# Patient Record
Sex: MALE | Race: WHITE | NOT HISPANIC OR LATINO | Employment: OTHER | URBAN - METROPOLITAN AREA
[De-identification: names, ages, dates, MRNs, and addresses within clinical notes are randomized per-mention and may not be internally consistent; named-entity substitution may affect disease eponyms.]

---

## 2017-11-07 ENCOUNTER — HISTORICAL (OUTPATIENT)
Dept: ADMINISTRATIVE | Facility: HOSPITAL | Age: 62
End: 2017-11-07

## 2018-01-02 ENCOUNTER — HISTORICAL (OUTPATIENT)
Dept: ADMINISTRATIVE | Facility: HOSPITAL | Age: 63
End: 2018-01-02

## 2018-07-18 ENCOUNTER — HISTORICAL (OUTPATIENT)
Dept: ADMINISTRATIVE | Facility: HOSPITAL | Age: 63
End: 2018-07-18

## 2018-07-18 LAB
ABS NEUT (OLG): 4.4
ALBUMIN SERPL-MCNC: 4 GM/DL (ref 3.4–5)
ALBUMIN/GLOB SERPL: 1.38 {RATIO} (ref 1.5–2.5)
ALP SERPL-CCNC: 55 UNIT/L (ref 38–126)
ALT SERPL-CCNC: 23 UNIT/L (ref 7–52)
APPEARANCE, UA: CLEAR
AST SERPL-CCNC: 21 UNIT/L (ref 15–37)
BACTERIA #/AREA URNS AUTO: ABNORMAL /HPF
BILIRUB SERPL-MCNC: 0.6 MG/DL (ref 0.2–1)
BILIRUB UR QL STRIP: NEGATIVE MG/DL
BILIRUBIN DIRECT+TOT PNL SERPL-MCNC: 0.1 MG/DL (ref 0–0.5)
BILIRUBIN DIRECT+TOT PNL SERPL-MCNC: 0.5 MG/DL
BUN SERPL-MCNC: 15 MG/DL (ref 7–18)
CALCIUM SERPL-MCNC: 8.6 MG/DL (ref 8.5–10)
CHLORIDE SERPL-SCNC: 108 MMOL/L (ref 98–107)
CHOLEST SERPL-MCNC: 207 MG/DL (ref 0–200)
CHOLEST/HDLC SERPL: 5.8 {RATIO}
CO2 SERPL-SCNC: 27 MMOL/L (ref 21–32)
COLOR UR: YELLOW
CREAT SERPL-MCNC: 0.94 MG/DL (ref 0.6–1.3)
CREAT UR-MCNC: 100 MG/DL
ERYTHROCYTE [DISTWIDTH] IN BLOOD BY AUTOMATED COUNT: 12.7 % (ref 11.5–17)
EST. AVERAGE GLUCOSE BLD GHB EST-MCNC: 105 MG/DL
GLOBULIN SER-MCNC: 2.8 GM/DL (ref 1.2–3)
GLUCOSE (UA): NEGATIVE MG/DL
GLUCOSE SERPL-MCNC: 106 MG/DL (ref 74–106)
HBA1C MFR BLD: 5.3 % (ref 4.4–6.4)
HCT VFR BLD AUTO: 45.6 % (ref 42–52)
HDLC SERPL-MCNC: 36 MG/DL (ref 35–60)
HGB BLD-MCNC: 16.1 GM/DL (ref 14–18)
HGB UR QL STRIP: NEGATIVE UNIT/L
KETONES UR QL STRIP: NEGATIVE MG/DL
LDLC SERPL CALC-MCNC: 136 MG/DL (ref 0–129)
LEUKOCYTE ESTERASE UR QL STRIP: ABNORMAL UNIT/L
LYMPHOCYTES # BLD AUTO: 3.2 X10(3)/MCL (ref 0.6–3.4)
LYMPHOCYTES NFR BLD AUTO: 38 % (ref 13–40)
MCH RBC QN AUTO: 33.5 PG (ref 27–31.2)
MCHC RBC AUTO-ENTMCNC: 35 GM/DL (ref 32–36)
MCV RBC AUTO: 95 FL (ref 80–94)
MICROALBUMIN UR-MCNC: 10 MG/L
MICROALBUMIN/CREAT RATIO PNL UR: <30 MG/GM
MONOCYTES # BLD AUTO: 0.9 X10(3)/MCL (ref 0–1.8)
MONOCYTES NFR BLD AUTO: 10.5 % (ref 0.1–24)
NEUTROPHILS NFR BLD AUTO: 51.5 % (ref 47–80)
NITRITE UR QL STRIP.AUTO: NEGATIVE
PH UR STRIP: 5.5 [PH]
PLATELET # BLD AUTO: 212 X10(3)/MCL (ref 130–400)
PMV BLD AUTO: 9.6 FL
POTASSIUM SERPL-SCNC: 4.4 MMOL/L (ref 3.5–5.1)
PROT SERPL-MCNC: 6.9 GM/DL (ref 6.4–8.2)
PROT UR QL STRIP: NEGATIVE MG/DL
PSA SERPL-MCNC: 0.38 NG/ML (ref 0–4.5)
RBC # BLD AUTO: 4.8 X10(6)/MCL (ref 4.7–6.1)
RBC #/AREA URNS HPF: ABNORMAL /HPF
SODIUM SERPL-SCNC: 143 MMOL/L (ref 136–145)
SP GR UR STRIP: 1.02
SQUAMOUS EPITHELIAL, UA: ABNORMAL /LPF
TRIGL SERPL-MCNC: 132 MG/DL (ref 30–150)
UROBILINOGEN UR STRIP-ACNC: 0.2 MG/DL
VLDLC SERPL CALC-MCNC: 26.4 MG/DL
WBC # SPEC AUTO: 8.5 X10(3)/MCL (ref 4.5–11.5)
WBC #/AREA URNS AUTO: ABNORMAL /[HPF]

## 2018-08-20 ENCOUNTER — HISTORICAL (OUTPATIENT)
Dept: ADMINISTRATIVE | Facility: HOSPITAL | Age: 63
End: 2018-08-20

## 2019-11-01 ENCOUNTER — HISTORICAL (OUTPATIENT)
Dept: ADMINISTRATIVE | Facility: HOSPITAL | Age: 64
End: 2019-11-01

## 2019-11-01 LAB
ABS NEUT (OLG): 5 X10(3)/MCL (ref 2.1–9.2)
ALBUMIN SERPL-MCNC: 3.9 GM/DL (ref 3.4–5)
ALBUMIN/GLOB SERPL: 1.5 {RATIO} (ref 1.5–2.5)
ALP SERPL-CCNC: 43 UNIT/L (ref 38–126)
ALT SERPL-CCNC: 23 UNIT/L (ref 7–52)
APPEARANCE, UA: ABNORMAL
AST SERPL-CCNC: 25 UNIT/L (ref 15–37)
BACTERIA #/AREA URNS AUTO: ABNORMAL /HPF
BILIRUB SERPL-MCNC: 0.7 MG/DL (ref 0.2–1)
BILIRUB UR QL STRIP: NEGATIVE MG/DL
BILIRUBIN DIRECT+TOT PNL SERPL-MCNC: 0.1 MG/DL (ref 0–0.5)
BILIRUBIN DIRECT+TOT PNL SERPL-MCNC: 0.6 MG/DL
BUN SERPL-MCNC: 15 MG/DL (ref 7–18)
CALCIUM SERPL-MCNC: 8.6 MG/DL (ref 8.5–10)
CHLORIDE SERPL-SCNC: 105 MMOL/L (ref 98–107)
CHOLEST SERPL-MCNC: 215 MG/DL (ref 0–200)
CHOLEST/HDLC SERPL: 6.5 {RATIO}
CO2 SERPL-SCNC: 29 MMOL/L (ref 21–32)
COLOR UR: YELLOW
CREAT SERPL-MCNC: 0.95 MG/DL (ref 0.6–1.3)
CREAT UR-MCNC: 200 MG/DL
ERYTHROCYTE [DISTWIDTH] IN BLOOD BY AUTOMATED COUNT: 12.9 % (ref 11.5–17)
EST. AVERAGE GLUCOSE BLD GHB EST-MCNC: 103 MG/DL
GLOBULIN SER-MCNC: 2.6 GM/DL (ref 1.2–3)
GLUCOSE (UA): NEGATIVE MG/DL
GLUCOSE SERPL-MCNC: 110 MG/DL (ref 74–106)
HBA1C MFR BLD: 5.2 % (ref 4.4–6.4)
HCT VFR BLD AUTO: 42.7 % (ref 42–52)
HDLC SERPL-MCNC: 33 MG/DL (ref 35–60)
HGB BLD-MCNC: 15.4 GM/DL (ref 14–18)
HGB UR QL STRIP: NEGATIVE UNIT/L
KETONES UR QL STRIP: NEGATIVE MG/DL
LDLC SERPL CALC-MCNC: 156 MG/DL (ref 0–129)
LEUKOCYTE ESTERASE UR QL STRIP: ABNORMAL UNIT/L
LYMPHOCYTES # BLD AUTO: 2.9 X10(3)/MCL (ref 0.6–3.4)
LYMPHOCYTES NFR BLD AUTO: 32.5 % (ref 13–40)
MCH RBC QN AUTO: 33.3 PG (ref 27–31.2)
MCHC RBC AUTO-ENTMCNC: 36 GM/DL (ref 32–36)
MCV RBC AUTO: 92 FL (ref 80–94)
MICROALBUMIN UR-MCNC: 30 MG/L
MICROALBUMIN/CREAT RATIO PNL UR: <30 MG/GM
MONOCYTES # BLD AUTO: 1 X10(3)/MCL (ref 0.1–1.3)
MONOCYTES NFR BLD AUTO: 11.2 % (ref 0.1–24)
NEUTROPHILS NFR BLD AUTO: 56.3 % (ref 47–80)
NITRITE UR QL STRIP.AUTO: NEGATIVE
PH UR STRIP: 6.5 [PH]
PLATELET # BLD AUTO: 226 X10(3)/MCL (ref 130–400)
PMV BLD AUTO: 9.5 FL (ref 9.4–12.4)
POTASSIUM SERPL-SCNC: 4.6 MMOL/L (ref 3.5–5.1)
PROT SERPL-MCNC: 6.5 GM/DL (ref 6.4–8.2)
PROT UR QL STRIP: NEGATIVE MG/DL
PSA SERPL-MCNC: 0.47 NG/ML (ref 0–4.5)
RBC # BLD AUTO: 4.62 X10(6)/MCL (ref 4.7–6.1)
RBC #/AREA URNS HPF: ABNORMAL /HPF
SODIUM SERPL-SCNC: 142 MMOL/L (ref 136–145)
SP GR UR STRIP: 1.01
SQUAMOUS EPITHELIAL, UA: ABNORMAL /LPF
TRIGL SERPL-MCNC: 148 MG/DL (ref 30–150)
TSH SERPL-ACNC: 3.68 MIU/ML (ref 0.35–4.94)
UROBILINOGEN UR STRIP-ACNC: 0.2 MG/DL
VLDLC SERPL CALC-MCNC: 29.6 MG/DL
WBC # SPEC AUTO: 8.9 X10(3)/MCL (ref 4.5–11.5)
WBC #/AREA URNS AUTO: ABNORMAL /[HPF]

## 2020-01-28 ENCOUNTER — HISTORICAL (OUTPATIENT)
Dept: ADMINISTRATIVE | Facility: HOSPITAL | Age: 65
End: 2020-01-28

## 2020-01-28 LAB
FLUAV AG NPH QL IA: POSITIVE
FLUBV AG NPH QL IA: NEGATIVE

## 2020-11-02 ENCOUNTER — HISTORICAL (OUTPATIENT)
Dept: ADMINISTRATIVE | Facility: HOSPITAL | Age: 65
End: 2020-11-02

## 2020-11-02 LAB
ABS NEUT (OLG): 4.1 X10(3)/MCL (ref 2.1–9.2)
ALBUMIN SERPL-MCNC: 3.9 GM/DL (ref 3.4–5)
ALBUMIN/GLOB SERPL: 1.56 {RATIO} (ref 1.5–2.5)
ALP SERPL-CCNC: 48 UNIT/L (ref 38–126)
ALT SERPL-CCNC: 25 UNIT/L (ref 7–52)
APPEARANCE, UA: ABNORMAL
AST SERPL-CCNC: 21 UNIT/L (ref 15–37)
BACTERIA #/AREA URNS AUTO: ABNORMAL /HPF
BILIRUB SERPL-MCNC: 0.7 MG/DL (ref 0.2–1)
BILIRUB UR QL STRIP: NEGATIVE MG/DL
BILIRUBIN DIRECT+TOT PNL SERPL-MCNC: 0.1 MG/DL (ref 0–0.5)
BILIRUBIN DIRECT+TOT PNL SERPL-MCNC: 0.6 MG/DL
BUN SERPL-MCNC: 20 MG/DL (ref 7–18)
CALCIUM SERPL-MCNC: 8.9 MG/DL (ref 8.5–10.1)
CHLORIDE SERPL-SCNC: 108 MMOL/L (ref 98–107)
CHOLEST SERPL-MCNC: 258 MG/DL (ref 0–200)
CHOLEST/HDLC SERPL: 6.6 {RATIO}
CO2 SERPL-SCNC: 25 MMOL/L (ref 21–32)
COLOR UR: YELLOW
CREAT SERPL-MCNC: 0.83 MG/DL (ref 0.6–1.3)
CREAT UR-MCNC: 100 MG/DL
ERYTHROCYTE [DISTWIDTH] IN BLOOD BY AUTOMATED COUNT: 14.9 % (ref 11.5–17)
EST. AVERAGE GLUCOSE BLD GHB EST-MCNC: 91 MG/DL
GLOBULIN SER-MCNC: 2.5 GM/DL (ref 1.2–3)
GLUCOSE (UA): NEGATIVE MG/DL
GLUCOSE SERPL-MCNC: 103 MG/DL (ref 74–106)
HBA1C MFR BLD: 4.8 % (ref 4.4–6.4)
HCT VFR BLD AUTO: 41.8 % (ref 42–52)
HDLC SERPL-MCNC: 39 MG/DL (ref 35–60)
HGB BLD-MCNC: 14.3 GM/DL (ref 14–18)
HGB UR QL STRIP: NEGATIVE UNIT/L
KETONES UR QL STRIP: NEGATIVE MG/DL
LDLC SERPL CALC-MCNC: 209 MG/DL (ref 0–129)
LEUKOCYTE ESTERASE UR QL STRIP: ABNORMAL UNIT/L
LYMPHOCYTES # BLD AUTO: 2.2 X10(3)/MCL (ref 0.6–3.4)
LYMPHOCYTES NFR BLD AUTO: 31 % (ref 13–40)
MCH RBC QN AUTO: 32.6 PG (ref 27–31.2)
MCHC RBC AUTO-ENTMCNC: 34 GM/DL (ref 32–36)
MCV RBC AUTO: 95 FL (ref 80–94)
MICROALBUMIN UR-MCNC: 10 MG/L
MICROALBUMIN/CREAT RATIO PNL UR: <30 MG/GM
MONOCYTES # BLD AUTO: 0.7 X10(3)/MCL (ref 0.1–1.3)
MONOCYTES NFR BLD AUTO: 10.7 % (ref 0.1–24)
NEUTROPHILS NFR BLD AUTO: 58.3 % (ref 47–80)
NITRITE UR QL STRIP.AUTO: NEGATIVE
PH UR STRIP: 6 [PH]
PLATELET # BLD AUTO: 206 X10(3)/MCL (ref 130–400)
PMV BLD AUTO: 9.9 FL (ref 9.4–12.4)
POTASSIUM SERPL-SCNC: 3.8 MMOL/L (ref 3.5–5.1)
PROT SERPL-MCNC: 6.4 GM/DL (ref 6.4–8.2)
PROT UR QL STRIP: NEGATIVE MG/DL
PSA SERPL-MCNC: 0.47 NG/ML (ref 0–4.5)
RBC # BLD AUTO: 4.39 X10(6)/MCL (ref 4.7–6.1)
RBC #/AREA URNS HPF: ABNORMAL /HPF
SODIUM SERPL-SCNC: 142 MMOL/L (ref 136–145)
SP GR UR STRIP: 1.02
SQUAMOUS EPITHELIAL, UA: ABNORMAL /LPF
T3FREE SERPL-MCNC: 2.09 PG/ML (ref 1.45–3.48)
T4 FREE SERPL-MCNC: 0.95 NG/DL (ref 0.76–1.46)
TRIGL SERPL-MCNC: 111 MG/DL (ref 30–150)
TSH SERPL-ACNC: 3.67 MIU/ML (ref 0.35–4.94)
UROBILINOGEN UR STRIP-ACNC: 0.2 MG/DL
VLDLC SERPL CALC-MCNC: 22.2 MG/DL
WBC # SPEC AUTO: 7 X10(3)/MCL (ref 4.5–11.5)
WBC #/AREA URNS AUTO: ABNORMAL /[HPF]

## 2021-03-03 ENCOUNTER — HISTORICAL (OUTPATIENT)
Dept: ADMINISTRATIVE | Facility: HOSPITAL | Age: 66
End: 2021-03-03

## 2021-05-07 ENCOUNTER — HISTORICAL (OUTPATIENT)
Dept: ADMINISTRATIVE | Facility: HOSPITAL | Age: 66
End: 2021-05-07

## 2021-06-16 ENCOUNTER — HISTORICAL (OUTPATIENT)
Dept: ADMINISTRATIVE | Facility: HOSPITAL | Age: 66
End: 2021-06-16

## 2021-11-15 ENCOUNTER — HISTORICAL (OUTPATIENT)
Dept: ADMINISTRATIVE | Facility: HOSPITAL | Age: 66
End: 2021-11-15

## 2021-11-15 LAB
ABS NEUT (OLG): 3.1 X10(3)/MCL (ref 2.1–9.2)
ALBUMIN SERPL-MCNC: 4 GM/DL (ref 3.4–5)
ALBUMIN/GLOB SERPL: 1.74 {RATIO} (ref 1.5–2.5)
ALP SERPL-CCNC: 46 UNIT/L (ref 38–126)
ALT SERPL-CCNC: 16 UNIT/L (ref 7–52)
APPEARANCE, UA: CLEAR
AST SERPL-CCNC: 20 UNIT/L (ref 15–37)
BACTERIA #/AREA URNS AUTO: ABNORMAL /HPF
BILIRUB SERPL-MCNC: 0.5 MG/DL (ref 0.2–1)
BILIRUB UR QL STRIP: NEGATIVE MG/DL
BILIRUBIN DIRECT+TOT PNL SERPL-MCNC: 0.1 MG/DL (ref 0–0.5)
BILIRUBIN DIRECT+TOT PNL SERPL-MCNC: 0.4 MG/DL
BUN SERPL-MCNC: 20 MG/DL (ref 7–18)
CALCIUM SERPL-MCNC: 9 MG/DL (ref 8.5–10.1)
CHLORIDE SERPL-SCNC: 107 MMOL/L (ref 98–107)
CHOLEST SERPL-MCNC: 277 MG/DL (ref 0–200)
CHOLEST/HDLC SERPL: 5.9 {RATIO}
CO2 SERPL-SCNC: 26 MMOL/L (ref 21–32)
COLOR UR: YELLOW
CREAT SERPL-MCNC: 0.86 MG/DL (ref 0.6–1.3)
CREAT UR-MCNC: 100 MG/DL
ERYTHROCYTE [DISTWIDTH] IN BLOOD BY AUTOMATED COUNT: 14.5 % (ref 11.5–17)
EST. AVERAGE GLUCOSE BLD GHB EST-MCNC: 88 MG/DL
GLOBULIN SER-MCNC: 2.4 GM/DL (ref 1.2–3)
GLUCOSE (UA): NEGATIVE MG/DL
GLUCOSE SERPL-MCNC: 105 MG/DL (ref 74–106)
HBA1C MFR BLD: 4.7 % (ref 4.4–6.4)
HCT VFR BLD AUTO: 41.2 % (ref 42–52)
HDLC SERPL-MCNC: 47 MG/DL (ref 35–60)
HGB BLD-MCNC: 13.9 GM/DL (ref 14–18)
HGB UR QL STRIP: NEGATIVE UNIT/L
KETONES UR QL STRIP: NEGATIVE MG/DL
LDLC SERPL CALC-MCNC: 160 MG/DL (ref 0–129)
LEUKOCYTE ESTERASE UR QL STRIP: ABNORMAL UNIT/L
LYMPHOCYTES # BLD AUTO: 2 X10(3)/MCL (ref 0.6–3.4)
LYMPHOCYTES NFR BLD AUTO: 35.2 % (ref 13–40)
MCH RBC QN AUTO: 32.2 PG (ref 27–31.2)
MCHC RBC AUTO-ENTMCNC: 34 GM/DL (ref 32–36)
MCV RBC AUTO: 95 FL (ref 80–94)
MICROALBUMIN UR-MCNC: 10 MG/L
MICROALBUMIN/CREAT RATIO PNL UR: <30 MG/GM
MONOCYTES # BLD AUTO: 0.5 X10(3)/MCL (ref 0.1–1.3)
MONOCYTES NFR BLD AUTO: 9.8 % (ref 0.1–24)
NEUTROPHILS NFR BLD AUTO: 55 % (ref 47–80)
NITRITE UR QL STRIP.AUTO: NEGATIVE
PH UR STRIP: 6.5 [PH]
PLATELET # BLD AUTO: 183 X10(3)/MCL (ref 130–400)
PMV BLD AUTO: 10.2 FL (ref 9.4–12.4)
POTASSIUM SERPL-SCNC: 4.1 MMOL/L (ref 3.5–5.1)
PROT SERPL-MCNC: 6.3 GM/DL (ref 6.4–8.2)
PROT UR QL STRIP: NEGATIVE MG/DL
PSA SERPL-MCNC: 0.4 NG/ML (ref 0–4.5)
RBC # BLD AUTO: 4.32 X10(6)/MCL (ref 4.7–6.1)
RBC #/AREA URNS HPF: ABNORMAL /HPF
SODIUM SERPL-SCNC: 142 MMOL/L (ref 136–145)
SP GR UR STRIP: 1.02
SQUAMOUS EPITHELIAL, UA: ABNORMAL /LPF
T3FREE SERPL-MCNC: 2.17 PG/ML (ref 1.45–3.48)
T4 FREE SERPL-MCNC: 0.9 NG/DL (ref 0.76–1.46)
TRIGL SERPL-MCNC: 107 MG/DL (ref 30–150)
TSH SERPL-ACNC: 3.01 MIU/ML (ref 0.35–4.94)
UROBILINOGEN UR STRIP-ACNC: 0.2 MG/DL
VLDLC SERPL CALC-MCNC: 21.4 MG/DL
WBC # SPEC AUTO: 5.6 X10(3)/MCL (ref 4.5–11.5)
WBC #/AREA URNS AUTO: ABNORMAL /[HPF]

## 2022-02-10 ENCOUNTER — HISTORICAL (OUTPATIENT)
Dept: ADMINISTRATIVE | Facility: HOSPITAL | Age: 67
End: 2022-02-10

## 2022-04-10 ENCOUNTER — HISTORICAL (OUTPATIENT)
Dept: ADMINISTRATIVE | Facility: HOSPITAL | Age: 67
End: 2022-04-10

## 2022-04-28 VITALS
DIASTOLIC BLOOD PRESSURE: 77 MMHG | SYSTOLIC BLOOD PRESSURE: 145 MMHG | HEIGHT: 66 IN | WEIGHT: 245.81 LBS | BODY MASS INDEX: 39.51 KG/M2 | OXYGEN SATURATION: 98 %

## 2022-04-30 NOTE — OP NOTE
Patient:   Arturo Ortiz             MRN: [id number]            FIN: [id number]               Age:   62 years     Sex:  Male     :  1955   Associated Diagnoses:   None   Author:   Ralph Hughes MD          Preoperative Diagnosis: Nuclear sclerotic cataract Right] eye.    Postoperative Diagnosis: Same.    Anesthesia: Local    Procedure: Phacoemulsification of cataract with posterior chamber implant of theRight] eye.    This patient is a [  ] year old who was given a diagnosis of severe cataracts of both eyes with [20/40  ] vision [ od ]. The risks and benefits of cataract surgery were explained; the patient was consented and desired to have the surgery done. The patient was given topical anesthesia using 1% Lidocaine Jelly and the patient was prepped and draped in sterile fashion.    The microscope was centered and focused in a temporal position and a super sharp blade was used to make a paracentesis in the corneal limbus. Dispersive Viscoelastic was then placed in the anterior chamber. A 2.4 mm keratome blade was used to enter the anterior chamber in a self-sealing type technique. The cystatome blade was then used to initiate a capsulorrhexis which was completed 360 degrees with Utrata forceps. BSS in an AC cannula was then used to perform hydrodissection and hydrodilineation. Phacoemulsification was then accomplished by creating a deep groove down the middle of the nucleus, then  it into 2 halves with the phacotip and the Rangel chopper and finishing the removal with a stop and chop technique.  The cortex was then removed using the I and A unit. All the lens material was removed without any tears to the anterior or posterior capsule. Cohesive Viscoelastic was then injected to inflate the capsular bag. A foldable lens was then injected into the capsular bag. The lens was observed to be securely placed into the bag. The I and A was then used to remove the remaining viscoelastic. A 10-0 Biosorb  incisional suture was not] placed. BSS through an A/C cannula was used to perform stromal hydration in the wound. BSS was also used again to reform the chamber and bring the eye to physiologic IOP.  The wound was checked for leaks and none were found. Copious Vigomox drop and 1-2 drops of, Prednisolone Acetate 1% were placed topically prior to removing the lid specular and drapes.  The drapes were then removed. The patient will be sent to recovery and instructed to use Vigamox, Ketorolac, and Predinsolone Acetate 1% three times a day as well as follow all instructions on the postoperative sheet given to them and explained after surgery. The patient will return to see Dr. Hughes at their scheduled appointment within 24-36 hours after surgery.      Ralph Hughes M.D.              LEONEL/michael           [date / time]

## 2022-04-30 NOTE — OP NOTE
DATE OF SURGERY:    01/02/2018    SURGEON:  Ralph Hughes MD    PREOPERATIVE DIAGNOSIS:  Malpositioned Toric intraocular lens right eye.    POSTOPERATIVE DIAGNOSIS:  Malpositioned Toric intraocular lens right eye.    PROCEDURE:  Repositioning of the Toric intraocular lens right eye.    PROCEDURE IN DETAIL:  After performing cataract surgery on the left eye successfully, we de-draped the patient, removed all the instruments, and re-prepped and draped the right eye, and brought in completely new instruments and phacoemulsification tubing et cetera.  A microscope was centered and focused temporally on the right eye and a Sinskey hook was used to tease open the previously made corneal incision in the temporal area.  Healon was then injected into the capsular bag to free up the lens.  Then an I&A handpiece was then used to remove the viscoelastic that had been placed into the eye as well as to rotate the eye into the correct position.  A degree marker was placed topically on the eye and fixed at 68 degrees.  The IOL was rotated into the 68 degree position and fluid was released from the eye to allow the capsular bag to collapse onto the lens thereby giving it a better chance to remain in position.  The eye was reinflated with BSS.  The incisions were then hydrated to a point where they did not leak.  Topical eye drops using Besivance and Prolensa were placed onto the eye and then the lid speculum and the drapes were removed.  The patient tolerated the entire procedure well without any complications.        ______________________________  MD GENIE Pinon/SLY  DD:  01/02/2018  Time:  11:42AM  DT:  01/03/2018  Time:  11:46AM  Job #:  [id number]

## 2022-04-30 NOTE — OP NOTE
Patient:   Arturo Ortiz             MRN: [id number]            FIN: [id number]               Age:   62 years     Sex:  Male     :  1955   Associated Diagnoses:   None   Author:   Ralph Hughes MD          Preoperative Diagnosis: Nuclear sclerotic cataract [Left  eye.    Postoperative Diagnosis: Same.    Anesthesia: Local    Procedure: Phacoemulsification of cataract with posterior chamber implant of the [Left/eye.    This patient is a [  ] year old who was given a diagnosis of severe cataracts of both eyes with [20/40  ] vision [os  ]. The risks and benefits of cataract surgery were explained; the patient was consented and desired to have the surgery done. The patient was given topical anesthesia using 1% Lidocaine Jelly and the patient was prepped and draped in sterile fashion.    The microscope was centered and focused in a temporal position and a super sharp blade was used to make a paracentesis in the corneal limbus. Dispersive Viscoelastic was then placed in the anterior chamber. A 2.4 mm keratome blade was used to enter the anterior chamber in a self-sealing type technique. The cystatome blade was then used to initiate a capsulorrhexis which was completed 360 degrees with Utrata forceps. BSS in an AC cannula was then used to perform hydrodissection and hydrodilineation. Phacoemulsification was then accomplished by creating a deep groove down the middle of the nucleus, then  it into 2 halves with the phacotip and the Rangel chopper and finishing the removal with a stop and chop technique.  The cortex was then removed using the I and A unit. All the lens material was removed without any tears to the anterior or posterior capsule. Cohesive Viscoelastic was then injected to inflate the capsular bag. A foldable lens was then injected into the capsular bag. The lens was observed to be securely placed into the bag. The I and A was then used to remove the remaining viscoelastic. A 10-0 Biosorb  incisional suture [-was not] placed. BSS through an A/C cannula was used to perform stromal hydration in the wound. BSS was also used again to reform the chamber and bring the eye to physiologic IOP.  The wound was checked for leaks and none were found. Copious Vigomox drop and 1-2 drops of, Prednisolone Acetate 1% were placed topically prior to removing the lid specular and drapes.  The drapes were then removed. The patient will be sent to recovery and instructed to use Vigamox, Ketorolac, and Predinsolone Acetate 1% three times a day as well as follow all instructions on the postoperative sheet given to them and explained after surgery. The patient will return to see Dr. Hughes at their scheduled appointment within 24-36 hours after surgery.      Ralph Hughes M.D.              LEONEL/michael           [date / time]

## 2022-07-14 ENCOUNTER — HOSPITAL ENCOUNTER (OUTPATIENT)
Dept: RADIOLOGY | Facility: CLINIC | Age: 67
Discharge: HOME | End: 2022-07-14
Attending: PHYSICIAN ASSISTANT
Payer: MEDICARE

## 2022-07-14 ENCOUNTER — OFFICE VISIT (OUTPATIENT)
Dept: ORTHOPEDICS | Facility: CLINIC | Age: 67
End: 2022-07-14
Payer: MEDICARE

## 2022-07-14 VITALS — BODY MASS INDEX: 38.89 KG/M2 | WEIGHT: 242 LBS | HEIGHT: 66 IN

## 2022-07-14 DIAGNOSIS — Z96.641 AFTERCARE FOLLOWING RIGHT HIP JOINT REPLACEMENT SURGERY: ICD-10-CM

## 2022-07-14 DIAGNOSIS — Z96.641 AFTERCARE FOLLOWING RIGHT HIP JOINT REPLACEMENT SURGERY: Primary | ICD-10-CM

## 2022-07-14 DIAGNOSIS — Z47.1 AFTERCARE FOLLOWING RIGHT HIP JOINT REPLACEMENT SURGERY: ICD-10-CM

## 2022-07-14 DIAGNOSIS — Z47.1 AFTERCARE FOLLOWING RIGHT HIP JOINT REPLACEMENT SURGERY: Primary | ICD-10-CM

## 2022-07-14 PROCEDURE — 1160F RVW MEDS BY RX/DR IN RCRD: CPT | Mod: CPTII,,, | Performed by: PHYSICIAN ASSISTANT

## 2022-07-14 PROCEDURE — 1101F PT FALLS ASSESS-DOCD LE1/YR: CPT | Mod: CPTII,,, | Performed by: PHYSICIAN ASSISTANT

## 2022-07-14 PROCEDURE — 73502 XR HIP WITH PELVIS WHEN PERFORMED, 2 OR 3  VIEWS RIGHT: ICD-10-PCS | Mod: RT,,, | Performed by: PHYSICIAN ASSISTANT

## 2022-07-14 PROCEDURE — 3288F FALL RISK ASSESSMENT DOCD: CPT | Mod: CPTII,,, | Performed by: PHYSICIAN ASSISTANT

## 2022-07-14 PROCEDURE — 3008F PR BODY MASS INDEX (BMI) DOCUMENTED: ICD-10-PCS | Mod: CPTII,,, | Performed by: PHYSICIAN ASSISTANT

## 2022-07-14 PROCEDURE — 1159F MED LIST DOCD IN RCRD: CPT | Mod: CPTII,,, | Performed by: PHYSICIAN ASSISTANT

## 2022-07-14 PROCEDURE — 1101F PR PT FALLS ASSESS DOC 0-1 FALLS W/OUT INJ PAST YR: ICD-10-PCS | Mod: CPTII,,, | Performed by: PHYSICIAN ASSISTANT

## 2022-07-14 PROCEDURE — 1160F PR REVIEW ALL MEDS BY PRESCRIBER/CLIN PHARMACIST DOCUMENTED: ICD-10-PCS | Mod: CPTII,,, | Performed by: PHYSICIAN ASSISTANT

## 2022-07-14 PROCEDURE — 1159F PR MEDICATION LIST DOCUMENTED IN MEDICAL RECORD: ICD-10-PCS | Mod: CPTII,,, | Performed by: PHYSICIAN ASSISTANT

## 2022-07-14 PROCEDURE — 99024 PR POST-OP FOLLOW-UP VISIT: ICD-10-PCS | Mod: ,,, | Performed by: PHYSICIAN ASSISTANT

## 2022-07-14 PROCEDURE — 73502 X-RAY EXAM HIP UNI 2-3 VIEWS: CPT | Mod: RT,,, | Performed by: PHYSICIAN ASSISTANT

## 2022-07-14 PROCEDURE — 3288F PR FALLS RISK ASSESSMENT DOCUMENTED: ICD-10-PCS | Mod: CPTII,,, | Performed by: PHYSICIAN ASSISTANT

## 2022-07-14 PROCEDURE — 99024 POSTOP FOLLOW-UP VISIT: CPT | Mod: ,,, | Performed by: PHYSICIAN ASSISTANT

## 2022-07-14 PROCEDURE — 3008F BODY MASS INDEX DOCD: CPT | Mod: CPTII,,, | Performed by: PHYSICIAN ASSISTANT

## 2022-07-25 NOTE — PROGRESS NOTES
"Chief Complaint:   Chief Complaint   Patient presents with    Follow-up     RIGHT SARWAT ON 5/18/21 , SOME DAYS ARE NOT THE SAME, STATES HE FEELS HIGHER IN THE RIGHT SIDE, WHEN HE TAKES A STEP HE HAS TO LIMP.        History of present illness:    This is a 67 y.o. year old male who complains of overall he does feel that his bowels still this point.  Otherwise has no complaints has been ambulating without assistive device    Review of Systems:    Constitution:   Denies chills, fever, and sweats.  HENT:   Denies headaches or blurry vision.  Cardiovascular:  Denies chest pain or irregular heart beat.  Respiratory:   Denies cough or shortness of breath.  Gastrointestinal:  Denies abdominal pain, nausea, or vomiting.  Musculoskeletal:   Denies muscle cramps.  Neurological:   Denies dizziness or focal weakness.  Psychiatric/Behavior: Normal mental status.  Hematology/Lymph:  Denies bleeding problem or easy bruising/bleeding.  Skin:    Denies rash or suspicious lesions.    Examination:    Vital Signs:    Vitals:    07/14/22 0859   Weight: 109.8 kg (242 lb)   Height: 5' 6" (1.676 m)       Body mass index is 39.06 kg/m².    Constitution:   Well-developed, well nourished patient in no acute distress.  Neurological:   Alert and oriented x 3 and cooperative to examination.     Psychiatric/Behavior: Normal mental status.  Respiratory:   No shortness of breath.  Eyes:    Extraoccular muscles intact  Skin:    No scars, rash or suspicious lesions.    Physical Exam:   Right Hip    No swelling, redness or increased heat.    Wound healed normal.     Motion was normal.     Weakness of the  hip was observed.    Sensation intact distally    Intact pedal pulses    Patient does still have a mild lurch with gait due to gluteus weakness otherwise normal     Assessment: Aftercare following right hip joint replacement surgery  -     X-Ray Hip 2 or 3 views Right (with Pelvis when performed); Future; Expected date: 07/14/2022         Plan:  " Patient with home exercise program in a daily ambulation schedule for strengthening.  Will have follow-up for his regular scheduled appointment LS has a problem prior to his appointment he will call the office          DISCLAIMER: This note may have been dictated using voice recognition software and may contain grammatical errors.     NOTE: Consult report sent to referring provider via [employer].

## 2022-11-22 PROBLEM — E66.9 CLASS 2 OBESITY WITH BODY MASS INDEX (BMI) OF 35.0 TO 35.9 IN ADULT: Status: ACTIVE | Noted: 2022-11-22

## 2022-11-22 PROBLEM — E66.812 CLASS 2 OBESITY WITH BODY MASS INDEX (BMI) OF 35.0 TO 35.9 IN ADULT: Status: ACTIVE | Noted: 2022-11-22

## 2022-11-22 PROBLEM — E11.9 DIABETES MELLITUS: Status: ACTIVE | Noted: 2022-11-22

## 2022-11-22 PROBLEM — R79.89 HIGH THYROID STIMULATING HORMONE (TSH) LEVEL: Status: ACTIVE | Noted: 2022-11-22

## 2022-11-22 PROBLEM — E03.9 HYPOTHYROID: Status: ACTIVE | Noted: 2022-11-22

## 2022-11-22 PROBLEM — M15.9 PRIMARY OSTEOARTHRITIS INVOLVING MULTIPLE JOINTS: Status: ACTIVE | Noted: 2022-11-22

## 2022-11-22 PROBLEM — E78.00 HYPERCHOLESTEROLEMIA: Status: ACTIVE | Noted: 2022-11-22

## 2022-11-22 PROBLEM — M15.0 PRIMARY OSTEOARTHRITIS INVOLVING MULTIPLE JOINTS: Status: ACTIVE | Noted: 2022-11-22

## 2022-12-13 ENCOUNTER — PATIENT MESSAGE (OUTPATIENT)
Dept: RESEARCH | Facility: HOSPITAL | Age: 67
End: 2022-12-13
Payer: MEDICARE

## 2023-09-07 ENCOUNTER — PATIENT MESSAGE (OUTPATIENT)
Dept: RESEARCH | Facility: HOSPITAL | Age: 68
End: 2023-09-07
Payer: MEDICARE

## 2023-11-29 PROBLEM — D53.9 MACROCYTIC ANEMIA: Status: ACTIVE | Noted: 2023-11-29

## 2023-12-06 PROBLEM — Z96.641 HISTORY OF RIGHT HIP REPLACEMENT: Status: ACTIVE | Noted: 2023-12-06

## 2023-12-07 PROBLEM — K80.20 CALCULUS OF GALLBLADDER WITHOUT CHOLECYSTITIS WITHOUT OBSTRUCTION: Status: ACTIVE | Noted: 2023-12-07

## 2023-12-07 PROBLEM — E66.01 CLASS 2 SEVERE OBESITY DUE TO EXCESS CALORIES WITH SERIOUS COMORBIDITY AND BODY MASS INDEX (BMI) OF 37.0 TO 37.9 IN ADULT: Status: ACTIVE | Noted: 2022-11-22

## 2023-12-23 ENCOUNTER — HOSPITAL ENCOUNTER (INPATIENT)
Facility: HOSPITAL | Age: 68
LOS: 10 days | Discharge: HOME | DRG: 871 | End: 2024-01-02
Attending: STUDENT IN AN ORGANIZED HEALTH CARE EDUCATION/TRAINING PROGRAM | Admitting: INTERNAL MEDICINE
Payer: MEDICARE

## 2023-12-23 DIAGNOSIS — R78.81 BACTEREMIA: ICD-10-CM

## 2023-12-23 DIAGNOSIS — R78.81 BACTEREMIA: Primary | ICD-10-CM

## 2023-12-23 DIAGNOSIS — A41.9 SEPSIS, DUE TO UNSPECIFIED ORGANISM, UNSPECIFIED WHETHER ACUTE ORGAN DYSFUNCTION PRESENT: ICD-10-CM

## 2023-12-23 DIAGNOSIS — R50.9 FEVER: ICD-10-CM

## 2023-12-23 DIAGNOSIS — I38 ENDOCARDITIS: ICD-10-CM

## 2023-12-23 DIAGNOSIS — R07.9 CHEST PAIN: ICD-10-CM

## 2023-12-23 DIAGNOSIS — I82.409 DVT (DEEP VENOUS THROMBOSIS): ICD-10-CM

## 2023-12-23 PROBLEM — U07.1 COVID: Status: ACTIVE | Noted: 2023-12-23

## 2023-12-23 LAB
ALBUMIN SERPL-MCNC: 2.7 G/DL (ref 3.4–4.8)
ALBUMIN/GLOB SERPL: 0.7 RATIO (ref 1.1–2)
ALP SERPL-CCNC: 57 UNIT/L (ref 40–150)
ALT SERPL-CCNC: 67 UNIT/L (ref 0–55)
APPEARANCE UR: CLEAR
AST SERPL-CCNC: 48 UNIT/L (ref 5–34)
BACTERIA #/AREA URNS AUTO: ABNORMAL /HPF
BASOPHILS # BLD AUTO: 0.05 X10(3)/MCL
BASOPHILS NFR BLD AUTO: 0.5 %
BILIRUB SERPL-MCNC: 0.5 MG/DL
BILIRUB UR QL STRIP.AUTO: NEGATIVE
BUN SERPL-MCNC: 18.2 MG/DL (ref 8.4–25.7)
CALCIUM SERPL-MCNC: 7.9 MG/DL (ref 8.8–10)
CHLORIDE SERPL-SCNC: 105 MMOL/L (ref 98–107)
CO2 SERPL-SCNC: 22 MMOL/L (ref 23–31)
COLOR UR AUTO: ABNORMAL
CREAT SERPL-MCNC: 0.98 MG/DL (ref 0.73–1.18)
EOSINOPHIL # BLD AUTO: 0.02 X10(3)/MCL (ref 0–0.9)
EOSINOPHIL NFR BLD AUTO: 0.2 %
ERYTHROCYTE [DISTWIDTH] IN BLOOD BY AUTOMATED COUNT: 14.1 % (ref 11.5–17)
GFR SERPLBLD CREATININE-BSD FMLA CKD-EPI: >60 MLS/MIN/1.73/M2
GLOBULIN SER-MCNC: 3.7 GM/DL (ref 2.4–3.5)
GLUCOSE SERPL-MCNC: 125 MG/DL (ref 82–115)
GLUCOSE UR QL STRIP.AUTO: NORMAL
HCT VFR BLD AUTO: 35.2 % (ref 42–52)
HGB BLD-MCNC: 11.9 G/DL (ref 14–18)
HYALINE CASTS #/AREA URNS LPF: ABNORMAL /LPF
IMM GRANULOCYTES # BLD AUTO: 0.06 X10(3)/MCL (ref 0–0.04)
IMM GRANULOCYTES NFR BLD AUTO: 0.6 %
KETONES UR QL STRIP.AUTO: NEGATIVE
LACTATE SERPL-SCNC: 1.2 MMOL/L (ref 0.5–2.2)
LEUKOCYTE ESTERASE UR QL STRIP.AUTO: 25
LYMPHOCYTES # BLD AUTO: 0.98 X10(3)/MCL (ref 0.6–4.6)
LYMPHOCYTES NFR BLD AUTO: 10.1 %
MCH RBC QN AUTO: 31.7 PG (ref 27–31)
MCHC RBC AUTO-ENTMCNC: 33.8 G/DL (ref 33–36)
MCV RBC AUTO: 93.9 FL (ref 80–94)
MONOCYTES # BLD AUTO: 0.75 X10(3)/MCL (ref 0.1–1.3)
MONOCYTES NFR BLD AUTO: 7.7 %
NEUTROPHILS # BLD AUTO: 7.84 X10(3)/MCL (ref 2.1–9.2)
NEUTROPHILS NFR BLD AUTO: 80.9 %
NITRITE UR QL STRIP.AUTO: NEGATIVE
NRBC BLD AUTO-RTO: 0 %
PH UR STRIP.AUTO: 5.5 [PH]
PLATELET # BLD AUTO: 153 X10(3)/MCL (ref 130–400)
PMV BLD AUTO: 9.6 FL (ref 7.4–10.4)
POTASSIUM SERPL-SCNC: 4.3 MMOL/L (ref 3.5–5.1)
PROT SERPL-MCNC: 6.4 GM/DL (ref 5.8–7.6)
PROT UR QL STRIP.AUTO: NEGATIVE
RBC # BLD AUTO: 3.75 X10(6)/MCL (ref 4.7–6.1)
RBC #/AREA URNS AUTO: ABNORMAL /HPF
RBC UR QL AUTO: NEGATIVE
SODIUM SERPL-SCNC: 136 MMOL/L (ref 136–145)
SP GR UR STRIP.AUTO: 1.01 (ref 1–1.03)
SQUAMOUS #/AREA URNS LPF: ABNORMAL /HPF
UROBILINOGEN UR STRIP-ACNC: NORMAL
WBC # SPEC AUTO: 9.7 X10(3)/MCL (ref 4.5–11.5)
WBC #/AREA URNS AUTO: ABNORMAL /HPF

## 2023-12-23 PROCEDURE — 80053 COMPREHEN METABOLIC PANEL: CPT | Performed by: PHYSICIAN ASSISTANT

## 2023-12-23 PROCEDURE — 85379 FIBRIN DEGRADATION QUANT: CPT | Performed by: INTERNAL MEDICINE

## 2023-12-23 PROCEDURE — 81015 MICROSCOPIC EXAM OF URINE: CPT | Performed by: PHYSICIAN ASSISTANT

## 2023-12-23 PROCEDURE — 96365 THER/PROPH/DIAG IV INF INIT: CPT

## 2023-12-23 PROCEDURE — 96367 TX/PROPH/DG ADDL SEQ IV INF: CPT

## 2023-12-23 PROCEDURE — 11000001 HC ACUTE MED/SURG PRIVATE ROOM

## 2023-12-23 PROCEDURE — 86140 C-REACTIVE PROTEIN: CPT | Performed by: INTERNAL MEDICINE

## 2023-12-23 PROCEDURE — 85025 COMPLETE CBC W/AUTO DIFF WBC: CPT | Performed by: PHYSICIAN ASSISTANT

## 2023-12-23 PROCEDURE — 25000003 PHARM REV CODE 250: Performed by: STUDENT IN AN ORGANIZED HEALTH CARE EDUCATION/TRAINING PROGRAM

## 2023-12-23 PROCEDURE — 83605 ASSAY OF LACTIC ACID: CPT | Performed by: PHYSICIAN ASSISTANT

## 2023-12-23 PROCEDURE — 63600175 PHARM REV CODE 636 W HCPCS: Performed by: STUDENT IN AN ORGANIZED HEALTH CARE EDUCATION/TRAINING PROGRAM

## 2023-12-23 PROCEDURE — 87154 CUL TYP ID BLD PTHGN 6+ TRGT: CPT | Performed by: PHYSICIAN ASSISTANT

## 2023-12-23 PROCEDURE — 27000207 HC ISOLATION

## 2023-12-23 PROCEDURE — 63600175 PHARM REV CODE 636 W HCPCS: Performed by: PHYSICIAN ASSISTANT

## 2023-12-23 PROCEDURE — 96366 THER/PROPH/DIAG IV INF ADDON: CPT

## 2023-12-23 PROCEDURE — 87077 CULTURE AEROBIC IDENTIFY: CPT | Performed by: PHYSICIAN ASSISTANT

## 2023-12-23 PROCEDURE — 82728 ASSAY OF FERRITIN: CPT | Performed by: INTERNAL MEDICINE

## 2023-12-23 PROCEDURE — 99285 EMERGENCY DEPT VISIT HI MDM: CPT | Mod: 25

## 2023-12-23 PROCEDURE — 85652 RBC SED RATE AUTOMATED: CPT | Performed by: INTERNAL MEDICINE

## 2023-12-23 PROCEDURE — 96360 HYDRATION IV INFUSION INIT: CPT | Mod: 59

## 2023-12-23 RX ORDER — IBUPROFEN 400 MG/1
400 TABLET ORAL EVERY 6 HOURS PRN
Status: DISCONTINUED | OUTPATIENT
Start: 2023-12-24 | End: 2024-01-02 | Stop reason: HOSPADM

## 2023-12-23 RX ORDER — POLYETHYLENE GLYCOL 3350 17 G/17G
17 POWDER, FOR SOLUTION ORAL 2 TIMES DAILY PRN
Status: DISCONTINUED | OUTPATIENT
Start: 2023-12-24 | End: 2024-01-02 | Stop reason: HOSPADM

## 2023-12-23 RX ORDER — MAG HYDROX/ALUMINUM HYD/SIMETH 200-200-20
30 SUSPENSION, ORAL (FINAL DOSE FORM) ORAL 4 TIMES DAILY PRN
Status: DISCONTINUED | OUTPATIENT
Start: 2023-12-24 | End: 2024-01-02 | Stop reason: HOSPADM

## 2023-12-23 RX ORDER — SODIUM CHLORIDE 0.9 % (FLUSH) 0.9 %
10 SYRINGE (ML) INJECTION
Status: DISCONTINUED | OUTPATIENT
Start: 2023-12-24 | End: 2024-01-02 | Stop reason: HOSPADM

## 2023-12-23 RX ORDER — TALC
6 POWDER (GRAM) TOPICAL NIGHTLY PRN
Status: DISCONTINUED | OUTPATIENT
Start: 2023-12-24 | End: 2024-01-02 | Stop reason: HOSPADM

## 2023-12-23 RX ORDER — LEVOTHYROXINE SODIUM 50 UG/1
50 TABLET ORAL
Status: DISCONTINUED | OUTPATIENT
Start: 2023-12-24 | End: 2024-01-02 | Stop reason: HOSPADM

## 2023-12-23 RX ORDER — AMOXICILLIN 250 MG
2 CAPSULE ORAL 2 TIMES DAILY PRN
Status: DISCONTINUED | OUTPATIENT
Start: 2023-12-24 | End: 2024-01-02 | Stop reason: HOSPADM

## 2023-12-23 RX ORDER — PROCHLORPERAZINE EDISYLATE 5 MG/ML
5 INJECTION INTRAMUSCULAR; INTRAVENOUS EVERY 6 HOURS PRN
Status: DISCONTINUED | OUTPATIENT
Start: 2023-12-24 | End: 2024-01-02 | Stop reason: HOSPADM

## 2023-12-23 RX ORDER — ENOXAPARIN SODIUM 100 MG/ML
40 INJECTION SUBCUTANEOUS EVERY 24 HOURS
Status: DISCONTINUED | OUTPATIENT
Start: 2023-12-24 | End: 2023-12-30

## 2023-12-23 RX ORDER — ACETAMINOPHEN 500 MG
1000 TABLET ORAL EVERY 6 HOURS PRN
Status: DISCONTINUED | OUTPATIENT
Start: 2023-12-24 | End: 2024-01-02 | Stop reason: HOSPADM

## 2023-12-23 RX ORDER — ACETAMINOPHEN 500 MG
1000 TABLET ORAL
Status: COMPLETED | OUTPATIENT
Start: 2023-12-23 | End: 2023-12-23

## 2023-12-23 RX ORDER — ONDANSETRON 2 MG/ML
4 INJECTION INTRAMUSCULAR; INTRAVENOUS EVERY 4 HOURS PRN
Status: DISCONTINUED | OUTPATIENT
Start: 2023-12-24 | End: 2024-01-02 | Stop reason: HOSPADM

## 2023-12-23 RX ORDER — ACETAMINOPHEN 325 MG/1
650 TABLET ORAL EVERY 4 HOURS PRN
Status: DISCONTINUED | OUTPATIENT
Start: 2023-12-24 | End: 2024-01-02 | Stop reason: HOSPADM

## 2023-12-23 RX ADMIN — PIPERACILLIN AND TAZOBACTAM 4.5 G: 4; .5 INJECTION, POWDER, LYOPHILIZED, FOR SOLUTION INTRAVENOUS; PARENTERAL at 05:12

## 2023-12-23 RX ADMIN — VANCOMYCIN HYDROCHLORIDE 2000 MG: 500 INJECTION, POWDER, LYOPHILIZED, FOR SOLUTION INTRAVENOUS at 06:12

## 2023-12-23 RX ADMIN — ACETAMINOPHEN 1000 MG: 500 TABLET ORAL at 07:12

## 2023-12-23 RX ADMIN — SODIUM CHLORIDE, POTASSIUM CHLORIDE, SODIUM LACTATE AND CALCIUM CHLORIDE 1000 ML: 600; 310; 30; 20 INJECTION, SOLUTION INTRAVENOUS at 04:12

## 2023-12-23 NOTE — FIRST PROVIDER EVALUATION
Medical screening examination initiated.  I have conducted a focused provider triage encounter, findings are as follows:    Brief history of present illness:  68-year-old male presents to ED for evaluation of fever for the past several days.  Diagnosed with COVID on 12/20/2023.  Seen in the ED 3 days ago with positive blood cultures and called to return to ED.    Vitals:    12/23/23 1603   BP: 138/69   Pulse: 91   Resp: 18   Temp: 97.8 °F (36.6 °C)   TempSrc: Oral   SpO2: 98%   Weight: 120.2 kg (265 lb)       Pertinent physical exam:  Patient is awake and alert and oriented.  Ambulatory to triage.  In no acute distress.      Brief workup plan:  labs, lactic, blood cultures, CXR    Preliminary workup initiated; this workup will be continued and followed by the physician or advanced practice provider that is assigned to the patient when roomed.

## 2023-12-23 NOTE — ED PROVIDER NOTES
Encounter Date: 12/23/2023    SCRIBE #1 NOTE: I, Luisa Lin, am scribing for, and in the presence of,  Jeff Parsons MD. I have scribed the following portions of the note - Other sections scribed: HPI, ROS, PE.       History     Chief Complaint   Patient presents with    Fever     Seen here X 3 days ago for fever and generalized weakness. Discharged home but called to come back to ER for positive Blood cultures. States still running fever at home. States took 1 g tylenol approx 1:30 PM. States no new symptoms since last visit.      68 year old male with history of DM, HTN, macrocytosis, and neuropathy presents to the ED with complaints of a fever onset one month ago.  Daily fever.  Patient has completed doxycycline, and Cipro with persistent fever.  He was seen in the emergency department 4 days ago but not seen by provider due to long wait times.  He had blood cultures obtained which have both grown out positive for aerococcus.  He also has a positive COVID swab from that day but denies any shortness of breath or upper respiratory symptoms.  He complains of mild rhinorrhea but notes that the fever is his main concern in the fever has persisted for the last month.   Pt denies a cough. He has been rotating Tylenol and Ibuprofen every 3 hours for the last few month.     The history is provided by the patient and the spouse. No  was used.     Review of patient's allergies indicates:  No Known Allergies  Past Medical History:   Diagnosis Date    Avascular necrosis of right femoral head     DM (diabetes mellitus)     Essential (primary) hypertension     Hypercholesteremia     Macrocytosis     Osteoarthritis of right hip     Osteoarthritis of right knee     Peripheral neuropathy      Past Surgical History:   Procedure Laterality Date    TOTAL HIP ARTHROPLASTY Right 05/18/2021     Family History   Problem Relation Age of Onset    Lung cancer Mother         Smoker    Hodgkin's lymphoma Father      Lung cancer Sister     Breast cancer Sister      Social History     Tobacco Use    Smoking status: Never    Smokeless tobacco: Never   Substance Use Topics    Alcohol use: Yes     Comment: occ    Drug use: Never     Review of Systems   Constitutional:  Positive for fever.   HENT:  Positive for rhinorrhea. Negative for sore throat.    Eyes:  Negative for visual disturbance.   Respiratory:  Negative for cough and shortness of breath.    Cardiovascular:  Negative for chest pain.   Gastrointestinal:  Negative for abdominal pain.   Genitourinary:  Negative for dysuria.   Musculoskeletal:  Negative for joint swelling.   Skin:  Negative for rash.   Neurological:  Negative for weakness.   Psychiatric/Behavioral:  Negative for confusion.    All other systems reviewed and are negative.      Physical Exam     Initial Vitals [12/23/23 1603]   BP Pulse Resp Temp SpO2   138/69 91 18 97.8 °F (36.6 °C) 98 %      MAP       --         Physical Exam    Nursing note and vitals reviewed.  Constitutional: He appears well-developed and well-nourished. He is not diaphoretic. No distress.   HENT:   Head: Normocephalic and atraumatic.   Eyes: Conjunctivae and EOM are normal. Pupils are equal, round, and reactive to light.   Neck:   Normal range of motion.  Cardiovascular:  Normal rate, regular rhythm, normal heart sounds and intact distal pulses.           No murmur heard.  Pulmonary/Chest: Breath sounds normal. No respiratory distress. He has no wheezes. He has no rales.   Abdominal: Abdomen is soft. He exhibits no distension. There is no abdominal tenderness.   Musculoskeletal:         General: No tenderness or edema. Normal range of motion.      Cervical back: Normal range of motion.     Neurological: He is alert and oriented to person, place, and time. No cranial nerve deficit.   Skin: Skin is warm and dry. Capillary refill takes less than 2 seconds. No rash noted. No erythema.   Psychiatric: He has a normal mood and affect.         ED  Course   Procedures  Labs Reviewed   COMPREHENSIVE METABOLIC PANEL - Abnormal; Notable for the following components:       Result Value    Carbon Dioxide 22 (*)     Glucose Level 125 (*)     Calcium Level Total 7.9 (*)     Albumin Level 2.7 (*)     Globulin 3.7 (*)     Albumin/Globulin Ratio 0.7 (*)     Alanine Aminotransferase 67 (*)     Aspartate Aminotransferase 48 (*)     All other components within normal limits   URINALYSIS, REFLEX TO URINE CULTURE - Abnormal; Notable for the following components:    Leukocyte Esterase, UA 25 (*)     Hyaline Casts, UA 0-2 (*)     All other components within normal limits   CBC WITH DIFFERENTIAL - Abnormal; Notable for the following components:    RBC 3.75 (*)     Hgb 11.9 (*)     Hct 35.2 (*)     MCH 31.7 (*)     IG# 0.06 (*)     All other components within normal limits   LACTIC ACID, PLASMA - Normal   BLOOD CULTURE [facility]   BLOOD CULTURE [facility]   CBC W/ AUTO DIFFERENTIAL    Narrative:     The following orders were created for panel order CBC auto differential.  Procedure                               Abnormality         Status                     ---------                               -----------         ------                     CBC with Differential[[id number]]       Abnormal            Final result                 Please view results for these tests on the individual orders.   C-REACTIVE PROTEIN   D DIMER, QUANTITATIVE   SEDIMENTATION RATE   FERRITIN          Imaging Results              X-Ray Chest 1 View (Final result)  Result time 12/23/23 17:07:16      Final result by Efe Bahena MD (12/23/23 17:07:16)                   Impression:      NO ACUTE CARDIOPULMONARY PROCESS IDENTIFIED.      Electronically signed by: Efe Bahena  Date:    12/23/2023  Time:    17:07               Narrative:    EXAMINATION:  XR CHEST 1 VIEW    CLINICAL HISTORY:  Fever, unspecified    TECHNIQUE:  One view    COMPARISON:  December 20, 2023.    FINDINGS:  Cardiopericardial silhouette is within  normal limits.  Portion of left lower peripheral chest was excluded on the radiograph.  No acute dense focal or segmental consolidation, congestive process, pleural effusions or pneumothorax.                                       Medications   remdesivir 200 mg in sodium chloride 0.9% 250 mL infusion (has no administration in time range)     Followed by   remdesivir 100 mg in sodium chloride 0.9% 100 mL infusion (has no administration in time range)   sodium chloride 0.9% flush 10 mL (has no administration in time range)   melatonin tablet 6 mg (has no administration in time range)   ondansetron injection 4 mg (has no administration in time range)   prochlorperazine injection Soln 5 mg (has no administration in time range)   polyethylene glycol packet 17 g (has no administration in time range)   senna-docusate 8.6-50 mg per tablet 2 tablet (has no administration in time range)   acetaminophen tablet 650 mg (has no administration in time range)   aluminum-magnesium hydroxide-simethicone 200-200-20 mg/5 mL suspension 30 mL (has no administration in time range)   acetaminophen tablet 1,000 mg (has no administration in time range)   enoxaparin injection 40 mg (has no administration in time range)   ibuprofen tablet 400 mg (has no administration in time range)   levothyroxine tablet 50 mcg (has no administration in time range)   multivitamin tablet (has no administration in time range)   cefTRIAXone (ROCEPHIN) 2 g in dextrose 5 % in water (D5W) 100 mL IVPB (MB+) (has no administration in time range)   vancomycin - pharmacy to dose (has no administration in time range)   lactated ringers bolus 1,000 mL (0 mLs Intravenous Stopped 12/23/23 1743)   vancomycin 2 g in dextrose 5 % 500 mL IVPB (0 mg Intravenous Stopped 12/23/23 2019)   piperacillin-tazobactam (ZOSYN) 4.5 g in dextrose 5 % in water (D5W) 100 mL IVPB (MB+) (0 g Intravenous Stopped 12/23/23 1806)   acetaminophen tablet 1,000 mg (1,000 mg Oral Given 12/23/23 1948)      Medical Decision Making  Problems Addressed:  Bacteremia: acute illness or injury that poses a threat to life or bodily functions  Fever: acute illness or injury that poses a threat to life or bodily functions  Sepsis, due to unspecified organism, unspecified whether acute organ dysfunction present: acute illness or injury that poses a threat to life or bodily functions    Amount and/or Complexity of Data Reviewed  Independent Historian: spouse     Details: Pt's wife reports that he was diagnosed with a UTI and has completed a cycle of Cipro and doxycycline since the onset.  External Data Reviewed: labs and notes.  Labs: ordered.  Radiology: ordered and independent interpretation performed.    Risk  OTC drugs.  Prescription drug management.  Decision regarding hospitalization.              Attending Attestation:           Physician Attestation for Scribe:  Physician Attestation Statement for Scribe #1: I, Jeff Parsons MD, reviewed documentation, as scribed by Luisa Lin in my presence, and it is both accurate and complete.                        Medical Decision Making:   History:   I obtained history from: someone other than patient.       <> Summary of History: Collateral from the patient's wife.  Old Medical Records: I decided to obtain old medical records.  Old Records Summarized: records from clinic visits, records from previous admission(s) and records from another hospital.       <> Summary of Records: Reviewed old records including previous blood culture results which showed Enterococcus  Initial Assessment:   Fever  Differential Diagnosis:   Judging by the patient's chief complaint and pertinent history, the patient has the following possible differential diagnoses, including but not limited to the following.  Some of these are deemed to be lower likelihood and some more likely based on my physical exam and history combined with possible lab work and/or imaging studies.   Please see the pertinent  "studies, and refer to the HPI.  Some of these diagnoses will take further evaluation to fully rule out, perhaps as an outpatient and the patient was encouraged to follow up when discharged for more comprehensive evaluation.    Viral syndrome, COVID, flu, otitis media UTI, intraabdominal infection, bacterial pharyngitis, pneumonia, sepsis, pyelonephritis, cellulitis, abscess,   Clinical Tests:   Lab Tests: Ordered and Reviewed  Radiological Study: Reviewed and Ordered  Sepsis Perfusion Assessment: "I attest a sepsis perfusion exam was performed within 6 hours of sepsis, severe sepsis, or septic shock presentation, following fluid resuscitation."    Sepsis Perfusion Assessment Complete: 12/23/2023 5:25 PM    ED Management:  Patient is a 68-year-old male presents to the emergency department for fever that began approximately 1 month ago.  He has completed multiple rounds of antibiotics without relief.  He has been diagnosed with UTI in the past.  He presented here few days ago and had positive blood cultures and as a result was instructed to return to the emergency department.  Repeat blood cultures obtained.  Started on broad-spectrum antibiotics until sensitivities return.  All results discussed with the patient.  Discussed need for admission for continued IV antibiotics.  Patient and family verbalized understanding agreed to plan.  Discussed case with hospital medicine who will admit the patient.  Other:   I have discussed this case with another health care provider.       <> Summary of the Discussion: Discussed case with hospital medicine who will admit the patient.             Clinical Impression:  Final diagnoses:  [R50.9] Fever  [R78.81] Bacteremia  [A41.9] Sepsis, due to unspecified organism, unspecified whether acute organ dysfunction present          ED Disposition Condition    Admit Stable                Jeff Parosns MD  01/07/24 3488    "

## 2023-12-23 NOTE — Clinical Note
Diagnosis: Fever [907344]   Future Attending Provider: LYUBOV MAYNARD [[id number]]   Admitting Provider:: LYUBOV MAYNARD [[id number]]   Admit to which facility:: [facility] [[id number]]   Reason for IP Medical Treatment  (Clinical interventions that can only be accomplished in the IP setting? ) :: FUO, bactermia   I certify that Inpatient services for greater than or equal to 2 midnights are medically necessary:: Yes   Plans for Post-Acute care--if anticipated (pick the single best option):: A. No post acute care anticipated at this time

## 2023-12-24 LAB
ACINETOBACTER CALCOACETICUS-BAUMANNII COMPLEX (OHS): NOT DETECTED
ALBUMIN SERPL-MCNC: 2.5 G/DL (ref 3.4–4.8)
ALBUMIN/GLOB SERPL: 0.6 RATIO (ref 1.1–2)
ALP SERPL-CCNC: 57 UNIT/L (ref 40–150)
ALT SERPL-CCNC: 60 UNIT/L (ref 0–55)
AST SERPL-CCNC: 46 UNIT/L (ref 5–34)
AV INDEX (PROSTH): 0.56
AV MEAN GRADIENT: 15 MMHG
AV PEAK GRADIENT: 26 MMHG
AV REGURGITATION PRESSURE HALF TIME: 423 MS
AV VALVE AREA BY VELOCITY RATIO: 2.14 CM²
AV VALVE AREA: 2.14 CM²
AV VELOCITY RATIO: 0.56
BACTEROIDES FRAGILIS (OHS): NOT DETECTED
BASOPHILS # BLD AUTO: 0.03 X10(3)/MCL
BASOPHILS NFR BLD AUTO: 0.3 %
BILIRUB SERPL-MCNC: 0.4 MG/DL
BUN SERPL-MCNC: 14.4 MG/DL (ref 8.4–25.7)
C AURIS DNA BLD POS QL NAA+NON-PROBE: NOT DETECTED
C GATTII+NEOFOR DNA CSF QL NAA+NON-PROBE: NOT DETECTED
CALCIUM SERPL-MCNC: 8 MG/DL (ref 8.8–10)
CANDIDA ALBICANS (OHS): NOT DETECTED
CANDIDA GLABRATA (OHS): NOT DETECTED
CANDIDA KRUSEI (OHS): NOT DETECTED
CANDIDA PARAPSILOSIS (OHS): NOT DETECTED
CANDIDA TROPICALIS (OHS): NOT DETECTED
CHLORIDE SERPL-SCNC: 106 MMOL/L (ref 98–107)
CO2 SERPL-SCNC: 21 MMOL/L (ref 23–31)
CREAT SERPL-MCNC: 0.85 MG/DL (ref 0.73–1.18)
CRP SERPL-MCNC: 149.6 MG/L
CTX-M (OHS): NORMAL
CV ECHO LV RWT: 0.46 CM
D DIMER PPP IA.FEU-MCNC: 2.37 UG/ML FEU (ref 0–0.5)
DOP CALC AO PEAK VEL: 2.57 M/S
DOP CALC AO VTI: 55.6 CM
DOP CALC LVOT AREA: 3.8 CM2
DOP CALC LVOT DIAMETER: 2.2 CM
DOP CALC LVOT PEAK VEL: 1.45 M/S
DOP CALC LVOT STROKE VOLUME: 118.92 CM3
DOP CALC MV VTI: 51.8 CM
DOP CALCLVOT PEAK VEL VTI: 31.3 CM
E WAVE DECELERATION TIME: 193 MSEC
E/A RATIO: 0.92
E/E' RATIO: 14.35 M/S
ECHO LV POSTERIOR WALL: 1.1 CM (ref 0.6–1.1)
ENTEROBACTER CLOACAE COMPLEX (OHS): NOT DETECTED
ENTEROBACTERALES (OHS): NOT DETECTED
ENTEROCOCCUS FAECALIS (OHS): NOT DETECTED
ENTEROCOCCUS FAECIUM (OHS): NOT DETECTED
EOSINOPHIL # BLD AUTO: 0.02 X10(3)/MCL (ref 0–0.9)
EOSINOPHIL NFR BLD AUTO: 0.2 %
ERYTHROCYTE [DISTWIDTH] IN BLOOD BY AUTOMATED COUNT: 14.2 % (ref 11.5–17)
ERYTHROCYTE [SEDIMENTATION RATE] IN BLOOD: 59 MM/HR (ref 0–15)
ESCHERICHIA COLI (OHS): NOT DETECTED
FERRITIN SERPL-MCNC: 2927.6 NG/ML (ref 21.81–274.66)
FRACTIONAL SHORTENING: 33 % (ref 28–44)
GFR SERPLBLD CREATININE-BSD FMLA CKD-EPI: >60 MLS/MIN/1.73/M2
GLOBULIN SER-MCNC: 4.1 GM/DL (ref 2.4–3.5)
GLUCOSE SERPL-MCNC: 110 MG/DL (ref 82–115)
GP B STREP DNA CSF QL NAA+NON-PROBE: NOT DETECTED
HAEM INFLU DNA CSF QL NAA+NON-PROBE: NOT DETECTED
HCT VFR BLD AUTO: 34.4 % (ref 42–52)
HGB BLD-MCNC: 11.8 G/DL (ref 14–18)
HR MV ECHO: 63 BPM
IMM GRANULOCYTES # BLD AUTO: 0.05 X10(3)/MCL (ref 0–0.04)
IMM GRANULOCYTES NFR BLD AUTO: 0.5 %
IMP (OHS): NORMAL
INTERVENTRICULAR SEPTUM: 1.1 CM (ref 0.6–1.1)
KLEBSIELLA AEROGENES (OHS): NOT DETECTED
KLEBSIELLA OXYTOCA (OHS): NOT DETECTED
KLEBSIELLA PNEUMONIAE GROUP (OHS): NOT DETECTED
KPC (OHS): NORMAL
L MONOCYTOG DNA CSF QL NAA+NON-PROBE: NOT DETECTED
LEFT ATRIUM SIZE: 5 CM
LEFT ATRIUM VOLUME MOD: 103 CM3
LEFT INTERNAL DIMENSION IN SYSTOLE: 3.2 CM (ref 2.1–4)
LEFT VENTRICLE DIASTOLIC VOLUME: 108 ML
LEFT VENTRICLE SYSTOLIC VOLUME: 41 ML
LEFT VENTRICULAR INTERNAL DIMENSION IN DIASTOLE: 4.8 CM (ref 3.5–6)
LEFT VENTRICULAR MASS: 193.96 G
LV LATERAL E/E' RATIO: 11.09 M/S
LV SEPTAL E/E' RATIO: 20.33 M/S
LVOT MG: 4 MMHG
LVOT MV: 0.93 CM/S
LYMPHOCYTES # BLD AUTO: 1.38 X10(3)/MCL (ref 0.6–4.6)
LYMPHOCYTES NFR BLD AUTO: 14.6 %
MAGNESIUM SERPL-MCNC: 2.1 MG/DL (ref 1.6–2.6)
MCH RBC QN AUTO: 32.6 PG (ref 27–31)
MCHC RBC AUTO-ENTMCNC: 34.3 G/DL (ref 33–36)
MCR-1 (OHS): NORMAL
MCV RBC AUTO: 95 FL (ref 80–94)
MECA/C (OHS): NORMAL
MECA/C AND MREJ (MRSA)(OHS): NORMAL
MONOCYTES # BLD AUTO: 1.12 X10(3)/MCL (ref 0.1–1.3)
MONOCYTES NFR BLD AUTO: 11.8 %
MV MEAN GRADIENT: 4 MMHG
MV PEAK A VEL: 1.32 M/S
MV PEAK E VEL: 1.22 M/S
MV PEAK GRADIENT: 10 MMHG
MV STENOSIS PRESSURE HALF TIME: 88 MS
MV VALVE AREA BY CONTINUITY EQUATION: 2.3 CM2
MV VALVE AREA P 1/2 METHOD: 2.5 CM2
N MEN DNA CSF QL NAA+NON-PROBE: NOT DETECTED
NDM (OHS): NORMAL
NEUTROPHILS # BLD AUTO: 6.87 X10(3)/MCL (ref 2.1–9.2)
NEUTROPHILS NFR BLD AUTO: 72.6 %
NRBC BLD AUTO-RTO: 0 %
OHS LV EJECTION FRACTION SIMPSONS BIPLANE MOD: 61 %
OXA-48-LIKE (OHS): NORMAL
PHOSPHATE SERPL-MCNC: 3.4 MG/DL (ref 2.3–4.7)
PISA AR MAX VEL: 4.63 M/S
PISA TR MAX VEL: 1.78 M/S
PLATELET # BLD AUTO: 131 X10(3)/MCL (ref 130–400)
PMV BLD AUTO: 10.6 FL (ref 7.4–10.4)
POTASSIUM SERPL-SCNC: 4.1 MMOL/L (ref 3.5–5.1)
PROT SERPL-MCNC: 6.6 GM/DL (ref 5.8–7.6)
PROTEUS SPP. (OHS): NOT DETECTED
PSA SERPL-MCNC: 0.83 NG/ML
PSEUDOMONAS AERUGINOSA (OHS): NOT DETECTED
RA PRESSURE ESTIMATED: 3 MMHG
RBC # BLD AUTO: 3.62 X10(6)/MCL (ref 4.7–6.1)
RV TB RVSP: 5 MMHG
S ENT+BONG DNA STL QL NAA+NON-PROBE: NOT DETECTED
S PNEUM DNA CSF QL NAA+NON-PROBE: NOT DETECTED
SERRATIA MARCESCENS (OHS): NOT DETECTED
SINUS: 3.8 CM
SODIUM SERPL-SCNC: 139 MMOL/L (ref 136–145)
STAPHYLOCOCCUS AUREUS (OHS): NOT DETECTED
STAPHYLOCOCCUS EPIDERMIDIS (OHS): NOT DETECTED
STAPHYLOCOCCUS LUGDUNENSIS (OHS): NOT DETECTED
STAPHYLOCOCCUS SPP. (OHS): NOT DETECTED
STENOTROPHOMONAS MALTOPHILIA (OHS): NOT DETECTED
STREPTOCOCCUS PYOGENES (GROUP A)(OHS): NOT DETECTED
STREPTOCOCCUS SPP. (OHS): NOT DETECTED
TDI LATERAL: 0.11 M/S
TDI SEPTAL: 0.06 M/S
TDI: 0.09 M/S
TR MAX PG: 13 MMHG
TRICUSPID ANNULAR PLANE SYSTOLIC EXCURSION: 3.2 CM
TV REST PULMONARY ARTERY PRESSURE: 16 MMHG
VANA/B (OHS): NORMAL
VIM (OHS): NORMAL
WBC # SPEC AUTO: 9.47 X10(3)/MCL (ref 4.5–11.5)

## 2023-12-24 PROCEDURE — 27000207 HC ISOLATION

## 2023-12-24 PROCEDURE — 25000003 PHARM REV CODE 250: Performed by: INTERNAL MEDICINE

## 2023-12-24 PROCEDURE — 25500020 PHARM REV CODE 255: Performed by: INTERNAL MEDICINE

## 2023-12-24 PROCEDURE — 99223 1ST HOSP IP/OBS HIGH 75: CPT | Mod: ,,, | Performed by: HOSPITALIST

## 2023-12-24 PROCEDURE — 84100 ASSAY OF PHOSPHORUS: CPT | Performed by: INTERNAL MEDICINE

## 2023-12-24 PROCEDURE — 21400001 HC TELEMETRY ROOM

## 2023-12-24 PROCEDURE — 63600175 PHARM REV CODE 636 W HCPCS: Mod: JZ,TB | Performed by: INTERNAL MEDICINE

## 2023-12-24 PROCEDURE — 85025 COMPLETE CBC W/AUTO DIFF WBC: CPT | Performed by: INTERNAL MEDICINE

## 2023-12-24 PROCEDURE — XW033E5 INTRODUCTION OF REMDESIVIR ANTI-INFECTIVE INTO PERIPHERAL VEIN, PERCUTANEOUS APPROACH, NEW TECHNOLOGY GROUP 5: ICD-10-PCS | Performed by: INTERNAL MEDICINE

## 2023-12-24 PROCEDURE — 83735 ASSAY OF MAGNESIUM: CPT | Performed by: INTERNAL MEDICINE

## 2023-12-24 PROCEDURE — 84153 ASSAY OF PSA TOTAL: CPT | Performed by: HOSPITALIST

## 2023-12-24 PROCEDURE — 11000001 HC ACUTE MED/SURG PRIVATE ROOM

## 2023-12-24 PROCEDURE — 80053 COMPREHEN METABOLIC PANEL: CPT | Performed by: INTERNAL MEDICINE

## 2023-12-24 RX ADMIN — CEFTRIAXONE SODIUM 2 G: 2 INJECTION, POWDER, FOR SOLUTION INTRAMUSCULAR; INTRAVENOUS at 12:12

## 2023-12-24 RX ADMIN — REMDESIVIR 200 MG: 100 INJECTION, POWDER, LYOPHILIZED, FOR SOLUTION INTRAVENOUS at 12:12

## 2023-12-24 RX ADMIN — REMDESIVIR 100 MG: 100 INJECTION, POWDER, LYOPHILIZED, FOR SOLUTION INTRAVENOUS at 11:12

## 2023-12-24 RX ADMIN — ENOXAPARIN SODIUM 40 MG: 40 INJECTION SUBCUTANEOUS at 05:12

## 2023-12-24 RX ADMIN — VANCOMYCIN HYDROCHLORIDE 1750 MG: 1 INJECTION, POWDER, LYOPHILIZED, FOR SOLUTION INTRAVENOUS at 05:12

## 2023-12-24 RX ADMIN — VANCOMYCIN HYDROCHLORIDE 1750 MG: 1 INJECTION, POWDER, LYOPHILIZED, FOR SOLUTION INTRAVENOUS at 06:12

## 2023-12-24 RX ADMIN — LEVOTHYROXINE SODIUM 50 MCG: 50 TABLET ORAL at 06:12

## 2023-12-24 RX ADMIN — THERA TABS 1 TABLET: TAB at 09:12

## 2023-12-24 RX ADMIN — IOPAMIDOL 100 ML: 755 INJECTION, SOLUTION INTRAVENOUS at 12:12

## 2023-12-24 NOTE — H&P
[facility] Hospital Medicine - H&P Note    Patient Name: Arturo Ortiz  MRN: [id number]  PCP: CHARISSE Wallace Jr., MD  Admitting Physician: Belle Lira MD  Admission Class: IP- Inpatient   Date of Service: 12/23/2023  Code status: Full    Chief Complaint   Fever for 1 month, positive blood cultures    History of Present Illness   This is a 68-year-old male with medical history of obesity BMI 37, osteoarthritis/right hip replacement, T2DM, HTN and HLD all lifestyle managed present to the ED with complaint of fever and positive blood cultures.     Patient reports symptoms started about 1 month ago with fever, chills, rigors on daily basis associated with drenching night sweats and myalgia.  He was initially seen at his PCP office and was prescribed cefuroxime on 11/03/2023 for 10 days but for unclear etiology of fever, fever did not resolve and subsequently on 11/22/2023 he started having burning urination and increased frequency and was diagnosed with UTI and prescribed doxycycline for 10 days and subsequently another 7 day course of ciprofloxacin on 12/13/2023.  He completed antibiotic course and continued to have daily high-grade fever up to 103F. He presented to the ED on 12/20/2023 for fever evaluation as well as runny nose and tested positive for COVID-19 subsequently followed up with his PCP and was prescribed molnupiravir.  He was called to return to the ED for positive blood cultures 4 of 4  bottles growing Gram-positive cocci with speciation Aerococcus urinae (susceptibility sent to reference lab and pending).    On arrival to ED he was febrile 103.1 normotensive and saturating 99% on room air.  Labs notable for WBC 9.7, hemoglobin 11.9, platelets 153, creatinine 0.98, ESR 59, , ferritin 2927, D-dimer 2.37 urinalysis unremarkable.  CTA chest show no evidence of PE, no infiltrate or consolidation, there is trace/minimal pleural effusions bilaterally.    He was  given Zosyn and vancomycin and referred to hospital medicine service for further evaluation and management.    ROS   Except as documented, all other systems reviewed and negative     Past Medical History   T2DM, HTN, HLD -managed with lifestyle changes  Mild chronic anemia/macrocytosis  Osteoarthritis-right hip arthroplasty  Hypothyroid  Testicular hydrocele  Cholelithiasis    Past Surgical History   Total hip arthroplasty 05/18/2021  Colonoscopy 2014    Social History     Social History     Tobacco Use    Smoking status: Never    Smokeless tobacco: Never   Substance Use Topics    Alcohol use: Yes     Comment: occ        Family History   Reviewed and negative    Allergies   Patient has no known allergies.    Home Medications     Prior to Admission medications    Medication Sig Start Date End Date Taking? Authorizing Provider   levothyroxine (SYNTHROID) 50 MCG tablet Take 1 tablet (50 mcg total) by mouth before breakfast. 12/7/23  Yes CHARISSE Wallace Jr., MD   molnupiravir 200 mg capsule (EUA) Take 4 capsules (800 mg total) by mouth every 12 (twelve) hours. 12/21/23  Yes CHARISSE Wallace Jr., MD   multivitamin (ONE DAILY MULTIVITAMIN) per tablet Take 1 tablet by mouth once daily.    Provider, Historical   phenazopyridine (PYRIDIUM) 200 MG tablet Take 1 tablet (200 mg total) by mouth 3 (three) times daily as needed (Urinary discomfort). 12/13/23   CHARISSE Wallace Jr., MD        Physical Exam   Vital Signs  Temp:  [97.8 °F (36.6 °C)-103.1 °F (39.5 °C)]   Pulse:  [56-91]   Resp:  [11-20]   BP: (122-145)/(55-71)   SpO2:  [95 %-99 %]    General: Appears comfortable  HEENT: NC/AT  Neck:  No JVD  Chest: CTABL  CVS: Regular rhythm. Normal S1/S2, no appreciable murmur  Abdomen: nondistended, normoactive BS, soft and non-tender.  MSK: No obvious deformity or joint swelling  Skin: Warm and dry  Neuro: AAOx3, no focal neurological deficit  Psych: Cooperative    Labs     Recent Labs     12/23/23  1643 12/23/23  9681    WBC 9.70  --    RBC 3.75*  --    HGB 11.9*  --    HCT 35.2*  --    MCV 93.9  --    MCH 31.7*  --    MCHC 33.8  --    RDW 14.1  --      --    SEDRATE  --  59*   CRP  --  149.60*     Recent Labs     12/23/23  2343   D-DIMER 2.37*   FERRITIN 2,927.60*      Recent Labs     12/21/23  1113 12/23/23  1643    136   K 3.9 4.3   CHLORIDE 102 105   CO2 26 22*   BUN 19.0* 18.2   CREATININE 1.01 0.98   EGFRNORACEVR >60 >60   GLUCOSE 130* 125*   CALCIUM 8.2* 7.9*   ALBUMIN 3.4 2.7*   GLOBULIN 3.1* 3.7*   ALKPHOS 47 57   ALT 50 67*   AST 37 48*   BILITOT 0.8 0.5   BILIDIR 0.2  --      Recent Labs     12/23/23 1657   LACTIC 1.2        Microbiology Results (last 7 days)       Procedure Component Value Units Date/Time    Blood culture #2 **CANNOT BE ORDERED STAT** [[id number]] Collected: 12/23/23 1657    Order Status: Resulted Specimen: Blood Updated: 12/23/23 1718    Blood culture #1 **CANNOT BE ORDERED STAT** [[id number]] Collected: 12/23/23 1657    Order Status: Resulted Specimen: Blood Updated: 12/23/23 1718           Imaging     CTA Chest Non-Coronary (PE Studies)         X-Ray Chest 1 View   Final Result      NO ACUTE CARDIOPULMONARY PROCESS IDENTIFIED.         Electronically signed by: Efe Bahena   Date:    12/23/2023   Time:    17:07        Assessment   Aerococcus urinae bacteremia  Probably urinary source  Clinical history of 4 weeks fever concerning for endocarditis or deep-seated infection    COVID-19  Asymptomatic from pulmonary aspect      History of prosthetic right hip, hypothyroid, T2DM/HTN/HLD diet and lifestyle managed, cholelithiasis, hydrocele    Plan   Repeat blood cultures x2  Follow-up sensitivity -sent to [facility] reference lab.  Per available literature, Aerococcus urinae sensitive to penicillin G with alternative agent ceftriaxone, also sensitive to vancomycin, and consideration for addition of aminoglycosides as synergistic once sensitivities available.  Will start ceftriaxone 2 g IV Q  24 hours and vancomycin pharmacy to dose for trough 15-20 pending sensitivities.  Will order transthoracic ECHO as initial evaluation for endocarditis.  Venous ultrasound BLE  Isolation precautions, Remdesivir x5 days  Infectious disease consult  Home medication reviewed and resumed  VTE Prophylaxis: Enoxaparin 40 mg subQ daily      Critical care time:  35 minutes  Critical care diagnosis:  Aerococcus bacteremia    Belle Lira MD  Internal Medicine

## 2023-12-24 NOTE — CONSULTS
Infectious Disease        Patient ID: Arturo Ortiz  68 y.o. male.    Chief Complaint: Fever (Seen here X 3 days ago for fever and generalized weakness. Discharged home but called to come back to ER for positive Blood cultures. States still running fever at home. States took 1 g tylenol approx 1:30 PM. States no new symptoms since last visit. )      Interval HPI:    12/24 - afebrile. Admitted with COVID and bacteremia. Repeat cx in process. TTE pending, if negative may need QUENTIN.  High suspicion for endocarditis, +stigmata. Okay to continue dual therapy for now pending further data.     Assessment and Plan:   1) Sepsis  - present on admission   - FLU negative  - rapid Strep negative  - COVID +  - gram positive bacteremia  - UA 12/21 - WBC 6-10  - UA 12/23  LE, 0-5 WBC  - s/p pip/tazo x 1  - currently on vancomycin, goal 15-20,Rx to dose  - currently on ceftriaxone    2) Bacteremia  - in setting of recurrent UTI  - Prosthetics: right THR  - BCx 12/20 - GPC both sets, prelim BCID negative, Aerococcus urinae, sent to ref lab for testing  - BCx12/23 - in process  - obtain QUENTIN if negative obtain QUENTIN, high suspicion of endocarditis given one month of symptoms  - obtain UCx  - obtain PSA 0.36-->   - stigmata: +Osler's node 4th left digit  - currently on vancomycin, goal 15-20,Rx to dose  - currently on ceftriaxone    3) COVID  - + PCR  - on remdesivir  - CXR 12/20 - No acute pulmonary process appreciated   - CXR 12/23 - no acute process  - CTA chest - no PE. Mild right lower lobe atelectasis.     Discussed with patient and family at bedside   Discussed with RN    Earnestine Read MD, MPH  [facility]    Thank you for this consultation. I will follow up with the patient. Please contact via Epic secure chat with any questions.       HPI:   Patient is Arturo Ortiz a 68 y.o. male admitted on 12/23 for one month of fevers, night sweats,myalias, chills. Patient went to PCP  and was prescribed oral antibiotics on 11/03/23 but failed to improve. He developed dysuria and frequency and was prescribe a course of antimicrobials for UTI on 11/22 and again on 12/13. Patient reports continued fevers of 103F at home.He presented to ED on 12/20 and tested + for COVID. BLood cx drawn at that time subsequently turned + and patient was admitted. Prelim isolate is Aerococcus spp. Patient on empiric coverage. Upon evaluation he as febrile, with elevated inflammatory markers. Patient has known DMII, hypertension, OA s/p recent right THR on 5/18/2021, hypercholesterolemia, hypothyroidism. Infectious diseases consulted for evaluation and management.     Past Medical History:   Diagnosis Date    Avascular necrosis of right femoral head     DM (diabetes mellitus)     Essential (primary) hypertension     Hypercholesteremia     Macrocytosis     Osteoarthritis of right hip     Osteoarthritis of right knee     Peripheral neuropathy      Past Surgical History:   Procedure Laterality Date    TOTAL HIP ARTHROPLASTY Right 05/18/2021     Review of patient's allergies indicates:  No Known Allergies  Current Outpatient Medications   Medication Instructions    levothyroxine (SYNTHROID) 50 mcg, Oral, Before breakfast    molnupiravir 800 mg, Oral, Every 12 hours    multivitamin (ONE DAILY MULTIVITAMIN) per tablet 1 tablet, Oral, Daily    phenazopyridine (PYRIDIUM) 200 mg, Oral, 3 times daily PRN       Current Facility-Administered Medications:     acetaminophen tablet 1,000 mg, 1,000 mg, Oral, Q6H PRN, Belle Lira MD    acetaminophen tablet 650 mg, 650 mg, Oral, Q4H PRN, Belle Lira MD    aluminum-magnesium hydroxide-simethicone 200-200-20 mg/5 mL suspension 30 mL, 30 mL, Oral, QID PRN, Belle Lira MD    cefTRIAXone (ROCEPHIN) 2 g in dextrose 5 % in water (D5W) 100 mL IVPB (MB+), 2 g, Intravenous, Q24H, Belle Lira MD, Stopped at 12/24/23 0120    enoxaparin injection 40 mg, 40 mg, Subcutaneous, Daily, Belle Lira  MD KAI    ibuprofen tablet 400 mg, 400 mg, Oral, Q6H PRN, Belle Lira MD    levothyroxine tablet 50 mcg, 50 mcg, Oral, Before breakfast, Belle Lira MD, 50 mcg at 12/24/23 0622    melatonin tablet 6 mg, 6 mg, Oral, Nightly PRN, Belle Lira MD    multivitamin tablet, 1 tablet, Oral, Daily, Belle Lira MD, 1 tablet at 12/24/23 0917    ondansetron injection 4 mg, 4 mg, Intravenous, Q4H PRN, Belle Lira MD    polyethylene glycol packet 17 g, 17 g, Oral, BID PRN, Belle Lira MD    prochlorperazine injection Soln 5 mg, 5 mg, Intravenous, Q6H PRN, Belle Lira MD    [COMPLETED] remdesivir 200 mg in sodium chloride 0.9% 250 mL infusion, 200 mg, Intravenous, Q24H, Stopped at 12/24/23 0040 **FOLLOWED BY** remdesivir 100 mg in sodium chloride 0.9% 100 mL infusion, 100 mg, Intravenous, Daily, Belle Lira MD    senna-docusate 8.6-50 mg per tablet 2 tablet, 2 tablet, Oral, BID PRN, Belle Lira MD    sodium chloride 0.9% flush 10 mL, 10 mL, Intravenous, PRN, Belle Lira MD    vancomycin (VANCOCIN) 1,750 mg in dextrose 5 % (D5W) 500 mL IVPB, 1,750 mg, Intravenous, Q12H, Belle Lira MD, Stopped at 12/24/23 0850    Pharmacy to dose Vancomycin consult, , , Once **AND** vancomycin - pharmacy to dose, , Intravenous, pharmacy to manage frequency, Belle Lira MD    Current Outpatient Medications:     levothyroxine (SYNTHROID) 50 MCG tablet, Take 1 tablet (50 mcg total) by mouth before breakfast., Disp: 30 tablet, Rfl: 5    molnupiravir 200 mg capsule (EUA), Take 4 capsules (800 mg total) by mouth every 12 (twelve) hours., Disp: 40 capsule, Rfl: 0    multivitamin (ONE DAILY MULTIVITAMIN) per tablet, Take 1 tablet by mouth once daily., Disp: , Rfl:     phenazopyridine (PYRIDIUM) 200 MG tablet, Take 1 tablet (200 mg total) by mouth 3 (three) times daily as needed (Urinary discomfort)., Disp: 21 tablet, Rfl: 0  Review of Systems   Constitutional:  Negative for chills and fever.   HENT:  Negative for congestion, ear  discharge, ear pain, facial swelling, mouth sores, postnasal drip, rhinorrhea, sinus pressure, sinus pain, sneezing, sore throat and trouble swallowing.    Eyes:  Negative for discharge, redness and itching.   Respiratory:  Negative for cough, chest tightness, shortness of breath and wheezing.    Cardiovascular:  Negative for chest pain, palpitations and leg swelling.   Gastrointestinal:  Negative for abdominal distention, abdominal pain, diarrhea, nausea and vomiting.   Genitourinary:  Negative for dysuria, flank pain, frequency and urgency.   Musculoskeletal:  Negative for back pain, myalgias and neck stiffness.   Skin:  Negative for rash and wound.   Allergic/Immunologic: Negative for immunocompromised state.   Neurological:  Negative for dizziness, light-headedness and headaches.   Hematological:  Negative for adenopathy.   Psychiatric/Behavioral:  Negative for agitation, confusion and suicidal ideas. The patient is not nervous/anxious.        Objective:   Temp:  [97.6 °F (36.4 °C)-103.1 °F (39.5 °C)] 98.2 °F (36.8 °C)  Pulse:  [56-91] 72  Resp:  [11-21] 21  SpO2:  [94 %-99 %] 94 %  BP: (122-145)/(55-71) 125/62     Physical Exam  Constitutional:       Appearance: Normal appearance. He is well-developed.   HENT:      Head: Normocephalic.      Nose: Nose normal.      Mouth/Throat:      Pharynx: No oropharyngeal exudate.   Eyes:      General: Lids are normal. No scleral icterus.        Right eye: No discharge.      Conjunctiva/sclera: Conjunctivae normal.      Pupils: Pupils are equal, round, and reactive to light.   Neck:      Thyroid: No thyromegaly.      Vascular: No JVD.      Trachea: Trachea normal.   Cardiovascular:      Rate and Rhythm: Normal rate and regular rhythm.      Pulses: Normal pulses.      Heart sounds: Normal heart sounds. No murmur heard.     No friction rub.   Pulmonary:      Effort: Pulmonary effort is normal. No respiratory distress.      Breath sounds: Normal breath sounds. No wheezing.    Chest:      Chest wall: No tenderness.   Abdominal:      General: Bowel sounds are normal. There is no distension.      Palpations: Abdomen is soft.      Tenderness: There is no abdominal tenderness. There is no guarding or rebound.   Musculoskeletal:         General: No tenderness. Normal range of motion.      Cervical back: Full passive range of motion without pain, normal range of motion and neck supple.   Lymphadenopathy:      Cervical: No cervical adenopathy.   Skin:     General: Skin is warm and dry.      Findings: No rash.   Neurological:      Mental Status: He is alert and oriented to person, place, and time.      Cranial Nerves: No cranial nerve deficit.      Sensory: No sensory deficit.   Psychiatric:         Speech: Speech normal.         Behavior: Behavior normal.         Thought Content: Thought content normal.         Judgment: Judgment normal.         Estimated Creatinine Clearance: 112.8 mL/min (based on SCr of 0.85 mg/dL).  Recent Labs   Lab 12/23/23 1643 12/24/23  0332   WBC 9.70 9.47    131     Microbiology Results (last 7 days)       Procedure Component Value Units Date/Time    Blood culture #2 **CANNOT BE ORDERED STAT** [[id number]] Collected: 12/23/23 1657    Order Status: Resulted Specimen: Blood Updated: 12/23/23 1718    Blood culture #1 **CANNOT BE ORDERED STAT** [[id number]] Collected: 12/23/23 1657    Order Status: Resulted Specimen: Blood Updated: 12/23/23 1718            Significant Labs: All pertinent labs within the past 24 hours have been reviewed.    Significant Imaging: I have reviewed all relevant and available imaging results/findings within the past 24 hours.      Plan -- see top of note

## 2023-12-24 NOTE — PROGRESS NOTES
"Pharmacokinetic Initial Assessment: IV Vancomycin    Assessment/Plan:    Initiate intravenous vancomycin with loading dose of 1750 mg once followed by a maintenance dose of vancomycin 1750 mg IV every 12 hours  Desired empiric serum trough concentration is 15 to 20 mcg/mL  Draw vancomycin trough level 60 min prior to fourth dose on 12/25 at approximately 0500  Pharmacy will continue to follow and monitor vancomycin.      Please contact pharmacy at extension [phone] with any questions regarding this assessment.     Thank you for the consult,   Adonay Florian       Patient brief summary:  Arturo Ortiz is a 68 y.o. male initiated on antimicrobial therapy with IV Vancomycin for treatment of suspected bacteremia    Drug Allergies:   Review of patient's allergies indicates:  No Known Allergies    Actual Body Weight:   120kg    Renal Function:   Estimated Creatinine Clearance: 97.8 mL/min (based on SCr of 0.98 mg/dL).,     Dialysis Method (if applicable):  N/A    CBC (last 72 hours):  Recent Labs   Lab Result Units 12/23/23  1643   WBC x10(3)/mcL 9.70   Hgb g/dL 11.9*   Hct % 35.2*   Platelet x10(3)/mcL 153   Mono % % 7.7   Eos % % 0.2   Basophil % % 0.5       Metabolic Panel (last 72 hours):  Recent Labs   Lab Result Units 12/21/23  1113 12/23/23  1643 12/23/23  1713   Sodium Level mmol/L 137 136  --    Potassium Level mmol/L 3.9 4.3  --    Chloride mmol/L 102 105  --    Carbon Dioxide mmol/L 26 22*  --    Glucose Level mg/dL 130* 125*  --    Glucose, UA  Negative  --  Normal   Blood Urea Nitrogen mg/dL 19.0* 18.2  --    Creatinine mg/dL 1.01 0.98  --    Albumin Level g/dL 3.4 2.7*  --    Bilirubin Total mg/dL 0.8 0.5  --    Alkaline Phosphatase unit/L 47 57  --    Aspartate Aminotransferase unit/L 37 48*  --    Alanine Aminotransferase unit/L 50 67*  --        Drug levels (last 3 results):  No results for input(s): "VANCOMYCINRA", "VANCORANDOM", "VANCOMYCINPE", "VANCOPEAK", "VANCOMYCINTR", "VANCOTROUGH" in the last 72 " hours.    Microbiologic Results:  Microbiology Results (last 7 days)       Procedure Component Value Units Date/Time    Blood culture #2 **CANNOT BE ORDERED STAT** [[id number]] Collected: 12/23/23 1657    Order Status: Resulted Specimen: Blood Updated: 12/23/23 1718    Blood culture #1 **CANNOT BE ORDERED STAT** [[id number]] Collected: 12/23/23 1657    Order Status: Resulted Specimen: Blood Updated: 12/23/23 1718

## 2023-12-24 NOTE — NURSING
Nurses Note -- 4 Eyes      12/24/2023   5:10 PM      Skin assessed during: Admit      [x] No Altered Skin Integrity Present    []Prevention Measures Documented      [] Yes- Altered Skin Integrity Present or Discovered   [] LDA Added if Not in Epic (Describe Wound)   [] New Altered Skin Integrity was Present on Admit and Documented in LDA   [] Wound Image Taken    Wound Care Consulted? No    Attending Nurse:  Milton Ledesma RN/Staff Member:  PERLITA Villafana

## 2023-12-25 LAB
BACTERIA BLD CULT: ABNORMAL
BACTERIA BLD CULT: ABNORMAL
GRAM STN SPEC: ABNORMAL
VANCOMYCIN TROUGH SERPL-MCNC: 13.3 UG/ML (ref 15–20)

## 2023-12-25 PROCEDURE — 99233 SBSQ HOSP IP/OBS HIGH 50: CPT | Mod: ,,, | Performed by: HOSPITALIST

## 2023-12-25 PROCEDURE — 63600175 PHARM REV CODE 636 W HCPCS: Performed by: INTERNAL MEDICINE

## 2023-12-25 PROCEDURE — 21400001 HC TELEMETRY ROOM

## 2023-12-25 PROCEDURE — 87077 CULTURE AEROBIC IDENTIFY: CPT | Performed by: HOSPITALIST

## 2023-12-25 PROCEDURE — 80202 ASSAY OF VANCOMYCIN: CPT | Performed by: INTERNAL MEDICINE

## 2023-12-25 PROCEDURE — 27000207 HC ISOLATION

## 2023-12-25 PROCEDURE — 63600175 PHARM REV CODE 636 W HCPCS: Performed by: STUDENT IN AN ORGANIZED HEALTH CARE EDUCATION/TRAINING PROGRAM

## 2023-12-25 PROCEDURE — 25000003 PHARM REV CODE 250: Performed by: STUDENT IN AN ORGANIZED HEALTH CARE EDUCATION/TRAINING PROGRAM

## 2023-12-25 PROCEDURE — 25000003 PHARM REV CODE 250: Performed by: INTERNAL MEDICINE

## 2023-12-25 RX ADMIN — LEVOTHYROXINE SODIUM 50 MCG: 50 TABLET ORAL at 06:12

## 2023-12-25 RX ADMIN — CEFTRIAXONE SODIUM 2 G: 2 INJECTION, POWDER, FOR SOLUTION INTRAMUSCULAR; INTRAVENOUS at 12:12

## 2023-12-25 RX ADMIN — VANCOMYCIN HYDROCHLORIDE 1750 MG: 1 INJECTION, POWDER, LYOPHILIZED, FOR SOLUTION INTRAVENOUS at 06:12

## 2023-12-25 RX ADMIN — REMDESIVIR 100 MG: 100 INJECTION, POWDER, LYOPHILIZED, FOR SOLUTION INTRAVENOUS at 09:12

## 2023-12-25 RX ADMIN — THERA TABS 1 TABLET: TAB at 09:12

## 2023-12-25 RX ADMIN — VANCOMYCIN HYDROCHLORIDE 2000 MG: 500 INJECTION, POWDER, LYOPHILIZED, FOR SOLUTION INTRAVENOUS at 05:12

## 2023-12-25 RX ADMIN — ENOXAPARIN SODIUM 40 MG: 40 INJECTION SUBCUTANEOUS at 05:12

## 2023-12-25 NOTE — PROGRESS NOTES
[facility]  Hospital Medicine Progress Note        Chief Complaint: Inpatient Follow-up for infection    HPI:   This is a 68-year-old male with medical history of obesity BMI 37, osteoarthritis/right hip replacement, T2DM, HTN and HLD all lifestyle managed present to the ED with complaint of fever and positive blood cultures.      Patient reports symptoms started about 1 month ago with fever, chills, rigors on daily basis associated with drenching night sweats and myalgia.  He was initially seen at his PCP office and was prescribed cefuroxime on 11/03/2023 for 10 days but for unclear etiology of fever, fever did not resolve and subsequently on 11/22/2023 he started having burning urination and increased frequency and was diagnosed with UTI and prescribed doxycycline for 10 days and subsequently another 7 day course of ciprofloxacin on 12/13/2023.  He completed antibiotic course and continued to have daily high-grade fever up to 103F. He presented to the ED on 12/20/2023 for fever evaluation as well as runny nose and tested positive for COVID-19 subsequently followed up with his PCP and was prescribed molnupiravir.  He was called to return to the ED for positive blood cultures 4 of 4  bottles growing Gram-positive cocci with speciation Aerococcus urinae (susceptibility sent to reference lab and pending).     On arrival to ED he was febrile 103.1 normotensive and saturating 99% on room air.  Labs notable for WBC 9.7, hemoglobin 11.9, platelets 153, creatinine 0.98, ESR 59, , ferritin 2927, D-dimer 2.37 urinalysis unremarkable.  CTA chest show no evidence of PE, no infiltrate or consolidation, there is trace/minimal pleural effusions bilaterally.     He was given Zosyn and vancomycin and referred to hospital medicine service for further evaluation and management.    Interval Hx:   Seen and examined the pt.   Denies pain and discomfort. Afebrile and repeat cultures are negative.      Objective/physical exam:  General: In no acute distress, afebrile  Chest: Clear to auscultation bilaterally  Heart: RRR, +S1, S2, no appreciable murmur  Abdomen: Soft, nontender, BS +  MSK: Warm, no lower extremity edema, no clubbing or cyanosis  Neurologic: Alert and oriented x4, Cranial nerve II-XII intact, Strength 5/5 in all 4 extremities    VITAL SIGNS: 24 HRS MIN & MAX LAST   Temp  Min: 98.6 °F (37 °C)  Max: 99.4 °F (37.4 °C) 98.6 °F (37 °C)   BP  Min: 130/68  Max: 161/70 136/65   Pulse  Min: 59  Max: 68  (!) 59   Resp  Min: 17  Max: 18 18   SpO2  Min: 93 %  Max: 96 % 95 %     I have reviewed the following labs:  Recent Labs   Lab 12/20/23  1502 12/23/23  1643 12/24/23  0332   WBC 16.96* 9.70 9.47   RBC 3.94* 3.75* 3.62*   HGB 12.8* 11.9* 11.8*   HCT 38.2* 35.2* 34.4*   MCV 97.0* 93.9 95.0*   MCH 32.5* 31.7* 32.6*   MCHC 33.5 33.8 34.3   RDW 13.9 14.1 14.2    153 131   MPV 9.6 9.6 10.6*       Recent Labs   Lab 12/21/23  1113 12/23/23  1643 12/24/23  0332    136 139   K 3.9 4.3 4.1   CO2 26 22* 21*   BUN 19.0* 18.2 14.4   CREATININE 1.01 0.98 0.85   CALCIUM 8.2* 7.9* 8.0*   MG  --   --  2.10   ALBUMIN 3.4 2.7* 2.5*   ALKPHOS 47 57 57   ALT 50 67* 60*   AST 37 48* 46*   BILITOT 0.8 0.5 0.4       Microbiology Results (last 7 days)       Procedure Component Value Units Date/Time    Blood Culture [[id number]] Collected: 12/25/23 0538    Order Status: Resulted Specimen: Blood Updated: 12/25/23 0554    Blood Culture [[id number]] Collected: 12/25/23 0538    Order Status: Resulted Specimen: Blood Updated: 12/25/23 0553    Blood culture #2 **CANNOT BE ORDERED STAT** [[id number]]  (Abnormal) Collected: 12/23/23 1657    Order Status: Completed Specimen: Blood Updated: 12/24/23 1711     GRAM STAIN Gram Positive Cocci, probable Staphylococcus      Seen in gram stain of broth only      2 of 2 bottles positive    Blood culture #1 **CANNOT BE ORDERED STAT** [[id number]]  (Abnormal) Collected: 12/23/23 1657     Order Status: Completed Specimen: Blood Updated: 12/24/23 1710     GRAM STAIN Gram Positive Cocci, probable Staphylococcus      Seen in gram stain of broth only      2 of 2 bottles positive    BCID2 Panel [[id number]]  (Normal) Collected: 12/23/23 1657    Order Status: Completed Specimen: Blood Updated: 12/24/23 1352     CTX-M (ESBL ) N/A     IMP (Cabapenemase ) N/A     KPC resistance gene (Carbapenemase ) N/A     mcr-1 N/A     mecA ID N/A     Comment: Note: Antimicrobial resistance can occur via multiple mechanisms. A Not Detected result for antimicrobial resistance gene(s) does not indicate antimicrobial susceptibility. Subculturing is required for species identification and susceptibility testing of   isolates.        mecA/C and MREJ (MRSA) gene N/A     NDM (Carbapenemase ) N/A     OXA-48-like (Carbapenemase ) N/A     eLah/B (VRE gene) N/A     VIM (Carbapenemase ) N/A     Enterococcus faecalis Not Detected     Enterococcus faecium Not Detected     Listeria monocytogenes Not Detected     Staphylococcus spp. Not Detected     Staphylococcus aureus Not Detected     Staphylococcus epidermidis Not Detected     Staphylococcus lugdunensis Not Detected     Streptococcus spp. Not Detected     Streptococcus agalactiae (Group B) Not Detected     Streptococcus pneumoniae Not Detected     Streptococcus pyogenes (Group A) Not Detected     Acinetobacter calcoaceticus/baumannii complex Not Detected     Bacteroides fragilis Not Detected     Enterobacterales Not Detected     Enterobacter cloacae complex Not Detected     Escherichia coli Not Detected     Klebsiella aerogenes Not Detected     Klebsiella oxytoca Not Detected     Klebsiella pneumoniae group Not Detected     Proteus spp. Not Detected     Salmonella spp. Not Detected     Serratia marcescens Not Detected     Haemophilus influenzae Not Detected     Neisseria meningitidis Not Detected     Pseudomonas aeruginosa Not Detected      Stenotrophomonas maltophilia Not Detected     Candida albicans Not Detected     Candida auris Not Detected     Candida glabrata Not Detected     Candida krusei Not Detected     Candida parapsilosis Not Detected     Candida tropicalis Not Detected     Cryptococcus neoformans/gattii Not Detected    Narrative:      The [employer]Orbit BCID2 Panel is a multiplexed nucleic acid test intended for the use with [employer]® 2.0 or BioFire® [employer]® [employer] for the simultaneous qualitative detection and identification of multiple bacterial and yeast nucleic acids and select genetic determinants associated with antimicrobial resistance.  The BioFire BCID2 Panel test is performed directly on blood culture samples identified as positive by a continuous monitoring blood culture system.  Results are intended to be interpreted in conjunction with Gram stain results.             See below for Radiology    Scheduled Med:   cefTRIAXone (ROCEPHIN) IVPB  2 g Intravenous Q24H    enoxparin  40 mg Subcutaneous Daily    levothyroxine  50 mcg Oral Before breakfast    multivitamin  1 tablet Oral Daily    remdesivir infusion  100 mg Intravenous Daily    vancomycin (VANCOCIN) IV (PEDS and ADULTS)  2,000 mg Intravenous Q12H         PRN Meds:  acetaminophen, acetaminophen, aluminum-magnesium hydroxide-simethicone, ibuprofen, melatonin, ondansetron, polyethylene glycol, prochlorperazine, senna-docusate 8.6-50 mg, sodium chloride 0.9%, Pharmacy to dose Vancomycin consult **AND** vancomycin - pharmacy to dose     Assessment/Plan:  Aerococcus urinae bacteremia, present on admission, in the setting of recurrent UTI  COVID-19, present on admission   History of thyroid disease,    Infectious disease consulted for bacteremia   Blood cultures on 12/20 grew GPC both sides, prelim BC ID negative.  Enterococcus urinary sent to lab for testing   Repeat blood cultures pending   TTE is negative, will consult cardiology for QUENTIN.   Continue vancomycin  and ceftriaxone   COVID positive, continue remdesivir protocol       VTE prophylaxis:  Lovenox    Patient condition:  Stable/Fair/Guarded/ Serious/ Critical    Anticipated discharge and Disposition:         All diagnosis and differential diagnosis have been reviewed; assessment and plan has been documented; I have personally reviewed the labs and test results that are presently available; I have reviewed the patients medication list; I have reviewed the consulting providers response and recommendations. I have reviewed or attempted to review medical records based upon their availability    All of the patient's questions have been  addressed and answered. Patient's is agreeable to the above stated plan. I will continue to monitor closely and make adjustments to medical management as needed.  _____________________________________________________________________    Nutrition Status:    Radiology:  I have personally reviewed the following imaging and agree with the radiologist.     Echo    Left Ventricle: The left ventricle is normal in size. Normal wall   thickness. Normal wall motion. There is normal systolic function with a   visually estimated ejection fraction of 55 - 60%. Diastolic function   cannot be reliably determined in the presence of mitral valve disease.    Right Ventricle: Normal right ventricular cavity size. Systolic   function is normal.    Left Atrium: Left atrium is moderately dilated.    Aortic Valve: There is moderate aortic valve sclerosis. There is mild   stenosis. Aortic valve area by VTI is 2.14 cm². Aortic valve peak velocity   is 2.57 m/s. Mean gradient is 15 mmHg. The dimensionless index is 0.56.   There is moderate aortic regurgitation.    Mitral Valve: Moderately calcified posterior leaflet. Chordae   calcification present. Moderately restricted motion. There is mild   stenosis. The mean pressure gradient across the mitral valve is 4 mmHg at   a heart rate of 63 bpm. There is mild  regurgitation.    Tricuspid Valve: There is trace regurgitation.    Pulmonary Artery: The estimated pulmonary artery systolic pressure is   16 mmHg.    IVC/SVC: Normal venous pressure at 3 mmHg.  CTA Chest Non-Coronary (PE Studies)  Narrative: EXAMINATION:  CTA CHEST NON CORONARY (PE STUDIES)    CLINICAL HISTORY:  Pulmonary embolism (PE) suspected, positive D-dimer;    TECHNIQUE:  CTA imaging of the chest after IV contrast. Axial, coronal and sagittal reconstructions, including MIP images, are reviewed. Dose length product 354 mGycm. Automatic exposure control, adjustment of mA/kV or iterative reconstruction technique used to limit radiation dose.    COMPARISON:  No relevant comparison studies available at the time of dictation.    FINDINGS:  Diagnostic quality: Adequate    Pulmonary embolism: None identified.    Lung parenchyma: Mild right lower lobe atelectasis.    Pleural effusion: Trace right pleural fluid.    Mediastinum/patt: Normal heart size and thoracic aortic caliber.  No pathologically enlarged lymph node.    Chest wall/axilla: No significant findings.    Upper abdomen: No significant findings.    Bones: No acute osseous process.  Impression: No pulmonary embolism identified.    No significant discrepancy between my interpretation and the preliminary radiology report.    Electronically signed by: Bob Sheppard  Date:    12/24/2023  Time:    08:41    Mario Gray M.D.  [facility]/Hospital Medicine Department  [facility]

## 2023-12-25 NOTE — PROGRESS NOTES
Infectious Disease        Patient ID: Arturo Ortiz  68 y.o. male.    Chief Complaint: Fever (Seen here X 3 days ago for fever and generalized weakness. Discharged home but called to come back to ER for positive Blood cultures. States still running fever at home. States took 1 g tylenol approx 1:30 PM. States no new symptoms since last visit. )    Interval HPI:    12/24 - afebrile. Admitted with COVID and bacteremia. Repeat cx in process. TTE pending, if negative may need QUENTIN.  High suspicion for endocarditis, +stigmata. Okay to continue dual therapy for now pending further data.    12/25 - fever curve improving. Tolerating abx. TTE negative, plan for QUENTIN  Assessment and Plan:   1) Sepsis  - present on admission   - FLU negative  - rapid Strep negative  - COVID +  - gram positive bacteremia  - UA 12/21 - WBC 6-10  - UA 12/23  LE, 0-5 WBC  - s/p pip/tazo x 1  - currently on vancomycin, goal 15-20,Rx to dose  - currently on ceftriaxone     2) Bacteremia  - in setting of recurrent UTI  - Prosthetics: right THR  - BCx 12/20 - GPC both sets, prelim BCID negative, Aerococcus urinae, sent to ref lab for testing  - BCx12/23 - GPC  - BCx 12/25 - in process  - TTE 12/23 - EF 55-60%, valves grossly normal  - obtain QUENTIN, high suspicion of endocarditis given one month of symptoms  - obtain UCx  - obtain PSA 0.36-->   - stigmata: +Osler's node 4th left digit  - currently on vancomycin, goal 15-20,Rx to dose  - currently on ceftriaxone     3) COVID  - + PCR  - on remdesivir  - CXR 12/20 - No acute pulmonary process appreciated   - CXR 12/23 - no acute process  - CTA chest - no PE. Mild right lower lobe atelectasis.      Discussed with patient and family at bedside   Discussed with PERLITA Read MD, MPH  [facility]     Thank you for this consultation. I will follow up with the patient. Please contact via Epic secure chat with any questions.         HPI:   Patient is Arturo  Josué Ortiz a 68 y.o. male admitted on 12/23 for one month of fevers, night sweats,myalias, chills. Patient went to PCP and was prescribed oral antibiotics on 11/03/23 but failed to improve. He developed dysuria and frequency and was prescribe a course of antimicrobials for UTI on 11/22 and again on 12/13. Patient reports continued fevers of 103F at home.He presented to ED on 12/20 and tested + for COVID. BLood cx drawn at that time subsequently turned + and patient was admitted. Prelim isolate is Aerococcus spp. Patient on empiric coverage. Upon evaluation he as febrile, with elevated inflammatory markers. Patient has known DMII, hypertension, OA s/p recent right THR on 5/18/2021, hypercholesterolemia, hypothyroidism. Infectious diseases consulted for evaluation and management.     Past Medical History:   Diagnosis Date    Avascular necrosis of right femoral head     DM (diabetes mellitus)     Essential (primary) hypertension     Hypercholesteremia     Macrocytosis     Osteoarthritis of right hip     Osteoarthritis of right knee     Peripheral neuropathy      Past Surgical History:   Procedure Laterality Date    TOTAL HIP ARTHROPLASTY Right 05/18/2021     Review of patient's allergies indicates:  No Known Allergies  Current Outpatient Medications   Medication Instructions    levothyroxine (SYNTHROID) 50 mcg, Oral, Before breakfast    molnupiravir 800 mg, Oral, Every 12 hours    multivitamin (ONE DAILY MULTIVITAMIN) per tablet 1 tablet, Oral, Daily    phenazopyridine (PYRIDIUM) 200 mg, Oral, 3 times daily PRN       Current Facility-Administered Medications:     acetaminophen tablet 1,000 mg, 1,000 mg, Oral, Q6H PRN, Belle Lira MD    acetaminophen tablet 650 mg, 650 mg, Oral, Q4H PRN, Belle Lira MD    aluminum-magnesium hydroxide-simethicone 200-200-20 mg/5 mL suspension 30 mL, 30 mL, Oral, QID PRN, PankajBelle bonilla MD    cefTRIAXone (ROCEPHIN) 2 g in dextrose 5 % in water (D5W) 100 mL IVPB (MB+), 2 g,  Intravenous, Q24H, Belle Lira MD, Stopped at 12/25/23 0119    enoxaparin injection 40 mg, 40 mg, Subcutaneous, Daily, Belle Lira MD, 40 mg at 12/24/23 1740    ibuprofen tablet 400 mg, 400 mg, Oral, Q6H PRN, Belle Lira MD    levothyroxine tablet 50 mcg, 50 mcg, Oral, Before breakfast, Belle Lira MD, 50 mcg at 12/25/23 0624    melatonin tablet 6 mg, 6 mg, Oral, Nightly PRN, Belle Lira MD    multivitamin tablet, 1 tablet, Oral, Daily, Belle Lira MD, 1 tablet at 12/24/23 0917    ondansetron injection 4 mg, 4 mg, Intravenous, Q4H PRN, Belle Lira MD    polyethylene glycol packet 17 g, 17 g, Oral, BID PRN, Belle Lira MD    prochlorperazine injection Soln 5 mg, 5 mg, Intravenous, Q6H PRN, Belle Lira MD    [COMPLETED] remdesivir 200 mg in sodium chloride 0.9% 250 mL infusion, 200 mg, Intravenous, Q24H, Stopped at 12/24/23 0040 **FOLLOWED BY** remdesivir 100 mg in sodium chloride 0.9% 100 mL infusion, 100 mg, Intravenous, Daily, Belle Lira MD, Stopped at 12/25/23 0026    senna-docusate 8.6-50 mg per tablet 2 tablet, 2 tablet, Oral, BID PRN, Belle Lira MD    sodium chloride 0.9% flush 10 mL, 10 mL, Intravenous, PRN, Belle Lira MD    vancomycin (VANCOCIN) 1,750 mg in dextrose 5 % (D5W) 500 mL IVPB, 1,750 mg, Intravenous, Q12H, Belle Lira MD, Last Rate: 200 mL/hr at 12/25/23 0656, 1,750 mg at 12/25/23 0656    Pharmacy to dose Vancomycin consult, , , Once **AND** vancomycin - pharmacy to dose, , Intravenous, pharmacy to manage frequency, Belle Lira MD  Review of Systems    Objective:   Temp:  [98.6 °F (37 °C)-99.4 °F (37.4 °C)] 98.6 °F (37 °C)  Pulse:  [59-68] 59  Resp:  [17-18] 18  SpO2:  [93 %-96 %] 95 %  BP: (130-161)/(63-73) 136/65     Physical Exam    Estimated Creatinine Clearance: 112.8 mL/min (based on SCr of 0.85 mg/dL).  Recent Labs   Lab 12/23/23  1643 12/24/23  0332   WBC 9.70 9.47    131     Microbiology Results (last 7 days)       Procedure Component Value Units  Date/Time    Blood Culture [[id number]] Collected: 12/25/23 0538    Order Status: Sent Specimen: Blood Updated: 12/25/23 0554    Blood Culture [[id number]] Collected: 12/25/23 0538    Order Status: Sent Specimen: Blood Updated: 12/25/23 0553    Blood culture #2 **CANNOT BE ORDERED STAT** [[id number]]  (Abnormal) Collected: 12/23/23 1657    Order Status: Completed Specimen: Blood Updated: 12/24/23 1711     GRAM STAIN Gram Positive Cocci, probable Staphylococcus      Seen in gram stain of broth only      2 of 2 bottles positive    Blood culture #1 **CANNOT BE ORDERED STAT** [[id number]]  (Abnormal) Collected: 12/23/23 1657    Order Status: Completed Specimen: Blood Updated: 12/24/23 1710     GRAM STAIN Gram Positive Cocci, probable Staphylococcus      Seen in gram stain of broth only      2 of 2 bottles positive    BCID2 Panel [[id number]]  (Normal) Collected: 12/23/23 1657    Order Status: Completed Specimen: Blood Updated: 12/24/23 1352     CTX-M (ESBL ) N/A     IMP (Cabapenemase ) N/A     KPC resistance gene (Carbapenemase ) N/A     mcr-1 N/A     mecA ID N/A     Comment: Note: Antimicrobial resistance can occur via multiple mechanisms. A Not Detected result for antimicrobial resistance gene(s) does not indicate antimicrobial susceptibility. Subculturing is required for species identification and susceptibility testing of   isolates.        mecA/C and MREJ (MRSA) gene N/A     NDM (Carbapenemase ) N/A     OXA-48-like (Carbapenemase ) N/A     Leah/B (VRE gene) N/A     VIM (Carbapenemase ) N/A     Enterococcus faecalis Not Detected     Enterococcus faecium Not Detected     Listeria monocytogenes Not Detected     Staphylococcus spp. Not Detected     Staphylococcus aureus Not Detected     Staphylococcus epidermidis Not Detected     Staphylococcus lugdunensis Not Detected     Streptococcus spp. Not Detected     Streptococcus agalactiae (Group B) Not Detected      Streptococcus pneumoniae Not Detected     Streptococcus pyogenes (Group A) Not Detected     Acinetobacter calcoaceticus/baumannii complex Not Detected     Bacteroides fragilis Not Detected     Enterobacterales Not Detected     Enterobacter cloacae complex Not Detected     Escherichia coli Not Detected     Klebsiella aerogenes Not Detected     Klebsiella oxytoca Not Detected     Klebsiella pneumoniae group Not Detected     Proteus spp. Not Detected     Salmonella spp. Not Detected     Serratia marcescens Not Detected     Haemophilus influenzae Not Detected     Neisseria meningitidis Not Detected     Pseudomonas aeruginosa Not Detected     Stenotrophomonas maltophilia Not Detected     Candida albicans Not Detected     Candida auris Not Detected     Candida glabrata Not Detected     Candida krusei Not Detected     Candida parapsilosis Not Detected     Candida tropicalis Not Detected     Cryptococcus neoformans/gattii Not Detected    Narrative:      The [employer] BCID2 Panel is a multiplexed nucleic acid test intended for the use with [employer]® [employer] or [employer]® [employer] for the simultaneous qualitative detection and identification of multiple bacterial and yeast nucleic acids and select genetic determinants associated with antimicrobial resistance.  The BioFire BCID2 Panel test is performed directly on blood culture samples identified as positive by a continuous monitoring blood culture system.  Results are intended to be interpreted in conjunction with Gram stain results.            Significant Labs: All pertinent labs within the past 24 hours have been reviewed.    Significant Imaging: I have reviewed all relevant and available imaging results/findings within the past 24 hours.      Plan -- see top of note

## 2023-12-25 NOTE — PROGRESS NOTES
Pharmacokinetic Assessment Follow Up: IV Vancomycin    Vancomycin serum concentration assessment(s):    The trough level was drawn correctly and can be used to guide therapy at this time. The measurement is below the desired definitive target range of 15 to 20 mcg/mL.    Vancomycin Regimen Plan:    Change regimen to Vancomycin 2000 mg IV every 12 hours with next serum trough concentration measured at 1600 prior to 4th dose on 12/26.    Drug levels (last 3 results):  Recent Labs   Lab Result Units 12/25/23  0538   Vancomycin Trough ug/ml 13.3*       Pharmacy will continue to follow and monitor vancomycin.    Please contact pharmacy at extension [phone] for questions regarding this assessment.    Thank you for the consult,   Nahed Cabezas       Patient brief summary:  Arturo Ortiz is a 68 y.o. male initiated on antimicrobial therapy with IV Vancomycin for treatment of bacteremia    The patient's current regimen is 1750mg q12h.    Drug Allergies:   Review of patient's allergies indicates:  No Known Allergies    Actual Body Weight:   120.2kg    Renal Function:   Estimated Creatinine Clearance: 112.8 mL/min (based on SCr of 0.85 mg/dL).,     Dialysis Method (if applicable):  N/A    CBC (last 72 hours):  Recent Labs   Lab Result Units 12/23/23  1643 12/24/23  0332   WBC x10(3)/mcL 9.70 9.47   Hgb g/dL 11.9* 11.8*   Hct % 35.2* 34.4*   Platelet x10(3)/mcL 153 131   Mono % % 7.7 11.8   Eos % % 0.2 0.2   Basophil % % 0.5 0.3       Metabolic Panel (last 72 hours):  Recent Labs   Lab Result Units 12/23/23  1643 12/23/23  1713 12/24/23  0332   Sodium Level mmol/L 136  --  139   Potassium Level mmol/L 4.3  --  4.1   Chloride mmol/L 105  --  106   Carbon Dioxide mmol/L 22*  --  21*   Glucose Level mg/dL 125*  --  110   Glucose, UA   --  Normal  --    Blood Urea Nitrogen mg/dL 18.2  --  14.4   Creatinine mg/dL 0.98  --  0.85   Albumin Level g/dL 2.7*  --  2.5*   Bilirubin Total mg/dL 0.5  --  0.4   Alkaline Phosphatase unit/L  57  --  57   Aspartate Aminotransferase unit/L 48*  --  46*   Alanine Aminotransferase unit/L 67*  --  60*   Magnesium Level mg/dL  --   --  2.10   Phosphorus Level mg/dL  --   --  3.4       Vancomycin Administrations:  vancomycin given in the last 96 hours                     vancomycin (VANCOCIN) 1,750 mg in dextrose 5 % (D5W) 500 mL IVPB (mg) 1,750 mg New Bag 12/25/23 0656     1,750 mg New Bag 12/24/23 1740     1,750 mg New Bag  0635    vancomycin 2 g in dextrose 5 % 500 mL IVPB ()  Restarted 12/23/23 1951      Restarted  1903     2,000 mg New Bag  1819                    Microbiologic Results:  Microbiology Results (last 7 days)       Procedure Component Value Units Date/Time    Blood Culture [[id number]] Collected: 12/25/23 0538    Order Status: Sent Specimen: Blood Updated: 12/25/23 0554    Blood Culture [[id number]] Collected: 12/25/23 0538    Order Status: Sent Specimen: Blood Updated: 12/25/23 0553    Blood culture #2 **CANNOT BE ORDERED STAT** [[id number]]  (Abnormal) Collected: 12/23/23 1657    Order Status: Completed Specimen: Blood Updated: 12/24/23 1711     GRAM STAIN Gram Positive Cocci, probable Staphylococcus      Seen in gram stain of broth only      2 of 2 bottles positive    Blood culture #1 **CANNOT BE ORDERED STAT** [[id number]]  (Abnormal) Collected: 12/23/23 1657    Order Status: Completed Specimen: Blood Updated: 12/24/23 1710     GRAM STAIN Gram Positive Cocci, probable Staphylococcus      Seen in gram stain of broth only      2 of 2 bottles positive    BCID2 Panel [[id number]]  (Normal) Collected: 12/23/23 1657    Order Status: Completed Specimen: Blood Updated: 12/24/23 1352     CTX-M (ESBL ) N/A     IMP (Cabapenemase ) N/A     KPC resistance gene (Carbapenemase ) N/A     mcr-1 N/A     mecA ID N/A     Comment: Note: Antimicrobial resistance can occur via multiple mechanisms. A Not Detected result for antimicrobial resistance gene(s) does not indicate antimicrobial  susceptibility. Subculturing is required for species identification and susceptibility testing of   isolates.        mecA/C and MREJ (MRSA) gene N/A     NDM (Carbapenemase ) N/A     OXA-48-like (Carbapenemase ) N/A     Leah/B (VRE gene) N/A     VIM (Carbapenemase ) N/A     Enterococcus faecalis Not Detected     Enterococcus faecium Not Detected     Listeria monocytogenes Not Detected     Staphylococcus spp. Not Detected     Staphylococcus aureus Not Detected     Staphylococcus epidermidis Not Detected     Staphylococcus lugdunensis Not Detected     Streptococcus spp. Not Detected     Streptococcus agalactiae (Group B) Not Detected     Streptococcus pneumoniae Not Detected     Streptococcus pyogenes (Group A) Not Detected     Acinetobacter calcoaceticus/baumannii complex Not Detected     Bacteroides fragilis Not Detected     Enterobacterales Not Detected     Enterobacter cloacae complex Not Detected     Escherichia coli Not Detected     Klebsiella aerogenes Not Detected     Klebsiella oxytoca Not Detected     Klebsiella pneumoniae group Not Detected     Proteus spp. Not Detected     Salmonella spp. Not Detected     Serratia marcescens Not Detected     Haemophilus influenzae Not Detected     Neisseria meningitidis Not Detected     Pseudomonas aeruginosa Not Detected     Stenotrophomonas maltophilia Not Detected     Candida albicans Not Detected     Candida auris Not Detected     Candida glabrata Not Detected     Candida krusei Not Detected     Candida parapsilosis Not Detected     Candida tropicalis Not Detected     Cryptococcus neoformans/gattii Not Detected    Narrative:      The [employer] BCID2 Panel is a multiplexed nucleic acid test intended for the use with [employer]® 2.0 or [employer]® [employer] for the simultaneous qualitative detection and identification of multiple bacterial and yeast nucleic acids and select genetic determinants associated with antimicrobial  resistance.  The [employer] BCID2 Panel test is performed directly on blood culture samples identified as positive by a continuous monitoring blood culture system.  Results are intended to be interpreted in conjunction with Gram stain results.

## 2023-12-25 NOTE — PLAN OF CARE
Problem: Adult Inpatient Plan of Care  Goal: Plan of Care Review  Outcome: Ongoing, Progressing  Goal: Optimal Comfort and Wellbeing  Outcome: Ongoing, Progressing     Problem: Diabetes Comorbidity  Goal: Blood Glucose Level Within Targeted Range  Outcome: Ongoing, Progressing     Problem: Fall Injury Risk  Goal: Absence of Fall and Fall-Related Injury  Outcome: Ongoing, Progressing     Problem: Infection  Goal: Absence of Infection Signs and Symptoms  Outcome: Ongoing, Progressing

## 2023-12-25 NOTE — PROGRESS NOTES
[facility]  Hospital Medicine Progress Note        Chief Complaint: Inpatient Follow-up for     HPI:   This is a 68-year-old male with medical history of obesity BMI 37, osteoarthritis/right hip replacement, T2DM, HTN and HLD all lifestyle managed present to the ED with complaint of fever and positive blood cultures.      Patient reports symptoms started about 1 month ago with fever, chills, rigors on daily basis associated with drenching night sweats and myalgia.  He was initially seen at his PCP office and was prescribed cefuroxime on 11/03/2023 for 10 days but for unclear etiology of fever, fever did not resolve and subsequently on 11/22/2023 he started having burning urination and increased frequency and was diagnosed with UTI and prescribed doxycycline for 10 days and subsequently another 7 day course of ciprofloxacin on 12/13/2023.  He completed antibiotic course and continued to have daily high-grade fever up to 103F. He presented to the ED on 12/20/2023 for fever evaluation as well as runny nose and tested positive for COVID-19 subsequently followed up with his PCP and was prescribed molnupiravir.  He was called to return to the ED for positive blood cultures 4 of 4  bottles growing Gram-positive cocci with speciation Aerococcus urinae (susceptibility sent to reference lab and pending).     On arrival to ED he was febrile 103.1 normotensive and saturating 99% on room air.  Labs notable for WBC 9.7, hemoglobin 11.9, platelets 153, creatinine 0.98, ESR 59, , ferritin 2927, D-dimer 2.37 urinalysis unremarkable.  CTA chest show no evidence of PE, no infiltrate or consolidation, there is trace/minimal pleural effusions bilaterally.     He was given Zosyn and vancomycin and referred to hospital medicine service for further evaluation and management.    Interval Hx:   Patient seen and examined by bedside, no acute overnight events.  Denies any fevers or chills at this  time.    Case was discussed with patient's nurse and  on the floor.    Objective/physical exam:  General: In no acute distress, afebrile  Chest: Clear to auscultation bilaterally  Heart: RRR, +S1, S2, no appreciable murmur  Abdomen: Soft, nontender, BS +  MSK: Warm, no lower extremity edema, no clubbing or cyanosis  Neurologic: Alert and oriented x4, Cranial nerve II-XII intact, Strength 5/5 in all 4 extremities    VITAL SIGNS: 24 HRS MIN & MAX LAST   Temp  Min: 97.6 °F (36.4 °C)  Max: 103.1 °F (39.5 °C) 99 °F (37.2 °C)   BP  Min: 125/62  Max: 161/70 (!) 161/70   Pulse  Min: 60  Max: 84  60   Resp  Min: 11  Max: 21 18   SpO2  Min: 94 %  Max: 99 % 96 %     I have reviewed the following labs:  Recent Labs   Lab 12/20/23  1502 12/23/23  1643 12/24/23  0332   WBC 16.96* 9.70 9.47   RBC 3.94* 3.75* 3.62*   HGB 12.8* 11.9* 11.8*   HCT 38.2* 35.2* 34.4*   MCV 97.0* 93.9 95.0*   MCH 32.5* 31.7* 32.6*   MCHC 33.5 33.8 34.3   RDW 13.9 14.1 14.2    153 131   MPV 9.6 9.6 10.6*     Recent Labs   Lab 12/21/23  1113 12/23/23  1643 12/24/23  0332    136 139   K 3.9 4.3 4.1   CO2 26 22* 21*   BUN 19.0* 18.2 14.4   CREATININE 1.01 0.98 0.85   CALCIUM 8.2* 7.9* 8.0*   MG  --   --  2.10   ALBUMIN 3.4 2.7* 2.5*   ALKPHOS 47 57 57   ALT 50 67* 60*   AST 37 48* 46*   BILITOT 0.8 0.5 0.4     Microbiology Results (last 7 days)       Procedure Component Value Units Date/Time    Blood culture #2 **CANNOT BE ORDERED STAT** [[id number]]  (Abnormal) Collected: 12/23/23 1657    Order Status: Completed Specimen: Blood Updated: 12/24/23 1711     GRAM STAIN Gram Positive Cocci, probable Staphylococcus      Seen in gram stain of broth only      2 of 2 bottles positive    Blood culture #1 **CANNOT BE ORDERED STAT** [[id number]]  (Abnormal) Collected: 12/23/23 1657    Order Status: Completed Specimen: Blood Updated: 12/24/23 1710     GRAM STAIN Gram Positive Cocci, probable Staphylococcus      Seen in gram stain of broth only       2 of 2 bottles positive    BCID2 Panel [[id number]]  (Normal) Collected: 12/23/23 4518    Order Status: Completed Specimen: Blood Updated: 12/24/23 9632     CTX-M (ESBL ) N/A     IMP (Cabapenemase ) N/A     KPC resistance gene (Carbapenemase ) N/A     mcr-1 N/A     mecA ID N/A     Comment: Note: Antimicrobial resistance can occur via multiple mechanisms. A Not Detected result for antimicrobial resistance gene(s) does not indicate antimicrobial susceptibility. Subculturing is required for species identification and susceptibility testing of   isolates.        mecA/C and MREJ (MRSA) gene N/A     NDM (Carbapenemase ) N/A     OXA-48-like (Carbapenemase ) N/A     Leah/B (VRE gene) N/A     VIM (Carbapenemase ) N/A     Enterococcus faecalis Not Detected     Enterococcus faecium Not Detected     Listeria monocytogenes Not Detected     Staphylococcus spp. Not Detected     Staphylococcus aureus Not Detected     Staphylococcus epidermidis Not Detected     Staphylococcus lugdunensis Not Detected     Streptococcus spp. Not Detected     Streptococcus agalactiae (Group B) Not Detected     Streptococcus pneumoniae Not Detected     Streptococcus pyogenes (Group A) Not Detected     Acinetobacter calcoaceticus/baumannii complex Not Detected     Bacteroides fragilis Not Detected     Enterobacterales Not Detected     Enterobacter cloacae complex Not Detected     Escherichia coli Not Detected     Klebsiella aerogenes Not Detected     Klebsiella oxytoca Not Detected     Klebsiella pneumoniae group Not Detected     Proteus spp. Not Detected     Salmonella spp. Not Detected     Serratia marcescens Not Detected     Haemophilus influenzae Not Detected     Neisseria meningitidis Not Detected     Pseudomonas aeruginosa Not Detected     Stenotrophomonas maltophilia Not Detected     Candida albicans Not Detected     Candida auris Not Detected     Candida glabrata Not Detected     Nathalia krusei  Not Detected     Candida parapsilosis Not Detected     Candida tropicalis Not Detected     Cryptococcus neoformans/gattii Not Detected    Narrative:      The [employer] BCID2 Panel is a multiplexed nucleic acid test intended for the use with [employer]® 2.0 or [employer]® [employer] Systems for the simultaneous qualitative detection and identification of multiple bacterial and yeast nucleic acids and select genetic determinants associated with antimicrobial resistance.  The [employer] BCID2 Panel test is performed directly on blood culture samples identified as positive by a continuous monitoring blood culture system.  Results are intended to be interpreted in conjunction with Gram stain results.             See below for Radiology    Scheduled Med:   cefTRIAXone (ROCEPHIN) IVPB  2 g Intravenous Q24H    enoxparin  40 mg Subcutaneous Daily    levothyroxine  50 mcg Oral Before breakfast    multivitamin  1 tablet Oral Daily    remdesivir infusion  100 mg Intravenous Daily    vancomycin (VANCOCIN) IV (PEDS and ADULTS)  1,750 mg Intravenous Q12H      Continuous Infusions:     PRN Meds:  acetaminophen, acetaminophen, aluminum-magnesium hydroxide-simethicone, ibuprofen, melatonin, ondansetron, polyethylene glycol, prochlorperazine, senna-docusate 8.6-50 mg, sodium chloride 0.9%, Pharmacy to dose Vancomycin consult **AND** vancomycin - pharmacy to dose     Assessment/Plan:  Aerococcus urinae bacteremia, present on admission, in the setting of recurrent UTI  COVID-19, present on admission   History of thyroid disease,    Infectious disease consulted for bacteremia   Blood cultures on 12/20 grew GPC both sides, prelim BC ID negative.  Enterococcus urinary sent to lab for testing   Repeat blood cultures pending   TTE pending for high suspicion of endocarditis  Infectious disease recommends to obtain QUENTIN if TTE negative  Continue vancomycin and ceftriaxone   COVID positive, continue remdesivir protocol       VTE  prophylaxis:  Lovenox    Patient condition:  Stable/Fair/Guarded/ Serious/ Critical    Anticipated discharge and Disposition:         All diagnosis and differential diagnosis have been reviewed; assessment and plan has been documented; I have personally reviewed the labs and test results that are presently available; I have reviewed the patients medication list; I have reviewed the consulting providers response and recommendations. I have reviewed or attempted to review medical records based upon their availability    All of the patient's questions have been  addressed and answered. Patient's is agreeable to the above stated plan. I will continue to monitor closely and make adjustments to medical management as needed.  _____________________________________________________________________    Nutrition Status:    Radiology:  I have personally reviewed the following imaging and agree with the radiologist.     CTA Chest Non-Coronary (PE Studies)  Narrative: EXAMINATION:  CTA CHEST NON CORONARY (PE STUDIES)    CLINICAL HISTORY:  Pulmonary embolism (PE) suspected, positive D-dimer;    TECHNIQUE:  CTA imaging of the chest after IV contrast. Axial, coronal and sagittal reconstructions, including MIP images, are reviewed. Dose length product 354 mGycm. Automatic exposure control, adjustment of mA/kV or iterative reconstruction technique used to limit radiation dose.    COMPARISON:  No relevant comparison studies available at the time of dictation.    FINDINGS:  Diagnostic quality: Adequate    Pulmonary embolism: None identified.    Lung parenchyma: Mild right lower lobe atelectasis.    Pleural effusion: Trace right pleural fluid.    Mediastinum/patt: Normal heart size and thoracic aortic caliber.  No pathologically enlarged lymph node.    Chest wall/axilla: No significant findings.    Upper abdomen: No significant findings.    Bones: No acute osseous process.  Impression: No pulmonary embolism identified.    No significant  discrepancy between my interpretation and the preliminary radiology report.    Electronically signed by: Bob Sheppard  Date:    12/24/2023  Time:    08:41    Elena Fragoso DO  Department of Hospital Medicine  [facility]  12/24/2023

## 2023-12-26 LAB
ALBUMIN SERPL-MCNC: 2.4 G/DL (ref 3.4–4.8)
ALBUMIN/GLOB SERPL: 0.7 RATIO (ref 1.1–2)
ALP SERPL-CCNC: 45 UNIT/L (ref 40–150)
ALT SERPL-CCNC: 70 UNIT/L (ref 0–55)
AST SERPL-CCNC: 50 UNIT/L (ref 5–34)
BASOPHILS # BLD AUTO: 0.04 X10(3)/MCL
BASOPHILS NFR BLD AUTO: 0.4 %
BILIRUB SERPL-MCNC: 0.3 MG/DL
BUN SERPL-MCNC: 11.5 MG/DL (ref 8.4–25.7)
CALCIUM SERPL-MCNC: 7.9 MG/DL (ref 8.8–10)
CHLORIDE SERPL-SCNC: 107 MMOL/L (ref 98–107)
CO2 SERPL-SCNC: 24 MMOL/L (ref 23–31)
CREAT SERPL-MCNC: 0.78 MG/DL (ref 0.73–1.18)
EOSINOPHIL # BLD AUTO: 0.16 X10(3)/MCL (ref 0–0.9)
EOSINOPHIL NFR BLD AUTO: 1.7 %
ERYTHROCYTE [DISTWIDTH] IN BLOOD BY AUTOMATED COUNT: 14.5 % (ref 11.5–17)
GFR SERPLBLD CREATININE-BSD FMLA CKD-EPI: >60 MLS/MIN/1.73/M2
GLOBULIN SER-MCNC: 3.5 GM/DL (ref 2.4–3.5)
GLUCOSE SERPL-MCNC: 105 MG/DL (ref 82–115)
HCT VFR BLD AUTO: 32.3 % (ref 42–52)
HGB BLD-MCNC: 11 G/DL (ref 14–18)
IMM GRANULOCYTES # BLD AUTO: 0.07 X10(3)/MCL (ref 0–0.04)
IMM GRANULOCYTES NFR BLD AUTO: 0.7 %
LYMPHOCYTES # BLD AUTO: 1.99 X10(3)/MCL (ref 0.6–4.6)
LYMPHOCYTES NFR BLD AUTO: 20.6 %
MCH RBC QN AUTO: 32.3 PG (ref 27–31)
MCHC RBC AUTO-ENTMCNC: 34.1 G/DL (ref 33–36)
MCV RBC AUTO: 94.7 FL (ref 80–94)
MONOCYTES # BLD AUTO: 1.11 X10(3)/MCL (ref 0.1–1.3)
MONOCYTES NFR BLD AUTO: 11.5 %
NEUTROPHILS # BLD AUTO: 6.27 X10(3)/MCL (ref 2.1–9.2)
NEUTROPHILS NFR BLD AUTO: 65.1 %
NRBC BLD AUTO-RTO: 0 %
PLATELET # BLD AUTO: 215 X10(3)/MCL (ref 130–400)
PMV BLD AUTO: 9.5 FL (ref 7.4–10.4)
POTASSIUM SERPL-SCNC: 4.3 MMOL/L (ref 3.5–5.1)
PROT SERPL-MCNC: 5.9 GM/DL (ref 5.8–7.6)
RBC # BLD AUTO: 3.41 X10(6)/MCL (ref 4.7–6.1)
SODIUM SERPL-SCNC: 138 MMOL/L (ref 136–145)
WBC # SPEC AUTO: 9.64 X10(3)/MCL (ref 4.5–11.5)

## 2023-12-26 PROCEDURE — 99233 SBSQ HOSP IP/OBS HIGH 50: CPT | Mod: ,,, | Performed by: HOSPITALIST

## 2023-12-26 PROCEDURE — 85025 COMPLETE CBC W/AUTO DIFF WBC: CPT | Performed by: STUDENT IN AN ORGANIZED HEALTH CARE EDUCATION/TRAINING PROGRAM

## 2023-12-26 PROCEDURE — 63600175 PHARM REV CODE 636 W HCPCS: Performed by: STUDENT IN AN ORGANIZED HEALTH CARE EDUCATION/TRAINING PROGRAM

## 2023-12-26 PROCEDURE — 21400001 HC TELEMETRY ROOM

## 2023-12-26 PROCEDURE — 80053 COMPREHEN METABOLIC PANEL: CPT | Performed by: STUDENT IN AN ORGANIZED HEALTH CARE EDUCATION/TRAINING PROGRAM

## 2023-12-26 PROCEDURE — 25000003 PHARM REV CODE 250: Performed by: STUDENT IN AN ORGANIZED HEALTH CARE EDUCATION/TRAINING PROGRAM

## 2023-12-26 PROCEDURE — 27000207 HC ISOLATION

## 2023-12-26 PROCEDURE — 25500020 PHARM REV CODE 255: Performed by: STUDENT IN AN ORGANIZED HEALTH CARE EDUCATION/TRAINING PROGRAM

## 2023-12-26 PROCEDURE — 63600175 PHARM REV CODE 636 W HCPCS: Performed by: INTERNAL MEDICINE

## 2023-12-26 PROCEDURE — 25000003 PHARM REV CODE 250: Performed by: INTERNAL MEDICINE

## 2023-12-26 RX ADMIN — IOPAMIDOL 100 ML: 755 INJECTION, SOLUTION INTRAVENOUS at 07:12

## 2023-12-26 RX ADMIN — VANCOMYCIN HYDROCHLORIDE 2000 MG: 500 INJECTION, POWDER, LYOPHILIZED, FOR SOLUTION INTRAVENOUS at 05:12

## 2023-12-26 RX ADMIN — ENOXAPARIN SODIUM 40 MG: 40 INJECTION SUBCUTANEOUS at 05:12

## 2023-12-26 RX ADMIN — CEFTRIAXONE SODIUM 2 G: 2 INJECTION, POWDER, FOR SOLUTION INTRAMUSCULAR; INTRAVENOUS at 12:12

## 2023-12-26 RX ADMIN — CEFTRIAXONE SODIUM 2 G: 2 INJECTION, POWDER, FOR SOLUTION INTRAMUSCULAR; INTRAVENOUS at 11:12

## 2023-12-26 RX ADMIN — LEVOTHYROXINE SODIUM 50 MCG: 50 TABLET ORAL at 05:12

## 2023-12-26 RX ADMIN — VANCOMYCIN HYDROCHLORIDE 2000 MG: 500 INJECTION, POWDER, LYOPHILIZED, FOR SOLUTION INTRAVENOUS at 06:12

## 2023-12-26 RX ADMIN — THERA TABS 1 TABLET: TAB at 08:12

## 2023-12-26 NOTE — PROGRESS NOTES
[facility]  Hospital Medicine Progress Note        Chief Complaint: Inpatient Follow-up for infection    HPI:   This is a 68-year-old male with medical history of obesity BMI 37, osteoarthritis/right hip replacement, T2DM, HTN and HLD all lifestyle managed present to the ED with complaint of fever and positive blood cultures.      Patient reports symptoms started about 1 month ago with fever, chills, rigors on daily basis associated with drenching night sweats and myalgia.  He was initially seen at his PCP office and was prescribed cefuroxime on 11/03/2023 for 10 days but for unclear etiology of fever, fever did not resolve and subsequently on 11/22/2023 he started having burning urination and increased frequency and was diagnosed with UTI and prescribed doxycycline for 10 days and subsequently another 7 day course of ciprofloxacin on 12/13/2023.  He completed antibiotic course and continued to have daily high-grade fever up to 103F. He presented to the ED on 12/20/2023 for fever evaluation as well as runny nose and tested positive for COVID-19 subsequently followed up with his PCP and was prescribed molnupiravir.  He was called to return to the ED for positive blood cultures 4 of 4  bottles growing Gram-positive cocci with speciation Aerococcus urinae (susceptibility sent to reference lab and pending).     On arrival to ED he was febrile 103.1 normotensive and saturating 99% on room air.  Labs notable for WBC 9.7, hemoglobin 11.9, platelets 153, creatinine 0.98, ESR 59, , ferritin 2927, D-dimer 2.37 urinalysis unremarkable.  CTA chest show no evidence of PE, no infiltrate or consolidation, there is trace/minimal pleural effusions bilaterally.     He was given Zosyn and vancomycin and referred to hospital medicine service for further evaluation and management.    Interval Hx:   Seen and examined the pt.   Denies pain and discomfort. Afebrile and repeat cultures are negative.      Objective/physical exam:  General: In no acute distress, afebrile  Chest: Clear to auscultation bilaterally  Heart: RRR, +S1, S2, no appreciable murmur  Abdomen: Soft, nontender, BS +  MSK: Warm, no lower extremity edema, no clubbing or cyanosis  Neurologic: Alert and oriented x4, Cranial nerve II-XII intact, Strength 5/5 in all 4 extremities    VITAL SIGNS: 24 HRS MIN & MAX LAST   Temp  Min: 98.4 °F (36.9 °C)  Max: 98.8 °F (37.1 °C) 98.6 °F (37 °C)   BP  Min: 124/53  Max: 135/72 133/68   Pulse  Min: 52  Max: 66  (!) 52   No data recorded 18   SpO2  Min: 94 %  Max: 97 % 97 %     I have reviewed the following labs:  Recent Labs   Lab 12/23/23  1643 12/24/23  0332 12/26/23  0536   WBC 9.70 9.47 9.64   RBC 3.75* 3.62* 3.41*   HGB 11.9* 11.8* 11.0*   HCT 35.2* 34.4* 32.3*   MCV 93.9 95.0* 94.7*   MCH 31.7* 32.6* 32.3*   MCHC 33.8 34.3 34.1   RDW 14.1 14.2 14.5    131 215   MPV 9.6 10.6* 9.5       Recent Labs   Lab 12/23/23  1643 12/24/23  0332 12/26/23  0536    139 138   K 4.3 4.1 4.3   CO2 22* 21* 24   BUN 18.2 14.4 11.5   CREATININE 0.98 0.85 0.78   CALCIUM 7.9* 8.0* 7.9*   MG  --  2.10  --    ALBUMIN 2.7* 2.5* 2.4*   ALKPHOS 57 57 45   ALT 67* 60* 70*   AST 48* 46* 50*   BILITOT 0.5 0.4 0.3       Microbiology Results (last 7 days)       Procedure Component Value Units Date/Time    Blood Culture [[id number]]  (Normal) Collected: 12/25/23 0538    Order Status: Completed Specimen: Blood Updated: 12/26/23 1000     CULTURE, BLOOD ([facility]) No Growth At 24 Hours    Blood Culture [[id number]]  (Normal) Collected: 12/25/23 0538    Order Status: Completed Specimen: Blood Updated: 12/26/23 1000     CULTURE, BLOOD ([facility]) No Growth At 24 Hours    Blood culture #2 **CANNOT BE ORDERED STAT** [[id number]]  (Abnormal) Collected: 12/23/23 1657    Order Status: Completed Specimen: Blood Updated: 12/25/23 1111     CULTURE, BLOOD ([facility]) Aerococcus urinae     Comment: Susceptibility sent to reference lab. Results to follow on  separate report.        GRAM STAIN Gram Positive Cocci, probable Staphylococcus      Seen in gram stain of broth only      2 of 2 bottles positive    Narrative:      For sensitivity results refer to [facility]-[id number].        Blood culture #1 **CANNOT BE ORDERED STAT** [[id number]]  (Abnormal) Collected: 12/23/23 1657    Order Status: Completed Specimen: Blood Updated: 12/25/23 1111     CULTURE, BLOOD ([facility]) Aerococcus urinae     Comment: Susceptibility sent to reference lab. Results to follow on separate report.        GRAM STAIN Gram Positive Cocci, probable Staphylococcus      Seen in gram stain of broth only      2 of 2 bottles positive    Narrative:      For sensitivity results refer to [id number].        BCID2 Panel [[id number]]  (Normal) Collected: 12/23/23 1657    Order Status: Completed Specimen: Blood Updated: 12/24/23 1352     CTX-M (ESBL ) N/A     IMP (Cabapenemase ) N/A     KPC resistance gene (Carbapenemase ) N/A     mcr-1 N/A     mecA ID N/A     Comment: Note: Antimicrobial resistance can occur via multiple mechanisms. A Not Detected result for antimicrobial resistance gene(s) does not indicate antimicrobial susceptibility. Subculturing is required for species identification and susceptibility testing of   isolates.        mecA/C and MREJ (MRSA) gene N/A     NDM (Carbapenemase ) N/A     OXA-48-like (Carbapenemase ) N/A     Leah/B (VRE gene) N/A     VIM (Carbapenemase ) N/A     Enterococcus faecalis Not Detected     Enterococcus faecium Not Detected     Listeria monocytogenes Not Detected     Staphylococcus spp. Not Detected     Staphylococcus aureus Not Detected     Staphylococcus epidermidis Not Detected     Staphylococcus lugdunensis Not Detected     Streptococcus spp. Not Detected     Streptococcus agalactiae (Group B) Not Detected     Streptococcus pneumoniae Not Detected     Streptococcus pyogenes (Group A) Not Detected     Acinetobacter  calcoaceticus/baumannii complex Not Detected     Bacteroides fragilis Not Detected     Enterobacterales Not Detected     Enterobacter cloacae complex Not Detected     Escherichia coli Not Detected     Klebsiella aerogenes Not Detected     Klebsiella oxytoca Not Detected     Klebsiella pneumoniae group Not Detected     Proteus spp. Not Detected     Salmonella spp. Not Detected     Serratia marcescens Not Detected     Haemophilus influenzae Not Detected     Neisseria meningitidis Not Detected     Pseudomonas aeruginosa Not Detected     Stenotrophomonas maltophilia Not Detected     Candida albicans Not Detected     Candida auris Not Detected     Candida glabrata Not Detected     Candida krusei Not Detected     Candida parapsilosis Not Detected     Candida tropicalis Not Detected     Cryptococcus neoformans/gattii Not Detected    Narrative:      The [employer] BCID2 Panel is a multiplexed nucleic acid test intended for the use with [employer] or [employer] for the simultaneous qualitative detection and identification of multiple bacterial and yeast nucleic acids and select genetic determinants associated with antimicrobial resistance.  The [employer] BCID2 Panel test is performed directly on blood culture samples identified as positive by a continuous monitoring blood culture system.  Results are intended to be interpreted in conjunction with Gram stain results.             See below for Radiology    Scheduled Med:   cefTRIAXone (ROCEPHIN) IVPB  2 g Intravenous Q24H    enoxparin  40 mg Subcutaneous Daily    levothyroxine  50 mcg Oral Before breakfast    multivitamin  1 tablet Oral Daily    vancomycin (VANCOCIN) IV (PEDS and ADULTS)  2,000 mg Intravenous Q12H         PRN Meds:  acetaminophen, acetaminophen, aluminum-magnesium hydroxide-simethicone, ibuprofen, melatonin, ondansetron, polyethylene glycol, prochlorperazine, senna-docusate 8.6-50 mg, sodium chloride 0.9%, Pharmacy to dose  Vancomycin consult **AND** vancomycin - pharmacy to dose     Assessment/Plan:  Aerococcus urinae bacteremia, present on admission, in the setting of recurrent UTI  COVID-19, present on admission   History of thyroid disease,    QUENTIN per cardiology.   Follow Bcx2   Infectious disease consulted for bacteremia   Blood cultures on 12/20 grew GPC both sides, prelim BC ID negative.  Enterococcus urinary sent to lab for testing   Repeat blood cultures pending   TTE is negative, will consult cardiology for QUENTIN.   Continue vancomycin and ceftriaxone   COVID positive, continue remdesivir protocol       VTE prophylaxis:  Lovenox    Patient condition:  Stable/Fair/Guarded/ Serious/ Critical    Anticipated discharge and Disposition:         All diagnosis and differential diagnosis have been reviewed; assessment and plan has been documented; I have personally reviewed the labs and test results that are presently available; I have reviewed the patients medication list; I have reviewed the consulting providers response and recommendations. I have reviewed or attempted to review medical records based upon their availability    All of the patient's questions have been  addressed and answered. Patient's is agreeable to the above stated plan. I will continue to monitor closely and make adjustments to medical management as needed.  _____________________________________________________________________    Nutrition Status:    Radiology:  I have personally reviewed the following imaging and agree with the radiologist.     CV Ultrasound doppler venous legs bilat    The right superficial femoral middle vein is normal.    The left superficial femoral middle vein is normal.    There was no evidence of deep or superficial vein thrombosis in bilateral   lower extremities.     Mario Gray M.D.  SCP/Hospital Medicine Department  [facility]

## 2023-12-26 NOTE — CONSULTS
[facility]    Cardiology  Consult Note    Patient Name: Arturo Ortiz  MRN: [id number]  Admission Date: 12/23/2023  Hospital Length of Stay: 3 days  Code Status: Full Code   Attending Provider: Elena Fragoso DO   Consulting Provider: Johana Magallanes NP  Primary Care Physician: CHARISSE Wallace Jr., MD  Principal Problem:Bacteremia    Patient information was obtained from patient, past medical records, and ER records.     Subjective:     Reason for consult: Concern for Endocarditis     HPI: 68-year-old male remotely known to [facility]/Dr. Patel (last seen in 2018) with PMHx of HTN, DM II, HLD, and neuropathy. He presented to the ED on 12.23.23 with complaints of fever. He reported that his symptoms began 1 month ago and fever was associated with chills, night sweats, and myalgia and rigors daily. He was seen by his PCP on 11.3.23 and was prescribed cefuroxime for 10 days for an unknown etiology and the fever did not resolve and on 11.22.23 he beagn have burning with urination he was diagnosed with a UTI and prescribed Doxycycline  for 10 days and subsequently another 7 day course of ciprofloxacin on 12/13/2023. He completed his antibiotic course and continued to have daily high grade fevers up to 103F. He presented to the ED on 12.20.2023 for fever evaluation as well as runny nose and tested positive for COVID-19 subsequently followed up with his PCP and was prescribed molnupiravir.  He was called to return to the ED for positive blood cultures 4 of 4  bottles growing Gram-positive cocci with speciation Aerococcus urinae (susceptibility sent to reference lab and pending). ED course:  febrile 103.1 normotensive and saturating 99% on room air.  Labs notable for WBC 9.7, hemoglobin 11.9, platelets 153, creatinine 0.98, ESR 59, , ferritin 2927, D-dimer 2.37 urinalysis unremarkable.  CTA chest show no evidence of PE, no infiltrate or consolidation, there is trace/minimal pleural  effusions bilaterally. He was treated with antibiotics and ID was consulted. TTE was preformed that showed no  valvular abnormalities. [facility] was consulted for concern for endocarditis.        PMH: HTN, DM Type II, HLD, Avascular necrosis of R femoral head, hypercholesteremia, macrocytosis, Osteoarthritis of right hip and right knee, Neuropathy  PSH: Total Hip arthoplasty  Family History: Mother: lung cancer, Father: Hodgkin's lymphoma, Sister: Lung cancer, breast cancer  Social History: occasional ETOH use. Denies tobacco or illict drug use     Previous Cardiac Diagnostics:   BLE Venous US (12.24.23):  The right superficial femoral middle vein is normal.  The left superficial femoral middle vein is normal.    ECHO (12.24.23):  Left Ventricle: The left ventricle is normal in size. Normal wall thickness. Normal wall motion. There is normal systolic function with a visually estimated ejection fraction of 55 - 60%. Diastolic function cannot be reliably determined in the presence of mitral valve disease.  Right Ventricle: Normal right ventricular cavity size. Systolic function is normal.  Left Atrium: Left atrium is moderately dilated.  Aortic Valve: There is moderate aortic valve sclerosis. There is mild stenosis. Aortic valve area by VTI is 2.14 cm². Aortic valve peak velocity is 2.57 m/s. Mean gradient is 15 mmHg. The dimensionless index is 0.56. There is moderate aortic regurgitation.  Mitral Valve: Moderately calcified posterior leaflet. Chordae calcification present. Moderately restricted motion. There is mild stenosis. The mean pressure gradient across the mitral valve is 4 mmHg at a heart rate of 63 bpm. There is mild regurgitation.  Tricuspid Valve: There is trace regurgitation.  Pulmonary Artery: The estimated pulmonary artery systolic pressure is 16 mmHg.  IVC/SVC: Normal venous pressure at 3 mmHg.    SPECT (9.18.18):  This is a normal perfusion study, no perfusion defects noted. There is no evidence of  ischemia.   This scan is suggestive of low risk for future cardiovascular events.   The left ventricular cavity is noted to be normal on the stress study. The left ventricular ejection fraction was calculated to be 52% and left ventricular global function is normal.   The study quality is excellent.     ECHO (9.17.18):  The study quality is average.   The left ventricle is normal in size. Global left ventricular systolic function is normal. The left ventricular ejection fraction is 60%. The left ventricle diastolic function is impaired (Grade I) with normal left atrial pressure. Moderate concentric left ventricular hypertrophy is present.   The left atrial diameter is mildly increased. (4.3 cm). Volume Index is normal.  Mild (1+) mitral regurgitation.  The posterior mitral leaflet is moderately thickened with moderately reduced mobility.   Mild calcification of the aortic valve is noted with adequate cuspal excursion.  The pulmonary artery systolic pressure is 25 mmHg.     Carotid US (9.17.18):  The study quality is average.   1-39% stenosis in the mid right internal carotid artery based on Bluth Criteria.   1-39% stenosis in the mid left internal carotid artery based on Bluth Criteria.   Antegrade right vertebral artery flow.   Antegrade left vertebral artery flow.     Review of patient's allergies indicates:  No Known Allergies    No current facility-administered medications on file prior to encounter.     Current Outpatient Medications on File Prior to Encounter   Medication Sig    levothyroxine (SYNTHROID) 50 MCG tablet Take 1 tablet (50 mcg total) by mouth before breakfast.    molnupiravir 200 mg capsule (EUA) Take 4 capsules (800 mg total) by mouth every 12 (twelve) hours.    multivitamin (ONE DAILY MULTIVITAMIN) per tablet Take 1 tablet by mouth once daily.    phenazopyridine (PYRIDIUM) 200 MG tablet Take 1 tablet (200 mg total) by mouth 3 (three) times daily as needed (Urinary discomfort). (Patient not  taking: Reported on 12/24/2023)             Review of Systems   Reason unable to perform ROS: Not preformed due to active covid infection.       Objective:     Vital Signs (Most Recent):  Temp: 98.8 °F (37.1 °C) (12/26/23 1123)  Pulse: (!) 59 (12/26/23 1123)  Resp: 18 (12/25/23 1619)  BP: 131/65 (12/26/23 1123)  SpO2: 96 % (12/26/23 1123) Vital Signs (24h Range):  Temp:  [98.4 °F (36.9 °C)-99.1 °F (37.3 °C)] 98.8 °F (37.1 °C)  Pulse:  [58-66] 59  Resp:  [18] 18  SpO2:  [94 %-97 %] 96 %  BP: (124-135)/(53-72) 131/65     Weight: 120.2 kg (265 lb)  Body mass index is 36.96 kg/m².    SpO2: 96 %       No intake or output data in the 24 hours ending 12/26/23 1258    Lines/Drains/Airways       Peripheral Intravenous Line  Duration                  Peripheral IV - Single Lumen 12/23/23 1642 20 G Posterior;Right Forearm 2 days         Peripheral IV - Single Lumen 12/23/23 1721 20 G Distal;Left;Posterior Forearm 2 days                    Significant Labs:  Recent Results (from the past 72 hour(s))   Comprehensive metabolic panel    Collection Time: 12/23/23  4:43 PM   Result Value Ref Range    Sodium Level 136 136 - 145 mmol/L    Potassium Level 4.3 3.5 - 5.1 mmol/L    Chloride 105 98 - 107 mmol/L    Carbon Dioxide 22 (L) 23 - 31 mmol/L    Glucose Level 125 (H) 82 - 115 mg/dL    Blood Urea Nitrogen 18.2 8.4 - 25.7 mg/dL    Creatinine 0.98 0.73 - 1.18 mg/dL    Calcium Level Total 7.9 (L) 8.8 - 10.0 mg/dL    Protein Total 6.4 5.8 - 7.6 gm/dL    Albumin Level 2.7 (L) 3.4 - 4.8 g/dL    Globulin 3.7 (H) 2.4 - 3.5 gm/dL    Albumin/Globulin Ratio 0.7 (L) 1.1 - 2.0 ratio    Bilirubin Total 0.5 <=1.5 mg/dL    Alkaline Phosphatase 57 40 - 150 unit/L    Alanine Aminotransferase 67 (H) 0 - 55 unit/L    Aspartate Aminotransferase 48 (H) 5 - 34 unit/L    eGFR >60 mls/min/1.73/m2   CBC with Differential    Collection Time: 12/23/23  4:43 PM   Result Value Ref Range    WBC 9.70 4.50 - 11.50 x10(3)/mcL    RBC 3.75 (L) 4.70 - 6.10 x10(6)/mcL     Hgb 11.9 (L) 14.0 - 18.0 g/dL    Hct 35.2 (L) 42.0 - 52.0 %    MCV 93.9 80.0 - 94.0 fL    MCH 31.7 (H) 27.0 - 31.0 pg    MCHC 33.8 33.0 - 36.0 g/dL    RDW 14.1 11.5 - 17.0 %    Platelet 153 130 - 400 x10(3)/mcL    MPV 9.6 7.4 - 10.4 fL    Neut % 80.9 %    Lymph % 10.1 %    Mono % 7.7 %    Eos % 0.2 %    Basophil % 0.5 %    Lymph # 0.98 0.6 - 4.6 x10(3)/mcL    Neut # 7.84 2.1 - 9.2 x10(3)/mcL    Mono # 0.75 0.1 - 1.3 x10(3)/mcL    Eos # 0.02 0 - 0.9 x10(3)/mcL    Baso # 0.05 <=0.2 x10(3)/mcL    IG# 0.06 (H) 0 - 0.04 x10(3)/mcL    IG% 0.6 %    NRBC% 0.0 %   Blood culture #1 **CANNOT BE ORDERED STAT**    Collection Time: 12/23/23  4:57 PM    Specimen: Blood   Result Value Ref Range    CULTURE, BLOOD ([facility]) Aerococcus urinae (A)     GRAM STAIN Gram Positive Cocci, probable Staphylococcus (AA)     GRAM STAIN Seen in gram stain of broth only (AA)     GRAM STAIN 2 of 2 bottles positive (AA)    Blood culture #2 **CANNOT BE ORDERED STAT**    Collection Time: 12/23/23  4:57 PM    Specimen: Blood   Result Value Ref Range    CULTURE, BLOOD ([facility]) Aerococcus urinae (A)     GRAM STAIN Gram Positive Cocci, probable Staphylococcus (AA)     GRAM STAIN Seen in gram stain of broth only (AA)     GRAM STAIN 2 of 2 bottles positive (AA)    Lactic acid, plasma    Collection Time: 12/23/23  4:57 PM   Result Value Ref Range    Lactic Acid Level 1.2 0.5 - 2.2 mmol/L   BCID2 Panel    Collection Time: 12/23/23  4:57 PM    Specimen: Blood   Result Value Ref Range    CTX-M (ESBL ) N/A Not Detected, N/A    IMP (Cabapenemase ) N/A Not Detected, N/A    KPC resistance gene (Carbapenemase ) N/A Not Detected, N/A    mcr-1 N/A Not Detected, N/A    mecA ID N/A Not Detected, N/A    mecA/C and MREJ (MRSA) gene N/A Not Detected, N/A    NDM (Carbapenemase ) N/A Not Detected, N/A    OXA-48-like (Carbapenemase ) N/A Not Detected, N/A    Leah/B (VRE gene) N/A Not Detected, N/A    VIM (Carbapenemase ) N/A Not  Detected, N/A    Enterococcus faecalis Not Detected Not Detected    Enterococcus faecium Not Detected Not Detected    Listeria monocytogenes Not Detected Not Detected    Staphylococcus spp. Not Detected Not Detected    Staphylococcus aureus Not Detected Not Detected    Staphylococcus epidermidis Not Detected Not Detected    Staphylococcus lugdunensis Not Detected Not Detected    Streptococcus spp. Not Detected Not Detected    Streptococcus agalactiae (Group B) Not Detected Not Detected    Streptococcus pneumoniae Not Detected Not Detected    Streptococcus pyogenes (Group A) Not Detected Not Detected    Acinetobacter calcoaceticus/baumannii complex Not Detected Not Detected    Bacteroides fragilis Not Detected Not Detected    Enterobacterales Not Detected Not Detected    Enterobacter cloacae complex Not Detected Not Detected    Escherichia coli Not Detected Not Detected    Klebsiella aerogenes Not Detected Not Detected    Klebsiella oxytoca Not Detected Not Detected    Klebsiella pneumoniae group Not Detected Not Detected    Proteus spp. Not Detected Not Detected    Salmonella spp. Not Detected Not Detected    Serratia marcescens Not Detected Not Detected    Haemophilus influenzae Not Detected Not Detected    Neisseria meningitidis Not Detected Not Detected    Pseudomonas aeruginosa Not Detected Not Detected    Stenotrophomonas maltophilia Not Detected Not Detected    Candida albicans Not Detected Not Detected    Candida auris Not Detected Not Detected    Candida glabrata Not Detected Not Detected    Candida krusei Not Detected Not Detected    Candida parapsilosis Not Detected Not Detected    Candida tropicalis Not Detected Not Detected    Cryptococcus neoformans/gattii Not Detected Not Detected   Urinalysis, Reflex to Urine Culture    Collection Time: 12/23/23  5:13 PM    Specimen: Urine   Result Value Ref Range    Color, UA Light-Yellow Yellow, Light-Yellow, Colorless, Straw, Dark-Yellow    Appearance, UA Clear  Clear    Specific Gravity, UA 1.014 1.005 - 1.030    pH, UA 5.5 5.0 - 8.5    Protein, UA Negative Negative    Glucose, UA Normal Negative, Normal    Ketones, UA Negative Negative    Blood, UA Negative Negative    Bilirubin, UA Negative Negative    Urobilinogen, UA Normal 0.2, 1.0, Normal    Nitrites, UA Negative Negative    Leukocyte Esterase, UA 25 (A) Negative    WBC, UA 0-5 None Seen, 0-2, 3-5, 0-5 /HPF    Bacteria, UA None Seen None Seen, Trace /HPF    Squamous Epithelial Cells, UA Trace None Seen /HPF    Hyaline Casts, UA 0-2 (A) None Seen /lpf    RBC, UA 0-5 None Seen, 0-2, 3-5, 0-5 /HPF   C-Reactive Protein    Collection Time: 12/23/23 11:43 PM   Result Value Ref Range    C-Reactive Protein 149.60 (H) <5.00 mg/L   D-Dimer, Quantitative    Collection Time: 12/23/23 11:43 PM   Result Value Ref Range    D-Dimer 2.37 (H) 0.00 - 0.50 ug/mL FEU   Sedimentation rate    Collection Time: 12/23/23 11:43 PM   Result Value Ref Range    Sed Rate 59 (H) 0 - 15 mm/hr   Ferritin    Collection Time: 12/23/23 11:43 PM   Result Value Ref Range    Ferritin Level 2,927.60 (H) 21.81 - 274.66 ng/mL   Comprehensive Metabolic Panel    Collection Time: 12/24/23  3:32 AM   Result Value Ref Range    Sodium Level 139 136 - 145 mmol/L    Potassium Level 4.1 3.5 - 5.1 mmol/L    Chloride 106 98 - 107 mmol/L    Carbon Dioxide 21 (L) 23 - 31 mmol/L    Glucose Level 110 82 - 115 mg/dL    Blood Urea Nitrogen 14.4 8.4 - 25.7 mg/dL    Creatinine 0.85 0.73 - 1.18 mg/dL    Calcium Level Total 8.0 (L) 8.8 - 10.0 mg/dL    Protein Total 6.6 5.8 - 7.6 gm/dL    Albumin Level 2.5 (L) 3.4 - 4.8 g/dL    Globulin 4.1 (H) 2.4 - 3.5 gm/dL    Albumin/Globulin Ratio 0.6 (L) 1.1 - 2.0 ratio    Bilirubin Total 0.4 <=1.5 mg/dL    Alkaline Phosphatase 57 40 - 150 unit/L    Alanine Aminotransferase 60 (H) 0 - 55 unit/L    Aspartate Aminotransferase 46 (H) 5 - 34 unit/L    eGFR >60 mls/min/1.73/m2   Magnesium    Collection Time: 12/24/23  3:32 AM   Result Value Ref  Range    Magnesium Level 2.10 1.60 - 2.60 mg/dL   Phosphorus    Collection Time: 12/24/23  3:32 AM   Result Value Ref Range    Phosphorus Level 3.4 2.3 - 4.7 mg/dL   CBC with Differential    Collection Time: 12/24/23  3:32 AM   Result Value Ref Range    WBC 9.47 4.50 - 11.50 x10(3)/mcL    RBC 3.62 (L) 4.70 - 6.10 x10(6)/mcL    Hgb 11.8 (L) 14.0 - 18.0 g/dL    Hct 34.4 (L) 42.0 - 52.0 %    MCV 95.0 (H) 80.0 - 94.0 fL    MCH 32.6 (H) 27.0 - 31.0 pg    MCHC 34.3 33.0 - 36.0 g/dL    RDW 14.2 11.5 - 17.0 %    Platelet 131 130 - 400 x10(3)/mcL    MPV 10.6 (H) 7.4 - 10.4 fL    Neut % 72.6 %    Lymph % 14.6 %    Mono % 11.8 %    Eos % 0.2 %    Basophil % 0.3 %    Lymph # 1.38 0.6 - 4.6 x10(3)/mcL    Neut # 6.87 2.1 - 9.2 x10(3)/mcL    Mono # 1.12 0.1 - 1.3 x10(3)/mcL    Eos # 0.02 0 - 0.9 x10(3)/mcL    Baso # 0.03 <=0.2 x10(3)/mcL    IG# 0.05 (H) 0 - 0.04 x10(3)/mcL    IG% 0.5 %    NRBC% 0.0 %   Echo    Collection Time: 12/24/23 10:12 AM   Result Value Ref Range    Vergara's Biplane MOD Ejection Fraction 61 %    LVOT stroke volume 118.92 cm3    LVIDd 4.80 3.5 - 6.0 cm    LV Systolic Volume 41.00 mL    LVIDs 3.20 2.1 - 4.0 cm    LV Diastolic Volume 108.00 mL    IVS 1.10 0.6 - 1.1 cm    LVOT diameter 2.20 cm    LVOT area 3.8 cm2    FS 33 28 - 44 %    Left Ventricle Relative Wall Thickness 0.46 cm    Posterior Wall 1.10 0.6 - 1.1 cm    LV mass 193.96 g    MV Peak E [place] 1.22 m/s    TDI LATERAL 0.11 m/s    TDI SEPTAL 0.06 m/s    E/E' ratio 14.35 m/s    MV Peak A [place] 1.32 m/s    TR Max [place] 1.78 m/s    E/A ratio 0.92     E wave deceleration time 193.00 msec    LV SEPTAL E/E' RATIO 20.33 m/s    LV LATERAL E/E' RATIO 11.09 m/s    LVOT peak denton 1.45 m/s    Left Ventricular Outflow Tract Mean Velocity 0.93 cm/s    Left Ventricular Outflow Tract Mean Gradient 4.00 mmHg    TAPSE 3.20 cm    LA size 5.00 cm    LA volume (mod) 103.00 cm3    AV regurgitation pressure 1/2 time 423 ms    AR Max [place] 4.63 m/s    AV mean gradient 15 mmHg    AV  peak gradient 26 mmHg    Ao peak esther 2.57 m/s    Ao VTI 55.60 cm    LVOT peak VTI 31.30 cm    AV valve area 2.14 cm²    AV Velocity Ratio 0.56     AV index (prosthetic) 0.56     PAMELA by Velocity Ratio 2.14 cm²    MV mean gradient 4 mmHg    MV peak gradient 10 mmHg    MV stenosis pressure 1/2 time 88.00 ms    MV valve area p 1/2 method 2.50 cm2    MV valve area by continuity eq 2.30 cm2    MV VTI 51.8 cm    Triscuspid Valve Regurgitation Peak Gradient 13 mmHg    Mean e' 0.09 m/s    Mitral Valve Heart Rate 63 bpm    TV resting pulmonary artery pressure 16 mmHg    RV TB RVSP 5 mmHg    Est. RA pres 3 mmHg    Sinus 3.8 cm   PSA, Total (Diagnostic)    Collection Time: 12/24/23 12:02 PM   Result Value Ref Range    Prostate Specific Antigen 0.83 <=4.00 ng/mL   VANCOMYCIN, TROUGH    Collection Time: 12/25/23  5:38 AM   Result Value Ref Range    Vancomycin Trough 13.3 (L) 15.0 - 20.0 ug/ml   Blood Culture    Collection Time: 12/25/23  5:38 AM    Specimen: Blood   Result Value Ref Range    CULTURE, BLOOD ([facility]) No Growth At 24 Hours    Blood Culture    Collection Time: 12/25/23  5:38 AM    Specimen: Blood   Result Value Ref Range    CULTURE, BLOOD ([facility]) No Growth At 24 Hours    Comprehensive Metabolic Panel    Collection Time: 12/26/23  5:36 AM   Result Value Ref Range    Sodium Level 138 136 - 145 mmol/L    Potassium Level 4.3 3.5 - 5.1 mmol/L    Chloride 107 98 - 107 mmol/L    Carbon Dioxide 24 23 - 31 mmol/L    Glucose Level 105 82 - 115 mg/dL    Blood Urea Nitrogen 11.5 8.4 - 25.7 mg/dL    Creatinine 0.78 0.73 - 1.18 mg/dL    Calcium Level Total 7.9 (L) 8.8 - 10.0 mg/dL    Protein Total 5.9 5.8 - 7.6 gm/dL    Albumin Level 2.4 (L) 3.4 - 4.8 g/dL    Globulin 3.5 2.4 - 3.5 gm/dL    Albumin/Globulin Ratio 0.7 (L) 1.1 - 2.0 ratio    Bilirubin Total 0.3 <=1.5 mg/dL    Alkaline Phosphatase 45 40 - 150 unit/L    Alanine Aminotransferase 70 (H) 0 - 55 unit/L    Aspartate Aminotransferase 50 (H) 5 - 34 unit/L    eGFR >60  mls/min/1.73/m2   CBC with Differential    Collection Time: 12/26/23  5:36 AM   Result Value Ref Range    WBC 9.64 4.50 - 11.50 x10(3)/mcL    RBC 3.41 (L) 4.70 - 6.10 x10(6)/mcL    Hgb 11.0 (L) 14.0 - 18.0 g/dL    Hct 32.3 (L) 42.0 - 52.0 %    MCV 94.7 (H) 80.0 - 94.0 fL    MCH 32.3 (H) 27.0 - 31.0 pg    MCHC 34.1 33.0 - 36.0 g/dL    RDW 14.5 11.5 - 17.0 %    Platelet 215 130 - 400 x10(3)/mcL    MPV 9.5 7.4 - 10.4 fL    Neut % 65.1 %    Lymph % 20.6 %    Mono % 11.5 %    Eos % 1.7 %    Basophil % 0.4 %    Lymph # 1.99 0.6 - 4.6 x10(3)/mcL    Neut # 6.27 2.1 - 9.2 x10(3)/mcL    Mono # 1.11 0.1 - 1.3 x10(3)/mcL    Eos # 0.16 0 - 0.9 x10(3)/mcL    Baso # 0.04 <=0.2 x10(3)/mcL    IG# 0.07 (H) 0 - 0.04 x10(3)/mcL    IG% 0.7 %    NRBC% 0.0 %       Significant Imaging:  Imaging Results              CTA Chest Non-Coronary (PE Studies) (Final result)  Result time 12/24/23 08:41:39      Final result by Bob Sheppard MD (12/24/23 08:41:39)                   Impression:      No pulmonary embolism identified.    No significant discrepancy between my interpretation and the preliminary radiology report.      Electronically signed by: Bob Sheppard  Date:    12/24/2023  Time:    08:41               Narrative:    EXAMINATION:  CTA CHEST NON CORONARY (PE STUDIES)    CLINICAL HISTORY:  Pulmonary embolism (PE) suspected, positive D-dimer;    TECHNIQUE:  CTA imaging of the chest after IV contrast. Axial, coronal and sagittal reconstructions, including MIP images, are reviewed. Dose length product 354 mGycm. Automatic exposure control, adjustment of mA/kV or iterative reconstruction technique used to limit radiation dose.    COMPARISON:  No relevant comparison studies available at the time of dictation.    FINDINGS:  Diagnostic quality: Adequate    Pulmonary embolism: None identified.    Lung parenchyma: Mild right lower lobe atelectasis.    Pleural effusion: Trace right pleural fluid.    Mediastinum/patt: Normal heart size and  thoracic aortic caliber.  No pathologically enlarged lymph node.    Chest wall/axilla: No significant findings.    Upper abdomen: No significant findings.    Bones: No acute osseous process.                        Preliminary result by Cristiano Cole MD (12/24/23 01:36:23)                   Impression:    1. No filling defects are seen in the pulmonary arteries to suggest pulmonary embolus.  2. No acute focal infiltrate or consolidation is seen.  3. Trace pleural effusions are noted bilaterally.  4. Details and other findings as discussed above.               Narrative:    START OF REPORT:  Technique: CT Scan of the chest was performed with intravenous contrast with direct axial images as well as sagittal and coronal reconstruction images pulmonary embolus protocol.    Dosage Information: Automated Exposure Control was utilized 353.89 mGy.cm.    Comparison: None.    Clinical History: Pulmonary embolism (PE) suspected, positive D-dimer.    Findings:  Soft Tissues: Unremarkable.  Neck: The thyroid gland appear unremarkable.  Mediastinum: The mediastinal structures are within normal limits.  Heart: The heart size is within normal limits.  Aorta: No aortic dissection or aneurysm is seen. Mild aortic calcification is seen in the thoracic aorta.  Pulmonary Arteries: No filling defects are seen in the pulmonary arteries to suggest pulmonary embolus.  Diaphragms: Moderate elevation is seen of the right hemidiaphragm which may reflect eventration.  Lungs: No acute focal infiltrate or consolidation is seen. The lungs are slightly heterogeneous, which may reflect small airways disease versus mosaic attenuation. Band-like opacity is seen in the right lower lobe, which may reflect subsegmental atelectasis. Streaky opacities are present in the lingula, which may reflect subsegmental atelectasis and / or parenchymal scarring.  Pleura: Trace pleural effusions are noted bilaterally. There is no pneumothorax.  Bony  Structures:  Spine: Mild spondylolytic changes are seen in the thoracic spine.  Abdomen: The visualized upper abdominal organs appear unremarkable.                                         X-Ray Chest 1 View (Final result)  Result time 12/23/23 17:07:16      Final result by Efe Bahena MD (12/23/23 17:07:16)                   Impression:      NO ACUTE CARDIOPULMONARY PROCESS IDENTIFIED.      Electronically signed by: Efe Bahena  Date:    12/23/2023  Time:    17:07               Narrative:    EXAMINATION:  XR CHEST 1 VIEW    CLINICAL HISTORY:  Fever, unspecified    TECHNIQUE:  One view    COMPARISON:  December 20, 2023.    FINDINGS:  Cardiopericardial silhouette is within normal limits.  Portion of left lower peripheral chest was excluded on the radiograph.  No acute dense focal or segmental consolidation, congestive process, pleural effusions or pneumothorax.                                      EKG:        Telemetry:  NSR    Physical Exam  Vitals (Review of Systems  Reason unable to perform ROS: Not preformed due to active covid infection.) and nursing note reviewed.         Home Medications:   No current facility-administered medications on file prior to encounter.     Current Outpatient Medications on File Prior to Encounter   Medication Sig Dispense Refill    levothyroxine (SYNTHROID) 50 MCG tablet Take 1 tablet (50 mcg total) by mouth before breakfast. 30 tablet 5    molnupiravir 200 mg capsule (EUA) Take 4 capsules (800 mg total) by mouth every 12 (twelve) hours. 40 capsule 0    multivitamin (ONE DAILY MULTIVITAMIN) per tablet Take 1 tablet by mouth once daily.      phenazopyridine (PYRIDIUM) 200 MG tablet Take 1 tablet (200 mg total) by mouth 3 (three) times daily as needed (Urinary discomfort). (Patient not taking: Reported on 12/24/2023) 21 tablet 0       Current Inpatient Medications:    Current Facility-Administered Medications:     acetaminophen tablet 1,000 mg, 1,000 mg, Oral, Q6H PRN, Pankaj, Ali  MD KAI    acetaminophen tablet 650 mg, 650 mg, Oral, Q4H PRN, Belle Lira MD    aluminum-magnesium hydroxide-simethicone 200-200-20 mg/5 mL suspension 30 mL, 30 mL, Oral, QID PRN, Belle Lira MD    cefTRIAXone (ROCEPHIN) 2 g in dextrose 5 % in water (D5W) 100 mL IVPB (MB+), 2 g, Intravenous, Q24H, Belle Lira MD, Stopped at 12/26/23 0119    enoxaparin injection 40 mg, 40 mg, Subcutaneous, Daily, Belle Lira MD, 40 mg at 12/25/23 1745    ibuprofen tablet 400 mg, 400 mg, Oral, Q6H PRN, Belle Lira MD    levothyroxine tablet 50 mcg, 50 mcg, Oral, Before breakfast, Belle Lira MD, 50 mcg at 12/26/23 0558    melatonin tablet 6 mg, 6 mg, Oral, Nightly PRN, Belle Lira MD    multivitamin tablet, 1 tablet, Oral, Daily, Belle Lira MD, 1 tablet at 12/26/23 0858    ondansetron injection 4 mg, 4 mg, Intravenous, Q4H PRN, Belle Lira MD    polyethylene glycol packet 17 g, 17 g, Oral, BID PRN, Belle Lira MD    prochlorperazine injection Soln 5 mg, 5 mg, Intravenous, Q6H PRN, Belle Lira MD    senna-docusate 8.6-50 mg per tablet 2 tablet, 2 tablet, Oral, BID PRN, Belle Lira MD    sodium chloride 0.9% flush 10 mL, 10 mL, Intravenous, PRN, Belle Lira MD    Pharmacy to dose Vancomycin consult, , , Once **AND** vancomycin - pharmacy to dose, , Intravenous, pharmacy to manage frequency, Belle Lira MD    vancomycin 2 g in dextrose 5 % 500 mL IVPB, 2,000 mg, Intravenous, Q12H, Elena Fragoso DO, Stopped at 12/26/23 0800         VTE Risk Mitigation (From admission, onward)           Ordered     enoxaparin injection 40 mg  Daily         12/23/23 2326     IP VTE HIGH RISK PATIENT  Once         12/23/23 2326     Place sequential compression device  Until discontinued         12/23/23 2326                    Assessment:   Bacteremia with possible endocarditis?   - TTE (12.23.23): EF 55-60% & valves grossly normal   - in the setting of UTI  - BCx 12.20.23 - [facility] both sets, prelim BCID negative, Aerococcus urinae,  sent to ref lab for testing  - BCx 12.23.23 - [facility]  - BCx 12.25.23 - in process  Sepsis    - Present on admit   - gram positive cocci bacteremia   COVID 19   - + PCR on 12.20.23   - On remdesivir   HTN  DM Type II  HLD  Avascular necrosis of R femoral head  Hypercholesteremia  Macrocytosis  Osteoarthritis of right hip and right knee  Neuropathy      Plan:   EKG & TTE reviewed  ID recommends a JUAN ~ will plan for JUAN after COVID  precautions are lifted   Cont, ABX per ID recommendation  IM for COVID management     Thank you for your consult.     Johana Magallanes NP  Cardiology  [facility] - 9th Floor Med Surg  12/26/2023 12:58 PM    I have seen the patient, reviewed the Nurse Practitioner's note, assessment and plan. I have personally interviewed and examined the patient at bedside and agree with the findings. Medical decision making listed above were done under my guidance.    Physical exam:  Cardiovascular system: regular rhythm, no murmur.  Lungs: CTAB.  Extremities: No leg edema.    Plan:  Juan in am

## 2023-12-26 NOTE — PROGRESS NOTES
Infectious Disease        Patient ID: Arturo Ortiz  68 y.o. male.    Chief Complaint: Fever (Seen here X 3 days ago for fever and generalized weakness. Discharged home but called to come back to ER for positive Blood cultures. States still running fever at home. States took 1 g tylenol approx 1:30 PM. States no new symptoms since last visit. )    Interval HPI:    12/24 - afebrile. Admitted with COVID and bacteremia. Repeat cx in process. TTE pending, if negative may need QUENTIN.  High suspicion for endocarditis, +stigmata. Okay to continue dual therapy for now pending further data.    12/25 - fever curve improving. Tolerating abx. TTE negative, plan for QUENTIN  12/26 - BCx prelim negative. QUENTIN pending. Isolate also at ref lab for susceptibility testing   Assessment and Plan:   1) Sepsis  - present on admission   - FLU negative  - rapid Strep negative  - COVID +  - gram positive bacteremia  - UA 12/21 - WBC 6-10  - UA 12/23  LE, 0-5 WBC  - s/p pip/tazo x 1  - currently on vancomycin, goal 15-20,Rx to dose  - currently on ceftriaxone     2) Bacteremia  - in setting of recurrent UTI  - Prosthetics: right THR  - BCx 12/20 - [facility] both sets, prelim BCID negative, Aerococcus urinae, sent to ref lab for testing  - BCx12/23 - [facility]  - BCx 12/25 - in process  - TTE 12/23 - EF 55-60%, valves grossly normal  - obtain QUENTIN, high suspicion of endocarditis given one month of symptoms  - obtain UCx  - obtain PSA 0.36-->   - stigmata: +Osler's node 4th left digit  - currently on vancomycin, goal 15-20,Rx to dose  - currently on ceftriaxone     3) COVID  - + PCR  - on remdesivir  - CXR 12/20 - No acute pulmonary process appreciated   - CXR 12/23 - no acute process  - CTA chest - no PE. Mild right lower lobe atelectasis.      Discussed with patient  and wife at bedside 12/26  Discussed with PERLITA Read MD, MPH  [facility]     Thank you for this consultation. I will follow up with  the patient. Please contact via Epic secure chat with any questions.         HPI:   Patient is Arturo Ortiz a 68 y.o. male admitted on 12/23 for one month of fevers, night sweats,myalias, chills. Patient went to PCP and was prescribed oral antibiotics on 11/03/23 but failed to improve. He developed dysuria and frequency and was prescribe a course of antimicrobials for UTI on 11/22 and again on 12/13. Patient reports continued fevers of 103F at home.He presented to ED on 12/20 and tested + for COVID. BLood cx drawn at that time subsequently turned + and patient was admitted. Prelim isolate is Aerococcus spp. Patient on empiric coverage. Upon evaluation he as febrile, with elevated inflammatory markers. Patient has known DMII, hypertension, OA s/p recent right THR on 5/18/2021, hypercholesterolemia, hypothyroidism. Infectious diseases consulted for evaluation and management.     Past Medical History:   Diagnosis Date    Avascular necrosis of right femoral head     DM (diabetes mellitus)     Essential (primary) hypertension     Hypercholesteremia     Macrocytosis     Osteoarthritis of right hip     Osteoarthritis of right knee     Peripheral neuropathy      Past Surgical History:   Procedure Laterality Date    TOTAL HIP ARTHROPLASTY Right 05/18/2021     Review of patient's allergies indicates:  No Known Allergies  Current Outpatient Medications   Medication Instructions    levothyroxine (SYNTHROID) 50 mcg, Oral, Before breakfast    molnupiravir 800 mg, Oral, Every 12 hours    multivitamin (ONE DAILY MULTIVITAMIN) per tablet 1 tablet, Oral, Daily    phenazopyridine (PYRIDIUM) 200 mg, Oral, 3 times daily PRN       Current Facility-Administered Medications:     acetaminophen tablet 1,000 mg, 1,000 mg, Oral, Q6H PRN, Belle Lira MD    acetaminophen tablet 650 mg, 650 mg, Oral, Q4H PRN, Belle Lira MD    aluminum-magnesium hydroxide-simethicone 200-200-20 mg/5 mL suspension 30 mL, 30 mL, Oral, QID PRN, Pankaj,  Belle QUINN MD    cefTRIAXone (ROCEPHIN) 2 g in dextrose 5 % in water (D5W) 100 mL IVPB (MB+), 2 g, Intravenous, Q24H, Belle Lira MD, Stopped at 12/26/23 0119    enoxaparin injection 40 mg, 40 mg, Subcutaneous, Daily, Belle Lira MD, 40 mg at 12/25/23 1745    ibuprofen tablet 400 mg, 400 mg, Oral, Q6H PRN, Belle Lira MD    levothyroxine tablet 50 mcg, 50 mcg, Oral, Before breakfast, Belle Lira MD, 50 mcg at 12/26/23 0558    melatonin tablet 6 mg, 6 mg, Oral, Nightly PRN, Belle Lira MD    multivitamin tablet, 1 tablet, Oral, Daily, Belle Lira MD, 1 tablet at 12/26/23 0858    ondansetron injection 4 mg, 4 mg, Intravenous, Q4H PRN, Belle Lira MD    polyethylene glycol packet 17 g, 17 g, Oral, BID PRN, Belle Lira MD    prochlorperazine injection Soln 5 mg, 5 mg, Intravenous, Q6H PRN, Belle Lira MD    senna-docusate 8.6-50 mg per tablet 2 tablet, 2 tablet, Oral, BID PRN, Belle Lira MD    sodium chloride 0.9% flush 10 mL, 10 mL, Intravenous, PRN, Belle Lira MD    Pharmacy to dose Vancomycin consult, , , Once **AND** vancomycin - pharmacy to dose, , Intravenous, pharmacy to manage frequency, Belle Lira MD    vancomycin 2 g in dextrose 5 % 500 mL IVPB, 2,000 mg, Intravenous, Q12H, Elena Fragoso DO, Stopped at 12/26/23 0800  Review of Systems   Constitutional:  Negative for chills and fever.   HENT:  Negative for congestion, ear discharge, ear pain, facial swelling, mouth sores, postnasal drip, rhinorrhea, sinus pressure, sinus pain, sneezing, sore throat and trouble swallowing.    Eyes:  Negative for discharge, redness and itching.   Respiratory:  Negative for cough, chest tightness, shortness of breath and wheezing.    Cardiovascular:  Negative for chest pain, palpitations and leg swelling.   Gastrointestinal:  Negative for abdominal distention, abdominal pain, diarrhea, nausea and vomiting.   Genitourinary:  Negative for dysuria, flank pain, frequency and urgency.   Musculoskeletal:  Negative  for back pain, myalgias and neck stiffness.   Skin:  Negative for rash and wound.   Allergic/Immunologic: Negative for immunocompromised state.   Neurological:  Negative for dizziness, light-headedness and headaches.   Hematological:  Negative for adenopathy.   Psychiatric/Behavioral:  Negative for agitation, confusion and suicidal ideas. The patient is not nervous/anxious.        Objective:   Temp:  [98.4 °F (36.9 °C)-99.1 °F (37.3 °C)] 98.5 °F (36.9 °C)  Pulse:  [58-66] 58  Resp:  [18] 18  SpO2:  [94 %-97 %] 95 %  BP: (124-149)/(53-75) 131/64     Physical Exam  Constitutional:       Appearance: Normal appearance. He is well-developed.   HENT:      Head: Normocephalic.      Nose: Nose normal.      Mouth/Throat:      Pharynx: No oropharyngeal exudate.   Eyes:      General: Lids are normal. No scleral icterus.        Right eye: No discharge.      Conjunctiva/sclera: Conjunctivae normal.      Pupils: Pupils are equal, round, and reactive to light.   Neck:      Thyroid: No thyromegaly.      Vascular: No JVD.      Trachea: Trachea normal.   Cardiovascular:      Rate and Rhythm: Normal rate and regular rhythm.      Pulses: Normal pulses.      Heart sounds: Normal heart sounds. No murmur heard.     No friction rub.   Pulmonary:      Effort: Pulmonary effort is normal. No respiratory distress.      Breath sounds: Normal breath sounds. No wheezing.   Chest:      Chest wall: No tenderness.   Abdominal:      General: Bowel sounds are normal. There is no distension.      Palpations: Abdomen is soft.      Tenderness: There is no abdominal tenderness. There is no guarding or rebound.   Musculoskeletal:         General: No tenderness. Normal range of motion.      Cervical back: Full passive range of motion without pain, normal range of motion and neck supple.   Lymphadenopathy:      Cervical: No cervical adenopathy.   Skin:     General: Skin is warm and dry.      Findings: No rash.   Neurological:      Mental Status: He is alert  and oriented to person, place, and time.      Cranial Nerves: No cranial nerve deficit.      Sensory: No sensory deficit.   Psychiatric:         Speech: Speech normal.         Behavior: Behavior normal.         Thought Content: Thought content normal.         Judgment: Judgment normal.         Estimated Creatinine Clearance: 122.9 mL/min (based on SCr of 0.78 mg/dL).  Recent Labs   Lab 12/26/23 0536   WBC 9.64          Microbiology Results (last 7 days)       Procedure Component Value Units Date/Time    Blood Culture [[id number]]  (Normal) Collected: 12/25/23 0538    Order Status: Completed Specimen: Blood Updated: 12/26/23 1000     CULTURE, BLOOD ([facility]) No Growth At 24 Hours    Blood Culture [[id number]]  (Normal) Collected: 12/25/23 0538    Order Status: Completed Specimen: Blood Updated: 12/26/23 1000     CULTURE, BLOOD ([facility]) No Growth At 24 Hours    Blood culture #2 **CANNOT BE ORDERED STAT** [[id number]]  (Abnormal) Collected: 12/23/23 1657    Order Status: Completed Specimen: Blood Updated: 12/25/23 1111     CULTURE, BLOOD ([facility]) Aerococcus urinae     Comment: Susceptibility sent to reference lab. Results to follow on separate report.        GRAM STAIN Gram Positive Cocci, probable Staphylococcus      Seen in gram stain of broth only      2 of 2 bottles positive    Narrative:      For sensitivity results refer to [id number].        Blood culture #1 **CANNOT BE ORDERED STAT** [[id number]]  (Abnormal) Collected: 12/23/23 1657    Order Status: Completed Specimen: Blood Updated: 12/25/23 1111     CULTURE, BLOOD ([facility]) Aerococcus urinae     Comment: Susceptibility sent to reference lab. Results to follow on separate report.        GRAM STAIN Gram Positive Cocci, probable Staphylococcus      Seen in gram stain of broth only      2 of 2 bottles positive    Narrative:      For sensitivity results refer to [id number].        BCID2 Panel [[id number]]  (Normal) Collected: 12/23/23 1657    Order Status:  Completed Specimen: Blood Updated: 12/24/23 1352     CTX-M (ESBL ) N/A     IMP (Cabapenemase ) N/A     KPC resistance gene (Carbapenemase ) N/A     mcr-1 N/A     mecA ID N/A     Comment: Note: Antimicrobial resistance can occur via multiple mechanisms. A Not Detected result for antimicrobial resistance gene(s) does not indicate antimicrobial susceptibility. Subculturing is required for species identification and susceptibility testing of   isolates.        mecA/C and MREJ (MRSA) gene N/A     NDM (Carbapenemase ) N/A     OXA-48-like (Carbapenemase ) N/A     Leah/B (VRE gene) N/A     VIM (Carbapenemase ) N/A     Enterococcus faecalis Not Detected     Enterococcus faecium Not Detected     Listeria monocytogenes Not Detected     Staphylococcus spp. Not Detected     Staphylococcus aureus Not Detected     Staphylococcus epidermidis Not Detected     Staphylococcus lugdunensis Not Detected     Streptococcus spp. Not Detected     Streptococcus agalactiae (Group B) Not Detected     Streptococcus pneumoniae Not Detected     Streptococcus pyogenes (Group A) Not Detected     Acinetobacter calcoaceticus/baumannii complex Not Detected     Bacteroides fragilis Not Detected     Enterobacterales Not Detected     Enterobacter cloacae complex Not Detected     Escherichia coli Not Detected     Klebsiella aerogenes Not Detected     Klebsiella oxytoca Not Detected     Klebsiella pneumoniae group Not Detected     Proteus spp. Not Detected     Salmonella spp. Not Detected     Serratia marcescens Not Detected     Haemophilus influenzae Not Detected     Neisseria meningitidis Not Detected     Pseudomonas aeruginosa Not Detected     Stenotrophomonas maltophilia Not Detected     Candida albicans Not Detected     Candida auris Not Detected     Candida glabrata Not Detected     Candida krusei Not Detected     Candida parapsilosis Not Detected     Candida tropicalis Not Detected     Cryptococcus  neoformans/gattii Not Detected    Narrative:      The [employer] BCID2 Panel is a multiplexed nucleic acid test intended for the use with Elo7® 2.0 or Elo7® [employer] Systems for the simultaneous qualitative detection and identification of multiple bacterial and yeast nucleic acids and select genetic determinants associated with antimicrobial resistance.  The [employer] BCID2 Panel test is performed directly on blood culture samples identified as positive by a continuous monitoring blood culture system.  Results are intended to be interpreted in conjunction with Gram stain results.            Significant Labs: All pertinent labs within the past 24 hours have been reviewed.    Significant Imaging: I have reviewed all relevant and available imaging results/findings within the past 24 hours.      Plan -- see top of note

## 2023-12-27 ENCOUNTER — ANESTHESIA (OUTPATIENT)
Dept: CARDIOLOGY | Facility: HOSPITAL | Age: 68
DRG: 871 | End: 2023-12-27
Payer: MEDICARE

## 2023-12-27 ENCOUNTER — ANESTHESIA EVENT (OUTPATIENT)
Dept: CARDIOLOGY | Facility: HOSPITAL | Age: 68
DRG: 871 | End: 2023-12-27
Payer: MEDICARE

## 2023-12-27 LAB
BSA FOR ECHO PROCEDURE: 2.45 M2
SARS-COV-2 RDRP RESP QL NAA+PROBE: NEGATIVE
VANCOMYCIN TROUGH SERPL-MCNC: 20.4 UG/ML (ref 15–20)

## 2023-12-27 PROCEDURE — D9220A PRA ANESTHESIA: Mod: CRNA,,,

## 2023-12-27 PROCEDURE — D9220A PRA ANESTHESIA: Mod: ANES,,, | Performed by: ANESTHESIOLOGY

## 2023-12-27 PROCEDURE — 63600175 PHARM REV CODE 636 W HCPCS: Performed by: INTERNAL MEDICINE

## 2023-12-27 PROCEDURE — 80202 ASSAY OF VANCOMYCIN: CPT | Performed by: STUDENT IN AN ORGANIZED HEALTH CARE EDUCATION/TRAINING PROGRAM

## 2023-12-27 PROCEDURE — 25000003 PHARM REV CODE 250

## 2023-12-27 PROCEDURE — 25000003 PHARM REV CODE 250: Performed by: STUDENT IN AN ORGANIZED HEALTH CARE EDUCATION/TRAINING PROGRAM

## 2023-12-27 PROCEDURE — 27000207 HC ISOLATION

## 2023-12-27 PROCEDURE — 87635 SARS-COV-2 COVID-19 AMP PRB: CPT | Performed by: STUDENT IN AN ORGANIZED HEALTH CARE EDUCATION/TRAINING PROGRAM

## 2023-12-27 PROCEDURE — 63600175 PHARM REV CODE 636 W HCPCS: Performed by: STUDENT IN AN ORGANIZED HEALTH CARE EDUCATION/TRAINING PROGRAM

## 2023-12-27 PROCEDURE — 99233 SBSQ HOSP IP/OBS HIGH 50: CPT | Mod: ,,, | Performed by: HOSPITALIST

## 2023-12-27 PROCEDURE — 11000001 HC ACUTE MED/SURG PRIVATE ROOM

## 2023-12-27 PROCEDURE — 25000003 PHARM REV CODE 250: Performed by: INTERNAL MEDICINE

## 2023-12-27 RX ORDER — LIDOCAINE HYDROCHLORIDE 20 MG/ML
INJECTION, SOLUTION EPIDURAL; INFILTRATION; INTRACAUDAL; PERINEURAL
Status: DISCONTINUED | OUTPATIENT
Start: 2023-12-27 | End: 2023-12-27

## 2023-12-27 RX ORDER — PROPOFOL 10 MG/ML
VIAL (ML) INTRAVENOUS
Status: DISCONTINUED | OUTPATIENT
Start: 2023-12-27 | End: 2023-12-27

## 2023-12-27 RX ADMIN — Medication 20 MG: at 02:12

## 2023-12-27 RX ADMIN — LEVOTHYROXINE SODIUM 50 MCG: 50 TABLET ORAL at 05:12

## 2023-12-27 RX ADMIN — VANCOMYCIN HYDROCHLORIDE 1750 MG: 500 INJECTION, POWDER, LYOPHILIZED, FOR SOLUTION INTRAVENOUS at 09:12

## 2023-12-27 RX ADMIN — LIDOCAINE HYDROCHLORIDE 4 ML: 20 INJECTION, SOLUTION EPIDURAL; INFILTRATION; INTRACAUDAL; PERINEURAL at 02:12

## 2023-12-27 RX ADMIN — VANCOMYCIN HYDROCHLORIDE 2000 MG: 500 INJECTION, POWDER, LYOPHILIZED, FOR SOLUTION INTRAVENOUS at 05:12

## 2023-12-27 RX ADMIN — ENOXAPARIN SODIUM 40 MG: 40 INJECTION SUBCUTANEOUS at 06:12

## 2023-12-27 RX ADMIN — Medication 80 MG: at 02:12

## 2023-12-27 RX ADMIN — CEFTRIAXONE SODIUM 2 G: 2 INJECTION, POWDER, FOR SOLUTION INTRAMUSCULAR; INTRAVENOUS at 11:12

## 2023-12-27 RX ADMIN — THERA TABS 1 TABLET: TAB at 08:12

## 2023-12-27 NOTE — PROGRESS NOTES
[facility] - 9th Floor Med Surg    Cardiology  Progress Note    Patient Name: Arturo Ortiz  MRN: [id number]  Admission Date: 12/23/2023  Hospital Length of Stay: 4 days  Code Status: Full Code   Attending Physician: Elena Fragoso DO   Primary Care Physician: CHARISSE Wallace Jr., MD  Expected Discharge Date:   Principal Problem:Bacteremia    Subjective:     Reason for consult: Concern for Endocarditis      HPI: 68-year-old male remotely known to [facility]/Dr. Patel (last seen in 2018) with PMHx of HTN, DM II, HLD, and neuropathy. He presented to the ED on 12.23.23 with complaints of fever. He reported that his symptoms began 1 month ago and fever was associated with chills, night sweats, and myalgia and rigors daily. He was seen by his PCP on 11.3.23 and was prescribed cefuroxime for 10 days for an unknown etiology and the fever did not resolve and on 11.22.23 he beagn have burning with urination he was diagnosed with a UTI and prescribed Doxycycline  for 10 days and subsequently another 7 day course of ciprofloxacin on 12/13/2023. He completed his antibiotic course and continued to have daily high grade fevers up to 103F. He presented to the ED on 12.20.2023 for fever evaluation as well as runny nose and tested positive for COVID-19 subsequently followed up with his PCP and was prescribed molnupiravir.  He was called to return to the ED for positive blood cultures 4 of 4  bottles growing Gram-positive cocci with speciation Aerococcus urinae (susceptibility sent to reference lab and pending). ED course:  febrile 103.1 normotensive and saturating 99% on room air.  Labs notable for WBC 9.7, hemoglobin 11.9, platelets 153, creatinine 0.98, ESR 59, , ferritin 2927, D-dimer 2.37 urinalysis unremarkable.  CTA chest show no evidence of PE, no infiltrate or consolidation, there is trace/minimal pleural effusions bilaterally. He was treated with antibiotics and ID was consulted. TTE was preformed  that showed no  valvular abnormalities. [facility] was consulted for concern for endocarditis.         Hospital Course:   12.27.23: NAD, VSS. He denies SOB, CP, or nausea. Seen sitting in bed. Covid - today plan for QUENTIN today.    PMH: HTN, DM Type II, HLD, Avascular necrosis of R femoral head, hypercholesteremia, macrocytosis, Osteoarthritis of right hip and right knee, Neuropathy  PSH: Total Hip arthoplasty  Family History: Mother: lung cancer, Father: Hodgkin's lymphoma, Sister: Lung cancer, breast cancer  Social History: occasional ETOH use. Denies tobacco or illict drug use      Previous Cardiac Diagnostics:   BLE Venous US (12.24.23):  The right superficial femoral middle vein is normal.  The left superficial femoral middle vein is normal.    ECHO (12.24.23):  Left Ventricle: The left ventricle is normal in size. Normal wall thickness. Normal wall motion. There is normal systolic function with a visually estimated ejection fraction of 55 - 60%. Diastolic function cannot be reliably determined in the presence of mitral valve disease.  Right Ventricle: Normal right ventricular cavity size. Systolic function is normal.  Left Atrium: Left atrium is moderately dilated.  Aortic Valve: There is moderate aortic valve sclerosis. There is mild stenosis. Aortic valve area by VTI is 2.14 cm². Aortic valve peak velocity is 2.57 m/s. Mean gradient is 15 mmHg. The dimensionless index is 0.56. There is moderate aortic regurgitation.  Mitral Valve: Moderately calcified posterior leaflet. Chordae calcification present. Moderately restricted motion. There is mild stenosis. The mean pressure gradient across the mitral valve is 4 mmHg at a heart rate of 63 bpm. There is mild regurgitation.  Tricuspid Valve: There is trace regurgitation.  Pulmonary Artery: The estimated pulmonary artery systolic pressure is 16 mmHg.  IVC/SVC: Normal venous pressure at 3 mmHg.     SPECT (9.18.18):  This is a normal perfusion study, no perfusion defects  noted. There is no evidence of ischemia.   This scan is suggestive of low risk for future cardiovascular events.   The left ventricular cavity is noted to be normal on the stress study. The left ventricular ejection fraction was calculated to be 52% and left ventricular global function is normal.   The study quality is excellent.      ECHO (9.17.18):  The study quality is average.   The left ventricle is normal in size. Global left ventricular systolic function is normal. The left ventricular ejection fraction is 60%. The left ventricle diastolic function is impaired (Grade I) with normal left atrial pressure. Moderate concentric left ventricular hypertrophy is present.   The left atrial diameter is mildly increased. (4.3 cm). Volume Index is normal.  Mild (1+) mitral regurgitation.  The posterior mitral leaflet is moderately thickened with moderately reduced mobility.   Mild calcification of the aortic valve is noted with adequate cuspal excursion.  The pulmonary artery systolic pressure is 25 mmHg.      Carotid US (9.17.18):  The study quality is average.   1-39% stenosis in the mid right internal carotid artery based on Bluth Criteria.   1-39% stenosis in the mid left internal carotid artery based on Bluth Criteria.   Antegrade right vertebral artery flow.   Antegrade left vertebral artery flow.     Review of Systems   Constitutional: Negative for chills and fever.   Cardiovascular:  Negative for chest pain, palpitations and syncope.   Respiratory:  Negative for cough and shortness of breath.    Musculoskeletal: Negative.    Gastrointestinal:  Negative for nausea and vomiting.   Neurological: Negative.    Psychiatric/Behavioral: Negative.         Objective:     Vital Signs (Most Recent):  Temp: 98.4 °F (36.9 °C) (12/27/23 1123)  Pulse: 65 (12/27/23 1123)  Resp: 18 (12/25/23 1619)  BP: 130/66 (12/27/23 1123)  SpO2: 97 % (12/27/23 1123) Vital Signs (24h Range):  Temp:  [98.4 °F (36.9 °C)-98.9 °F (37.2 °C)] 98.4 °F  (36.9 °C)  Pulse:  [52-67] 65  SpO2:  [94 %-97 %] 97 %  BP: (124-146)/(64-70) 130/66     Weight: 120.2 kg (265 lb)  Body mass index is 36.96 kg/m².    SpO2: 97 %       No intake or output data in the 24 hours ending 12/27/23 1416    Lines/Drains/Airways       Peripheral Intravenous Line  Duration                  Peripheral IV - Single Lumen 12/23/23 1642 20 G Posterior;Right Forearm 3 days         Peripheral IV - Single Lumen 12/23/23 1721 20 G Distal;Left;Posterior Forearm 3 days                    Significant Labs:   Recent Results (from the past 72 hour(s))   VANCOMYCIN, TROUGH    Collection Time: 12/25/23  5:38 AM   Result Value Ref Range    Vancomycin Trough 13.3 (L) 15.0 - 20.0 ug/ml   Blood Culture    Collection Time: 12/25/23  5:38 AM    Specimen: Blood   Result Value Ref Range    CULTURE, BLOOD ([facility]) No Growth At 48 Hours    Blood Culture    Collection Time: 12/25/23  5:38 AM    Specimen: Blood   Result Value Ref Range    CULTURE, BLOOD ([facility]) No Growth At 48 Hours    Comprehensive Metabolic Panel    Collection Time: 12/26/23  5:36 AM   Result Value Ref Range    Sodium Level 138 136 - 145 mmol/L    Potassium Level 4.3 3.5 - 5.1 mmol/L    Chloride 107 98 - 107 mmol/L    Carbon Dioxide 24 23 - 31 mmol/L    Glucose Level 105 82 - 115 mg/dL    Blood Urea Nitrogen 11.5 8.4 - 25.7 mg/dL    Creatinine 0.78 0.73 - 1.18 mg/dL    Calcium Level Total 7.9 (L) 8.8 - 10.0 mg/dL    Protein Total 5.9 5.8 - 7.6 gm/dL    Albumin Level 2.4 (L) 3.4 - 4.8 g/dL    Globulin 3.5 2.4 - 3.5 gm/dL    Albumin/Globulin Ratio 0.7 (L) 1.1 - 2.0 ratio    Bilirubin Total 0.3 <=1.5 mg/dL    Alkaline Phosphatase 45 40 - 150 unit/L    Alanine Aminotransferase 70 (H) 0 - 55 unit/L    Aspartate Aminotransferase 50 (H) 5 - 34 unit/L    eGFR >60 mls/min/1.73/m2   CBC with Differential    Collection Time: 12/26/23  5:36 AM   Result Value Ref Range    WBC 9.64 4.50 - 11.50 x10(3)/mcL    RBC 3.41 (L) 4.70 - 6.10 x10(6)/mcL    Hgb 11.0 (L) 14.0 -  18.0 g/dL    Hct 32.3 (L) 42.0 - 52.0 %    MCV 94.7 (H) 80.0 - 94.0 fL    MCH 32.3 (H) 27.0 - 31.0 pg    MCHC 34.1 33.0 - 36.0 g/dL    RDW 14.5 11.5 - 17.0 %    Platelet 215 130 - 400 x10(3)/mcL    MPV 9.5 7.4 - 10.4 fL    Neut % 65.1 %    Lymph % 20.6 %    Mono % 11.5 %    Eos % 1.7 %    Basophil % 0.4 %    Lymph # 1.99 0.6 - 4.6 x10(3)/mcL    Neut # 6.27 2.1 - 9.2 x10(3)/mcL    Mono # 1.11 0.1 - 1.3 x10(3)/mcL    Eos # 0.16 0 - 0.9 x10(3)/mcL    Baso # 0.04 <=0.2 x10(3)/mcL    IG# 0.07 (H) 0 - 0.04 x10(3)/mcL    IG% 0.7 %    NRBC% 0.0 %   COVID-19 Rapid Screening    Collection Time: 12/27/23  9:59 AM   Result Value Ref Range    SARS COV-2 MOLECULAR Negative Negative       Telemetry:  NSR    Physical Exam  Constitutional:       Appearance: Normal appearance.   HENT:      Head: Normocephalic.      Mouth/Throat:      Mouth: Mucous membranes are moist.   Cardiovascular:      Rate and Rhythm: Normal rate and regular rhythm.      Pulses: Normal pulses.      Heart sounds: Normal heart sounds. No murmur heard.  Pulmonary:      Effort: Pulmonary effort is normal. No respiratory distress.      Breath sounds: Normal breath sounds.   Abdominal:      General: Abdomen is flat.      Palpations: Abdomen is soft.   Skin:     General: Skin is warm.   Neurological:      Mental Status: He is alert and oriented to person, place, and time.   Psychiatric:         Mood and Affect: Mood normal.         Behavior: Behavior normal.         Judgment: Judgment normal.         Current Inpatient Medications:    Current Facility-Administered Medications:     acetaminophen tablet 1,000 mg, 1,000 mg, Oral, Q6H PRN, Belle Lira MD    acetaminophen tablet 650 mg, 650 mg, Oral, Q4H PRN, Belle Lira MD    aluminum-magnesium hydroxide-simethicone 200-200-20 mg/5 mL suspension 30 mL, 30 mL, Oral, QID PRN, Belle Lira MD    cefTRIAXone (ROCEPHIN) 2 g in dextrose 5 % in water (D5W) 100 mL IVPB (MB+), 2 g, Intravenous, Q24H, PankajBelle bonilla MD, Stopped  at 12/27/23 0023    enoxaparin injection 40 mg, 40 mg, Subcutaneous, Daily, Belle Lira MD, 40 mg at 12/26/23 1730    ibuprofen tablet 400 mg, 400 mg, Oral, Q6H PRN, Belle Lira MD    levothyroxine tablet 50 mcg, 50 mcg, Oral, Before breakfast, Belle Lira MD, 50 mcg at 12/27/23 0543    melatonin tablet 6 mg, 6 mg, Oral, Nightly PRN, Belle Lira MD    multivitamin tablet, 1 tablet, Oral, Daily, Belle Lira MD, 1 tablet at 12/27/23 0824    ondansetron injection 4 mg, 4 mg, Intravenous, Q4H PRN, Belle Lira MD    polyethylene glycol packet 17 g, 17 g, Oral, BID PRN, Belle Lira MD    prochlorperazine injection Soln 5 mg, 5 mg, Intravenous, Q6H PRN, Belle Lira MD    senna-docusate 8.6-50 mg per tablet 2 tablet, 2 tablet, Oral, BID PRN, Belle Lira MD    sodium chloride 0.9% flush 10 mL, 10 mL, Intravenous, PRN, Belle Lira MD    Pharmacy to dose Vancomycin consult, , , Once **AND** vancomycin - pharmacy to dose, , Intravenous, pharmacy to manage frequency, Belle Lira MD    vancomycin 2 g in dextrose 5 % 500 mL IVPB, 2,000 mg, Intravenous, Q12H, Elena Fragoso, DO, Stopped at 12/27/23 0746    VTE Risk Mitigation (From admission, onward)           Ordered     enoxaparin injection 40 mg  Daily         12/23/23 2326     IP VTE HIGH RISK PATIENT  Once         12/23/23 2326     Place sequential compression device  Until discontinued         12/23/23 2326                    Assessment:   Bacteremia with possible endocarditis?   - TTE (12.23.23): EF 55-60% & valves grossly normal   - in the setting of UTI  - BCx 12.20.23 - [facility] both sets, prelim BCID negative, Aerococcus urinae, sent to ref lab for testing  - BCx 12.23.23 - [facility]  - BCx 12.25.23 - in process  Sepsis    - Present on admit   - gram positive cocci bacteremia   COVID 19   - + PCR on 12.20.23   - On remdesivir   HTN  DM Type II  HLD  Avascular necrosis of R femoral head  Hypercholesteremia  Macrocytosis  Osteoarthritis of right hip and right  knee  Neuropathy      Plan:   EKG & TTE reviewed  ID recommends a QUENTIN ~ will plan for QUENTIN today  Risk and benefits discussed with patient, He is agreeable to proceed informed consent signs plane for QUENTIN today  Keep NPO until after QUENTIN  Cont, ABX per ID recommendation           Johana Magallanes, BRITTNEY  Cardiology  [facility] - 9th Floor Med Surg  12/27/2023    I have seen the patient, reviewed the Nurse Practitioner's note, assessment and plan. I have personally interviewed and examined the patient at bedside and agree with the findings. Medical decision making listed above were done under my guidance.    Physical exam:  Cardiovascular system: regular rhythm, no murmur.  Lungs: CTAB.  Extremities: No leg edema.    Plan:  QUENTIN today

## 2023-12-27 NOTE — PLAN OF CARE
12/27/23 0903   Medicare Message   Important Message from Medicare regarding Discharge Appeal Rights Given to patient/caregiver;Explained to patient/caregiver     Pt voiced understanding of IMM.

## 2023-12-27 NOTE — NURSING
Before leaving pt said he is concerned because he is unsure of what the plan is with him because no doctor has rounded on him the past few days to let him know what is going on.

## 2023-12-27 NOTE — PROGRESS NOTES
[facility]  Hospital Medicine Progress Note        Chief Complaint: Inpatient Follow-up for infection    HPI:   This is a 68-year-old male with medical history of obesity BMI 37, osteoarthritis/right hip replacement, T2DM, HTN and HLD all lifestyle managed present to the ED with complaint of fever and positive blood cultures.      Patient reports symptoms started about 1 month ago with fever, chills, rigors on daily basis associated with drenching night sweats and myalgia.  He was initially seen at his PCP office and was prescribed cefuroxime on 11/03/2023 for 10 days but for unclear etiology of fever, fever did not resolve and subsequently on 11/22/2023 he started having burning urination and increased frequency and was diagnosed with UTI and prescribed doxycycline for 10 days and subsequently another 7 day course of ciprofloxacin on 12/13/2023.  He completed antibiotic course and continued to have daily high-grade fever up to 103F. He presented to the ED on 12/20/2023 for fever evaluation as well as runny nose and tested positive for COVID-19 subsequently followed up with his PCP and was prescribed molnupiravir.  He was called to return to the ED for positive blood cultures 4 of 4  bottles growing Gram-positive cocci with speciation Aerococcus urinae (susceptibility sent to reference lab and pending).     On arrival to ED he was febrile 103.1 normotensive and saturating 99% on room air.  Labs notable for WBC 9.7, hemoglobin 11.9, platelets 153, creatinine 0.98, ESR 59, , ferritin 2927, D-dimer 2.37 urinalysis unremarkable.  CTA chest show no evidence of PE, no infiltrate or consolidation, there is trace/minimal pleural effusions bilaterally.     He was given Zosyn and vancomycin and referred to hospital medicine service for further evaluation and management.    Interval Hx:   Seen and examined the pt.   Denies pain and discomfort. Afebrile and repeat cultures are negative.      Objective/physical exam:  General: In no acute distress, afebrile  Chest: Clear to auscultation bilaterally  Heart: RRR, +S1, S2, no appreciable murmur  Abdomen: Soft, nontender, BS +  MSK: Warm, no lower extremity edema, no clubbing or cyanosis  Neurologic: Alert and oriented x4, Cranial nerve II-XII intact, Strength 5/5 in all 4 extremities    VITAL SIGNS: 24 HRS MIN & MAX LAST   Temp  Min: 98.4 °F (36.9 °C)  Max: 98.9 °F (37.2 °C) 98.4 °F (36.9 °C)   BP  Min: 124/64  Max: 146/70 130/66   Pulse  Min: 52  Max: 67  65   No data recorded 18   SpO2  Min: 94 %  Max: 97 % 97 %     I have reviewed the following labs:  Recent Labs   Lab 12/23/23  1643 12/24/23  0332 12/26/23  0536   WBC 9.70 9.47 9.64   RBC 3.75* 3.62* 3.41*   HGB 11.9* 11.8* 11.0*   HCT 35.2* 34.4* 32.3*   MCV 93.9 95.0* 94.7*   MCH 31.7* 32.6* 32.3*   MCHC 33.8 34.3 34.1   RDW 14.1 14.2 14.5    131 215   MPV 9.6 10.6* 9.5       Recent Labs   Lab 12/23/23  1643 12/24/23  0332 12/26/23  0536    139 138   K 4.3 4.1 4.3   CO2 22* 21* 24   BUN 18.2 14.4 11.5   CREATININE 0.98 0.85 0.78   CALCIUM 7.9* 8.0* 7.9*   MG  --  2.10  --    ALBUMIN 2.7* 2.5* 2.4*   ALKPHOS 57 57 45   ALT 67* 60* 70*   AST 48* 46* 50*   BILITOT 0.5 0.4 0.3       Microbiology Results (last 7 days)       Procedure Component Value Units Date/Time    Blood Culture [[id number]]  (Normal) Collected: 12/25/23 0538    Order Status: Completed Specimen: Blood Updated: 12/27/23 1001     CULTURE, BLOOD ([facility]) No Growth At 48 Hours    Blood Culture [[id number]]  (Normal) Collected: 12/25/23 0538    Order Status: Completed Specimen: Blood Updated: 12/27/23 1001     CULTURE, BLOOD ([facility]) No Growth At 48 Hours    Blood culture #2 **CANNOT BE ORDERED STAT** [[id number]]  (Abnormal) Collected: 12/23/23 1657    Order Status: Completed Specimen: Blood Updated: 12/25/23 1111     CULTURE, BLOOD ([facility]) Aerococcus urinae     Comment: Susceptibility sent to reference lab. Results to follow on  separate report.        GRAM STAIN Gram Positive Cocci, probable Staphylococcus      Seen in gram stain of broth only      2 of 2 bottles positive    Narrative:      For sensitivity results refer to [facility]-[id number].        Blood culture #1 **CANNOT BE ORDERED STAT** [[id number]]  (Abnormal) Collected: 12/23/23 1657    Order Status: Completed Specimen: Blood Updated: 12/25/23 1111     CULTURE, BLOOD ([facility]) Aerococcus urinae     Comment: Susceptibility sent to reference lab. Results to follow on separate report.        GRAM STAIN Gram Positive Cocci, probable Staphylococcus      Seen in gram stain of broth only      2 of 2 bottles positive    Narrative:      For sensitivity results refer to [facility] [place]-[id number].        BCID2 Panel [[id number]]  (Normal) Collected: 12/23/23 1657    Order Status: Completed Specimen: Blood Updated: 12/24/23 1352     CTX-M (ESBL ) N/A     IMP (Cabapenemase ) N/A     KPC resistance gene (Carbapenemase ) N/A     mcr-1 N/A     mecA ID N/A     Comment: Note: Antimicrobial resistance can occur via multiple mechanisms. A Not Detected result for antimicrobial resistance gene(s) does not indicate antimicrobial susceptibility. Subculturing is required for species identification and susceptibility testing of   isolates.        mecA/C and MREJ (MRSA) gene N/A     NDM (Carbapenemase ) N/A     OXA-48-like (Carbapenemase ) N/A     Leah/B (VRE gene) N/A     VIM (Carbapenemase ) N/A     Enterococcus faecalis Not Detected     Enterococcus faecium Not Detected     Listeria monocytogenes Not Detected     Staphylococcus spp. Not Detected     Staphylococcus aureus Not Detected     Staphylococcus epidermidis Not Detected     Staphylococcus lugdunensis Not Detected     Streptococcus spp. Not Detected     Streptococcus agalactiae (Group B) Not Detected     Streptococcus pneumoniae Not Detected     Streptococcus pyogenes (Group A) Not Detected     Acinetobacter  calcoaceticus/baumannii complex Not Detected     Bacteroides fragilis Not Detected     Enterobacterales Not Detected     Enterobacter cloacae complex Not Detected     Escherichia coli Not Detected     Klebsiella aerogenes Not Detected     Klebsiella oxytoca Not Detected     Klebsiella pneumoniae group Not Detected     Proteus spp. Not Detected     Salmonella spp. Not Detected     Serratia marcescens Not Detected     Haemophilus influenzae Not Detected     Neisseria meningitidis Not Detected     Pseudomonas aeruginosa Not Detected     Stenotrophomonas maltophilia Not Detected     Candida albicans Not Detected     Candida auris Not Detected     Candida glabrata Not Detected     Candida krusei Not Detected     Candida parapsilosis Not Detected     Candida tropicalis Not Detected     Cryptococcus neoformans/gattii Not Detected    Narrative:      The [facility] BCID2 Panel is a multiplexed nucleic acid test intended for the use with Stupeflix® [employer].0 or Stupeflix® [employer] for the simultaneous qualitative detection and identification of multiple bacterial and yeast nucleic acids and select genetic determinants associated with antimicrobial resistance.  The [facility] BCID2 Panel test is performed directly on blood culture samples identified as positive by a continuous monitoring blood culture system.  Results are intended to be interpreted in conjunction with Gram stain results.             See below for Radiology    Scheduled Med:   cefTRIAXone (ROCEPHIN) IVPB  2 g Intravenous Q24H    enoxparin  40 mg Subcutaneous Daily    levothyroxine  50 mcg Oral Before breakfast    multivitamin  1 tablet Oral Daily    vancomycin (VANCOCIN) IV (PEDS and ADULTS)  2,000 mg Intravenous Q12H         PRN Meds:  acetaminophen, acetaminophen, aluminum-magnesium hydroxide-simethicone, ibuprofen, melatonin, ondansetron, polyethylene glycol, prochlorperazine, senna-docusate 8.6-50 mg, sodium chloride 0.9%, Pharmacy to dose  Vancomycin consult **AND** vancomycin - pharmacy to dose     Assessment/Plan:  Aerococcus urinae bacteremia, present on admission, in the setting of recurrent UTI  COVID-19, present on admission   History of thyroid disease,    SHAKEEL per cardiology.   - covid is negative, can proceed with shakeel.  Follow Bcx2   Infectious disease consulted for bacteremia   Blood cultures on 12/20 grew GPC both sides, prelim BC ID negative.  Enterococcus urinary sent to lab for testing   Repeat blood cultures pending   TTE is negative, will consult cardiology for SHAKEEL.   Continue vancomycin and ceftriaxone   COVID positive, continue remdesivir protocol       VTE prophylaxis:  Lovenox    Patient condition:  Stable/Fair/Guarded/ Serious/ Critical    Anticipated discharge and Disposition:         All diagnosis and differential diagnosis have been reviewed; assessment and plan has been documented; I have personally reviewed the labs and test results that are presently available; I have reviewed the patients medication list; I have reviewed the consulting providers response and recommendations. I have reviewed or attempted to review medical records based upon their availability    All of the patient's questions have been  addressed and answered. Patient's is agreeable to the above stated plan. I will continue to monitor closely and make adjustments to medical management as needed.  _____________________________________________________________________    Nutrition Status:    Radiology:  I have personally reviewed the following imaging and agree with the radiologist.     CT Abdomen Pelvis With IV Contrast NO Oral Contrast  Narrative: EXAMINATION:  CT ABDOMEN PELVIS WITH IV CONTRAST    CLINICAL HISTORY:  sepsis;    TECHNIQUE:  Helically acquired images with axial, sagittal and coronal reformations were obtained from the lung bases to the pubic symphysis after the IV administration of contrast.    Automated tube current modulation, weight-based  exposure dosing, and/or iterative reconstruction technique utilized to reach lowest reasonably achievable exposure rate.    DLP: 1075 mGy*cm    COMPARISON:  No relevant prior available for comparison at the time of dictation.    FINDINGS:  HEART: There are calcifications at the mitral valve annulus.    LUNG BASES: Elevation the right hemidiaphragm with mild right basilar atelectasis.  Trace pleural fluid bilaterally.    LIVER: Normal attenuation. No appreciable focal hepatic lesion.    BILIARY: No calcified gallstones.    PANCREAS: No inflammatory change.    SPLEEN: Upper limits of normal in size.    ADRENALS: No mass.    KIDNEYS/URETERS: Nonobstructing 8 mm right renal caliceal calculus.  Patchy cortical hypoenhancement at the lower pole right kidney.  Incidental 2.5 cm left renal cortical cyst requires no imaging follow-up.    GI TRACT/MESENTERY:  No evidence of bowel obstruction or inflammation.  Moderate colonic stool burden.    PERITONEUM: No free fluid.No free air.    LYMPH NODES: No enlarged lymph nodes by size criteria.    VASCULATURE: Mild aortic atherosclerosis.    BLADDER: Bladder wall appears thickened and trabeculated.    REPRODUCTIVE ORGANS: Obscured by beam hardening artifact.  There are prostate calcifications.    SOFT TISSUES: Unremarkable.    BONES: Postop right hip arthroplasty with associated beam hardening artifact.  Degenerative facet arthropathy at the lower lumbar spine.  Impression: 1. Patchy cortical hypoenhancement at the lower pole right kidney may be related to pyelonephritis or scarring.  Correlate clinically.  2. Nonobstructing right nephrolithiasis  3. Elevated right hemidiaphragm with right basilar atelectasis  4. Trabeculated and thickened appearance of the urinary bladder.  There are prostate calcifications.  Appearance of the urinary bladder may be related to bladder wall hypertrophy and/or cystitis.    Electronically signed by: Ysabel  Lorenzo  Date:    12/26/2023  Time:    19:47  CV Ultrasound doppler venous legs bilat    The right superficial femoral middle vein is normal.    The left superficial femoral middle vein is normal.    There was no evidence of deep or superficial vein thrombosis in bilateral   lower extremities.     Mario Gray M.D.  [facility]/Hospital Medicine Department  [facility]

## 2023-12-27 NOTE — PLAN OF CARE
12/27/23 0903   Discharge Assessment   Assessment Type Discharge Planning Assessment   Confirmed/corrected address, phone number and insurance Yes   Confirmed Demographics Correct on Facesheet   Source of Information patient   When was your last doctors appointment? 12/15/23   Communicated ANUSHA with patient/caregiver Date not available/Unable to determine   Reason For Admission + blood cultures, fever   People in Home spouse   Do you expect to return to your current living situation? Yes   Do you have help at home or someone to help you manage your care at home? Yes   Who are your caregiver(s) and their phone number(s)? wife   Prior to hospitilization cognitive status: Alert/Oriented   Current cognitive status: Alert/Oriented   Walking or Climbing Stairs Difficulty no   Dressing/Bathing Difficulty no   Home Layout Able to live on 1st floor   Equipment Currently Used at Home none   Readmission within 30 days? No   Patient currently being followed by outpatient case management? No   Do you currently have service(s) that help you manage your care at home? No   Do you take prescription medications? Yes   Do you have any problems affording any of your prescribed medications? No   Is the patient taking medications as prescribed? yes   Who is going to help you get home at discharge? wife   How do you get to doctors appointments? car, drives self   Are you on dialysis? No   Do you take coumadin? No   Discharge Plan A Home   Discharge Plan B Home Health   DME Needed Upon Discharge  none   Discharge Plan discussed with: Patient   Transition of Care Barriers None   Financial Resource Strain   How hard is it for you to pay for the very basics like food, housing, medical care, and heating? Not very   Housing Stability   In the last 12 months, was there a time when you were not able to pay the mortgage or rent on time? N   In the last 12 months, was there a time when you did not have a steady place to sleep or slept in a  shelter (including now)? N   Transportation Needs   In the past 12 months, has lack of transportation kept you from medical appointments or from getting medications? no   In the past 12 months, has lack of transportation kept you from meetings, work, or from getting things needed for daily living? No   Food Insecurity   Within the past 12 months, you worried that your food would run out before you got the money to buy more. Never true   Within the past 12 months, the food you bought just didn't last and you didn't have money to get more. Never true   Social Connections   In a typical week, how many times do you talk on the phone with family, friends, or neighbors? More than 3   How often do you get together with friends or relatives? Twice   Are you , , , , never , or living with a partner?    Alcohol Use   Q1: How often do you have a drink containing alcohol? Never   Q2: How many drinks containing alcohol do you have on a typical day when you are drinking? None   Q3: How often do you have six or more drinks on one occasion? Never     Pharmacy: [facility] [address]  Pending QUENTIN and blood cultures results.  CM will continue to follow for dc needs.

## 2023-12-27 NOTE — ANESTHESIA PREPROCEDURE EVALUATION
12/27/2023  Arturo Ortiz is a 68 y.o., male.    Pre-op Diagnosis: Bacteremia, Rule out endocarditis  Procedure: QUENTIN     Assessment:   Bacteremia with possible endocarditis?   - TTE (12.23.23): EF 55-60% & valves grossly normal   - in the setting of UTI  - BCx 12.20.23 - GPC both sets, prelim BCID negative, Aerococcus urinae, sent to ref lab for testing  - BCx 12.23.23 - GPC  - BCx 12.25.23 - in process  Sepsis    - Present on admit   - gram positive cocci bacteremia   COVID 19   - + PCR on 12.20.23   - On remdesivir   HTN  DM Type II  HLD  Avascular necrosis of R femoral head  Hypercholesteremia  Macrocytosis  Osteoarthritis of right hip and right knee  Neuropathy  Review of patient's allergies indicates:  No Known Allergies    Current Outpatient Medications   Medication Instructions    levothyroxine (SYNTHROID) 50 mcg, Oral, Before breakfast    molnupiravir 800 mg, Oral, Every 12 hours    multivitamin (ONE DAILY MULTIVITAMIN) per tablet 1 tablet, Oral, Daily    phenazopyridine (PYRIDIUM) 200 mg, Oral, 3 times daily PRN       Past Medical History:   Diagnosis Date    Avascular necrosis of right femoral head     DM (diabetes mellitus)     Essential (primary) hypertension     Hypercholesteremia     Macrocytosis     Osteoarthritis of right hip     Osteoarthritis of right knee     Peripheral neuropathy    PMH includes Morbid Obesity    Past Surgical History:   Procedure Laterality Date    TOTAL HIP ARTHROPLASTY Right 05/18/2021        Recent Labs   Lab 12/23/23  1643 12/24/23  0332 12/26/23  0536   WBC 9.70 9.47 9.64   RBC 3.75* 3.62* 3.41*   HGB 11.9* 11.8* 11.0*   HCT 35.2* 34.4* 32.3*   MCV 93.9 95.0* 94.7*   MCH 31.7* 32.6* 32.3*   MCHC 33.8 34.3 34.1   RDW 14.1 14.2 14.5    131 215   MPV 9.6 10.6* 9.5       Recent Labs   Lab 12/23/23  1643 12/24/23  0332 12/26/23  0536    139 138   K 4.3  4.1 4.3   CO2 22* 21* 24   BUN 18.2 14.4 11.5   CREATININE 0.98 0.85 0.78   CALCIUM 7.9* 8.0* 7.9*   MG  --  2.10  --    ALBUMIN 2.7* 2.5* 2.4*   ALKPHOS 57 57 45   ALT 67* 60* 70*   AST 48* 46* 50*   BILITOT 0.5 0.4 0.3     ECG - NSR no acute changes    Pre-op Assessment    I have reviewed the Patient Summary Reports.    I have reviewed the NPO Status.   I have reviewed the Medications.     Review of Systems  Anesthesia Hx:  No problems with previous Anesthesia             Denies Family Hx of Anesthesia complications.    Denies Personal Hx of Anesthesia complications.                    Social:  Non-Smoker       Cardiovascular:  Exercise tolerance: good   Hypertension       Denies Angina.   Denies Orthopnea.  Denies PND.    Denies HASTINGS.    Functional Capacity good / => 4 METS                         Musculoskeletal:  Arthritis               Neurological:  Denies TIA.  Denies CVA.                                    Endocrine:  Diabetes Hypothyroidism          Psych:  Psychiatric Normal                ECHO (12.24.23):  Left Ventricle: The left ventricle is normal in size. Normal wall thickness. Normal wall motion. There is normal systolic function with a visually estimated ejection fraction of 55 - 60%. Diastolic function cannot be reliably determined in the presence of mitral valve disease.  Right Ventricle: Normal right ventricular cavity size. Systolic function is normal.  Left Atrium: Left atrium is moderately dilated.  Aortic Valve: There is moderate aortic valve sclerosis. There is mild stenosis. Aortic valve area by VTI is 2.14 cm². Aortic valve peak velocity is 2.57 m/s. Mean gradient is 15 mmHg. The dimensionless index is 0.56. There is moderate aortic regurgitation.  Mitral Valve: Moderately calcified posterior leaflet. Chordae calcification present. Moderately restricted motion. There is mild stenosis. The mean pressure gradient across the mitral valve is 4 mmHg at a heart rate of 63 bpm. There is mild  regurgitation.  Tricuspid Valve: There is trace regurgitation.  Pulmonary Artery: The estimated pulmonary artery systolic pressure is 16 mmHg.  IVC/SVC: Normal venous pressure at 3 mmHg.     SPECT (9.18.18):  This is a normal perfusion study, no perfusion defects noted. There is no evidence of ischemia.   This scan is suggestive of low risk for future cardiovascular events.   The left ventricular cavity is noted to be normal on the stress study. The left ventricular ejection fraction was calculated to be 52% and left ventricular global function is normal.   The study quality is excellent.     Physical Exam  General: Well nourished, Alert and Oriented    Airway:  Mallampati: III   Mouth Opening: Normal  TM Distance: Normal  Tongue: Normal  Neck ROM: Normal ROM    Dental:  Intact    Chest/Lungs:  Clear to auscultation    Heart:  Rate: Normal  Rhythm: Regular Rhythm  No pretibial edema  No carotid bruits      Anesthesia Plan  Type of Anesthesia, risks & benefits discussed:    Anesthesia Type: Gen Natural Airway  Intra-op Monitoring Plan: Standard ASA Monitors  Post Op Pain Control Plan: IV/PO Opioids PRN  Induction:  IV  Informed Consent: Informed consent signed with the Patient and all parties understand the risks and agree with anesthesia plan.  All questions answered. Patient consented to blood products? No  ASA Score: 3  Day of Surgery Review of History & Physical: H&P Update referred to the surgeon/provider.  Anesthesia Plan Notes: GA TIVA    Ready For Surgery From Anesthesia Perspective.     .

## 2023-12-27 NOTE — TRANSFER OF CARE
"Anesthesia Transfer of Care Note    Patient: Arturo Ortiz    Procedure(s) Performed: * No procedures listed *    Patient location: Cath Lab    Anesthesia Type: general    Transport from OR: Transported from OR on 2-3 L/min O2 by NC with adequate spontaneous ventilation    Post pain: adequate analgesia    Post assessment: no apparent anesthetic complications    Post vital signs: stable    Level of consciousness: responds to stimulation    Nausea/Vomiting: no nausea/vomiting    Complications: none    Transfer of care protocol was followedComments: Detailed report with handoff to licensed provider complete      Last vitals: Visit Vitals  /66   Pulse 65   Temp 36.9 °C (98.4 °F) (Oral)   Resp 18   Ht 5' 11" (1.803 m)   Wt 120.2 kg (265 lb)   SpO2 97%   BMI 36.96 kg/m²     "

## 2023-12-27 NOTE — PROGRESS NOTES
Infectious Disease        Patient ID: Arturo Ortiz  68 y.o. male.    Chief Complaint: Fever (Seen here X 3 days ago for fever and generalized weakness. Discharged home but called to come back to ER for positive Blood cultures. States still running fever at home. States took 1 g tylenol approx 1:30 PM. States no new symptoms since last visit. )    Interval HPI:    12/24 - afebrile. Admitted with COVID and bacteremia. Repeat cx in process. TTE pending, if negative may need QUENTIN.  High suspicion for endocarditis, +stigmata. Okay to continue dual therapy for now pending further data.    12/25 - fever curve improving. Tolerating abx. TTE negative, plan for QUENTIN  12/26 - BCx prelim negative. QUENTIN pending. Isolate also at ref lab for susceptibility testing   12/27 - afebrile rechecked for COVID and negative, QUENTIN today. Will follow up once results back from ref lab.   Assessment and Plan:   1) Sepsis  - present on admission   - FLU negative  - rapid Strep negative  - COVID +  - gram positive bacteremia  - UA 12/21 - WBC 6-10  - UA 12/23  LE, 0-5 WBC  - s/p pip/tazo x 1  - currently on vancomycin, goal 15-20,Rx to dose  - currently on ceftriaxone     2) Bacteremia  - in setting of recurrent UTI  - Prosthetics: right THR  - BCx 12/20 - GPC both sets, prelim BCID negative, Aerococcus urinae, sent to ref lab for testing  - BCx12/23 - GPC  - BCx 12/25 - in process  - TTE 12/23 - EF 55-60%, valves grossly normal  - obtain QUENTIN, high suspicion of endocarditis given one month of symptoms  - obtain PSA 0.36--> 0.83  - stigmata: +Osler's node 4th left digit  - currently on vancomycin, goal 15-20,Rx to dose  - currently on ceftriaxone     3) COVID  - + PCR  - on remdesivir  - CXR 12/20 - No acute pulmonary process appreciated   - CXR 12/23 - no acute process  - CTA chest - no PE. Mild right lower lobe atelectasis.      Discussed with patient  and family at bedside 12/27  Discussed with PERLITA FORDE  MD Jacek, MPH  [facility]     Thank you for this consultation. I will follow up with the patient. Please contact via Epic secure chat with any questions.         HPI:   Patient is Arturo Ortiz a 68 y.o. male admitted on 12/23 for one month of fevers, night sweats,myalias, chills. Patient went to PCP and was prescribed oral antibiotics on 11/03/23 but failed to improve. He developed dysuria and frequency and was prescribe a course of antimicrobials for UTI on 11/22 and again on 12/13. Patient reports continued fevers of 103F at home.He presented to ED on 12/20 and tested + for COVID. BLood cx drawn at that time subsequently turned + and patient was admitted. Prelim isolate is Aerococcus spp. Patient on empiric coverage. Upon evaluation he as febrile, with elevated inflammatory markers. Patient has known DMII, hypertension, OA s/p recent right THR on 5/18/2021, hypercholesterolemia, hypothyroidism. Infectious diseases consulted for evaluation and management.     Past Medical History:   Diagnosis Date    Avascular necrosis of right femoral head     DM (diabetes mellitus)     Essential (primary) hypertension     Hypercholesteremia     Macrocytosis     Osteoarthritis of right hip     Osteoarthritis of right knee     Peripheral neuropathy      Past Surgical History:   Procedure Laterality Date    TOTAL HIP ARTHROPLASTY Right 05/18/2021     Review of patient's allergies indicates:  No Known Allergies  Current Outpatient Medications   Medication Instructions    levothyroxine (SYNTHROID) 50 mcg, Oral, Before breakfast    molnupiravir 800 mg, Oral, Every 12 hours    multivitamin (ONE DAILY MULTIVITAMIN) per tablet 1 tablet, Oral, Daily    phenazopyridine (PYRIDIUM) 200 mg, Oral, 3 times daily PRN       Current Facility-Administered Medications:     acetaminophen tablet 1,000 mg, 1,000 mg, Oral, Q6H PRN, Belle Lira MD    acetaminophen tablet 650 mg, 650 mg, Oral, Q4H PRN, Pankaj, Ali  MD K[facility]    aluminum-magnesium hydroxide-simethicone 200-200-20 mg/5 mL suspension 30 mL, 30 mL, Oral, QID PRN, Belle Lira MD    cefTRIAXone (ROCEPHIN) 2 g in dextrose 5 % in water (D5W) 100 mL IVPB (MB+), 2 g, Intravenous, Q24H, Belle Lira MD, Stopped at 12/27/23 0023    enoxaparin injection 40 mg, 40 mg, Subcutaneous, Daily, Belle Lira MD, 40 mg at 12/26/23 1730    ibuprofen tablet 400 mg, 400 mg, Oral, Q6H PRN, Belle Lira MD    levothyroxine tablet 50 mcg, 50 mcg, Oral, Before breakfast, Belle Lira MD, 50 mcg at 12/27/23 0543    melatonin tablet 6 mg, 6 mg, Oral, Nightly PRN, Belle Lira MD    multivitamin tablet, 1 tablet, Oral, Daily, Belle Lira MD, 1 tablet at 12/27/23 0824    ondansetron injection 4 mg, 4 mg, Intravenous, Q4H PRN, Belle Lira MD    polyethylene glycol packet 17 g, 17 g, Oral, BID PRN, Belle Lira MD    prochlorperazine injection Soln 5 mg, 5 mg, Intravenous, Q6H PRN, Belle Lira MD    senna-docusate 8.6-50 mg per tablet 2 tablet, 2 tablet, Oral, BID PRN, Belle Lira MD    sodium chloride 0.9% flush 10 mL, 10 mL, Intravenous, PRN, Belle Lira MD    Pharmacy to dose Vancomycin consult, , , Once **AND** vancomycin - pharmacy to dose, , Intravenous, pharmacy to manage frequency, Belle Lira MD    vancomycin 2 g in dextrose 5 % 500 mL IVPB, 2,000 mg, Intravenous, Q12H, Elena Fragoso DO, Stopped at 12/27/23 0746  Review of Systems   Constitutional:  Negative for chills and fever.   HENT:  Negative for congestion, ear discharge, ear pain, facial swelling, mouth sores, postnasal drip, rhinorrhea, sinus pressure, sinus pain, sneezing, sore throat and trouble swallowing.    Eyes:  Negative for discharge, redness and itching.   Respiratory:  Negative for cough, chest tightness, shortness of breath and wheezing.    Cardiovascular:  Negative for chest pain, palpitations and leg swelling.   Gastrointestinal:  Negative for abdominal distention, abdominal pain, diarrhea, nausea  and vomiting.   Genitourinary:  Negative for dysuria, flank pain, frequency and urgency.   Musculoskeletal:  Negative for back pain, myalgias and neck stiffness.   Skin:  Negative for rash and wound.   Allergic/Immunologic: Negative for immunocompromised state.   Neurological:  Negative for dizziness, light-headedness and headaches.   Hematological:  Negative for adenopathy.   Psychiatric/Behavioral:  Negative for agitation, confusion and suicidal ideas. The patient is not nervous/anxious.        Objective:   Temp:  [98.4 °F (36.9 °C)-98.9 °F (37.2 °C)] 98.4 °F (36.9 °C)  Pulse:  [52-67] 65  SpO2:  [94 %-97 %] 97 %  BP: (124-146)/(64-70) 130/66     Physical Exam  Constitutional:       Appearance: Normal appearance. He is well-developed.   HENT:      Head: Normocephalic.      Nose: Nose normal.      Mouth/Throat:      Pharynx: No oropharyngeal exudate.   Eyes:      General: Lids are normal. No scleral icterus.        Right eye: No discharge.      Conjunctiva/sclera: Conjunctivae normal.      Pupils: Pupils are equal, round, and reactive to light.   Neck:      Thyroid: No thyromegaly.      Vascular: No JVD.      Trachea: Trachea normal.   Cardiovascular:      Rate and Rhythm: Normal rate and regular rhythm.      Pulses: Normal pulses.      Heart sounds: Normal heart sounds. No murmur heard.     No friction rub.   Pulmonary:      Effort: Pulmonary effort is normal. No respiratory distress.      Breath sounds: Normal breath sounds. No wheezing.   Chest:      Chest wall: No tenderness.   Abdominal:      General: Bowel sounds are normal. There is no distension.      Palpations: Abdomen is soft.      Tenderness: There is no abdominal tenderness. There is no guarding or rebound.   Musculoskeletal:         General: No tenderness. Normal range of motion.      Cervical back: Full passive range of motion without pain, normal range of motion and neck supple.   Lymphadenopathy:      Cervical: No cervical adenopathy.   Skin:      General: Skin is warm and dry.      Findings: No rash.   Neurological:      Mental Status: He is alert and oriented to person, place, and time.      Cranial Nerves: No cranial nerve deficit.      Sensory: No sensory deficit.   Psychiatric:         Speech: Speech normal.         Behavior: Behavior normal.         Thought Content: Thought content normal.         Judgment: Judgment normal.         Estimated Creatinine Clearance: 122.9 mL/min (based on SCr of 0.78 mg/dL).  Recent Labs   Lab 12/26/23 0536   WBC 9.64          Microbiology Results (last 7 days)       Procedure Component Value Units Date/Time    Blood Culture [[id number]]  (Normal) Collected: 12/25/23 0538    Order Status: Completed Specimen: Blood Updated: 12/27/23 1001     CULTURE, BLOOD ([facility]) No Growth At 48 Hours    Blood Culture [[id number]]  (Normal) Collected: 12/25/23 0538    Order Status: Completed Specimen: Blood Updated: 12/27/23 1001     CULTURE, BLOOD ([facility]) No Growth At 48 Hours    Blood culture #2 **CANNOT BE ORDERED STAT** [[id number]]  (Abnormal) Collected: 12/23/23 1657    Order Status: Completed Specimen: Blood Updated: 12/25/23 1111     CULTURE, BLOOD ([facility]) Aerococcus urinae     Comment: Susceptibility sent to reference lab. Results to follow on separate report.        GRAM STAIN Gram Positive Cocci, probable Staphylococcus      Seen in gram stain of broth only      2 of 2 bottles positive    Narrative:      For sensitivity results refer to [id number].        Blood culture #1 **CANNOT BE ORDERED STAT** [[id number]]  (Abnormal) Collected: 12/23/23 1657    Order Status: Completed Specimen: Blood Updated: 12/25/23 1111     CULTURE, BLOOD ([facility]) Aerococcus urinae     Comment: Susceptibility sent to reference lab. Results to follow on separate report.        GRAM STAIN Gram Positive Cocci, probable Staphylococcus      Seen in gram stain of broth only      2 of 2 bottles positive    Narrative:      For sensitivity results refer to  [id number].        BCID2 Panel [[id number]]  (Normal) Collected: 12/23/23 1657    Order Status: Completed Specimen: Blood Updated: 12/24/23 1352     CTX-M (ESBL ) N/A     IMP (Cabapenemase ) N/A     KPC resistance gene (Carbapenemase ) N/A     mcr-1 N/A     mecA ID N/A     Comment: Note: Antimicrobial resistance can occur via multiple mechanisms. A Not Detected result for antimicrobial resistance gene(s) does not indicate antimicrobial susceptibility. Subculturing is required for species identification and susceptibility testing of   isolates.        mecA/C and MREJ (MRSA) gene N/A     NDM (Carbapenemase ) N/A     OXA-48-like (Carbapenemase ) N/A     Leah/B (VRE gene) N/A     VIM (Carbapenemase ) N/A     Enterococcus faecalis Not Detected     Enterococcus faecium Not Detected     Listeria monocytogenes Not Detected     Staphylococcus spp. Not Detected     Staphylococcus aureus Not Detected     Staphylococcus epidermidis Not Detected     Staphylococcus lugdunensis Not Detected     Streptococcus spp. Not Detected     Streptococcus agalactiae (Group B) Not Detected     Streptococcus pneumoniae Not Detected     Streptococcus pyogenes (Group A) Not Detected     Acinetobacter calcoaceticus/baumannii complex Not Detected     Bacteroides fragilis Not Detected     Enterobacterales Not Detected     Enterobacter cloacae complex Not Detected     Escherichia coli Not Detected     Klebsiella aerogenes Not Detected     Klebsiella oxytoca Not Detected     Klebsiella pneumoniae group Not Detected     Proteus spp. Not Detected     Salmonella spp. Not Detected     Serratia marcescens Not Detected     Haemophilus influenzae Not Detected     Neisseria meningitidis Not Detected     Pseudomonas aeruginosa Not Detected     Stenotrophomonas maltophilia Not Detected     Candida albicans Not Detected     Candida auris Not Detected     Candida glabrata Not Detected     Nathalia krusei Not  Detected     Candida parapsilosis Not Detected     Candida tropicalis Not Detected     Cryptococcus neoformans/gattii Not Detected    Narrative:      The [employer] BCID2 Panel is a multiplexed nucleic acid test intended for the use with [employer]® 2.0 or [employer]® [employer] for the simultaneous qualitative detection and identification of multiple bacterial and yeast nucleic acids and select genetic determinants associated with antimicrobial resistance.  The [employer] BCID2 Panel test is performed directly on blood culture samples identified as positive by a continuous monitoring blood culture system.  Results are intended to be interpreted in conjunction with Gram stain results.            Significant Labs: All pertinent labs within the past 24 hours have been reviewed.    Significant Imaging: I have reviewed all relevant and available imaging results/findings within the past 24 hours.      Plan -- see top of note

## 2023-12-27 NOTE — PROGRESS NOTES
Vancomycin serum concentration assessment(s):    Level of 20.4 mcg/ml is slightly above goal of 15-20 mcg/ml.    Vancomycin Regimen Plan:    Change to 1750 mg q12h and check trough on 12/29 at 2100.    Drug levels (last 3 results):  Recent Labs   Lab Result Units 12/25/23  0538 12/27/23  1631   Vancomycin Trough ug/ml 13.3* 20.4*       Patient brief summary:  Arturo Ortiz is a 68 y.o. male initiated on antimicrobial therapy with IV Vancomycin for treatment of bacteremia      Drug Allergies:   Review of patient's allergies indicates:  No Known Allergies    Actual Body Weight:   122.9 kg    Renal Function:   Estimated Creatinine Clearance: 122.9 mL/min (based on SCr of 0.78 mg/dL).,     Dialysis Method (if applicable):  N/A    CBC (last 72 hours):  Recent Labs   Lab Result Units 12/26/23  0536   WBC x10(3)/mcL 9.64   Hgb g/dL 11.0*   Hct % 32.3*   Platelet x10(3)/mcL 215   Mono % % 11.5   Eos % % 1.7   Basophil % % 0.4       Metabolic Panel (last 72 hours):  Recent Labs   Lab Result Units 12/26/23  0536   Sodium Level mmol/L 138   Potassium Level mmol/L 4.3   Chloride mmol/L 107   Carbon Dioxide mmol/L 24   Glucose Level mg/dL 105   Blood Urea Nitrogen mg/dL 11.5   Creatinine mg/dL 0.78   Albumin Level g/dL 2.4*   Bilirubin Total mg/dL 0.3   Alkaline Phosphatase unit/L 45   Aspartate Aminotransferase unit/L 50*   Alanine Aminotransferase unit/L 70*       Vancomycin Administrations:  vancomycin given in the last 96 hours                     vancomycin 2 g in dextrose 5 % 500 mL IVPB (mg) 2,000 mg New Bag 12/27/23 0546     2,000 mg New Bag 12/26/23 1731     2,000 mg New Bag  0600     2,000 mg New Bag 12/25/23 1746    vancomycin (VANCOCIN) 1,750 mg in dextrose 5 % (D5W) 500 mL IVPB (mg) 1,750 mg New Bag 12/25/23 0656     1,750 mg New Bag 12/24/23 1740     1,750 mg New Bag  0635    vancomycin 2 g in dextrose 5 % 500 mL IVPB ()  Restarted 12/23/23 1951      Restarted  1903     2,000 mg New Bag  1819                     Microbiologic Results:  Microbiology Results (last 7 days)       Procedure Component Value Units Date/Time    Blood Culture [[id number]]  (Normal) Collected: 12/25/23 0538    Order Status: Completed Specimen: Blood Updated: 12/27/23 1001     CULTURE, BLOOD ([facility]) No Growth At 48 Hours    Blood Culture [[id number]]  (Normal) Collected: 12/25/23 0538    Order Status: Completed Specimen: Blood Updated: 12/27/23 1001     CULTURE, BLOOD ([facility]) No Growth At 48 Hours    Blood culture #2 **CANNOT BE ORDERED STAT** [[id number]]  (Abnormal) Collected: 12/23/23 1657    Order Status: Completed Specimen: Blood Updated: 12/25/23 1111     CULTURE, BLOOD ([facility]) Aerococcus urinae     Comment: Susceptibility sent to reference lab. Results to follow on separate report.        GRAM STAIN Gram Positive Cocci, probable Staphylococcus      Seen in gram stain of broth only      2 of 2 bottles positive    Narrative:      For sensitivity results refer to [id number].        Blood culture #1 **CANNOT BE ORDERED STAT** [[id number]]  (Abnormal) Collected: 12/23/23 1657    Order Status: Completed Specimen: Blood Updated: 12/25/23 1111     CULTURE, BLOOD ([facility]) Aerococcus urinae     Comment: Susceptibility sent to reference lab. Results to follow on separate report.        GRAM STAIN Gram Positive Cocci, probable Staphylococcus      Seen in gram stain of broth only      2 of 2 bottles positive    Narrative:      For sensitivity results refer to [id number].        BCID2 Panel [[id number]]  (Normal) Collected: 12/23/23 1657    Order Status: Completed Specimen: Blood Updated: 12/24/23 1352     CTX-M (ESBL ) N/A     IMP (Cabapenemase ) N/A     KPC resistance gene (Carbapenemase ) N/A     mcr-1 N/A     mecA ID N/A     Comment: Note: Antimicrobial resistance can occur via multiple mechanisms. A Not Detected result for antimicrobial resistance gene(s) does not indicate antimicrobial susceptibility. Subculturing is  required for species identification and susceptibility testing of   isolates.        mecA/C and MREJ (MRSA) gene N/A     NDM (Carbapenemase ) N/A     OXA-48-like (Carbapenemase ) N/A     Leah/B (VRE gene) N/A     VIM (Carbapenemase ) N/A     Enterococcus faecalis Not Detected     Enterococcus faecium Not Detected     Listeria monocytogenes Not Detected     Staphylococcus spp. Not Detected     Staphylococcus aureus Not Detected     Staphylococcus epidermidis Not Detected     Staphylococcus lugdunensis Not Detected     Streptococcus spp. Not Detected     Streptococcus agalactiae (Group B) Not Detected     Streptococcus pneumoniae Not Detected     Streptococcus pyogenes (Group A) Not Detected     Acinetobacter calcoaceticus/baumannii complex Not Detected     Bacteroides fragilis Not Detected     Enterobacterales Not Detected     Enterobacter cloacae complex Not Detected     Escherichia coli Not Detected     Klebsiella aerogenes Not Detected     Klebsiella oxytoca Not Detected     Klebsiella pneumoniae group Not Detected     Proteus spp. Not Detected     Salmonella spp. Not Detected     Serratia marcescens Not Detected     Haemophilus influenzae Not Detected     Neisseria meningitidis Not Detected     Pseudomonas aeruginosa Not Detected     Stenotrophomonas maltophilia Not Detected     Candida albicans Not Detected     Candida auris Not Detected     Candida glabrata Not Detected     Candida krusei Not Detected     Candida parapsilosis Not Detected     Candida tropicalis Not Detected     Cryptococcus neoformans/gattii Not Detected    Narrative:      The [employer] BCID2 Panel is a multiplexed nucleic acid test intended for the use with [employer]® 2.0 or [employer] for the simultaneous qualitative detection and identification of multiple bacterial and yeast nucleic acids and select genetic determinants associated with antimicrobial resistance.  The [employer] BCID2 Panel  test is performed directly on blood culture samples identified as positive by a continuous monitoring blood culture system.  Results are intended to be interpreted in conjunction with Gram stain results.

## 2023-12-28 LAB
ALBUMIN SERPL-MCNC: 2.6 G/DL (ref 3.4–4.8)
ALBUMIN/GLOB SERPL: 0.7 RATIO (ref 1.1–2)
ALP SERPL-CCNC: 50 UNIT/L (ref 40–150)
ALT SERPL-CCNC: 72 UNIT/L (ref 0–55)
AST SERPL-CCNC: 48 UNIT/L (ref 5–34)
BASOPHILS # BLD AUTO: 0.05 X10(3)/MCL
BASOPHILS NFR BLD AUTO: 0.5 %
BILIRUB SERPL-MCNC: 0.5 MG/DL
BUN SERPL-MCNC: 11.5 MG/DL (ref 8.4–25.7)
CALCIUM SERPL-MCNC: 8 MG/DL (ref 8.8–10)
CHLORIDE SERPL-SCNC: 108 MMOL/L (ref 98–107)
CO2 SERPL-SCNC: 24 MMOL/L (ref 23–31)
CREAT SERPL-MCNC: 0.87 MG/DL (ref 0.73–1.18)
EOSINOPHIL # BLD AUTO: 0.16 X10(3)/MCL (ref 0–0.9)
EOSINOPHIL NFR BLD AUTO: 1.7 %
ERYTHROCYTE [DISTWIDTH] IN BLOOD BY AUTOMATED COUNT: 14.1 % (ref 11.5–17)
GFR SERPLBLD CREATININE-BSD FMLA CKD-EPI: >60 MLS/MIN/1.73/M2
GLOBULIN SER-MCNC: 3.8 GM/DL (ref 2.4–3.5)
GLUCOSE SERPL-MCNC: 98 MG/DL (ref 82–115)
HCT VFR BLD AUTO: 33.3 % (ref 42–52)
HGB BLD-MCNC: 11.3 G/DL (ref 14–18)
IMM GRANULOCYTES # BLD AUTO: 0.05 X10(3)/MCL (ref 0–0.04)
IMM GRANULOCYTES NFR BLD AUTO: 0.5 %
LYMPHOCYTES # BLD AUTO: 2.07 X10(3)/MCL (ref 0.6–4.6)
LYMPHOCYTES NFR BLD AUTO: 21.9 %
MCH RBC QN AUTO: 31.9 PG (ref 27–31)
MCHC RBC AUTO-ENTMCNC: 33.9 G/DL (ref 33–36)
MCV RBC AUTO: 94.1 FL (ref 80–94)
MONOCYTES # BLD AUTO: 1.02 X10(3)/MCL (ref 0.1–1.3)
MONOCYTES NFR BLD AUTO: 10.8 %
NEUTROPHILS # BLD AUTO: 6.11 X10(3)/MCL (ref 2.1–9.2)
NEUTROPHILS NFR BLD AUTO: 64.6 %
NRBC BLD AUTO-RTO: 0 %
PLATELET # BLD AUTO: 265 X10(3)/MCL (ref 130–400)
PMV BLD AUTO: 9.4 FL (ref 7.4–10.4)
POTASSIUM SERPL-SCNC: 4.3 MMOL/L (ref 3.5–5.1)
PROT SERPL-MCNC: 6.4 GM/DL (ref 5.8–7.6)
RBC # BLD AUTO: 3.54 X10(6)/MCL (ref 4.7–6.1)
SODIUM SERPL-SCNC: 140 MMOL/L (ref 136–145)
WBC # SPEC AUTO: 9.46 X10(3)/MCL (ref 4.5–11.5)

## 2023-12-28 PROCEDURE — 21400001 HC TELEMETRY ROOM

## 2023-12-28 PROCEDURE — 25000003 PHARM REV CODE 250: Performed by: STUDENT IN AN ORGANIZED HEALTH CARE EDUCATION/TRAINING PROGRAM

## 2023-12-28 PROCEDURE — 99233 SBSQ HOSP IP/OBS HIGH 50: CPT | Mod: ,,, | Performed by: HOSPITALIST

## 2023-12-28 PROCEDURE — 80053 COMPREHEN METABOLIC PANEL: CPT | Performed by: STUDENT IN AN ORGANIZED HEALTH CARE EDUCATION/TRAINING PROGRAM

## 2023-12-28 PROCEDURE — 63600175 PHARM REV CODE 636 W HCPCS: Performed by: STUDENT IN AN ORGANIZED HEALTH CARE EDUCATION/TRAINING PROGRAM

## 2023-12-28 PROCEDURE — 25000003 PHARM REV CODE 250: Performed by: INTERNAL MEDICINE

## 2023-12-28 PROCEDURE — 63600175 PHARM REV CODE 636 W HCPCS: Performed by: INTERNAL MEDICINE

## 2023-12-28 PROCEDURE — 85025 COMPLETE CBC W/AUTO DIFF WBC: CPT | Performed by: STUDENT IN AN ORGANIZED HEALTH CARE EDUCATION/TRAINING PROGRAM

## 2023-12-28 RX ADMIN — LEVOTHYROXINE SODIUM 50 MCG: 50 TABLET ORAL at 05:12

## 2023-12-28 RX ADMIN — THERA TABS 1 TABLET: TAB at 09:12

## 2023-12-28 RX ADMIN — VANCOMYCIN HYDROCHLORIDE 1750 MG: 500 INJECTION, POWDER, LYOPHILIZED, FOR SOLUTION INTRAVENOUS at 09:12

## 2023-12-28 RX ADMIN — ENOXAPARIN SODIUM 40 MG: 40 INJECTION SUBCUTANEOUS at 05:12

## 2023-12-28 RX ADMIN — VANCOMYCIN HYDROCHLORIDE 1750 MG: 500 INJECTION, POWDER, LYOPHILIZED, FOR SOLUTION INTRAVENOUS at 10:12

## 2023-12-28 NOTE — PROGRESS NOTES
[facility]  Hospital Medicine Progress Note        Chief Complaint: Inpatient Follow-up for infection    HPI:   This is a 68-year-old male with medical history of obesity BMI 37, osteoarthritis/right hip replacement, T2DM, HTN and HLD all lifestyle managed present to the ED with complaint of fever and positive blood cultures.      Patient reports symptoms started about 1 month ago with fever, chills, rigors on daily basis associated with drenching night sweats and myalgia.  He was initially seen at his PCP office and was prescribed cefuroxime on 11/03/2023 for 10 days but for unclear etiology of fever, fever did not resolve and subsequently on 11/22/2023 he started having burning urination and increased frequency and was diagnosed with UTI and prescribed doxycycline for 10 days and subsequently another 7 day course of ciprofloxacin on 12/13/2023.  He completed antibiotic course and continued to have daily high-grade fever up to 103F. He presented to the ED on 12/20/2023 for fever evaluation as well as runny nose and tested positive for COVID-19 subsequently followed up with his PCP and was prescribed molnupiravir.  He was called to return to the ED for positive blood cultures 4 of 4  bottles growing Gram-positive cocci with speciation Aerococcus urinae (susceptibility sent to reference lab and pending).     On arrival to ED he was febrile 103.1 normotensive and saturating 99% on room air.  Labs notable for WBC 9.7, hemoglobin 11.9, platelets 153, creatinine 0.98, ESR 59, , ferritin 2927, D-dimer 2.37 urinalysis unremarkable.  CTA chest show no evidence of PE, no infiltrate or consolidation, there is trace/minimal pleural effusions bilaterally.     He was given Zosyn and vancomycin and referred to hospital medicine service for further evaluation and management.    Interval Hx:   Seen and examined the pt.   Denies pain and discomfort. Afebrile and repeat cultures are negative.      Objective/physical exam:  General: In no acute distress, afebrile  Chest: Clear to auscultation bilaterally  Heart: RRR, +S1, S2, no appreciable murmur  Abdomen: Soft, nontender, BS +  MSK: Warm, no lower extremity edema, no clubbing or cyanosis  Neurologic: Alert and oriented x4, Cranial nerve II-XII intact, Strength 5/5 in all 4 extremities    VITAL SIGNS: 24 HRS MIN & MAX LAST   Temp  Min: 98 °F (36.7 °C)  Max: 99.9 °F (37.7 °C) 98.6 °F (37 °C)   BP  Min: 120/56  Max: 154/74 (!) 120/56   Pulse  Min: 54  Max: 61  (!) 54   Resp  Min: 18  Max: 18 18   SpO2  Min: 93 %  Max: 96 % (!) 94 %     I have reviewed the following labs:  Recent Labs   Lab 12/24/23  0332 12/26/23  0536 12/28/23  0430   WBC 9.47 9.64 9.46   RBC 3.62* 3.41* 3.54*   HGB 11.8* 11.0* 11.3*   HCT 34.4* 32.3* 33.3*   MCV 95.0* 94.7* 94.1*   MCH 32.6* 32.3* 31.9*   MCHC 34.3 34.1 33.9   RDW 14.2 14.5 14.1    215 265   MPV 10.6* 9.5 9.4       Recent Labs   Lab 12/24/23  0332 12/26/23  0536 12/28/23  0430    138 140   K 4.1 4.3 4.3   CO2 21* 24 24   BUN 14.4 11.5 11.5   CREATININE 0.85 0.78 0.87   CALCIUM 8.0* 7.9* 8.0*   MG 2.10  --   --    ALBUMIN 2.5* 2.4* 2.6*   ALKPHOS 57 45 50   ALT 60* 70* 72*   AST 46* 50* 48*   BILITOT 0.4 0.3 0.5       Microbiology Results (last 7 days)       Procedure Component Value Units Date/Time    Blood Culture [[id number]]  (Normal) Collected: 12/25/23 0538    Order Status: Completed Specimen: Blood Updated: 12/28/23 1001     CULTURE, BLOOD ([facility]) No Growth At 72 Hours    Blood Culture [[id number]]  (Normal) Collected: 12/25/23 0538    Order Status: Completed Specimen: Blood Updated: 12/28/23 1001     CULTURE, BLOOD ([facility]) No Growth At 72 Hours    Blood culture #2 **CANNOT BE ORDERED STAT** [[id number]]  (Abnormal) Collected: 12/23/23 1657    Order Status: Completed Specimen: Blood Updated: 12/25/23 1111     CULTURE, BLOOD ([facility]) Aerococcus urinae     Comment: Susceptibility sent to reference lab. Results  to follow on separate report.        GRAM STAIN Gram Positive Cocci, probable Staphylococcus      Seen in gram stain of broth only      2 of 2 bottles positive    Narrative:      For sensitivity results refer to [facility]-[id number].        Blood culture #1 **CANNOT BE ORDERED STAT** [[id number]]  (Abnormal) Collected: 12/23/23 1657    Order Status: Completed Specimen: Blood Updated: 12/25/23 1111     CULTURE, BLOOD ([facility]) Aerococcus urinae     Comment: Susceptibility sent to reference lab. Results to follow on separate report.        GRAM STAIN Gram Positive Cocci, probable Staphylococcus      Seen in gram stain of broth only      2 of 2 bottles positive    Narrative:      For sensitivity results refer to [facility]-[id number].        BCID2 Panel [[id number]]  (Normal) Collected: 12/23/23 1657    Order Status: Completed Specimen: Blood Updated: 12/24/23 1352     CTX-M (ESBL ) N/A     IMP (Cabapenemase ) N/A     KPC resistance gene (Carbapenemase ) N/A     mcr-1 N/A     mecA ID N/A     Comment: Note: Antimicrobial resistance can occur via multiple mechanisms. A Not Detected result for antimicrobial resistance gene(s) does not indicate antimicrobial susceptibility. Subculturing is required for species identification and susceptibility testing of   isolates.        mecA/C and MREJ (MRSA) gene N/A     NDM (Carbapenemase ) N/A     OXA-48-like (Carbapenemase ) N/A     Leah/B (VRE gene) N/A     VIM (Carbapenemase ) N/A     Enterococcus faecalis Not Detected     Enterococcus faecium Not Detected     Listeria monocytogenes Not Detected     Staphylococcus spp. Not Detected     Staphylococcus aureus Not Detected     Staphylococcus epidermidis Not Detected     Staphylococcus lugdunensis Not Detected     Streptococcus spp. Not Detected     Streptococcus agalactiae (Group B) Not Detected     Streptococcus pneumoniae Not Detected     Streptococcus pyogenes (Group A) Not Detected      Acinetobacter calcoaceticus/baumannii complex Not Detected     Bacteroides fragilis Not Detected     Enterobacterales Not Detected     Enterobacter cloacae complex Not Detected     Escherichia coli Not Detected     Klebsiella aerogenes Not Detected     Klebsiella oxytoca Not Detected     Klebsiella pneumoniae group Not Detected     Proteus spp. Not Detected     Salmonella spp. Not Detected     Serratia marcescens Not Detected     Haemophilus influenzae Not Detected     Neisseria meningitidis Not Detected     Pseudomonas aeruginosa Not Detected     Stenotrophomonas maltophilia Not Detected     Candida albicans Not Detected     Candida auris Not Detected     Candida glabrata Not Detected     Candida krusei Not Detected     Candida parapsilosis Not Detected     Candida tropicalis Not Detected     Cryptococcus neoformans/gattii Not Detected    Narrative:      The [employer] BCID2 Panel is a multiplexed nucleic acid test intended for the use with [employer] or [employer] for the simultaneous qualitative detection and identification of multiple bacterial and yeast nucleic acids and select genetic determinants associated with antimicrobial resistance.  The [employer] BCID2 Panel test is performed directly on blood culture samples identified as positive by a continuous monitoring blood culture system.  Results are intended to be interpreted in conjunction with Gram stain results.             See below for Radiology    Scheduled Med:   cefTRIAXone (ROCEPHIN) IVPB  2 g Intravenous Q24H    enoxparin  40 mg Subcutaneous Daily    levothyroxine  50 mcg Oral Before breakfast    multivitamin  1 tablet Oral Daily    vancomycin (VANCOCIN) IV (PEDS and ADULTS)  1,750 mg Intravenous Q12H         PRN Meds:  acetaminophen, acetaminophen, aluminum-magnesium hydroxide-simethicone, ibuprofen, melatonin, ondansetron, polyethylene glycol, prochlorperazine, senna-docusate 8.6-50 mg, sodium chloride 0.9%, Pharmacy  to dose Vancomycin consult **AND** vancomycin - pharmacy to dose     Assessment/Plan:  Endocarditis  Aerococcus urinae bacteremia, present on admission, in the setting of recurrent UTI  COVID-19, present on admission   History of thyroid disease,    QUENTIN revealed aortic valve is a trileaflet valve. There is a small mobile echogenic irregular mass present on the noncoronary cusp consistent with vegetation.    Will need 6 weeks regiment of abx.   - Covid is negative.   Blood cultures on 12/20 grew GPC both sides, prelim BC ID negative.  Enterococcus urinary sent to lab for testing   Continue vancomycin and ceftriaxone     VTE prophylaxis:  Lovenox    Patient condition:  Stable/Fair/Guarded/ Serious/ Critical    Anticipated discharge and Disposition:         All diagnosis and differential diagnosis have been reviewed; assessment and plan has been documented; I have personally reviewed the labs and test results that are presently available; I have reviewed the patients medication list; I have reviewed the consulting providers response and recommendations. I have reviewed or attempted to review medical records based upon their availability    All of the patient's questions have been  addressed and answered. Patient's is agreeable to the above stated plan. I will continue to monitor closely and make adjustments to medical management as needed.  _____________________________________________________________________    Nutrition Status:    Radiology:  I have personally reviewed the following imaging and agree with the radiologist.     Transesophageal echo (QUENTIN)    Left Ventricle: The left ventricle is normal in size. Normal wall   thickness. Normal wall motion. There is normal systolic function with a   visually estimated ejection fraction of 60 - 65%.    Right Ventricle: Normal right ventricular cavity size.    Left Atrium: Left atrium is mildly dilated.    Aortic Valve: The aortic valve is a trileaflet valve. There is a  small   mobile echogenic irregular mass present on the noncoronary cusp consistent   with vegetation.    Mitral Valve: There is mild regurgitation.    Pulmonic Valve: There is mild regurgitation.    Mario Gray M.D.  [facility]/Hospital Medicine Department  [facility]

## 2023-12-28 NOTE — PROGRESS NOTES
Pharmacokinetic Assessment Follow Up: IV Vancomycin    Vancomycin serum concentration assessment(s):    The trough level was drawn correctly and can be used to guide therapy at this time. The measurement is above the desired definitive target range of 15 to 20 mcg/mL.    Vancomycin Regimen Plan:    Change regimen to Vancomycin 1500 mg IV every 12 hours with next serum trough concentration measured at 1 hour prior to 1600 dose on 12/29    Drug levels (last 3 results):  Recent Labs   Lab Result Units 12/25/23  0538 12/27/23  1631   Vancomycin Trough ug/ml 13.3* 20.4*       Pharmacy will continue to follow and monitor vancomycin.    Please contact pharmacy at extension [phone] for questions regarding this assessment.    Thank you for the consult,   Adonay Lane       Patient brief summary:  Arturo Ortiz is a 68 y.o. male initiated on antimicrobial therapy with IV Vancomycin for treatment of bacteremia    The patient's current regimen is 1500mg q12h    Drug Allergies:   Review of patient's allergies indicates:  No Known Allergies    Actual Body Weight:   120kg    Renal Function:   Estimated Creatinine Clearance: 122.9 mL/min (based on SCr of 0.78 mg/dL).,     Dialysis Method (if applicable):  N/A    CBC (last 72 hours):  Recent Labs   Lab Result Units 12/26/23  0536   WBC x10(3)/mcL 9.64   Hgb g/dL 11.0*   Hct % 32.3*   Platelet x10(3)/mcL 215   Mono % % 11.5   Eos % % 1.7   Basophil % % 0.4       Metabolic Panel (last 72 hours):  Recent Labs   Lab Result Units 12/26/23  0536   Sodium Level mmol/L 138   Potassium Level mmol/L 4.3   Chloride mmol/L 107   Carbon Dioxide mmol/L 24   Glucose Level mg/dL 105   Blood Urea Nitrogen mg/dL 11.5   Creatinine mg/dL 0.78   Albumin Level g/dL 2.4*   Bilirubin Total mg/dL 0.3   Alkaline Phosphatase unit/L 45   Aspartate Aminotransferase unit/L 50*   Alanine Aminotransferase unit/L 70*       Vancomycin Administrations:  vancomycin given in the last 96 hours                      vancomycin (VANCOCIN) 1,750 mg in dextrose 5 % (D5W) 500 mL IVPB (mg) 1,750 mg New Bag 12/27/23 2138    vancomycin 2 g in dextrose 5 % 500 mL IVPB (mg) 2,000 mg New Bag 12/27/23 0546     2,000 mg New Bag 12/26/23 1731     2,000 mg New Bag  0600     2,000 mg New Bag 12/25/23 1746    vancomycin (VANCOCIN) 1,750 mg in dextrose 5 % (D5W) 500 mL IVPB (mg) 1,750 mg New Bag 12/25/23 0656     1,750 mg New Bag 12/24/23 1740     1,750 mg New Bag  0635                    Microbiologic Results:  Microbiology Results (last 7 days)       Procedure Component Value Units Date/Time    Blood Culture [[id number]]  (Normal) Collected: 12/25/23 0538    Order Status: Completed Specimen: Blood Updated: 12/27/23 1001     CULTURE, BLOOD ([facility]) No Growth At 48 Hours    Blood Culture [[id number]]  (Normal) Collected: 12/25/23 0538    Order Status: Completed Specimen: Blood Updated: 12/27/23 1001     CULTURE, BLOOD ([facility]) No Growth At 48 Hours    Blood culture #2 **CANNOT BE ORDERED STAT** [[id number]]  (Abnormal) Collected: 12/23/23 1657    Order Status: Completed Specimen: Blood Updated: 12/25/23 1111     CULTURE, BLOOD ([facility]) Aerococcus urinae     Comment: Susceptibility sent to reference lab. Results to follow on separate report.        GRAM STAIN Gram Positive Cocci, probable Staphylococcus      Seen in gram stain of broth only      2 of 2 bottles positive    Narrative:      For sensitivity results refer to [facility]-[id number].        Blood culture #1 **CANNOT BE ORDERED STAT** [[id number]]  (Abnormal) Collected: 12/23/23 1657    Order Status: Completed Specimen: Blood Updated: 12/25/23 1111     CULTURE, BLOOD ([facility]) Aerococcus urinae     Comment: Susceptibility sent to reference lab. Results to follow on separate report.        GRAM STAIN Gram Positive Cocci, probable Staphylococcus      Seen in gram stain of broth only      2 of 2 bottles positive    Narrative:      For sensitivity results refer to [facility]-[id number].        BCID2 Panel  [[id number]]  (Normal) Collected: 12/23/23 5435    Order Status: Completed Specimen: Blood Updated: 12/24/23 7795     CTX-M (ESBL ) N/A     IMP (Cabapenemase ) N/A     KPC resistance gene (Carbapenemase ) N/A     mcr-1 N/A     mecA ID N/A     Comment: Note: Antimicrobial resistance can occur via multiple mechanisms. A Not Detected result for antimicrobial resistance gene(s) does not indicate antimicrobial susceptibility. Subculturing is required for species identification and susceptibility testing of   isolates.        mecA/C and MREJ (MRSA) gene N/A     NDM (Carbapenemase ) N/A     OXA-48-like (Carbapenemase ) N/A     Leah/B (VRE gene) N/A     VIM (Carbapenemase ) N/A     Enterococcus faecalis Not Detected     Enterococcus faecium Not Detected     Listeria monocytogenes Not Detected     Staphylococcus spp. Not Detected     Staphylococcus aureus Not Detected     Staphylococcus epidermidis Not Detected     Staphylococcus lugdunensis Not Detected     Streptococcus spp. Not Detected     Streptococcus agalactiae (Group B) Not Detected     Streptococcus pneumoniae Not Detected     Streptococcus pyogenes (Group A) Not Detected     Acinetobacter calcoaceticus/baumannii complex Not Detected     Bacteroides fragilis Not Detected     Enterobacterales Not Detected     Enterobacter cloacae complex Not Detected     Escherichia coli Not Detected     Klebsiella aerogenes Not Detected     Klebsiella oxytoca Not Detected     Klebsiella pneumoniae group Not Detected     Proteus spp. Not Detected     Salmonella spp. Not Detected     Serratia marcescens Not Detected     Haemophilus influenzae Not Detected     Neisseria meningitidis Not Detected     Pseudomonas aeruginosa Not Detected     Stenotrophomonas maltophilia Not Detected     Candida albicans Not Detected     Candida auris Not Detected     Candida glabrata Not Detected     Candida krusei Not Detected     Candida parapsilosis Not  Detected     Candida tropicalis Not Detected     Cryptococcus neoformans/gattii Not Detected    Narrative:      The [employer] BCID2 Panel is a multiplexed nucleic acid test intended for the use with [employer]® [employer].0 or [employer]® [employer] for the simultaneous qualitative detection and identification of multiple bacterial and yeast nucleic acids and select genetic determinants associated with antimicrobial resistance.  The [employer] BCID2 Panel test is performed directly on blood culture samples identified as positive by a continuous monitoring blood culture system.  Results are intended to be interpreted in conjunction with Gram stain results.

## 2023-12-28 NOTE — PROGRESS NOTES
[facility] - 9th Floor Med Surg    Cardiology  Progress Note    Patient Name: Arturo Ortiz  MRN: [id number]  Admission Date: 12/23/2023  Hospital Length of Stay: 5 days  Code Status: Full Code   Attending Physician: Elena Fragoso DO   Primary Care Physician: CHARISSE Wallace Jr., MD  Expected Discharge Date:   Principal Problem:Bacteremia    Subjective:     Reason for consult: Concern for Endocarditis      HPI: 68-year-old male remotely known to [facility]/Dr. Patel (last seen in 2018) with PMHx of HTN, DM II, HLD, and neuropathy. He presented to the ED on 12.23.23 with complaints of fever. He reported that his symptoms began 1 month ago and fever was associated with chills, night sweats, and myalgia and rigors daily. He was seen by his PCP on 11.3.23 and was prescribed cefuroxime for 10 days for an unknown etiology and the fever did not resolve and on 11.22.23 he beagn have burning with urination he was diagnosed with a UTI and prescribed Doxycycline  for 10 days and subsequently another 7 day course of ciprofloxacin on 12/13/2023. He completed his antibiotic course and continued to have daily high grade fevers up to 103F. He presented to the ED on 12.20.2023 for fever evaluation as well as runny nose and tested positive for COVID-19 subsequently followed up with his PCP and was prescribed molnupiravir.  He was called to return to the ED for positive blood cultures 4 of 4  bottles growing Gram-positive cocci with speciation Aerococcus urinae (susceptibility sent to reference lab and pending). ED course:  febrile 103.1 normotensive and saturating 99% on room air.  Labs notable for WBC 9.7, hemoglobin 11.9, platelets 153, creatinine 0.98, ESR 59, , ferritin 2927, D-dimer 2.37 urinalysis unremarkable.  CTA chest show no evidence of PE, no infiltrate or consolidation, there is trace/minimal pleural effusions bilaterally. He was treated with antibiotics and ID was consulted. TTE was preformed  that showed no  valvular abnormalities. [facility] was consulted for concern for endocarditis.         Hospital Course:   12.27.23: NAD, FARHEEN. He denies SOB, CP, or nausea. Seen sitting in bed. Covid - today plan for QUENTIN today.  12.28.23: NAD, VSS. He was seen resting comfortably in bed. He denies SOB or CP.     PMH: HTN, DM Type II, HLD, Avascular necrosis of R femoral head, hypercholesteremia, macrocytosis, Osteoarthritis of right hip and right knee, Neuropathy  PSH: Total Hip arthoplasty  Family History: Mother: lung cancer, Father: Hodgkin's lymphoma, Sister: Lung cancer, breast cancer  Social History: occasional ETOH use. Denies tobacco or illict drug use      Previous Cardiac Diagnostics:   QUENTIN (12.27.23):  Left Ventricle: The left ventricle is normal in size. Normal wall thickness. Normal wall motion. There is normal systolic function with a visually estimated ejection fraction of 60 - 65%.  Right Ventricle: Normal right ventricular cavity size.  Left Atrium: Left atrium is mildly dilated.  Aortic Valve: The aortic valve is a trileaflet valve. There is a small mobile echogenic irregular mass present on the noncoronary cusp consistent with vegetation.  Mitral Valve: There is mild regurgitation.  Pulmonic Valve: There is mild regurgitation.    BLE Venous US (12.24.23):  The right superficial femoral middle vein is normal.  The left superficial femoral middle vein is normal.    ECHO (12.24.23):  Left Ventricle: The left ventricle is normal in size. Normal wall thickness. Normal wall motion. There is normal systolic function with a visually estimated ejection fraction of 55 - 60%. Diastolic function cannot be reliably determined in the presence of mitral valve disease.  Right Ventricle: Normal right ventricular cavity size. Systolic function is normal.  Left Atrium: Left atrium is moderately dilated.  Aortic Valve: There is moderate aortic valve sclerosis. There is mild stenosis. Aortic valve area by VTI is 2.14 cm².  Aortic valve peak velocity is 2.57 m/s. Mean gradient is 15 mmHg. The dimensionless index is 0.56. There is moderate aortic regurgitation.  Mitral Valve: Moderately calcified posterior leaflet. Chordae calcification present. Moderately restricted motion. There is mild stenosis. The mean pressure gradient across the mitral valve is 4 mmHg at a heart rate of 63 bpm. There is mild regurgitation.  Tricuspid Valve: There is trace regurgitation.  Pulmonary Artery: The estimated pulmonary artery systolic pressure is 16 mmHg.  IVC/SVC: Normal venous pressure at 3 mmHg.     SPECT (9.18.18):  This is a normal perfusion study, no perfusion defects noted. There is no evidence of ischemia.   This scan is suggestive of low risk for future cardiovascular events.   The left ventricular cavity is noted to be normal on the stress study. The left ventricular ejection fraction was calculated to be 52% and left ventricular global function is normal.   The study quality is excellent.      ECHO (9.17.18):  The study quality is average.   The left ventricle is normal in size. Global left ventricular systolic function is normal. The left ventricular ejection fraction is 60%. The left ventricle diastolic function is impaired (Grade I) with normal left atrial pressure. Moderate concentric left ventricular hypertrophy is present.   The left atrial diameter is mildly increased. (4.3 cm). Volume Index is normal.  Mild (1+) mitral regurgitation.  The posterior mitral leaflet is moderately thickened with moderately reduced mobility.   Mild calcification of the aortic valve is noted with adequate cuspal excursion.  The pulmonary artery systolic pressure is 25 mmHg.      Carotid US (9.17.18):  The study quality is average.   1-39% stenosis in the mid right internal carotid artery based on Bluth Criteria.   1-39% stenosis in the mid left internal carotid artery based on Bluth Criteria.   Antegrade right vertebral artery flow.   Antegrade left  vertebral artery flow.     Review of Systems   Constitutional: Negative for chills and fever.   Cardiovascular:  Negative for chest pain, palpitations and syncope.   Respiratory:  Negative for cough and shortness of breath.    Musculoskeletal: Negative.    Gastrointestinal:  Negative for nausea and vomiting.   Neurological: Negative.    Psychiatric/Behavioral: Negative.         Objective:     Vital Signs (Most Recent):  Temp: 98.2 °F (36.8 °C) (12/28/23 1135)  Pulse: 61 (12/28/23 1135)  Resp: 18 (12/28/23 0516)  BP: 129/63 (12/28/23 1135)  SpO2: 95 % (12/28/23 1135) Vital Signs (24h Range):  Temp:  [98 °F (36.7 °C)-99.9 °F (37.7 °C)] 98.2 °F (36.8 °C)  Pulse:  [57-61] 61  Resp:  [18] 18  SpO2:  [93 %-96 %] 95 %  BP: (125-154)/(63-77) 129/63     Weight: 120.2 kg (265 lb)  Body mass index is 36.96 kg/m².    SpO2: 95 %       No intake or output data in the 24 hours ending 12/28/23 1217    Lines/Drains/Airways       Peripheral Intravenous Line  Duration                  Peripheral IV - Single Lumen 12/23/23 1642 20 G Posterior;Right Forearm 4 days                    Significant Labs:   Recent Results (from the past 72 hour(s))   Comprehensive Metabolic Panel    Collection Time: 12/26/23  5:36 AM   Result Value Ref Range    Sodium Level 138 136 - 145 mmol/L    Potassium Level 4.3 3.5 - 5.1 mmol/L    Chloride 107 98 - 107 mmol/L    Carbon Dioxide 24 23 - 31 mmol/L    Glucose Level 105 82 - 115 mg/dL    Blood Urea Nitrogen 11.5 8.4 - 25.7 mg/dL    Creatinine 0.78 0.73 - 1.18 mg/dL    Calcium Level Total 7.9 (L) 8.8 - 10.0 mg/dL    Protein Total 5.9 5.8 - 7.6 gm/dL    Albumin Level 2.4 (L) 3.4 - 4.8 g/dL    Globulin 3.5 2.4 - 3.5 gm/dL    Albumin/Globulin Ratio 0.7 (L) 1.1 - 2.0 ratio    Bilirubin Total 0.3 <=1.5 mg/dL    Alkaline Phosphatase 45 40 - 150 unit/L    Alanine Aminotransferase 70 (H) 0 - 55 unit/L    Aspartate Aminotransferase 50 (H) 5 - 34 unit/L    eGFR >60 mls/min/1.73/m2   CBC with Differential    Collection  Time: 12/26/23  5:36 AM   Result Value Ref Range    WBC 9.64 4.50 - 11.50 x10(3)/mcL    RBC 3.41 (L) 4.70 - 6.10 x10(6)/mcL    Hgb 11.0 (L) 14.0 - 18.0 g/dL    Hct 32.3 (L) 42.0 - 52.0 %    MCV 94.7 (H) 80.0 - 94.0 fL    MCH 32.3 (H) 27.0 - 31.0 pg    MCHC 34.1 33.0 - 36.0 g/dL    RDW 14.5 11.5 - 17.0 %    Platelet 215 130 - 400 x10(3)/mcL    MPV 9.5 7.4 - 10.4 fL    Neut % 65.1 %    Lymph % 20.6 %    Mono % 11.5 %    Eos % 1.7 %    Basophil % 0.4 %    Lymph # 1.99 0.6 - 4.6 x10(3)/mcL    Neut # 6.27 2.1 - 9.2 x10(3)/mcL    Mono # 1.11 0.1 - 1.3 x10(3)/mcL    Eos # 0.16 0 - 0.9 x10(3)/mcL    Baso # 0.04 <=0.2 x10(3)/mcL    IG# 0.07 (H) 0 - 0.04 x10(3)/mcL    IG% 0.7 %    NRBC% 0.0 %   COVID-19 Rapid Screening    Collection Time: 12/27/23  9:59 AM   Result Value Ref Range    SARS COV-2 MOLECULAR Negative Negative   Transesophageal echo (QUENTIN)    Collection Time: 12/27/23  2:20 PM   Result Value Ref Range    BSA 2.45 m2   VANCOMYCIN, TROUGH    Collection Time: 12/27/23  4:31 PM   Result Value Ref Range    Vancomycin Trough 20.4 (H) 15.0 - 20.0 ug/ml   Comprehensive Metabolic Panel    Collection Time: 12/28/23  4:30 AM   Result Value Ref Range    Sodium Level 140 136 - 145 mmol/L    Potassium Level 4.3 3.5 - 5.1 mmol/L    Chloride 108 (H) 98 - 107 mmol/L    Carbon Dioxide 24 23 - 31 mmol/L    Glucose Level 98 82 - 115 mg/dL    Blood Urea Nitrogen 11.5 8.4 - 25.7 mg/dL    Creatinine 0.87 0.73 - 1.18 mg/dL    Calcium Level Total 8.0 (L) 8.8 - 10.0 mg/dL    Protein Total 6.4 5.8 - 7.6 gm/dL    Albumin Level 2.6 (L) 3.4 - 4.8 g/dL    Globulin 3.8 (H) 2.4 - 3.5 gm/dL    Albumin/Globulin Ratio 0.7 (L) 1.1 - 2.0 ratio    Bilirubin Total 0.5 <=1.5 mg/dL    Alkaline Phosphatase 50 40 - 150 unit/L    Alanine Aminotransferase 72 (H) 0 - 55 unit/L    Aspartate Aminotransferase 48 (H) 5 - 34 unit/L    eGFR >60 mls/min/1.73/m2   CBC with Differential    Collection Time: 12/28/23  4:30 AM   Result Value Ref Range    WBC 9.46 4.50 -  11.50 x10(3)/mcL    RBC 3.54 (L) 4.70 - 6.10 x10(6)/mcL    Hgb 11.3 (L) 14.0 - 18.0 g/dL    Hct 33.3 (L) 42.0 - 52.0 %    MCV 94.1 (H) 80.0 - 94.0 fL    MCH 31.9 (H) 27.0 - 31.0 pg    MCHC 33.9 33.0 - 36.0 g/dL    RDW 14.1 11.5 - 17.0 %    Platelet 265 130 - 400 x10(3)/mcL    MPV 9.4 7.4 - 10.4 fL    Neut % 64.6 %    Lymph % 21.9 %    Mono % 10.8 %    Eos % 1.7 %    Basophil % 0.5 %    Lymph # 2.07 0.6 - 4.6 x10(3)/mcL    Neut # 6.11 2.1 - 9.2 x10(3)/mcL    Mono # 1.02 0.1 - 1.3 x10(3)/mcL    Eos # 0.16 0 - 0.9 x10(3)/mcL    Baso # 0.05 <=0.2 x10(3)/mcL    IG# 0.05 (H) 0 - 0.04 x10(3)/mcL    IG% 0.5 %    NRBC% 0.0 %       Telemetry:  NSR    Physical Exam  Constitutional:       Appearance: Normal appearance.   HENT:      Head: Normocephalic.      Mouth/Throat:      Mouth: Mucous membranes are moist.   Cardiovascular:      Rate and Rhythm: Normal rate and regular rhythm.      Pulses: Normal pulses.      Heart sounds: Normal heart sounds. No murmur heard.  Pulmonary:      Effort: Pulmonary effort is normal. No respiratory distress.      Breath sounds: Normal breath sounds.   Abdominal:      General: Abdomen is flat.      Palpations: Abdomen is soft.   Skin:     General: Skin is warm.   Neurological:      Mental Status: He is alert and oriented to person, place, and time.   Psychiatric:         Mood and Affect: Mood normal.         Behavior: Behavior normal.         Judgment: Judgment normal.       Current Inpatient Medications:    Current Facility-Administered Medications:     acetaminophen tablet 1,000 mg, 1,000 mg, Oral, Q6H PRN, Belle Lira MD    acetaminophen tablet 650 mg, 650 mg, Oral, Q4H PRN, Belle Lira MD    aluminum-magnesium hydroxide-simethicone 200-200-20 mg/5 mL suspension 30 mL, 30 mL, Oral, QID PRN, Belle Lira MD    cefTRIAXone (ROCEPHIN) 2 g in dextrose 5 % in water (D5W) 100 mL IVPB (MB+), 2 g, Intravenous, Q24H, Belle Lira MD, Stopped at 12/28/23 0016    enoxaparin injection 40 mg, 40 mg,  Subcutaneous, Daily, Belle Lira MD, 40 mg at 12/27/23 1821    ibuprofen tablet 400 mg, 400 mg, Oral, Q6H PRN, Belle Lira MD    levothyroxine tablet 50 mcg, 50 mcg, Oral, Before breakfast, Belle Lira MD, 50 mcg at 12/28/23 0508    melatonin tablet 6 mg, 6 mg, Oral, Nightly PRN, Belle Lira MD    multivitamin tablet, 1 tablet, Oral, Daily, Belle Lira MD, 1 tablet at 12/28/23 0953    ondansetron injection 4 mg, 4 mg, Intravenous, Q4H PRN, Belle Lira MD    polyethylene glycol packet 17 g, 17 g, Oral, BID PRN, Belle Lira MD    prochlorperazine injection Soln 5 mg, 5 mg, Intravenous, Q6H PRN, Belle Lira MD    senna-docusate 8.6-50 mg per tablet 2 tablet, 2 tablet, Oral, BID PRN, Belle Lira MD    sodium chloride 0.9% flush 10 mL, 10 mL, Intravenous, PRN, Belle Lira MD    vancomycin (VANCOCIN) 1,750 mg in dextrose 5 % (D5W) 500 mL IVPB, 1,750 mg, Intravenous, Q12H, Elena Fragoso DO, Last Rate: 250 mL/hr at 12/28/23 0954, 1,750 mg at 12/28/23 0954    Pharmacy to dose Vancomycin consult, , , Once **AND** vancomycin - pharmacy to dose, , Intravenous, pharmacy to manage frequency, Belle Lira MD    VTE Risk Mitigation (From admission, onward)           Ordered     enoxaparin injection 40 mg  Daily         12/23/23 2326     IP VTE HIGH RISK PATIENT  Once         12/23/23 2326     Place sequential compression device  Until discontinued         12/23/23 2326                    Assessment:   Bacteremia with  endocarditis   - QUENTIN (12.27.23): aortic valve is a trileaflet valve. There is a small mobile echogenic irregular mass present on the noncoronary cusp consistent with vegetation.    - TTE (12.23.23): EF 55-60% & valves grossly normal    - in the setting of UTI   - BCx 12.20.23 - [facility] both sets, prelim BCID negative, Aerococcus urinae, sent to ref lab for testing  - BCx 12.23.23 - [facility]  - BCx 12.25.23 - in process  Sepsis    - Present on admit   - gram positive cocci bacteremia   COVID 19   - + PCR on  12.20.23   - On remdesivir   HTN  DM Type II  HLD  Avascular necrosis of R femoral head  Hypercholesteremia  Macrocytosis  Osteoarthritis of right hip and right knee  Neuropathy      Plan:   EKG & TTE reviewed  ECHO reviewd   Cont, ABX per ID recommendation  Cardiology to sign off            Johana Magallanes NP  Cardiology  [facility] - 9th Floor Med Surg  12/28/2023

## 2023-12-28 NOTE — PROGRESS NOTES
Infectious Disease        Patient ID: Arturo Ortiz  68 y.o. male.    Chief Complaint: Fever (Seen here X 3 days ago for fever and generalized weakness. Discharged home but called to come back to ER for positive Blood cultures. States still running fever at home. States took 1 g tylenol approx 1:30 PM. States no new symptoms since last visit. )    Interval HPI:    12/24 - afebrile. Admitted with COVID and bacteremia. Repeat cx in process. TTE pending, if negative may need QUENTIN.  High suspicion for endocarditis, +stigmata. Okay to continue dual therapy for now pending further data.    12/25 - fever curve improving. Tolerating abx. TTE negative, plan for QUENTIN  12/26 - BCx prelim negative. QUENTIN pending. Isolate also at ref lab for susceptibility testing   12/27 - afebrile rechecked for COVID and negative, QUENTIN today. Will follow up once results back from ref lab.   12/28 - QUENTIN with small AV vegetation. Will need extended course. Awaiting ref lab data for final regimen, will follow up when [facility]   Assessment and Plan:   1) Sepsis  - present on admission   - FLU negative  - rapid Strep negative  - COVID +  - gram positive bacteremia  - UA 12/21 - WBC 6-10  - UA 12/23  LE, 0-5 WBC  - s/p pip/tazo x 1  - currently on vancomycin, goal 15-20,Rx to dose  - currently on ceftriaxone     2) Bacteremia  - in setting of recurrent UTI  - Prosthetics: right THR  - BCx 12/20 - GPC both sets, prelim BCID negative, Aerococcus urinae, sent to ref lab for testing  - BCx12/23 - GPC  - BCx 12/25 - in process  - TTE 12/23 - EF 55-60%, valves grossly normal  - QUENTIN 12/28  -   Aortic Valve: The aortic valve is a trileaflet valve. There is a small mobile echogenic irregular mass present on the noncoronary cusp consistent with vegetation.  - PSA 0.36--> 0.83  - stigmata: +Osler's node 4th left digit  - currently on vancomycin, goal 15-20,Rx to dose  - currently on  ceftriaxone     3) COVID  - + PCR  - on remdesivir  - CXR 12/20 - No acute pulmonary process appreciated   - CXR 12/23 - no acute process  - CTA chest - no PE. Mild right lower lobe atelectasis.      Discussed with patient  and family at bedside 12/28  Discussed and seen with PERLITA Read MD, MPH  [facility]     Thank you for this consultation. I will follow up with the patient. Please contact via Epic secure chat with any questions.         HPI:   Patient is Arturo Ortiz a 68 y.o. male admitted on 12/23 for one month of fevers, night sweats,myalias, chills. Patient went to PCP and was prescribed oral antibiotics on 11/03/23 but failed to improve. He developed dysuria and frequency and was prescribe a course of antimicrobials for UTI on 11/22 and again on 12/13. Patient reports continued fevers of 103F at home.He presented to ED on 12/20 and tested + for COVID. BLood cx drawn at that time subsequently turned + and patient was admitted. Prelim isolate is Aerococcus spp. Patient on empiric coverage. Upon evaluation he as febrile, with elevated inflammatory markers. Patient has known DMII, hypertension, OA s/p recent right THR on 5/18/2021, hypercholesterolemia, hypothyroidism. Infectious diseases consulted for evaluation and management.     Past Medical History:   Diagnosis Date    Avascular necrosis of right femoral head     DM (diabetes mellitus)     Essential (primary) hypertension     Hypercholesteremia     Macrocytosis     Osteoarthritis of right hip     Osteoarthritis of right knee     Peripheral neuropathy      Past Surgical History:   Procedure Laterality Date    TOTAL HIP ARTHROPLASTY Right 05/18/2021     Review of patient's allergies indicates:  No Known Allergies  Current Outpatient Medications   Medication Instructions    levothyroxine (SYNTHROID) 50 mcg, Oral, Before breakfast    molnupiravir 800 mg, Oral, Every 12 hours    multivitamin (ONE DAILY  MULTIVITAMIN) per tablet 1 tablet, Oral, Daily    phenazopyridine (PYRIDIUM) 200 mg, Oral, 3 times daily PRN       Current Facility-Administered Medications:     acetaminophen tablet 1,000 mg, 1,000 mg, Oral, Q6H PRN, Belle Lira MD    acetaminophen tablet 650 mg, 650 mg, Oral, Q4H PRN, Belle Lira MD    aluminum-magnesium hydroxide-simethicone 200-200-20 mg/5 mL suspension 30 mL, 30 mL, Oral, QID PRN, Belle Lira MD    cefTRIAXone (ROCEPHIN) 2 g in dextrose 5 % in water (D5W) 100 mL IVPB (MB+), 2 g, Intravenous, Q24H, Belle Lira MD, Stopped at 12/28/23 0016    enoxaparin injection 40 mg, 40 mg, Subcutaneous, Daily, Belle Lira MD, 40 mg at 12/27/23 1821    ibuprofen tablet 400 mg, 400 mg, Oral, Q6H PRN, Belle Lira MD    levothyroxine tablet 50 mcg, 50 mcg, Oral, Before breakfast, Belle Lira MD, 50 mcg at 12/28/23 0508    melatonin tablet 6 mg, 6 mg, Oral, Nightly PRN, Belle Lira MD    multivitamin tablet, 1 tablet, Oral, Daily, Belle Lira MD, 1 tablet at 12/27/23 0824    ondansetron injection 4 mg, 4 mg, Intravenous, Q4H PRN, Belle Lira MD    polyethylene glycol packet 17 g, 17 g, Oral, BID PRN, Belle Lira MD    prochlorperazine injection Soln 5 mg, 5 mg, Intravenous, Q6H PRN, Belle Lira MD    senna-docusate 8.6-50 mg per tablet 2 tablet, 2 tablet, Oral, BID PRN, Belle Lira MD    sodium chloride 0.9% flush 10 mL, 10 mL, Intravenous, PRN, Belle Lira MD    vancomycin (VANCOCIN) 1,750 mg in dextrose 5 % (D5W) 500 mL IVPB, 1,750 mg, Intravenous, Q12H, Elena Fragoso, DO    Pharmacy to dose Vancomycin consult, , , Once **AND** vancomycin - pharmacy to dose, , Intravenous, pharmacy to manage frequency, PankajBelle MD  Review of Systems   Constitutional:  Negative for chills and fever.   HENT:  Negative for congestion, ear discharge, ear pain, facial swelling, mouth sores, postnasal drip, rhinorrhea, sinus pressure, sinus pain, sneezing, sore throat and trouble swallowing.    Eyes:   Negative for discharge, redness and itching.   Respiratory:  Negative for cough, chest tightness, shortness of breath and wheezing.    Cardiovascular:  Negative for chest pain, palpitations and leg swelling.   Gastrointestinal:  Negative for abdominal distention, abdominal pain, diarrhea, nausea and vomiting.   Genitourinary:  Negative for dysuria, flank pain, frequency and urgency.   Musculoskeletal:  Negative for back pain, myalgias and neck stiffness.   Skin:  Negative for rash and wound.   Allergic/Immunologic: Negative for immunocompromised state.   Neurological:  Negative for dizziness, light-headedness and headaches.   Hematological:  Negative for adenopathy.   Psychiatric/Behavioral:  Negative for agitation, confusion and suicidal ideas. The patient is not nervous/anxious.        Objective:   Temp:  [98 °F (36.7 °C)-99.9 °F (37.7 °C)] 99 °F (37.2 °C)  Pulse:  [53-65] 60  Resp:  [18] 18  SpO2:  [93 %-97 %] 93 %  BP: (124-154)/(64-77) 154/74     Physical Exam  Constitutional:       Appearance: Normal appearance. He is well-developed.   HENT:      Head: Normocephalic.      Nose: Nose normal.      Mouth/Throat:      Pharynx: No oropharyngeal exudate.   Eyes:      General: Lids are normal. No scleral icterus.        Right eye: No discharge.      Conjunctiva/sclera: Conjunctivae normal.      Pupils: Pupils are equal, round, and reactive to light.   Neck:      Thyroid: No thyromegaly.      Vascular: No JVD.      Trachea: Trachea normal.   Cardiovascular:      Rate and Rhythm: Normal rate and regular rhythm.      Pulses: Normal pulses.      Heart sounds: Normal heart sounds. No murmur heard.     No friction rub.   Pulmonary:      Effort: Pulmonary effort is normal. No respiratory distress.      Breath sounds: Normal breath sounds. No wheezing.   Chest:      Chest wall: No tenderness.   Abdominal:      General: Bowel sounds are normal. There is no distension.      Palpations: Abdomen is soft.      Tenderness: There  is no abdominal tenderness. There is no guarding or rebound.   Musculoskeletal:         General: No tenderness. Normal range of motion.      Cervical back: Full passive range of motion without pain, normal range of motion and neck supple.   Lymphadenopathy:      Cervical: No cervical adenopathy.   Skin:     General: Skin is warm and dry.      Findings: No rash.   Neurological:      Mental Status: He is alert and oriented to person, place, and time.      Cranial Nerves: No cranial nerve deficit.      Sensory: No sensory deficit.   Psychiatric:         Speech: Speech normal.         Behavior: Behavior normal.         Thought Content: Thought content normal.         Judgment: Judgment normal.         Estimated Creatinine Clearance: 110.2 mL/min (based on SCr of 0.87 mg/dL).  Recent Labs   Lab 12/28/23  0430   WBC 9.46          Microbiology Results (last 7 days)       Procedure Component Value Units Date/Time    Blood Culture [[id number]]  (Normal) Collected: 12/25/23 0538    Order Status: Completed Specimen: Blood Updated: 12/27/23 1001     CULTURE, BLOOD ([facility]) No Growth At 48 Hours    Blood Culture [[id number]]  (Normal) Collected: 12/25/23 0538    Order Status: Completed Specimen: Blood Updated: 12/27/23 1001     CULTURE, BLOOD ([facility]) No Growth At 48 Hours    Blood culture #2 **CANNOT BE ORDERED STAT** [[id number]]  (Abnormal) Collected: 12/23/23 1657    Order Status: Completed Specimen: Blood Updated: 12/25/23 1111     CULTURE, BLOOD ([facility]) Aerococcus urinae     Comment: Susceptibility sent to reference lab. Results to follow on separate report.        GRAM STAIN Gram Positive Cocci, probable Staphylococcus      Seen in gram stain of broth only      2 of 2 bottles positive    Narrative:      For sensitivity results refer to [id number].        Blood culture #1 **CANNOT BE ORDERED STAT** [[id number]]  (Abnormal) Collected: 12/23/23 1657    Order Status: Completed Specimen: Blood Updated: 12/25/23 1111      CULTURE, BLOOD ([facility]) Aerococcus urinae     Comment: Susceptibility sent to reference lab. Results to follow on separate report.        GRAM STAIN Gram Positive Cocci, probable Staphylococcus      Seen in gram stain of broth only      2 of 2 bottles positive    Narrative:      For sensitivity results refer to [id number].        BCID2 Panel [[id number]]  (Normal) Collected: 12/23/23 1652    Order Status: Completed Specimen: Blood Updated: 12/24/23 1352     CTX-M (ESBL ) N/A     IMP (Cabapenemase ) N/A     KPC resistance gene (Carbapenemase ) N/A     mcr-1 N/A     mecA ID N/A     Comment: Note: Antimicrobial resistance can occur via multiple mechanisms. A Not Detected result for antimicrobial resistance gene(s) does not indicate antimicrobial susceptibility. Subculturing is required for species identification and susceptibility testing of   isolates.        mecA/C and MREJ (MRSA) gene N/A     NDM (Carbapenemase ) N/A     OXA-48-like (Carbapenemase ) N/A     Leah/B (VRE gene) N/A     VIM (Carbapenemase ) N/A     Enterococcus faecalis Not Detected     Enterococcus faecium Not Detected     Listeria monocytogenes Not Detected     Staphylococcus spp. Not Detected     Staphylococcus aureus Not Detected     Staphylococcus epidermidis Not Detected     Staphylococcus lugdunensis Not Detected     Streptococcus spp. Not Detected     Streptococcus agalactiae (Group B) Not Detected     Streptococcus pneumoniae Not Detected     Streptococcus pyogenes (Group A) Not Detected     Acinetobacter calcoaceticus/baumannii complex Not Detected     Bacteroides fragilis Not Detected     Enterobacterales Not Detected     Enterobacter cloacae complex Not Detected     Escherichia coli Not Detected     Klebsiella aerogenes Not Detected     Klebsiella oxytoca Not Detected     Klebsiella pneumoniae group Not Detected     Proteus spp. Not Detected     Salmonella spp. Not Detected     Serratia  marcescens Not Detected     Haemophilus influenzae Not Detected     Neisseria meningitidis Not Detected     Pseudomonas aeruginosa Not Detected     Stenotrophomonas maltophilia Not Detected     Candida albicans Not Detected     Candida auris Not Detected     Candida glabrata Not Detected     Candida krusei Not Detected     Candida parapsilosis Not Detected     Candida tropicalis Not Detected     Cryptococcus neoformans/gattii Not Detected    Narrative:      The BiOWiSH BCID2 Panel is a multiplexed nucleic acid test intended for the use with [employer]® 2.0 or Lendsquare® [employer] Systems for the simultaneous qualitative detection and identification of multiple bacterial and yeast nucleic acids and select genetic determinants associated with antimicrobial resistance.  The [employer] BCID2 Panel test is performed directly on blood culture samples identified as positive by a continuous monitoring blood culture system.  Results are intended to be interpreted in conjunction with Gram stain results.            Significant Labs: All pertinent labs within the past 24 hours have been reviewed.    Significant Imaging: I have reviewed all relevant and available imaging results/findings within the past 24 hours.      Plan -- see top of note

## 2023-12-29 PROCEDURE — 21400001 HC TELEMETRY ROOM

## 2023-12-29 PROCEDURE — 87040 BLOOD CULTURE FOR BACTERIA: CPT | Performed by: STUDENT IN AN ORGANIZED HEALTH CARE EDUCATION/TRAINING PROGRAM

## 2023-12-29 PROCEDURE — 99233 SBSQ HOSP IP/OBS HIGH 50: CPT | Mod: ,,, | Performed by: HOSPITALIST

## 2023-12-29 PROCEDURE — 63600175 PHARM REV CODE 636 W HCPCS: Performed by: INTERNAL MEDICINE

## 2023-12-29 PROCEDURE — 27000207 HC ISOLATION

## 2023-12-29 PROCEDURE — 25000003 PHARM REV CODE 250: Performed by: INTERNAL MEDICINE

## 2023-12-29 RX ADMIN — ENOXAPARIN SODIUM 40 MG: 40 INJECTION SUBCUTANEOUS at 06:12

## 2023-12-29 RX ADMIN — CEFTRIAXONE SODIUM 2 G: 2 INJECTION, POWDER, FOR SOLUTION INTRAMUSCULAR; INTRAVENOUS at 01:12

## 2023-12-29 RX ADMIN — LEVOTHYROXINE SODIUM 50 MCG: 50 TABLET ORAL at 06:12

## 2023-12-29 RX ADMIN — THERA TABS 1 TABLET: TAB at 08:12

## 2023-12-29 NOTE — PHYSICIAN QUERY
"PT Name: Arturo Ortiz  MR #: [id number]    DOCUMENTATION CLARIFICATION     CDS/: Sophia Thompson RN CDI           Contact information: [email or web address]  This form is a permanent document in the medical record.    Query Date: December 29, 2023      By submitting this query, we are merely seeking further clarification of documentation.  Please utilize your independent clinical judgment when addressing the question(s) below.    The Medical Record reflects the following:    Clinical Information Location in Medical Record   Sepsis  - present on admission   Bacteremia - in setting of recurrent UTI  QUENTIN 12/28  -   Aortic Valve: The aortic valve is a trileaflet valve. There is a small mobile echogenic irregular mass present on the noncoronary cusp consistent with vegetation.   COVID  + PCR - on remdesivir    Bacteremia with  endocarditis   - QUENTIN (12.27.23): aortic valve is a trileaflet valve. There is a small mobile echogenic irregular mass present on the noncoronary cusp consistent with vegetation.  Sepsis    - Present on admit  - gram positive cocci bacteremia   COVID 19   - + PCR on 12.20.23  - On remdesivi    12/23  103.1   91   21   122/71   94  12/24  99.2     64   18   130/68   94  12/27  99        60   18   154/74   93  12/29  98.3     56   18   130/71   94    Wbc  12/20   16.96  12/23   9.70  12/26   9.64  12/28   9.46     CRP 12/23  149.50    Lactic  12/23 1.2    Cultures 12/20 2/2, 12/23 2/2 with Aerococcus urinae                 12/25 Staph 1/2 ID 12/28 PARMINDER Read MD            Cardiology 12/28 FRIEDA Magallanes NP            Vitals                               Lab         Please clarify/confirm the Consultants diagnosis of  "Sepsis":     [ x ] Diagnosis ruled in   [  ] Diagnosis ruled out   [  ] Other diagnosis (please specify): _____________________________   [  ] Clinically undetermined             "

## 2023-12-29 NOTE — PROGRESS NOTES
Infectious Disease        Patient ID: Arturo Ortiz  68 y.o. male.    Chief Complaint: Fever (Seen here X 3 days ago for fever and generalized weakness. Discharged home but called to come back to ER for positive Blood cultures. States still running fever at home. States took 1 g tylenol approx 1:30 PM. States no new symptoms since last visit. )    Interval HPI:    12/24 - afebrile. Admitted with COVID and bacteremia. Repeat cx in process. TTE pending, if negative may need QUENTIN.  High suspicion for endocarditis, +stigmata. Okay to continue dual therapy for now pending further data.    12/25 - fever curve improving. Tolerating abx. TTE negative, plan for QUENTIN  12/26 - BCx prelim negative. QUENTIN pending. Isolate also at ref lab for susceptibility testing   12/27 - afebrile rechecked for COVID and negative, QUENTIN today. Will follow up once results back from ref lab.   12/28 - QUENTIN with small AV vegetation. Will need extended course. Awaiting ref lab data for final regimen, will follow up when available. wiill order PICC once final regimen determined.   12.29 - afebrile. Set from 12/25 + 1/4 indicative of slow clearance. Repeat now to establish full clearance. Isolate is susceptible to ceftriaxone, vanco, PCN. Streamline to ceftriaxone. Once blood cx negative for 72 hrs, will place PICC and plan for discharge. Will follow up on Tuesday  Assessment and Plan:   1) Sepsis  - present on admission   - FLU negative  - rapid Strep negative  - COVID +  - gram positive bacteremia  - UA 12/21 - WBC 6-10  - UA 12/23  LE, 0-5 WBC  - s/p pip/tazo x 1  - currently on ceftriaxone     2) Bacteremia  - in setting of recurrent UTI  - Prosthetics: right THR  - BCx 12/20 - GPC both sets, prelim BCID negative, Aerococcus urinae [S-PCN, ceftriaxone, vanco)  - BCx12/23 - GPC  - BCx 12/25 -1/4 +   - repeat BCx 12/29 - in process  - TTE 12/23 - EF 55-60%, valves grossly normal  - QUENTIN 12/28  -   Aortic Valve: The aortic valve is a  trileaflet valve. There is a small mobile echogenic irregular mass present on the noncoronary cusp consistent with vegetation.  - PSA 0.36--> 0.83  - stigmata: +Osler's node 4th left digit  - currently on vancomycin, goal 15-20,Rx to dose  - currently on ceftriaxone  -  ceftriaxone 2 g qdaily IVP for 6 weeks from 12/29 to 2/9  - weekly labs:  CBC, CMP, CRP, ESR.  Fax results to ID team  at [phone]  - weekly PICC care and as needed   - remove PICC on 2/9 after last dose  - Follow up with ID in 3 - 4 weeks        3) COVID  - + PCR  - on remdesivir  - CXR 12/20 - No acute pulmonary process appreciated   - CXR 12/23 - no acute process  - CTA chest - no PE. Mild right lower lobe atelectasis.      Discussed with patient  and family at bedside 12/29  Discussed and seen with PERLITA Read MD, MPH  [facility]     Thank you for this consultation. I will follow up with the patient. Please contact via Epic secure chat with any questions.         HPI:   Patient is Arturo Ortiz a 68 y.o. male admitted on 12/23 for one month of fevers, night sweats,myalias, chills. Patient went to PCP and was prescribed oral antibiotics on 11/03/23 but failed to improve. He developed dysuria and frequency and was prescribe a course of antimicrobials for UTI on 11/22 and again on 12/13. Patient reports continued fevers of 103F at home.He presented to ED on 12/20 and tested + for COVID. BLood cx drawn at that time subsequently turned + and patient was admitted. Prelim isolate is Aerococcus spp. Patient on empiric coverage. Upon evaluation he as febrile, with elevated inflammatory markers. Patient has known DMII, hypertension, OA s/p recent right THR on 5/18/2021, hypercholesterolemia, hypothyroidism. Infectious diseases consulted for evaluation and management.     Past Medical History:   Diagnosis Date    Avascular necrosis of right femoral head     DM (diabetes mellitus)     Essential (primary)  hypertension     Hypercholesteremia     Macrocytosis     Osteoarthritis of right hip     Osteoarthritis of right knee     Peripheral neuropathy      Past Surgical History:   Procedure Laterality Date    TOTAL HIP ARTHROPLASTY Right 05/18/2021     Review of patient's allergies indicates:  No Known Allergies  Current Outpatient Medications   Medication Instructions    levothyroxine (SYNTHROID) 50 mcg, Oral, Before breakfast    molnupiravir 800 mg, Oral, Every 12 hours    multivitamin (ONE DAILY MULTIVITAMIN) per tablet 1 tablet, Oral, Daily    phenazopyridine (PYRIDIUM) 200 mg, Oral, 3 times daily PRN       Current Facility-Administered Medications:     acetaminophen tablet 1,000 mg, 1,000 mg, Oral, Q6H PRN, Belle Lira MD    acetaminophen tablet 650 mg, 650 mg, Oral, Q4H PRN, Belle Lira MD    aluminum-magnesium hydroxide-simethicone 200-200-20 mg/5 mL suspension 30 mL, 30 mL, Oral, QID PRN, Belle Lira MD    cefTRIAXone (ROCEPHIN) 2 g in dextrose 5 % in water (D5W) 100 mL IVPB (MB+), 2 g, Intravenous, Q24H, Belle Lira MD, Stopped at 12/29/23 0139    enoxaparin injection 40 mg, 40 mg, Subcutaneous, Daily, Belle Lira MD, 40 mg at 12/28/23 1740    ibuprofen tablet 400 mg, 400 mg, Oral, Q6H PRN, Belle Lira MD    levothyroxine tablet 50 mcg, 50 mcg, Oral, Before breakfast, Belle Lira MD, 50 mcg at 12/29/23 0626    melatonin tablet 6 mg, 6 mg, Oral, Nightly PRN, Belle Lira MD    multivitamin tablet, 1 tablet, Oral, Daily, Belle Lira MD, 1 tablet at 12/29/23 0859    ondansetron injection 4 mg, 4 mg, Intravenous, Q4H PRN, Belle Lira MD    polyethylene glycol packet 17 g, 17 g, Oral, BID PRN, Belle Lira MD    prochlorperazine injection Soln 5 mg, 5 mg, Intravenous, Q6H PRN, Blele Lira MD    senna-docusate 8.6-50 mg per tablet 2 tablet, 2 tablet, Oral, BID PRN, PankajBelle bonilla MD    sodium chloride 0.9% flush 10 mL, 10 mL, Intravenous, PRN, PankajBelle bonilla MD  Review of Systems   Constitutional:   Negative for chills and fever.   HENT:  Negative for congestion, ear discharge, ear pain, facial swelling, mouth sores, postnasal drip, rhinorrhea, sinus pressure, sinus pain, sneezing, sore throat and trouble swallowing.    Eyes:  Negative for discharge, redness and itching.   Respiratory:  Negative for cough, chest tightness, shortness of breath and wheezing.    Cardiovascular:  Negative for chest pain, palpitations and leg swelling.   Gastrointestinal:  Negative for abdominal distention, abdominal pain, diarrhea, nausea and vomiting.   Genitourinary:  Negative for dysuria, flank pain, frequency and urgency.   Musculoskeletal:  Negative for back pain, myalgias and neck stiffness.   Skin:  Negative for rash and wound.   Allergic/Immunologic: Negative for immunocompromised state.   Neurological:  Negative for dizziness, light-headedness and headaches.   Hematological:  Negative for adenopathy.   Psychiatric/Behavioral:  Negative for agitation, confusion and suicidal ideas. The patient is not nervous/anxious.        Objective:   Temp:  [98 °F (36.7 °C)-98.9 °F (37.2 °C)] 98.3 °F (36.8 °C)  Pulse:  [54-64] 64  SpO2:  [94 %-96 %] 96 %  BP: (114-158)/(56-81) 114/62     Physical Exam  Constitutional:       Appearance: Normal appearance. He is well-developed.   HENT:      Head: Normocephalic.      Nose: Nose normal.      Mouth/Throat:      Pharynx: No oropharyngeal exudate.   Eyes:      General: Lids are normal. No scleral icterus.        Right eye: No discharge.      Conjunctiva/sclera: Conjunctivae normal.      Pupils: Pupils are equal, round, and reactive to light.   Neck:      Thyroid: No thyromegaly.      Vascular: No JVD.      Trachea: Trachea normal.   Cardiovascular:      Rate and Rhythm: Normal rate and regular rhythm.      Pulses: Normal pulses.      Heart sounds: Normal heart sounds. No murmur heard.     No friction rub.   Pulmonary:      Effort: Pulmonary effort is normal. No respiratory distress.      Breath  sounds: Normal breath sounds. No wheezing.   Chest:      Chest wall: No tenderness.   Abdominal:      General: Bowel sounds are normal. There is no distension.      Palpations: Abdomen is soft.      Tenderness: There is no abdominal tenderness. There is no guarding or rebound.   Musculoskeletal:         General: No tenderness. Normal range of motion.      Cervical back: Full passive range of motion without pain, normal range of motion and neck supple.   Lymphadenopathy:      Cervical: No cervical adenopathy.   Skin:     General: Skin is warm and dry.      Findings: No rash.   Neurological:      Mental Status: He is alert and oriented to person, place, and time.      Cranial Nerves: No cranial nerve deficit.      Sensory: No sensory deficit.   Psychiatric:         Speech: Speech normal.         Behavior: Behavior normal.         Thought Content: Thought content normal.         Judgment: Judgment normal.         Estimated Creatinine Clearance: 110.2 mL/min (based on SCr of 0.87 mg/dL).  Recent Labs   Lab 12/28/23  0430   WBC 9.46          Microbiology Results (last 7 days)       Procedure Component Value Units Date/Time    Blood Culture [[id number]] Collected: 12/29/23 1105    Order Status: Resulted Specimen: Blood Updated: 12/29/23 1107    Blood Culture [[id number]] Collected: 12/29/23 1105    Order Status: Resulted Specimen: Blood Updated: 12/29/23 1106    Blood Culture [[id number]]  (Normal) Collected: 12/25/23 0538    Order Status: Completed Specimen: Blood Updated: 12/29/23 1001     CULTURE, BLOOD ([facility]) No Growth At 96 Hours    Blood Culture [[id number]]  (Abnormal) Collected: 12/25/23 0538    Order Status: Completed Specimen: Blood Updated: 12/29/23 0239     CULTURE, BLOOD ([facility]) No Growth At 72 Hours     GRAM STAIN Gram Positive Cocci, probable Staphylococcus      Seen in gram stain of broth only      1 of 2 Aerobic bottles positive    Blood culture #2 **CANNOT BE ORDERED STAT** [[id number]]  (Abnormal)  Collected: 12/23/23 1657    Order Status: Completed Specimen: Blood Updated: 12/25/23 1111     CULTURE, BLOOD ([facility]) Aerococcus urinae     Comment: Susceptibility sent to reference lab. Results to follow on separate report.        GRAM STAIN Gram Positive Cocci, probable Staphylococcus      Seen in gram stain of broth only      2 of 2 bottles positive    Narrative:      For sensitivity results refer to [address] [place], [place] [id number].        Blood culture #1 **CANNOT BE ORDERED STAT** [[id number]]  (Abnormal) Collected: 12/23/23 1657    Order Status: Completed Specimen: Blood Updated: 12/25/23 1111     CULTURE, BLOOD ([facility]) Aerococcus urinae     Comment: Susceptibility sent to reference lab. Results to follow on separate report.        GRAM STAIN Gram Positive Cocci, probable Staphylococcus      Seen in gram stain of broth only      2 of 2 bottles positive    Narrative:      For sensitivity results refer to [id number].        BCID2 Panel [[id number]]  (Normal) Collected: 12/23/23 1657    Order Status: Completed Specimen: Blood Updated: 12/24/23 1352     CTX-M (ESBL ) N/A     IMP (Cabapenemase ) N/A     KPC resistance gene (Carbapenemase ) N/A     mcr-1 N/A     mecA ID N/A     Comment: Note: Antimicrobial resistance can occur via multiple mechanisms. A Not Detected result for antimicrobial resistance gene(s) does not indicate antimicrobial susceptibility. Subculturing is required for species identification and susceptibility testing of   isolates.        mecA/C and MREJ (MRSA) gene N/A     NDM (Carbapenemase ) N/A     OXA-48-like (Carbapenemase ) N/A     Leah/B (VRE gene) N/A     VIM (Carbapenemase ) N/A     Enterococcus faecalis Not Detected     Enterococcus faecium Not Detected     Listeria monocytogenes Not Detected     Staphylococcus spp. Not Detected     Staphylococcus aureus Not Detected     Staphylococcus epidermidis Not Detected     Staphylococcus lugdunensis Not Detected      Streptococcus spp. Not Detected     Streptococcus agalactiae (Group B) Not Detected     Streptococcus pneumoniae Not Detected     Streptococcus pyogenes (Group A) Not Detected     Acinetobacter calcoaceticus/baumannii complex Not Detected     Bacteroides fragilis Not Detected     Enterobacterales Not Detected     Enterobacter cloacae complex Not Detected     Escherichia coli Not Detected     Klebsiella aerogenes Not Detected     Klebsiella oxytoca Not Detected     Klebsiella pneumoniae group Not Detected     Proteus spp. Not Detected     Salmonella spp. Not Detected     Serratia marcescens Not Detected     Haemophilus influenzae Not Detected     Neisseria meningitidis Not Detected     Pseudomonas aeruginosa Not Detected     Stenotrophomonas maltophilia Not Detected     Candida albicans Not Detected     Candida auris Not Detected     Candida glabrata Not Detected     Candida krusei Not Detected     Candida parapsilosis Not Detected     Candida tropicalis Not Detected     Cryptococcus neoformans/gattii Not Detected    Narrative:      The [employer] BCID2 Panel is a multiplexed nucleic acid test intended for the use with [employer]® 2.0 or Posmetrics® Endgame Systems for the simultaneous qualitative detection and identification of multiple bacterial and yeast nucleic acids and select genetic determinants associated with antimicrobial resistance.  The [employer] BCID2 Panel test is performed directly on blood culture samples identified as positive by a continuous monitoring blood culture system.  Results are intended to be interpreted in conjunction with Gram stain results.            Significant Labs: All pertinent labs within the past 24 hours have been reviewed.    Significant Imaging: I have reviewed all relevant and available imaging results/findings within the past 24 hours.      Plan -- see top of note

## 2023-12-29 NOTE — PROGRESS NOTES
Inpatient Nutrition Evaluation    Admit Date: 12/23/2023   Total duration of encounter: 6 days   Patient Age: 68 y.o.    Nutrition Recommendation/Prescription     Continue heart healthy diet as tolerated. Monitor need for diabetic modifier   Continue MVI as medically feasible  RD to monitor po intake and weight    Nutrition Assessment     Chart Review    Reason Seen: length of stay    Malnutrition Screening Tool Results   Have you recently lost weight without trying?: No  Have you been eating poorly because of a decreased appetite?: Yes   MST Score: 1   Diagnosis:  Endocarditis  Aerococcus urinae bacteremia, present on admission, in the setting of recurrent UTI  COVID-19, present on admission     Relevant Medical History: osteoarthritis/ right hip replacement, DM 2, HTN, HLD    Scheduled Medications:  cefTRIAXone (ROCEPHIN) IVPB, 2 g, Q24H  enoxparin, 40 mg, Daily  levothyroxine, 50 mcg, Before breakfast  multivitamin, 1 tablet, Daily    Continuous Infusions:   PRN Medications: acetaminophen, acetaminophen, aluminum-magnesium hydroxide-simethicone, ibuprofen, melatonin, ondansetron, polyethylene glycol, prochlorperazine, senna-docusate 8.6-50 mg, sodium chloride 0.9%    Recent Labs   Lab 12/23/23  1643 12/23/23  2343 12/24/23  0332 12/26/23  0536 12/28/23  0430     --  139 138 140   K 4.3  --  4.1 4.3 4.3   CALCIUM 7.9*  --  8.0* 7.9* 8.0*   PHOS  --   --  3.4  --   --    MG  --   --  2.10  --   --    CHLORIDE 105  --  106 107 108*   CO2 22*  --  21* 24 24   BUN 18.2  --  14.4 11.5 11.5   CREATININE 0.98  --  0.85 0.78 0.87   EGFRNORACEVR >60  --  >60 >60 >60   GLUCOSE 125*  --  110 105 98   BILITOT 0.5  --  0.4 0.3 0.5   ALKPHOS 57  --  57 45 50   ALT 67*  --  60* 70* 72*   AST 48*  --  46* 50* 48*   ALBUMIN 2.7*  --  2.5* 2.4* 2.6*   CRP  --  149.60*  --   --   --    WBC 9.70  --  9.47 9.64 9.46   HGB 11.9*  --  11.8* 11.0* 11.3*   HCT 35.2*  --  34.4* 32.3* 33.3*     Nutrition Orders:  Diet heart  "healthy      Appetite/Oral Intake: good/% of meals  Factors Affecting Nutritional Intake: none identified  Food/Presybeterian/Cultural Preferences: none reported  Food Allergies: no known food allergies  Last Bowel Movement: 23  Wound(s):      Comments    : Pt reports good appetite, denies GI complaints. Appetite prior admission was decreased x 1 week. -270# and denies unintentional weight loss prior admission. Bed weight today of 267#. Weight appears stable per EMR weights. Pt well nourished per NFPE.    Anthropometrics    Height: 5' 10.98" (180.3 cm),    Last Weight: 121.3 kg (267 lb 6.4 oz) (23 1604), Weight Method: Bed Scale  BMI (Calculated): 37.3  BMI Classification: obese grade II (BMI 35-39.9)        Ideal Body Weight (IBW), Male: 171.88 lb     % Ideal Body Weight, Male (lb): 155.57 %                 Usual Body Weight (UBW), k.2 kg  % Usual Body Weight: 101.12  % Weight Change From Usual Weight: 0.91 %  Usual Weight Provided By: patient, EMR weight history, and patient denies unintentional weight loss    Wt Readings from Last 5 Encounters:   23 121.3 kg (267 lb 6.4 oz)   23 120.7 kg (266 lb)   23 120.2 kg (265 lb)   23 122 kg (269 lb)   23 121.6 kg (268 lb)     Weight Change(s) Since Admission:   Wt Readings from Last 1 Encounters:   23 1604 121.3 kg (267 lb 6.4 oz)   23 1431 120.2 kg (265 lb)   23 1603 120.2 kg (265 lb)   Admit Weight: 120.2 kg (265 lb) (23 1603), Weight Method: Bed Scale    Patient Education     Not applicable.    Nutrition Goals & Monitoring     Dietitian will monitor: food and beverage intake, weight, electrolyte/renal panel, and glucose/endocrine profile    Nutrition Risk/Follow-Up: low (follow-up in 5-7 days)  Patients assigned 'low nutrition risk' status do not qualify for a full nutritional assessment but will be monitored and re-evaluated in a 5-7 day time period. Please consult if re-evaluation " needed sooner.

## 2023-12-30 LAB
ALBUMIN SERPL-MCNC: 2.6 G/DL (ref 3.4–4.8)
ALBUMIN/GLOB SERPL: 0.7 RATIO (ref 1.1–2)
ALP SERPL-CCNC: 48 UNIT/L (ref 40–150)
ALT SERPL-CCNC: 52 UNIT/L (ref 0–55)
AST SERPL-CCNC: 26 UNIT/L (ref 5–34)
BACTERIA BLD CULT: ABNORMAL
BACTERIA BLD CULT: NORMAL
BASOPHILS # BLD AUTO: 0.04 X10(3)/MCL
BASOPHILS NFR BLD AUTO: 0.4 %
BILIRUB SERPL-MCNC: 0.4 MG/DL
BUN SERPL-MCNC: 12.2 MG/DL (ref 8.4–25.7)
CALCIUM SERPL-MCNC: 8 MG/DL (ref 8.8–10)
CHLORIDE SERPL-SCNC: 107 MMOL/L (ref 98–107)
CO2 SERPL-SCNC: 24 MMOL/L (ref 23–31)
CREAT SERPL-MCNC: 0.89 MG/DL (ref 0.73–1.18)
EOSINOPHIL # BLD AUTO: 0.14 X10(3)/MCL (ref 0–0.9)
EOSINOPHIL NFR BLD AUTO: 1.4 %
ERYTHROCYTE [DISTWIDTH] IN BLOOD BY AUTOMATED COUNT: 14.3 % (ref 11.5–17)
GFR SERPLBLD CREATININE-BSD FMLA CKD-EPI: >60 MLS/MIN/1.73/M2
GLOBULIN SER-MCNC: 3.7 GM/DL (ref 2.4–3.5)
GLUCOSE SERPL-MCNC: 97 MG/DL (ref 82–115)
GRAM STN SPEC: ABNORMAL
HCT VFR BLD AUTO: 32.3 % (ref 42–52)
HGB BLD-MCNC: 10.8 G/DL (ref 14–18)
IMM GRANULOCYTES # BLD AUTO: 0.1 X10(3)/MCL (ref 0–0.04)
IMM GRANULOCYTES NFR BLD AUTO: 1 %
LYMPHOCYTES # BLD AUTO: 2.12 X10(3)/MCL (ref 0.6–4.6)
LYMPHOCYTES NFR BLD AUTO: 21.1 %
MCH RBC QN AUTO: 31.8 PG (ref 27–31)
MCHC RBC AUTO-ENTMCNC: 33.4 G/DL (ref 33–36)
MCV RBC AUTO: 95 FL (ref 80–94)
MONOCYTES # BLD AUTO: 0.98 X10(3)/MCL (ref 0.1–1.3)
MONOCYTES NFR BLD AUTO: 9.7 %
NEUTROPHILS # BLD AUTO: 6.68 X10(3)/MCL (ref 2.1–9.2)
NEUTROPHILS NFR BLD AUTO: 66.4 %
NRBC BLD AUTO-RTO: 0 %
PLATELET # BLD AUTO: 302 X10(3)/MCL (ref 130–400)
PMV BLD AUTO: 9 FL (ref 7.4–10.4)
POTASSIUM SERPL-SCNC: 4.3 MMOL/L (ref 3.5–5.1)
PROT SERPL-MCNC: 6.3 GM/DL (ref 5.8–7.6)
RBC # BLD AUTO: 3.4 X10(6)/MCL (ref 4.7–6.1)
SODIUM SERPL-SCNC: 138 MMOL/L (ref 136–145)
WBC # SPEC AUTO: 10.06 X10(3)/MCL (ref 4.5–11.5)

## 2023-12-30 PROCEDURE — 63600175 PHARM REV CODE 636 W HCPCS: Performed by: INTERNAL MEDICINE

## 2023-12-30 PROCEDURE — 25000003 PHARM REV CODE 250: Performed by: INTERNAL MEDICINE

## 2023-12-30 PROCEDURE — 80053 COMPREHEN METABOLIC PANEL: CPT | Performed by: STUDENT IN AN ORGANIZED HEALTH CARE EDUCATION/TRAINING PROGRAM

## 2023-12-30 PROCEDURE — 21400001 HC TELEMETRY ROOM

## 2023-12-30 PROCEDURE — 85025 COMPLETE CBC W/AUTO DIFF WBC: CPT | Performed by: STUDENT IN AN ORGANIZED HEALTH CARE EDUCATION/TRAINING PROGRAM

## 2023-12-30 RX ADMIN — THERA TABS 1 TABLET: TAB at 08:12

## 2023-12-30 RX ADMIN — RIVAROXABAN 10 MG: 10 TABLET, FILM COATED ORAL at 06:12

## 2023-12-30 RX ADMIN — CEFTRIAXONE SODIUM 2 G: 2 INJECTION, POWDER, FOR SOLUTION INTRAMUSCULAR; INTRAVENOUS at 12:12

## 2023-12-30 RX ADMIN — LEVOTHYROXINE SODIUM 50 MCG: 50 TABLET ORAL at 05:12

## 2023-12-30 NOTE — PROGRESS NOTES
[facility] - 9th Floor [facility] MEDICINE ~ PROGRESS NOTE        CHIEF COMPLAINT   Hospital follow up    HOSPITAL COURSE   68-year-old male with medical history of obesity BMI 37, osteoarthritis/right hip replacement, T2DM, HTN and HLD all lifestyle managed present to the ED with complaint of fever and positive blood cultures.      Patient reports symptoms started about 1 month ago with fever, chills, rigors on daily basis associated with drenching night sweats and myalgia.  He was initially seen at his PCP office and was prescribed cefuroxime on 11/03/2023 for 10 days but for unclear etiology of fever, fever did not resolve and subsequently on 11/22/2023 he started having burning urination and increased frequency and was diagnosed with UTI and prescribed doxycycline for 10 days and subsequently another 7 day course of ciprofloxacin on 12/13/2023.  He completed antibiotic course and continued to have daily high-grade fever up to 103F. He presented to the ED on 12/20/2023 for fever evaluation as well as runny nose and tested positive for COVID-19 subsequently followed up with his PCP and was prescribed molnupiravir.  He was called to return to the ED for positive blood cultures 4 of 4  bottles growing Gram-positive cocci with speciation Aerococcus urinae (susceptibility sent to reference lab and pending).     On arrival to ED he was febrile 103.1 normotensive and saturating 99% on room air.  Labs notable for WBC 9.7, hemoglobin 11.9, platelets 153, creatinine 0.98, ESR 59, , ferritin 2927, D-dimer 2.37 urinalysis unremarkable.  CTA chest show no evidence of PE, no infiltrate or consolidation, there is trace/minimal pleural effusions bilaterally.     He was given Zosyn and vancomycin and referred to hospital medicine service for further evaluation and management.  QUENTIN is positive for mobile vegetation. Needs 6 weeks of abx. ID is following.      Patient is doing well no acute issues.    BCx1 is positive. Will need negative cultures before discharge.    Today  Seen examined this morning.  Doing well and had many questions about how things work as far as blood cultures and positive and negative and antibiotics.  He was concerned about not receiving antibiotics around the clock.  Discussed half-life and long-acting medications versus some short-acting.        OBJECTIVE/PHYSICAL EXAM     VITAL SIGNS (MOST RECENT):  Temp: 98.1 °F (36.7 °C) (12/30/23 1134)  Pulse: (!) 56 (12/30/23 1134)  Resp: 18 (12/29/23 2000)  BP: 130/74 (12/30/23 1134)  SpO2: 96 % (12/30/23 1134) VITAL SIGNS (24 HOUR RANGE):  Temp:  [98.1 °F (36.7 °C)-99.3 °F (37.4 °C)] 98.1 °F (36.7 °C)  Pulse:  [56-70] 56  Resp:  [18] 18  SpO2:  [94 %-97 %] 96 %  BP: (114-158)/(62-83) 130/74   GENERAL: In no acute distress, afebrile  HEENT:  CHEST: Clear to auscultation bilaterally  HEART: S1, S2, no appreciable murmur  ABDOMEN: Soft, nontender, BS +  MSK: Warm, no lower extremity edema, no clubbing or cyanosis  NEUROLOGIC: Alert and oriented x4, moving all extremities with good strength   INTEGUMENTARY:  PSYCHIATRY:        ASSESSMENT/PLAN   Infective aortic valve endocarditis  Recurrent acute complicated urinary tract infection/pyelonephritis right-sided  Aerococcus urinae bacteremia  Recent COVID-19 infection      Infectious Disease following.  6 weeks IV antibiotics.  Rocephin 2 g daily.  Monitor for 72 hours prior to placing PICC line.  Follow-up on repeat blood culture done yesterday.  Discontinue COVID-19 isolation, over 10 days out from test.    DVT prophylaxis:  Xarelto 10    Anticipated discharge and disposition:   __________________________________________________________________________    LABS/MICRO/MEDS/DIAGNOSTICS       LABS  Recent Labs     12/30/23  0455      K 4.3   CHLORIDE 107   CO2 24   BUN 12.2   CREATININE 0.89   GLUCOSE 97   CALCIUM 8.0*   ALKPHOS 48   AST 26   ALT 52   ALBUMIN 2.6*     Recent Labs     12/30/23  045    WBC 10.06   RBC 3.40*   HCT 32.3*   MCV 95.0*          MICROBIOLOGY  Microbiology Results (last 7 days)       Procedure Component Value Units Date/Time    Blood Culture [[id number]]  (Normal) Collected: 12/25/23 0538    Order Status: Completed Specimen: Blood Updated: 12/30/23 1001     CULTURE, BLOOD ([facility]) No Growth at 5 days    Blood Culture [[id number]] Collected: 12/29/23 1105    Order Status: Resulted Specimen: Blood Updated: 12/29/23 1107    Blood Culture [[id number]] Collected: 12/29/23 1105    Order Status: Resulted Specimen: Blood Updated: 12/29/23 1106    Blood Culture [[id number]]  (Abnormal) Collected: 12/25/23 0538    Order Status: Completed Specimen: Blood Updated: 12/29/23 0239     CULTURE, BLOOD ([facility]) No Growth At 72 Hours     GRAM STAIN Gram Positive Cocci, probable Staphylococcus      Seen in gram stain of broth only      1 of 2 Aerobic bottles positive    Blood culture #2 **CANNOT BE ORDERED STAT** [[id number]]  (Abnormal) Collected: 12/23/23 1657    Order Status: Completed Specimen: Blood Updated: 12/25/23 1111     CULTURE, BLOOD ([facility]) Aerococcus urinae     Comment: Susceptibility sent to reference lab. Results to follow on separate report.        GRAM STAIN Gram Positive Cocci, probable Staphylococcus      Seen in gram stain of broth only      2 of 2 bottles positive    Narrative:      For sensitivity results refer to [id number].        Blood culture #1 **CANNOT BE ORDERED STAT** [[id number]]  (Abnormal) Collected: 12/23/23 1657    Order Status: Completed Specimen: Blood Updated: 12/25/23 1111     CULTURE, BLOOD ([facility]) Aerococcus urinae     Comment: Susceptibility sent to reference lab. Results to follow on separate report.        GRAM STAIN Gram Positive Cocci, probable Staphylococcus      Seen in gram stain of broth only      2 of 2 bottles positive    Narrative:      For sensitivity results refer to [id number].        BCID2 Panel [[id number]]  (Normal) Collected: 12/23/23  1657    Order Status: Completed Specimen: Blood Updated: 12/24/23 1007     CTX-M (ESBL ) N/A     IMP (Cabapenemase ) N/A     KPC resistance gene (Carbapenemase ) N/A     mcr-1 N/A     mecA ID N/A     Comment: Note: Antimicrobial resistance can occur via multiple mechanisms. A Not Detected result for antimicrobial resistance gene(s) does not indicate antimicrobial susceptibility. Subculturing is required for species identification and susceptibility testing of   isolates.        mecA/C and MREJ (MRSA) gene N/A     NDM (Carbapenemase ) N/A     OXA-48-like (Carbapenemase ) N/A     Leah/B (VRE gene) N/A     VIM (Carbapenemase ) N/A     Enterococcus faecalis Not Detected     Enterococcus faecium Not Detected     Listeria monocytogenes Not Detected     Staphylococcus spp. Not Detected     Staphylococcus aureus Not Detected     Staphylococcus epidermidis Not Detected     Staphylococcus lugdunensis Not Detected     Streptococcus spp. Not Detected     Streptococcus agalactiae (Group B) Not Detected     Streptococcus pneumoniae Not Detected     Streptococcus pyogenes (Group A) Not Detected     Acinetobacter calcoaceticus/baumannii complex Not Detected     Bacteroides fragilis Not Detected     Enterobacterales Not Detected     Enterobacter cloacae complex Not Detected     Escherichia coli Not Detected     Klebsiella aerogenes Not Detected     Klebsiella oxytoca Not Detected     Klebsiella pneumoniae group Not Detected     Proteus spp. Not Detected     Salmonella spp. Not Detected     Serratia marcescens Not Detected     Haemophilus influenzae Not Detected     Neisseria meningitidis Not Detected     Pseudomonas aeruginosa Not Detected     Stenotrophomonas maltophilia Not Detected     Candida albicans Not Detected     Candida auris Not Detected     Candida glabrata Not Detected     Candida krusei Not Detected     Candida parapsilosis Not Detected     Candida tropicalis Not Detected      Cryptococcus neoformans/gattii Not Detected    Narrative:      The [employer] BCID2 Panel is a multiplexed nucleic acid test intended for the use with [employer]Streams® 2.0 or NetStreams® [employer] for the simultaneous qualitative detection and identification of multiple bacterial and yeast nucleic acids and select genetic determinants associated with antimicrobial resistance.  The [employer] BCID2 Panel test is performed directly on blood culture samples identified as positive by a continuous monitoring blood culture system.  Results are intended to be interpreted in conjunction with Gram stain results.               MEDICATIONS   cefTRIAXone (ROCEPHIN) IVPB  2 g Intravenous Q24H    enoxparin  40 mg Subcutaneous Daily    levothyroxine  50 mcg Oral Before breakfast    multivitamin  1 tablet Oral Daily         INFUSIONS         DIAGNOSTIC TESTS  CT Abdomen Pelvis With IV Contrast NO Oral Contrast   Final Result      1. Patchy cortical hypoenhancement at the lower pole right kidney may be related to pyelonephritis or scarring.  Correlate clinically.   2. Nonobstructing right nephrolithiasis   3. Elevated right hemidiaphragm with right basilar atelectasis   4. Trabeculated and thickened appearance of the urinary bladder.  There are prostate calcifications.  Appearance of the urinary bladder may be related to bladder wall hypertrophy and/or cystitis.         Electronically signed by: Ysabel Ventura   Date:    12/26/2023   Time:    19:47      CTA Chest Non-Coronary (PE Studies)   Final Result      No pulmonary embolism identified.      No significant discrepancy between my interpretation and the preliminary radiology report.         Electronically signed by: Bob Sheppard   Date:    12/24/2023   Time:    08:41      X-Ray Chest 1 View   Final Result      NO ACUTE CARDIOPULMONARY PROCESS IDENTIFIED.         Electronically signed by: Efe Bahena   Date:    12/23/2023   Time:    17:07           No results  "found for: "EF"       NUTRITION STATUS  Patient meets ASPEN criteria for other (see comments) (does not meet criteria) malnutrition of   per RD assessment as evidenced by:  Energy Intake (Malnutrition): other (see comments) (does not meet criteria)  Weight Loss (Malnutrition): other (see comments) (does not meet criteria)  Subcutaneous Fat (Malnutrition): other (see comments) (does not meet criteria)  Muscle Mass (Malnutrition): other (see comments) (does not meet criteria)           A minimum of two characteristics is recommended for diagnosis of either severe or non-severe malnutrition.       Case related differential diagnoses have been reviewed; assessment and plan has been documented. I have personally reviewed the labs and test results that are currently available; I have reviewed the patients medication list. I have reviewed the consulting providers recommendations. I have reviewed or attempted to review medical records based upon their availability.  All of the patient's and/or family's questions have been addressed and answered to the best of my ability.  I will continue to monitor closely and make adjustments to medical management as needed.  This document was created using M*Modal Fluency Direct.  Transcription errors may have been made.  Please contact me if any questions may rise regarding documentation to clarify transcription.        Chema Parsons MD   Internal Medicine  Department of Hospital Medicine  [facility] - 9th Floor Med Surg               "

## 2023-12-30 NOTE — PROGRESS NOTES
[facility] Hospital Medicine Progress Note        Chief Complaint: Inpatient Follow-up for infection    HPI:   This is a 68-year-old male with medical history of obesity BMI 37, osteoarthritis/right hip replacement, T2DM, HTN and HLD all lifestyle managed present to the ED with complaint of fever and positive blood cultures.      Patient reports symptoms started about 1 month ago with fever, chills, rigors on daily basis associated with drenching night sweats and myalgia.  He was initially seen at his PCP office and was prescribed cefuroxime on 11/03/2023 for 10 days but for unclear etiology of fever, fever did not resolve and subsequently on 11/22/2023 he started having burning urination and increased frequency and was diagnosed with UTI and prescribed doxycycline for 10 days and subsequently another 7 day course of ciprofloxacin on 12/13/2023.  He completed antibiotic course and continued to have daily high-grade fever up to 103F. He presented to the ED on 12/20/2023 for fever evaluation as well as runny nose and tested positive for COVID-19 subsequently followed up with his PCP and was prescribed molnupiravir.  He was called to return to the ED for positive blood cultures 4 of 4  bottles growing Gram-positive cocci with speciation Aerococcus urinae (susceptibility sent to reference lab and pending).     On arrival to ED he was febrile 103.1 normotensive and saturating 99% on room air.  Labs notable for WBC 9.7, hemoglobin 11.9, platelets 153, creatinine 0.98, ESR 59, , ferritin 2927, D-dimer 2.37 urinalysis unremarkable.  CTA chest show no evidence of PE, no infiltrate or consolidation, there is trace/minimal pleural effusions bilaterally.     He was given Zosyn and vancomycin and referred to hospital medicine service for further evaluation and management.  QUENTIN is positive for mobile vegetation. Needs 6 weeks of abx. ID is following.     Patient is doing well no acute issues.    BCx1 is positive. Will need negative cultures before discharge.     Objective/physical exam:  General: In no acute distress, afebrile  Chest: Clear to auscultation bilaterally  Heart: RRR, +S1, S2, no appreciable murmur  Abdomen: Soft, nontender, BS +  MSK: Warm, no lower extremity edema, no clubbing or cyanosis  Neurologic: Alert and oriented x4, Cranial nerve II-XII intact, Strength 5/5 in all 4 extremities    VITAL SIGNS: 24 HRS MIN & MAX LAST   Temp  Min: 98 °F (36.7 °C)  Max: 98.9 °F (37.2 °C) 98.3 °F (36.8 °C)   BP  Min: 114/62  Max: 158/68 114/62   Pulse  Min: 56  Max: 64  64   No data recorded 18   SpO2  Min: 94 %  Max: 96 % 96 %     I have reviewed the following labs:  Recent Labs   Lab 12/24/23  0332 12/26/23  0536 12/28/23  0430   WBC 9.47 9.64 9.46   RBC 3.62* 3.41* 3.54*   HGB 11.8* 11.0* 11.3*   HCT 34.4* 32.3* 33.3*   MCV 95.0* 94.7* 94.1*   MCH 32.6* 32.3* 31.9*   MCHC 34.3 34.1 33.9   RDW 14.2 14.5 14.1    215 265   MPV 10.6* 9.5 9.4       Recent Labs   Lab 12/24/23  0332 12/26/23  0536 12/28/23  0430    138 140   K 4.1 4.3 4.3   CO2 21* 24 24   BUN 14.4 11.5 11.5   CREATININE 0.85 0.78 0.87   CALCIUM 8.0* 7.9* 8.0*   MG 2.10  --   --    ALBUMIN 2.5* 2.4* 2.6*   ALKPHOS 57 45 50   ALT 60* 70* 72*   AST 46* 50* 48*   BILITOT 0.4 0.3 0.5       Microbiology Results (last 7 days)       Procedure Component Value Units Date/Time    Blood Culture [[id number]] Collected: 12/29/23 1105    Order Status: Resulted Specimen: Blood Updated: 12/29/23 1107    Blood Culture [[id number]] Collected: 12/29/23 1105    Order Status: Resulted Specimen: Blood Updated: 12/29/23 1106    Blood Culture [[id number]]  (Normal) Collected: 12/25/23 0538    Order Status: Completed Specimen: Blood Updated: 12/29/23 1001     CULTURE, BLOOD ([facility]) No Growth At 96 Hours    Blood Culture [[id number]]  (Abnormal) Collected: 12/25/23 0538    Order Status: Completed Specimen: Blood Updated: 12/29/23 0239     CULTURE, BLOOD  ([facility]) No Growth At 72 Hours     GRAM STAIN Gram Positive Cocci, probable Staphylococcus      Seen in gram stain of broth only      1 of 2 Aerobic bottles positive    Blood culture #2 **CANNOT BE ORDERED STAT** [[id number]]  (Abnormal) Collected: 12/23/23 1657    Order Status: Completed Specimen: Blood Updated: 12/25/23 1111     CULTURE, BLOOD ([facility]) Aerococcus urinae     Comment: Susceptibility sent to reference lab. Results to follow on separate report.        GRAM STAIN Gram Positive Cocci, probable Staphylococcus      Seen in gram stain of broth only      2 of 2 bottles positive    Narrative:      For sensitivity results refer to [id number].        Blood culture #1 **CANNOT BE ORDERED STAT** [[id number]]  (Abnormal) Collected: 12/23/23 1657    Order Status: Completed Specimen: Blood Updated: 12/25/23 1111     CULTURE, BLOOD ([facility]) Aerococcus urinae     Comment: Susceptibility sent to reference lab. Results to follow on separate report.        GRAM STAIN Gram Positive Cocci, probable Staphylococcus      Seen in gram stain of broth only      2 of 2 bottles positive    Narrative:      For sensitivity results refer to [id number].        BCID2 Panel [[id number]]  (Normal) Collected: 12/23/23 1657    Order Status: Completed Specimen: Blood Updated: 12/24/23 1352     CTX-M (ESBL ) N/A     IMP (Cabapenemase ) N/A     KPC resistance gene (Carbapenemase ) N/A     mcr-1 N/A     mecA ID N/A     Comment: Note: Antimicrobial resistance can occur via multiple mechanisms. A Not Detected result for antimicrobial resistance gene(s) does not indicate antimicrobial susceptibility. Subculturing is required for species identification and susceptibility testing of   isolates.        mecA/C and MREJ (MRSA) gene N/A     NDM (Carbapenemase ) N/A     OXA-48-like (Carbapenemase ) N/A     Leah/B (VRE gene) N/A     VIM (Carbapenemase ) N/A     Enterococcus faecalis Not Detected      Enterococcus faecium Not Detected     Listeria monocytogenes Not Detected     Staphylococcus spp. Not Detected     Staphylococcus aureus Not Detected     Staphylococcus epidermidis Not Detected     Staphylococcus lugdunensis Not Detected     Streptococcus spp. Not Detected     Streptococcus agalactiae (Group B) Not Detected     Streptococcus pneumoniae Not Detected     Streptococcus pyogenes (Group A) Not Detected     Acinetobacter calcoaceticus/baumannii complex Not Detected     Bacteroides fragilis Not Detected     Enterobacterales Not Detected     Enterobacter cloacae complex Not Detected     Escherichia coli Not Detected     Klebsiella aerogenes Not Detected     Klebsiella oxytoca Not Detected     Klebsiella pneumoniae group Not Detected     Proteus spp. Not Detected     Salmonella spp. Not Detected     Serratia marcescens Not Detected     Haemophilus influenzae Not Detected     Neisseria meningitidis Not Detected     Pseudomonas aeruginosa Not Detected     Stenotrophomonas maltophilia Not Detected     Candida albicans Not Detected     Candida auris Not Detected     Candida glabrata Not Detected     Candida krusei Not Detected     Candida parapsilosis Not Detected     Candida tropicalis Not Detected     Cryptococcus neoformans/gattii Not Detected    Narrative:      The [employer] BCID2 Panel is a multiplexed nucleic acid test intended for the use with check24® 2.0 or check24® [employer] for the simultaneous qualitative detection and identification of multiple bacterial and yeast nucleic acids and select genetic determinants associated with antimicrobial resistance.  The [employer] BCID2 Panel test is performed directly on blood culture samples identified as positive by a continuous monitoring blood culture system.  Results are intended to be interpreted in conjunction with Gram stain results.             See below for Radiology    Scheduled Med:   cefTRIAXone (ROCEPHIN) IVPB  2 g Intravenous  Q24H    enoxparin  40 mg Subcutaneous Daily    levothyroxine  50 mcg Oral Before breakfast    multivitamin  1 tablet Oral Daily         PRN Meds:  acetaminophen, acetaminophen, aluminum-magnesium hydroxide-simethicone, ibuprofen, melatonin, ondansetron, polyethylene glycol, prochlorperazine, senna-docusate 8.6-50 mg, sodium chloride 0.9%     Assessment/Plan:  Endocarditis  Aerococcus urinae bacteremia, present on admission, in the setting of recurrent UTI  COVID-19, present on admission   History of thyroid disease,      - repeat blood cultures to see negativity before discharge at least 72 hours.   - consider picc and HH with abx in discharge.   QUENTIN revealed aortic valve is a trileaflet valve. There is a small mobile echogenic irregular mass present on the noncoronary cusp consistent with vegetation.    Will need 6 weeks regiment of abx.   - Covid is negative.   Blood cultures on 12/20 grew GPC both sides, prelim BC ID negative.  Enterococcus urinary sent to lab for testing   Continue vancomycin and ceftriaxone     VTE prophylaxis:  Lovenox    Patient condition:  Stable/Fair/Guarded/ Serious/ Critical    Anticipated discharge and Disposition:         All diagnosis and differential diagnosis have been reviewed; assessment and plan has been documented; I have personally reviewed the labs and test results that are presently available; I have reviewed the patients medication list; I have reviewed the consulting providers response and recommendations. I have reviewed or attempted to review medical records based upon their availability    All of the patient's questions have been  addressed and answered. Patient's is agreeable to the above stated plan. I will continue to monitor closely and make adjustments to medical management as needed.  _____________________________________________________________________    Nutrition Status:    Radiology:  I have personally reviewed the following imaging and agree with the  radiologist.     Transesophageal echo (QUENTIN)    Left Ventricle: The left ventricle is normal in size. Normal wall   thickness. Normal wall motion. There is normal systolic function with a   visually estimated ejection fraction of 60 - 65%.    Right Ventricle: Normal right ventricular cavity size.    Left Atrium: Left atrium is mildly dilated.    Aortic Valve: The aortic valve is a trileaflet valve. There is a small   mobile echogenic irregular mass present on the noncoronary cusp consistent   with vegetation.    Mitral Valve: There is mild regurgitation.    Pulmonic Valve: There is mild regurgitation.    Mario Gray M.D.  [facility]/Hospital Medicine Department  [facility]           No

## 2023-12-31 PROCEDURE — 25000003 PHARM REV CODE 250: Performed by: INTERNAL MEDICINE

## 2023-12-31 PROCEDURE — 63600175 PHARM REV CODE 636 W HCPCS: Performed by: INTERNAL MEDICINE

## 2023-12-31 PROCEDURE — 21400001 HC TELEMETRY ROOM

## 2023-12-31 RX ORDER — MUPIROCIN 20 MG/G
OINTMENT TOPICAL 2 TIMES DAILY
Status: DISCONTINUED | OUTPATIENT
Start: 2023-12-31 | End: 2024-01-02 | Stop reason: HOSPADM

## 2023-12-31 RX ORDER — SODIUM CHLORIDE 9 MG/ML
INJECTION, SOLUTION INTRAVENOUS
Status: DISCONTINUED | OUTPATIENT
Start: 2023-12-31 | End: 2024-01-02 | Stop reason: HOSPADM

## 2023-12-31 RX ADMIN — THERA TABS 1 TABLET: TAB at 09:12

## 2023-12-31 RX ADMIN — LEVOTHYROXINE SODIUM 50 MCG: 50 TABLET ORAL at 05:12

## 2023-12-31 RX ADMIN — CEFTRIAXONE SODIUM 2 G: 2 INJECTION, POWDER, FOR SOLUTION INTRAMUSCULAR; INTRAVENOUS at 03:12

## 2023-12-31 RX ADMIN — RIVAROXABAN 10 MG: 10 TABLET, FILM COATED ORAL at 06:12

## 2023-12-31 RX ADMIN — MUPIROCIN: 20 OINTMENT TOPICAL at 09:12

## 2023-12-31 RX ADMIN — SODIUM CHLORIDE: 9 INJECTION, SOLUTION INTRAVENOUS at 02:12

## 2023-12-31 NOTE — PROGRESS NOTES
[facility] - 9th Floor [facility] MEDICINE ~ PROGRESS NOTE        CHIEF COMPLAINT   Hospital follow up    HOSPITAL COURSE   68-year-old male with medical history of obesity BMI 37, osteoarthritis/right hip replacement, T2DM, HTN and HLD all lifestyle managed present to the ED with complaint of fever and positive blood cultures.      Patient reports symptoms started about 1 month ago with fever, chills, rigors on daily basis associated with drenching night sweats and myalgia.  He was initially seen at his PCP office and was prescribed cefuroxime on 11/03/2023 for 10 days but for unclear etiology of fever, fever did not resolve and subsequently on 11/22/2023 he started having burning urination and increased frequency and was diagnosed with UTI and prescribed doxycycline for 10 days and subsequently another 7 day course of ciprofloxacin on 12/13/2023.  He completed antibiotic course and continued to have daily high-grade fever up to 103F. He presented to the ED on 12/20/2023 for fever evaluation as well as runny nose and tested positive for COVID-19 subsequently followed up with his PCP and was prescribed molnupiravir.  He was called to return to the ED for positive blood cultures 4 of 4  bottles growing Gram-positive cocci with speciation Aerococcus urinae (susceptibility sent to reference lab and pending).     On arrival to ED he was febrile 103.1 normotensive and saturating 99% on room air.  Labs notable for WBC 9.7, hemoglobin 11.9, platelets 153, creatinine 0.98, ESR 59, , ferritin 2927, D-dimer 2.37 urinalysis unremarkable.  CTA chest show no evidence of PE, no infiltrate or consolidation, there is trace/minimal pleural effusions bilaterally.     He was given Zosyn and vancomycin and referred to hospital medicine service for further evaluation and management.  QUENTIN is positive for mobile vegetation. Needs 6 weeks of abx. ID is following.      Patient is doing well no acute issues.    BCx1 is positive. Will need negative cultures before discharge.    Today  Seen examined this morning.  Blood cultures remain negative from the 29th up fully they stay that way.  No other issues at this time.        OBJECTIVE/PHYSICAL EXAM     VITAL SIGNS (MOST RECENT):  Temp: 98.3 °F (36.8 °C) (12/31/23 0759)  Pulse: (!) 55 (12/31/23 0759)  Resp: 18 (12/30/23 1925)  BP: (!) 118/57 (12/31/23 0759)  SpO2: 95 % (12/31/23 0759) VITAL SIGNS (24 HOUR RANGE):  Temp:  [97.3 °F (36.3 °C)-98.8 °F (37.1 °C)] 98.3 °F (36.8 °C)  Pulse:  [55-65] 55  Resp:  [18] 18  SpO2:  [95 %-97 %] 95 %  BP: (115-149)/(56-77) 118/57   GENERAL: In no acute distress, afebrile  HEENT:  CHEST: Clear to auscultation bilaterally  HEART: S1, S2, no appreciable murmur  ABDOMEN: Soft, nontender, BS +  MSK: Warm, no lower extremity edema, no clubbing or cyanosis  NEUROLOGIC: Alert and oriented x4, moving all extremities with good strength   INTEGUMENTARY:  PSYCHIATRY:        ASSESSMENT/PLAN   Infective aortic valve endocarditis  Recurrent acute complicated urinary tract infection/pyelonephritis right-sided  Aerococcus urinae bacteremia  Recent COVID-19 infection      Infectious Disease following.  6 weeks IV antibiotics.  Rocephin 2 g daily.  Monitor for 72 hours prior to placing PICC line.  Follow-up on repeat blood culture  Discontinue COVID-19 isolation, over 10 days out from test.    DVT prophylaxis:  Xarelto 10    Anticipated discharge and disposition:   __________________________________________________________________________    LABS/MICRO/MEDS/DIAGNOSTICS       LABS  Recent Labs     12/30/23  0455      K 4.3   CHLORIDE 107   CO2 24   BUN 12.2   CREATININE 0.89   GLUCOSE 97   CALCIUM 8.0*   ALKPHOS 48   AST 26   ALT 52   ALBUMIN 2.6*       Recent Labs     12/30/23  0455   WBC 10.06   RBC 3.40*   HCT 32.3*   MCV 95.0*            MICROBIOLOGY  Microbiology Results (last 7 days)       Procedure Component Value Units Date/Time    Blood  Culture [[id number]]  (Abnormal) Collected: 12/25/23 0538    Order Status: Completed Specimen: Blood Updated: 12/30/23 1231     CULTURE, BLOOD ([facility]) Aerococcus urinae     GRAM STAIN Gram Positive Cocci, probable Staphylococcus      Seen in gram stain of broth only      1 of 2 Aerobic bottles positive    Narrative:      Refer to [place] sensitivity results     Blood Culture [[id number]]  (Normal) Collected: 12/29/23 1105    Order Status: Completed Specimen: Blood Updated: 12/30/23 1200     CULTURE, BLOOD ([facility]) No Growth At 24 Hours    Blood Culture [[id number]]  (Normal) Collected: 12/29/23 1105    Order Status: Completed Specimen: Blood Updated: 12/30/23 1200     CULTURE, BLOOD ([facility]) No Growth At 24 Hours    Blood Culture [[id number]]  (Normal) Collected: 12/25/23 0538    Order Status: Completed Specimen: Blood Updated: 12/30/23 1001     CULTURE, BLOOD ([facility]) No Growth at 5 days    Blood culture #2 **CANNOT BE ORDERED STAT** [[id number]]  (Abnormal) Collected: 12/23/23 1657    Order Status: Completed Specimen: Blood Updated: 12/25/23 1111     CULTURE, BLOOD ([facility]) Aerococcus urinae     Comment: Susceptibility sent to reference lab. Results to follow on separate report.        GRAM STAIN Gram Positive Cocci, probable Staphylococcus      Seen in gram stain of broth only      2 of 2 bottles positive    Narrative:      For sensitivity results refer to [id number].        Blood culture #1 **CANNOT BE ORDERED STAT** [[id number]]  (Abnormal) Collected: 12/23/23 1657    Order Status: Completed Specimen: Blood Updated: 12/25/23 1111     CULTURE, BLOOD ([facility]) Aerococcus urinae     Comment: Susceptibility sent to reference lab. Results to follow on separate report.        GRAM STAIN Gram Positive Cocci, probable Staphylococcus      Seen in gram stain of broth only      2 of 2 bottles positive    Narrative:      For sensitivity results refer to [id number].        BCID2 Panel [[id number]]  (Normal) Collected: 12/23/23 1657     Order Status: Completed Specimen: Blood Updated: 12/24/23 1352     CTX-M (ESBL ) N/A     IMP (Cabapenemase ) N/A     KPC resistance gene (Carbapenemase ) N/A     mcr-1 N/A     mecA ID N/A     Comment: Note: Antimicrobial resistance can occur via multiple mechanisms. A Not Detected result for antimicrobial resistance gene(s) does not indicate antimicrobial susceptibility. Subculturing is required for species identification and susceptibility testing of   isolates.        mecA/C and MREJ (MRSA) gene N/A     NDM (Carbapenemase ) N/A     OXA-48-like (Carbapenemase ) N/A     Leah/B (VRE gene) N/A     VIM (Carbapenemase ) N/A     Enterococcus faecalis Not Detected     Enterococcus faecium Not Detected     Listeria monocytogenes Not Detected     Staphylococcus spp. Not Detected     Staphylococcus aureus Not Detected     Staphylococcus epidermidis Not Detected     Staphylococcus lugdunensis Not Detected     Streptococcus spp. Not Detected     Streptococcus agalactiae (Group B) Not Detected     Streptococcus pneumoniae Not Detected     Streptococcus pyogenes (Group A) Not Detected     Acinetobacter calcoaceticus/baumannii complex Not Detected     Bacteroides fragilis Not Detected     Enterobacterales Not Detected     Enterobacter cloacae complex Not Detected     Escherichia coli Not Detected     Klebsiella aerogenes Not Detected     Klebsiella oxytoca Not Detected     Klebsiella pneumoniae group Not Detected     Proteus spp. Not Detected     Salmonella spp. Not Detected     Serratia marcescens Not Detected     Haemophilus influenzae Not Detected     Neisseria meningitidis Not Detected     Pseudomonas aeruginosa Not Detected     Stenotrophomonas maltophilia Not Detected     Candida albicans Not Detected     Candida auris Not Detected     Candida glabrata Not Detected     Candida krusei Not Detected     Candida parapsilosis Not Detected     Candida tropicalis Not Detected      Cryptococcus neoformans/gattii Not Detected    Narrative:      The [employer] BCID2 Panel is a multiplexed nucleic acid test intended for the use with [employer] Mobile® 2.0 or GoLive! Mobile® [employer] for the simultaneous qualitative detection and identification of multiple bacterial and yeast nucleic acids and select genetic determinants associated with antimicrobial resistance.  The [employer] BCID2 Panel test is performed directly on blood culture samples identified as positive by a continuous monitoring blood culture system.  Results are intended to be interpreted in conjunction with Gram stain results.               MEDICATIONS   cefTRIAXone (ROCEPHIN) IVPB  2 g Intravenous Q24H    levothyroxine  50 mcg Oral Before breakfast    multivitamin  1 tablet Oral Daily    mupirocin   Nasal BID    rivaroxaban  10 mg Oral Daily with dinner         INFUSIONS         DIAGNOSTIC TESTS  CT Abdomen Pelvis With IV Contrast NO Oral Contrast   Final Result      1. Patchy cortical hypoenhancement at the lower pole right kidney may be related to pyelonephritis or scarring.  Correlate clinically.   2. Nonobstructing right nephrolithiasis   3. Elevated right hemidiaphragm with right basilar atelectasis   4. Trabeculated and thickened appearance of the urinary bladder.  There are prostate calcifications.  Appearance of the urinary bladder may be related to bladder wall hypertrophy and/or cystitis.         Electronically signed by: Ysabel Ventura   Date:    12/26/2023   Time:    19:47      CTA Chest Non-Coronary (PE Studies)   Final Result      No pulmonary embolism identified.      No significant discrepancy between my interpretation and the preliminary radiology report.         Electronically signed by: Bob Sheppard   Date:    12/24/2023   Time:    08:41      X-Ray Chest 1 View   Final Result      NO ACUTE CARDIOPULMONARY PROCESS IDENTIFIED.         Electronically signed by: Efe Bahena   Date:    12/23/2023  "  Time:    17:07           No results found for: "EF"       NUTRITION STATUS  Patient meets ASPEN criteria for other (see comments) (does not meet criteria) malnutrition of   per RD assessment as evidenced by:  Energy Intake (Malnutrition): other (see comments) (does not meet criteria)  Weight Loss (Malnutrition): other (see comments) (does not meet criteria)  Subcutaneous Fat (Malnutrition): other (see comments) (does not meet criteria)  Muscle Mass (Malnutrition): other (see comments) (does not meet criteria)           A minimum of two characteristics is recommended for diagnosis of either severe or non-severe malnutrition.       Case related differential diagnoses have been reviewed; assessment and plan has been documented. I have personally reviewed the labs and test results that are currently available; I have reviewed the patients medication list. I have reviewed the consulting providers recommendations. I have reviewed or attempted to review medical records based upon their availability.  All of the patient's and/or family's questions have been addressed and answered to the best of my ability.  I will continue to monitor closely and make adjustments to medical management as needed.  This document was created using M*Modal Fluency Direct.  Transcription errors may have been made.  Please contact me if any questions may rise regarding documentation to clarify transcription.        Chema Parsons MD   Internal Medicine  Department of Hospital Medicine  [facility] - 9th Floor Med Surg               "

## 2024-01-01 PROCEDURE — 21400001 HC TELEMETRY ROOM

## 2024-01-01 PROCEDURE — 25000003 PHARM REV CODE 250: Performed by: INTERNAL MEDICINE

## 2024-01-01 PROCEDURE — 63600175 PHARM REV CODE 636 W HCPCS: Performed by: INTERNAL MEDICINE

## 2024-01-01 RX ADMIN — CEFTRIAXONE SODIUM 2 G: 2 INJECTION, POWDER, FOR SOLUTION INTRAMUSCULAR; INTRAVENOUS at 06:01

## 2024-01-01 RX ADMIN — MUPIROCIN: 20 OINTMENT TOPICAL at 09:01

## 2024-01-01 RX ADMIN — MUPIROCIN: 20 OINTMENT TOPICAL at 08:01

## 2024-01-01 RX ADMIN — LEVOTHYROXINE SODIUM 50 MCG: 50 TABLET ORAL at 06:01

## 2024-01-01 RX ADMIN — THERA TABS 1 TABLET: TAB at 08:01

## 2024-01-01 RX ADMIN — RIVAROXABAN 10 MG: 10 TABLET, FILM COATED ORAL at 05:01

## 2024-01-01 NOTE — PROGRESS NOTES
[facility] - 9th Floor [facility] MEDICINE ~ PROGRESS NOTE        CHIEF COMPLAINT   Hospital follow up    HOSPITAL COURSE   68-year-old male with medical history of obesity BMI 37, osteoarthritis/right hip replacement, T2DM, HTN and HLD all lifestyle managed present to the ED with complaint of fever and positive blood cultures.      Patient reports symptoms started about 1 month ago with fever, chills, rigors on daily basis associated with drenching night sweats and myalgia.  He was initially seen at his PCP office and was prescribed cefuroxime on 11/03/2023 for 10 days but for unclear etiology of fever, fever did not resolve and subsequently on 11/22/2023 he started having burning urination and increased frequency and was diagnosed with UTI and prescribed doxycycline for 10 days and subsequently another 7 day course of ciprofloxacin on 12/13/2023.  He completed antibiotic course and continued to have daily high-grade fever up to 103F. He presented to the ED on 12/20/2023 for fever evaluation as well as runny nose and tested positive for COVID-19 subsequently followed up with his PCP and was prescribed molnupiravir.  He was called to return to the ED for positive blood cultures 4 of 4  bottles growing Gram-positive cocci with speciation Aerococcus urinae (susceptibility sent to reference lab and pending).     On arrival to ED he was febrile 103.1 normotensive and saturating 99% on room air.  Labs notable for WBC 9.7, hemoglobin 11.9, platelets 153, creatinine 0.98, ESR 59, , ferritin 2927, D-dimer 2.37 urinalysis unremarkable.  CTA chest show no evidence of PE, no infiltrate or consolidation, there is trace/minimal pleural effusions bilaterally.     He was given Zosyn and vancomycin and referred to hospital medicine service for further evaluation and management.  QUENTIN is positive for mobile vegetation. Needs 6 weeks of abx. ID is following.      Patient is doing well no acute issues.    BCx1 is positive. Will need negative cultures before discharge.    Today  Seen this morning and no complaints.  Blood cultures still remains negative today at 72 hours but hold off on PICC line until tomorrow since his previous ones turned up positive after 72 hours.        OBJECTIVE/PHYSICAL EXAM     VITAL SIGNS (MOST RECENT):  Temp: 98.2 °F (36.8 °C) (01/01/24 0827)  Pulse: (!) 55 (01/01/24 0827)  Resp: 20 (12/31/23 2327)  BP: 117/64 (01/01/24 0827)  SpO2: 96 % (01/01/24 0827) VITAL SIGNS (24 HOUR RANGE):  Temp:  [98 °F (36.7 °C)-99 °F (37.2 °C)] 98.2 °F (36.8 °C)  Pulse:  [53-59] 55  Resp:  [18-20] 20  SpO2:  [96 %-98 %] 96 %  BP: (114-149)/(58-71) 117/64   GENERAL: In no acute distress, afebrile  HEENT:  CHEST: Clear to auscultation bilaterally  HEART: S1, S2, no appreciable murmur  ABDOMEN: Soft, nontender, BS +  MSK: Warm, no lower extremity edema, no clubbing or cyanosis  NEUROLOGIC: Alert and oriented x4, moving all extremities with good strength   INTEGUMENTARY:  PSYCHIATRY:        ASSESSMENT/PLAN   Infective aortic valve endocarditis  Recurrent acute complicated urinary tract infection/pyelonephritis right-sided  Aerococcus urinae bacteremia  Recent COVID-19 infection      Infectious Disease following.  6 weeks IV antibiotics.  Rocephin 2 g daily.  Monitor for 72 hours prior to placing PICC line.  Follow-up on repeat blood culture  Discontinue COVID-19 isolation, over 10 days out from test.  For PICC line placement tomorrow morning    DVT prophylaxis:  Xarelto 10    Anticipated discharge and disposition:  Discharge after we set up IV home antibiotics and PICC line  __________________________________________________________________________    LABS/MICRO/MEDS/DIAGNOSTICS       LABS  Recent Labs     12/30/23  0455      K 4.3   CHLORIDE 107   CO2 24   BUN 12.2   CREATININE 0.89   GLUCOSE 97   CALCIUM 8.0*   ALKPHOS 48   AST 26   ALT 52   ALBUMIN 2.6*       Recent Labs     12/30/23  0455   WBC 10.06    RBC 3.40*   HCT 32.3*   MCV 95.0*            MICROBIOLOGY  Microbiology Results (last 7 days)       Procedure Component Value Units Date/Time    Blood Culture [[id number]]  (Normal) Collected: 12/29/23 1105    Order Status: Completed Specimen: Blood Updated: 12/31/23 1201     CULTURE, BLOOD ([facility]) No Growth At 48 Hours    Blood Culture [[id number]]  (Normal) Collected: 12/29/23 1105    Order Status: Completed Specimen: Blood Updated: 12/31/23 1201     CULTURE, BLOOD ([facility]) No Growth At 48 Hours    Blood Culture [[id number]]  (Abnormal) Collected: 12/25/23 0538    Order Status: Completed Specimen: Blood Updated: 12/30/23 1231     CULTURE, BLOOD ([facility]) Aerococcus urinae     GRAM STAIN Gram Positive Cocci, probable Staphylococcus      Seen in gram stain of broth only      1 of 2 Aerobic bottles positive    Narrative:      Refer to [place] sensitivity results     Blood Culture [[id number]]  (Normal) Collected: 12/25/23 0538    Order Status: Completed Specimen: Blood Updated: 12/30/23 1001     CULTURE, BLOOD ([facility]) No Growth at 5 days    Blood culture #2 **CANNOT BE ORDERED STAT** [[id number]]  (Abnormal) Collected: 12/23/23 1657    Order Status: Completed Specimen: Blood Updated: 12/25/23 1111     CULTURE, BLOOD ([facility]) Aerococcus urinae     Comment: Susceptibility sent to reference lab. Results to follow on separate report.        GRAM STAIN Gram Positive Cocci, probable Staphylococcus      Seen in gram stain of broth only      2 of 2 bottles positive    Narrative:      For sensitivity results refer to [address] [place], [place] [id number].        Blood culture #1 **CANNOT BE ORDERED STAT** [[id number]]  (Abnormal) Collected: 12/23/23 1657    Order Status: Completed Specimen: Blood Updated: 12/25/23 1111     CULTURE, BLOOD ([facility]) Aerococcus urinae     Comment: Susceptibility sent to reference lab. Results to follow on separate report.        GRAM STAIN Gram Positive Cocci, probable Staphylococcus      Seen in gram stain of broth only      2  "of 2 bottles positive    Narrative:      For sensitivity results refer to [id number].                   MEDICATIONS   cefTRIAXone (ROCEPHIN) IVPB  2 g Intravenous Q24H    levothyroxine  50 mcg Oral Before breakfast    multivitamin  1 tablet Oral Daily    mupirocin   Nasal BID    rivaroxaban  10 mg Oral Daily with dinner         INFUSIONS         DIAGNOSTIC TESTS  CT Abdomen Pelvis With IV Contrast NO Oral Contrast   Final Result      1. Patchy cortical hypoenhancement at the lower pole right kidney may be related to pyelonephritis or scarring.  Correlate clinically.   2. Nonobstructing right nephrolithiasis   3. Elevated right hemidiaphragm with right basilar atelectasis   4. Trabeculated and thickened appearance of the urinary bladder.  There are prostate calcifications.  Appearance of the urinary bladder may be related to bladder wall hypertrophy and/or cystitis.         Electronically signed by: Ysabel Ventura   Date:    12/26/2023   Time:    19:47      CTA Chest Non-Coronary (PE Studies)   Final Result      No pulmonary embolism identified.      No significant discrepancy between my interpretation and the preliminary radiology report.         Electronically signed by: Bob Sheppard   Date:    12/24/2023   Time:    08:41      X-Ray Chest 1 View   Final Result      NO ACUTE CARDIOPULMONARY PROCESS IDENTIFIED.         Electronically signed by: Efe Bahena   Date:    12/23/2023   Time:    17:07           No results found for: "EF"       NUTRITION STATUS  Patient meets ASPEN criteria for other (see comments) (does not meet criteria) malnutrition of   per RD assessment as evidenced by:  Energy Intake (Malnutrition): other (see comments) (does not meet criteria)  Weight Loss (Malnutrition): other (see comments) (does not meet criteria)  Subcutaneous Fat (Malnutrition): other (see comments) (does not meet criteria)  Muscle Mass (Malnutrition): other (see comments) (does not meet criteria)           A minimum of " two characteristics is recommended for diagnosis of either severe or non-severe malnutrition.       Case related differential diagnoses have been reviewed; assessment and plan has been documented. I have personally reviewed the labs and test results that are currently available; I have reviewed the patients medication list. I have reviewed the consulting providers recommendations. I have reviewed or attempted to review medical records based upon their availability.  All of the patient's and/or family's questions have been addressed and answered to the best of my ability.  I will continue to monitor closely and make adjustments to medical management as needed.  This document was created using M*Modal Fluency Direct.  Transcription errors may have been made.  Please contact me if any questions may rise regarding documentation to clarify transcription.        Chema Parsons MD   Internal Medicine  Department of Hospital Medicine [facility] - 9th Floor Med Surg

## 2024-01-02 VITALS
HEART RATE: 62 BPM | RESPIRATION RATE: 18 BRPM | SYSTOLIC BLOOD PRESSURE: 145 MMHG | TEMPERATURE: 98 F | BODY MASS INDEX: 37.43 KG/M2 | HEIGHT: 71 IN | OXYGEN SATURATION: 98 % | WEIGHT: 267.38 LBS | DIASTOLIC BLOOD PRESSURE: 72 MMHG

## 2024-01-02 PROCEDURE — 02HV33Z INSERTION OF INFUSION DEVICE INTO SUPERIOR VENA CAVA, PERCUTANEOUS APPROACH: ICD-10-PCS | Performed by: INTERNAL MEDICINE

## 2024-01-02 PROCEDURE — 63600175 PHARM REV CODE 636 W HCPCS: Performed by: INTERNAL MEDICINE

## 2024-01-02 PROCEDURE — 36569 INSJ PICC 5 YR+ W/O IMAGING: CPT

## 2024-01-02 PROCEDURE — 25000003 PHARM REV CODE 250: Performed by: INTERNAL MEDICINE

## 2024-01-02 PROCEDURE — C1751 CATH, INF, PER/CENT/MIDLINE: HCPCS

## 2024-01-02 RX ORDER — SODIUM CHLORIDE 0.9 % (FLUSH) 0.9 %
10 SYRINGE (ML) INJECTION
Status: DISCONTINUED | OUTPATIENT
Start: 2024-01-02 | End: 2024-01-02 | Stop reason: HOSPADM

## 2024-01-02 RX ORDER — SODIUM CHLORIDE 0.9 % (FLUSH) 0.9 %
10 SYRINGE (ML) INJECTION EVERY 6 HOURS
Status: DISCONTINUED | OUTPATIENT
Start: 2024-01-02 | End: 2024-01-02 | Stop reason: HOSPADM

## 2024-01-02 RX ADMIN — CEFTRIAXONE SODIUM 2 G: 2 INJECTION, POWDER, FOR SOLUTION INTRAMUSCULAR; INTRAVENOUS at 06:01

## 2024-01-02 RX ADMIN — MUPIROCIN: 20 OINTMENT TOPICAL at 09:01

## 2024-01-02 RX ADMIN — LEVOTHYROXINE SODIUM 50 MCG: 50 TABLET ORAL at 06:01

## 2024-01-02 RX ADMIN — THERA TABS 1 TABLET: TAB at 09:01

## 2024-01-02 NOTE — PLAN OF CARE
Problem: Adult Inpatient Plan of Care  Goal: Plan of Care Review  1/2/2024 1408 by Opal Garcia RN  Outcome: Ongoing, Progressing  1/2/2024 1408 by Opal Garcia RN  Outcome: Ongoing, Progressing  Goal: Patient-Specific Goal (Individualized)  1/2/2024 1408 by Opal Garcia RN  Outcome: Ongoing, Progressing  1/2/2024 1408 by Opal Garcia RN  Outcome: Ongoing, Progressing  Goal: Absence of Hospital-Acquired Illness or Injury  1/2/2024 1408 by Opal Garcia RN  Outcome: Ongoing, Progressing  1/2/2024 1408 by Opal Garcia RN  Outcome: Ongoing, Progressing  Goal: Optimal Comfort and Wellbeing  1/2/2024 1408 by Opal Garcia RN  Outcome: Ongoing, Progressing  1/2/2024 1408 by Opal Garcia RN  Outcome: Ongoing, Progressing  Goal: Readiness for Transition of Care  1/2/2024 1408 by Opal Garcia RN  Outcome: Ongoing, Progressing  1/2/2024 1408 by Opal Garcia RN  Outcome: Ongoing, Progressing     Problem: Diabetes Comorbidity  Goal: Blood Glucose Level Within Targeted Range  1/2/2024 1408 by Opal Garcia RN  Outcome: Ongoing, Progressing  1/2/2024 1408 by Opal Garcia RN  Outcome: Ongoing, Progressing     Problem: Fall Injury Risk  Goal: Absence of Fall and Fall-Related Injury  1/2/2024 1408 by Opal Garcia RN  Outcome: Ongoing, Progressing  1/2/2024 1408 by Opal Garcia RN  Outcome: Ongoing, Progressing     Problem: Infection  Goal: Absence of Infection Signs and Symptoms  1/2/2024 1408 by Opal Garcia RN  Outcome: Ongoing, Progressing  1/2/2024 1408 by Opal Garcia RN  Outcome: Ongoing, Progressing

## 2024-01-02 NOTE — PLAN OF CARE
01/02/24 1245   Final Note   Assessment Type Final Discharge Note   Anticipated Discharge Disposition Home   Post-Acute Status   Post-Acute Authorization IV Infusion   IV Infusion Status Set-up Complete/Auth obtained  ([employer])     Patient will go to [facility] outpatient for bloodwork and PICC dressing changes weekly.

## 2024-01-02 NOTE — PROCEDURES
"Arturo Ortiz is a 68 y.o. male patient.    Temp: 98.4 °F (36.9 °C) (01/02/24 1144)  Pulse: (!) 55 (01/02/24 1144)  Resp: 20 (01/01/24 1941)  BP: (!) 134/52 (01/02/24 1144)  SpO2: 99 % (01/02/24 1144)  Weight: 121.3 kg (267 lb 6.4 oz) (12/29/23 1604)  Height: 5' 10.98" (180.3 cm) (12/29/23 1604)    PICC  Date/Time: 1/2/2024 2:05 PM  Performed by: Austen Boyce, RN  Consent Done: Yes  Time out: Immediately prior to procedure a time out was called to verify the correct patient, procedure, equipment, support staff and site/side marked as required  Indications: med administration and vascular access  Anesthesia: local infiltration  Local anesthetic: lidocaine 1% without epinephrine  Anesthetic Total (mL): 5  Preparation: skin prepped with ChloraPrep  Skin prep agent dried: skin prep agent completely dried prior to procedure  Sterile barriers: all five maximum sterile barriers used - cap, mask, sterile gown, sterile gloves, and large sterile sheet  Hand hygiene: hand hygiene performed prior to central venous catheter insertion  Location details: right basilic  Catheter type: double lumen  Catheter size: 5 Fr  Catheter Length: 38cm    Ultrasound guidance: yes  Vessel Caliber: patentVascular Doppler: not done  Needle advanced into vessel with real time Ultrasound guidance.  Guidewire confirmed in vessel.  Sterile sheath used.  no esophageal manometryNumber of attempts: 1  Post-procedure: blood return through all ports, sterile dressing applied and chlorhexidine patch    Assessment: placement verified by x-ray  Complications: none          Austen Boyce RN  1/2/2024    "

## 2024-01-02 NOTE — ANESTHESIA POSTPROCEDURE EVALUATION
Anesthesia Post Evaluation    Patient: Arturo Ortiz    Procedure(s) Performed: * No procedures listed *    Final Anesthesia Type: general      Patient location during evaluation: PACU  Patient participation: Yes- Able to Participate  Level of consciousness: awake and alert and oriented  Post-procedure vital signs: reviewed and stable  Pain management: adequate  Airway patency: patent    PONV status at discharge: No PONV  Anesthetic complications: no      Cardiovascular status: hemodynamically stable  Respiratory status: unassisted  Hydration status: euvolemic  Follow-up not needed.              Vitals Value Taken Time   /84 01/02/24 0015   Temp 37.1 °C (98.8 °F) 01/02/24 0015   Pulse 56 01/02/24 0015   Resp 20 01/01/24 1941   SpO2 95 % 01/02/24 0015         No case tracking events are documented in the log.      Pain/Mary Jane Score: No data recorded

## 2024-01-02 NOTE — DISCHARGE SUMMARY
[facility] - 9th Floor [facility] MEDICINE - DISCHARGE SUMMARY    Patient Name: Arturo Ortiz  MRN: [id number]  Admission Date: 12/23/2023  Discharge Date: 01/02/2024  Hospital Length of Stay: 10 days  Discharge Provider: Chema Parsons MD  Primary Care Provider: CHARISSE Wallace Jr., MD      HOSPITAL COURSE   68-year-old male with medical history of obesity BMI 37, osteoarthritis/right hip replacement, T2DM, HTN and HLD all lifestyle managed present to the ED with complaint of fever and positive blood cultures.    Patient reports symptoms started about 1 month ago with fever, chills, rigors on daily basis associated with drenching night sweats and myalgia.  He was initially seen at his PCP office and was prescribed cefuroxime on 11/03/2023 for 10 days but for unclear etiology of fever, fever did not resolve and subsequently on 11/22/2023 he started having burning urination and increased frequency and was diagnosed with UTI and prescribed doxycycline for 10 days and subsequently another 7 day course of ciprofloxacin on 12/13/2023.  He completed antibiotic course and continued to have daily high-grade fever up to 103F. He presented to the ED on 12/20/2023 for fever evaluation as well as runny nose and tested positive for COVID-19 subsequently followed up with his PCP and was prescribed molnupiravir.  He was called to return to the ED for positive blood cultures 4 of 4  bottles growing Gram-positive cocci with speciation Aerococcus urinae (susceptibility sent to reference lab and pending).     On arrival to ED he was febrile 103.1 normotensive and saturating 99% on room air.  Labs notable for WBC 9.7, hemoglobin 11.9, platelets 153, creatinine 0.98, ESR 59, , ferritin 2927, D-dimer 2.37 urinalysis unremarkable.  CTA chest show no evidence of PE, no infiltrate or consolidation, there is trace/minimal pleural effusions bilaterally.   He was given Zosyn and vancomycin and referred to  hospital medicine service for further evaluation and management.  QUENTIN is positive for mobile vegetation. Needs 6 weeks of abx. ID is following.  Blood cultures did clear December 29th and Infectious Disease has recommended a 6 week course with a end date of February 9th.  Patient will be discharged today with a PICC line and we will set him up with home antibiotics.  He will follow up with Infectious Disease Clinic as outpatient.        PHYSICAL EXAM     Most Recent Vital Signs:  Temp: 98.5 °F (36.9 °C) (01/02/24 0827)  Pulse: (!) 53 (01/02/24 0827)  Resp: 20 (01/01/24 1941)  BP: 134/69 (01/02/24 0827)  SpO2: 96 % (01/02/24 0827)   GENERAL: In no acute distress, afebrile  HEENT:  CHEST: Clear to auscultation bilaterally  HEART: S1, S2, no appreciable murmur  ABDOMEN: Soft, nontender, BS +  MSK: Warm, no lower extremity edema, no clubbing or cyanosis  NEUROLOGIC: Alert and oriented x4, moving all extremities with good strength   INTEGUMENTARY:  PSYCHIATRY:          DISCHARGE DIAGNOSIS   Infective aortic valve endocarditis  Recurrent acute complicated urinary tract infection/pyelonephritis right-sided  Aerococcus urinae bacteremia  Recent COVID-19 infection    _____________________________________________________________________________      DISCHARGE MED REC     Current Discharge Medication List        START taking these medications    Details   dextrose 5 % in water (D5W) PgBk 100 mL with cefTRIAXone 2 gram SolR 2 g Inject 2 g into the vein once daily.    Comments: -  ceftriaxone 2 g qdaily IVP for 6 weeks from 12/29 to 2/9  - weekly labs:  CBC, CMP, CRP, ESR.  Fax results to ID team  at [phone]  - weekly PICC care and as needed   - remove PICC on 2/9 after last dose  - Follow up with ID in 3 - 4 weeks           CONTINUE these medications which have NOT CHANGED    Details   levothyroxine (SYNTHROID) 50 MCG tablet Take 1 tablet (50 mcg total) by mouth before breakfast.  Qty: 30 tablet, Refills: 5       multivitamin (ONE DAILY MULTIVITAMIN) per tablet Take 1 tablet by mouth once daily.           STOP taking these medications       molnupiravir 200 mg capsule (EUA) Comments:   Reason for Stopping:         phenazopyridine (PYRIDIUM) 200 MG tablet Comments:   Reason for Stopping:                  CONSULTS     Consults (From admission, onward)          Status Ordering Provider     Inpatient consult to Social Work/Case Management  Once        Provider:  (Not yet assigned)    WILLY Velazquez     Inpatient consult to Cardiology  Once        Provider:  Jimenez Cruz MD    Completed KALEIGH LEON     Inpatient consult to Infectious Diseases  Once        Provider:  Earnestine Read MD    Completed LYUBOV MAYNARD              FOLLOW UP      Follow-up Information       CHARISSE Wallace Jr., MD. Go on 1/9/2024.    Specialty: Family Medicine  Why: @1:30pm  Contact information:  [address] [place] [address]  [place] [address]  [phone]               Leodan Sharp MD Follow up in 2 week(s).    Specialty: Infectious Diseases  Contact information:  [address] [place] [address] [address] [place] [address]  [phone]                                 DISCHARGE INSTRUCTIONS     Explained in detail to the patient about the discharge plan, medications, and follow-up visits. Pt understands and agrees with the treatment plan.  Discharged Condition: stable  Diet as tolerated  Activities as tolerated  Discharge to: Home-Health Care [facility]    TIME SPENT ON DISCHARGE   35 minutes        Willy Parsons MD  Internal Medicine  Department of Hospital Medicine [facility] - 9th Floor Med Surg      This document was created using electronic dictation services.  Please excuse any errors that may have been made.  Contact me if any questions regarding documentation to clarify verbiage.

## 2024-01-03 LAB
BACTERIA BLD CULT: NORMAL
BACTERIA BLD CULT: NORMAL

## 2024-01-05 ENCOUNTER — PATIENT OUTREACH (OUTPATIENT)
Dept: ADMINISTRATIVE | Facility: CLINIC | Age: 69
End: 2024-01-05
Payer: MEDICARE

## 2024-01-05 NOTE — PROGRESS NOTES
2nd attempt made to reach patient for TCC call. Left voicemail please call [phone] leave first name, last, and date of birth for Shane. I will return your call.

## 2024-01-05 NOTE — PROGRESS NOTES
C3 nurse attempted to contact Arturo Ortiz  for a TCC post hospital discharge follow up call. No answer. Left voicemail with callback information. The patient has a scheduled HOSFU appointment with CHARISSE Wallace Jr., MD  on 1/9/24 @ 1:30pm.

## 2024-01-08 ENCOUNTER — PATIENT MESSAGE (OUTPATIENT)
Dept: ADMINISTRATIVE | Facility: CLINIC | Age: 69
End: 2024-01-08
Payer: MEDICARE

## 2024-01-08 NOTE — PROGRESS NOTES
3rd attempt made to reach patient for TCC call. Left voicemail please call [phone] leave first name, last, and date of birth for Shane. I will return your call.

## 2024-01-10 NOTE — PROGRESS NOTES
C3 nurse spoke with Arturo Ortiz wife Deepika for a TCC post hospital discharge follow up call. The patient had a scheduled [facility] appointment with CHARISSE Wallace Jr., MD  on 1/9/24 @ 1:30pm.

## 2024-01-23 ENCOUNTER — OFFICE VISIT (OUTPATIENT)
Dept: INFECTIOUS DISEASES | Facility: CLINIC | Age: 69
End: 2024-01-23
Payer: MEDICARE

## 2024-01-23 VITALS
OXYGEN SATURATION: 98 % | HEART RATE: 54 BPM | DIASTOLIC BLOOD PRESSURE: 67 MMHG | WEIGHT: 269 LBS | HEIGHT: 70 IN | BODY MASS INDEX: 38.51 KG/M2 | SYSTOLIC BLOOD PRESSURE: 150 MMHG | RESPIRATION RATE: 18 BRPM

## 2024-01-23 DIAGNOSIS — I38 ENDOCARDITIS, UNSPECIFIED CHRONICITY, UNSPECIFIED ENDOCARDITIS TYPE: Primary | ICD-10-CM

## 2024-01-23 PROCEDURE — 99999 PR PBB SHADOW E&M-EST. PATIENT-LVL IV: CPT | Mod: PBBFAC,,,

## 2024-01-23 PROCEDURE — 99215 OFFICE O/P EST HI 40 MIN: CPT | Mod: S$GLB,,,

## 2024-01-23 PROCEDURE — 1111F DSCHRG MED/CURRENT MED MERGE: CPT | Mod: CPTII,S$GLB,,

## 2024-01-23 PROCEDURE — 3078F DIAST BP <80 MM HG: CPT | Mod: CPTII,S$GLB,,

## 2024-01-23 PROCEDURE — 3008F BODY MASS INDEX DOCD: CPT | Mod: CPTII,S$GLB,,

## 2024-01-23 PROCEDURE — 1159F MED LIST DOCD IN RCRD: CPT | Mod: CPTII,S$GLB,,

## 2024-01-23 PROCEDURE — 3077F SYST BP >= 140 MM HG: CPT | Mod: CPTII,S$GLB,,

## 2024-01-23 PROCEDURE — 3288F FALL RISK ASSESSMENT DOCD: CPT | Mod: CPTII,S$GLB,,

## 2024-01-23 PROCEDURE — 1101F PT FALLS ASSESS-DOCD LE1/YR: CPT | Mod: CPTII,S$GLB,,

## 2024-01-23 RX ORDER — CEFTRIAXONE SODIUM 10 G/100ML
INJECTION, POWDER, FOR SOLUTION INTRAVENOUS
Status: ON HOLD | COMMUNITY
Start: 2024-01-18 | End: 2024-02-16

## 2024-01-23 RX ORDER — FLUCONAZOLE 150 MG/1
150 TABLET ORAL
Qty: 2 TABLET | Refills: 0 | Status: ON HOLD | OUTPATIENT
Start: 2024-01-23 | End: 2024-02-16

## 2024-01-23 NOTE — PROGRESS NOTES
Subjective:       Patient ID: Arturo Ortiz 68 y.o.     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 He to the ER on 12/20/2023 with complaints of fever, chills, and night sweats over the last month.  He had been treated for a UTI with oral cefuroxime on 11/03/2023 and doxycycline on 11/22/2023.  He says that he had dysuria and frequency at that time as well as fever that did not resolve with oral antibiotics.  Blood cultures were drawn however patient left.  Of note he was also COVID positive was given a course of Remdesivir.  He was called back to report to the ER on 12/23/2023 due to blood cultures revealing Aerococcus urinae.  TTE on 12/24/2023 with out evidence vegetation however does reveal moderate aortic valve sclerosis.  EF 55-60%.  Repeat blood cultures on 12/23 and 12/25 remained positive.  He had a CT abdomen and pelvis on 12/26 revealing prostate calcifications and a nonobstructing 8 mm right renal caliceal calculus.  Patchy cortical hypoenhancement at the lower pole right kidney  may be related to pyelonephritis or scarring.  He was taken for a QUENTIN on 12/27 revealing vegetation on aortic valve.  EF during QUENTIN 60 65% and normal valvular function.  Blood cultures cleared on 12/29.  He was discharged home to complete a 6 week course of ceftriaxone 2 g IV daily until 02/09/2024.       01/23/2024 office visit:  Mr. Ortiz presents to clinic for follow-up today.  He is currently completing a 6 week course of IV ceftriaxone for aortic valve endocarditis.  He denies any fever, chills, or night sweats.  He did have some dizziness upon discharge however it is resolving.  He denies any nausea,  vomiting, or diarrhea with antimicrobials.  He is complaining of redness and itching around groin he is putting topical antifungal with some improvement in redness in pruritus.  He denies missing any doses of IV antibiotics.  He does complain of a sore throat that began around 4 days ago.  Denies any cough associated. He denies any dysuria, frequency, or foul odor.      Past Medical History:   Diagnosis Date    Avascular necrosis of right femoral head     DM (diabetes mellitus)     Essential (primary) hypertension     Hypercholesteremia     Macrocytosis     Osteoarthritis of right hip     Osteoarthritis of right knee     Peripheral neuropathy         Past Surgical History:   Procedure Laterality Date    EYE SURGERY      JOINT REPLACEMENT      Hip replacement    TOTAL HIP ARTHROPLASTY Right 05/18/2021        Social History     Socioeconomic History    Marital status:     Number of children: 6   Tobacco Use    Smoking status: Never    Smokeless tobacco: Never   Substance and Sexual Activity    Alcohol use: Not Currently     Comment: occ    Drug use: Never    Sexual activity: Yes     Partners: Female     Birth control/protection: None   Social History Narrative    ** Merged History Encounter **          Social Determinants of Health     Financial Resource Strain: Low Risk  (12/27/2023)    Overall Financial Resource Strain (CARDIA)     Difficulty of Paying Living Expenses: Not very hard   Food Insecurity: No Food Insecurity (12/27/2023)    Hunger Vital Sign     Worried About Running Out of Food in the Last Year: Never true     Ran Out of Food in the Last Year: Never true   Transportation Needs: No Transportation Needs (12/27/2023)    PRAPARE - Transportation     Lack of Transportation (Medical): No     Lack of Transportation (Non-Medical): No   Social Connections: Unknown (12/27/2023)    Social Connection and Isolation Panel [NHANES]     Frequency of Communication with Friends and Family: More than three times a  "week     Frequency of Social Gatherings with Friends and Family: Twice a week     Marital Status:    Housing Stability: Unknown (12/27/2023)    Housing Stability Vital Sign     Unable to Pay for Housing in the Last Year: No     Unstable Housing in the Last Year: No        Family History   Problem Relation Age of Onset    Lung cancer Mother         Smoker    Hodgkin's lymphoma Father     Lung cancer Sister     Breast cancer Sister         Review of patient's allergies indicates:  No Known Allergies     Immunization History   Administered Date(s) Administered    COVID-19, MRNA, LN-S, PF (Pfizer) (Gray Cap) 07/01/2022    COVID-19, MRNA, LN-S, PF (Pfizer) (Purple Cap) 02/22/2021, 02/22/2021, 03/15/2021, 03/15/2021    Influenza 10/15/2021    Influenza (FLUAD) - Quadrivalent - Adjuvanted - PF *Preferred* (65+) 10/30/2023    Influenza - Quadrivalent - High Dose - PF (65 years and older) 10/16/2022    Influenza - Quadrivalent - PF *Preferred* (6 months and older) 11/02/2020    Influenza - Trivalent - PF (ADULT) 10/15/2021    Pneumococcal Conjugate - 20 Valent 10/25/2022    Td (ADULT) 07/12/2005    Zoster Recombinant 10/25/2022        Review of Systems   All other systems reviewed and are negative.         Objective:      BP (!) 150/67 (BP Location: Left arm)   Pulse (!) 54   Resp 18   Ht 5' 10" (1.778 m)   Wt 122 kg (269 lb)   SpO2 98%   BMI 38.60 kg/m²      Physical Exam  Vitals reviewed.   Constitutional:       General: He is not in acute distress.     Appearance: Normal appearance. He is obese. He is not toxic-appearing.   HENT:      Mouth/Throat:      Mouth: Mucous membranes are moist.      Pharynx: No oropharyngeal exudate or posterior oropharyngeal erythema.   Eyes:      General: No scleral icterus.     Conjunctiva/sclera: Conjunctivae normal.      Pupils: Pupils are equal, round, and reactive to light.   Cardiovascular:      Rate and Rhythm: Normal rate and regular rhythm.      Pulses: Normal pulses.     "  Heart sounds: Murmur heard.   Pulmonary:      Effort: Pulmonary effort is normal. No respiratory distress.      Breath sounds: Normal breath sounds.   Abdominal:      General: Abdomen is flat. Bowel sounds are normal.      Palpations: Abdomen is soft.      Tenderness: There is no abdominal tenderness.   Musculoskeletal:         General: No swelling.      Cervical back: Normal range of motion and neck supple.      Comments: Right upper arm PICC in place clean, dry, and intact   Lymphadenopathy:      Cervical: No cervical adenopathy.   Skin:     General: Skin is warm and dry.      Findings: No rash.      Comments: Erythematous rash present in bilateral groin   Neurological:      General: No focal deficit present.      Mental Status: He is alert and oriented to person, place, and time.   Psychiatric:         Mood and Affect: Mood normal.         Behavior: Behavior normal.          Labs: Reviewed most recent relevant labs available, notable results highlighted in this note    Imaging: Reviewed most recent relevant imaging studies available, notable results highlighted in this note    Assessment:       Problem List Items Addressed This Visit    None  Visit Diagnoses       Endocarditis, unspecified chronicity, unspecified endocarditis type    -  Primary    Relevant Orders    Echo    Ambulatory referral/consult to Cardiology    US Retroperitoneal Complete               Plan:    -Mr. Ortiz is currently completing a 6 week course of IV ceftriaxone 2 g IV Q 24 hours that began on 12/29/2023.  Anticipated end date of 02/09/2024  -most recent labs on 1/17/24 with Cr. 1.35, ESR 45, WBC 7.3, CRP 10  -Labs on 1/10 with Cr 1.01, ESR 67, CRP 13.6  -encouraged to stay hydrated.  We will continue to monitor renal function on this week's labs.  -he denies any systemic signs of infection at this time.   -he does have what appears to be a yeast infection in his bilateral groin.  We will prescribe fluconazole 150 mg p.o. Q 24 hours for  1 dose.  He can continue with topical clotrimazole and nystatin powder.  -will order echo for further evaluation of valvular function.  -we will refer to cardiologist to establish care.  I spoke with [facility] regarding referral.   -ideally would like for him to have repeat QUENTIN after completion antimicrobial course.  -we will obtain renal ultrasound for evaluation of potential obstructing sources that may have caused bacteremia.  -I educated him on systemic signs of infection such as fever, chills, or night sweats as well as worsening shortness of breath and fatigue when stopping IV therapy.  There is a risk of reoccurrence if source control has not been achieved.  -we will follow up again in 4 weeks.    Follow up in about 4 weeks (around 2/20/2024).    65  minutes of total time spent on the encounter, which includes face to face time and non-face to face time preparing to see the patient (eg, review of tests), Obtaining and/or reviewing separately obtained history, Documenting clinical information in the electronic or other health record, Independently interpreting results (not separately reported) and communicating results to the patient/family/caregiver, or Care coordination (not separately reported).

## 2024-01-29 ENCOUNTER — HOSPITAL ENCOUNTER (EMERGENCY)
Facility: HOSPITAL | Age: 69
Discharge: HOME | End: 2024-01-29
Attending: EMERGENCY MEDICINE
Payer: MEDICARE

## 2024-01-29 VITALS
WEIGHT: 265 LBS | SYSTOLIC BLOOD PRESSURE: 180 MMHG | RESPIRATION RATE: 18 BRPM | HEART RATE: 69 BPM | TEMPERATURE: 99 F | BODY MASS INDEX: 38.02 KG/M2 | OXYGEN SATURATION: 97 % | DIASTOLIC BLOOD PRESSURE: 65 MMHG

## 2024-01-29 DIAGNOSIS — M79.601 RIGHT ARM PAIN: Primary | ICD-10-CM

## 2024-01-29 PROCEDURE — 93010 ELECTROCARDIOGRAM REPORT: CPT | Mod: ,,, | Performed by: INTERNAL MEDICINE

## 2024-01-29 PROCEDURE — 93005 ELECTROCARDIOGRAM TRACING: CPT

## 2024-01-29 PROCEDURE — 99283 EMERGENCY DEPT VISIT LOW MDM: CPT | Mod: 25

## 2024-01-29 PROCEDURE — 99284 EMERGENCY DEPT VISIT MOD MDM: CPT | Mod: 25

## 2024-01-29 NOTE — ED PROVIDER NOTES
Encounter Date: 1/29/2024       History     Chief Complaint   Patient presents with    Arm Pain     Reports right arm tenderness. Patient has PRITI PICC for home antibiotics. Went today for PICC dressing and sent here due to having pain in arm. No current c/o pain but has had the pain in the past few days. Arms is not red, hot, swollen. 2+ radial pulse.      See MDM    The history is provided by the patient. No  was used.     Review of patient's allergies indicates:  No Known Allergies  Past Medical History:   Diagnosis Date    Avascular necrosis of right femoral head     DM (diabetes mellitus)     Essential (primary) hypertension     Hypercholesteremia     Macrocytosis     Osteoarthritis of right hip     Osteoarthritis of right knee     Peripheral neuropathy      Past Surgical History:   Procedure Laterality Date    EYE SURGERY      JOINT REPLACEMENT      Hip replacement    TOTAL HIP ARTHROPLASTY Right 05/18/2021     Family History   Problem Relation Age of Onset    Lung cancer Mother         Smoker    Hodgkin's lymphoma Father     Lung cancer Sister     Breast cancer Sister      Social History     Tobacco Use    Smoking status: Never    Smokeless tobacco: Never   Substance Use Topics    Alcohol use: Not Currently     Comment: occ    Drug use: Never     Review of Systems   Musculoskeletal:         Right upper arm pain near PICC last week. No redness or swelling.    All other systems reviewed and are negative.      Physical Exam     Initial Vitals [01/29/24 1645]   BP Pulse Resp Temp SpO2   (!) 180/65 69 18 98.7 °F (37.1 °C) 97 %      MAP       --         Physical Exam    Nursing note and vitals reviewed.  Constitutional: He appears well-developed and well-nourished.   Cardiovascular:  Normal rate.           Pulmonary/Chest: No respiratory distress.   Musculoskeletal:      Comments: Right upper arm PICC line noted in place. No redness or swelling. No drainage. No tenderness currently upon  palpation. Full ROM of arm      Neurological: He is alert and oriented to person, place, and time.   Skin: Skin is warm and dry.   Psychiatric: He has a normal mood and affect.         ED Course   Procedures  Labs Reviewed - No data to display       Imaging Results    None          Medications - No data to display  Medical Decision Making  67 y/o male presents with needing to get US to r/o DVT in right arm s/t picc per staff that changes his picc line dressing weekly and draws blood. He mentioned to them his right arm was hurting last week after dressing change and that he couldn't even bend the arm due to pain. He states it improved after about 2-3 days. Currently no more pain. He never had any swelling or redness or drainage. He went today to go get the dressing changed and he was telling them about the pain he had last time and they wanted him to come get US to r/o DVT because of that.       Additional MDM:   Differential Diagnosis:   Other: The following diagnoses were also considered and will be evaluated: DVT, muscle strain and phlebitis.            ED Course as of 01/29/24 1907 Mon Jan 29, 2024 1801 Transfer care to LOLI Dior NP. Waiting on vascular ultrasound to determine DVT or not.  [EV]   1855 CV US negative for DVT of RUE [LM]   1859 Patient US negative for DVT. He continues to deny any pain. Will dc home.  [LM]      ED Course User Index  [EV] Dinora Lerma, JAGUARP  [LM] Carmen Dior NP                             Clinical Impression:  Final diagnoses:  [M79.601] Right arm pain          ED Disposition Condition    Discharge Stable          ED Prescriptions    None       Follow-up Information       Follow up With Specialties Details Why Contact Info    CHARISSE Wallace Jr., MD Family Medicine Schedule an appointment as soon as possible for a visit   [address] [place] [id number]  [phone]               Carmen Dior NP  01/29/24 1907

## 2024-01-29 NOTE — FIRST PROVIDER EVALUATION
Medical screening examination initiated.  I have conducted a focused provider triage encounter, findings are as follows:    Brief history of present illness:  69 y/o male presents with needing to make sure there is not a blood clot in the right arm s/t pain in that arm where PICC line is. Sent here from place that changes the dressing and draws blood. They would not touch him.     There were no vitals filed for this visit.    Pertinent physical exam:  alert, nonlabored, right upper arm picc no redness or swelling     Brief workup plan:  us     Preliminary workup initiated; this workup will be continued and followed by the physician or advanced practice provider that is assigned to the patient when roomed.

## 2024-02-01 ENCOUNTER — HOSPITAL ENCOUNTER (OUTPATIENT)
Dept: CARDIOLOGY | Facility: HOSPITAL | Age: 69
Discharge: HOME | End: 2024-02-01
Payer: MEDICARE

## 2024-02-01 ENCOUNTER — HOSPITAL ENCOUNTER (OUTPATIENT)
Dept: RADIOLOGY | Facility: HOSPITAL | Age: 69
Discharge: HOME | End: 2024-02-01
Payer: MEDICARE

## 2024-02-01 DIAGNOSIS — I38 ENDOCARDITIS, UNSPECIFIED CHRONICITY, UNSPECIFIED ENDOCARDITIS TYPE: ICD-10-CM

## 2024-02-01 LAB
AV INDEX (PROSTH): 0.6
AV MEAN GRADIENT: 15 MMHG
AV PEAK GRADIENT: 27 MMHG
AV REGURGITATION PRESSURE HALF TIME: 295 MS
AV VALVE AREA BY VELOCITY RATIO: 2.11 CM²
AV VALVE AREA: 2.08 CM²
AV VELOCITY RATIO: 0.61
CV ECHO LV RWT: 0.39 CM
DOP CALC AO PEAK VEL: 2.59 M/S
DOP CALC AO VTI: 65.5 CM
DOP CALC LVOT AREA: 3.5 CM2
DOP CALC LVOT DIAMETER: 2.1 CM
DOP CALC LVOT PEAK VEL: 1.58 M/S
DOP CALC LVOT STROKE VOLUME: 136.4 CM3
DOP CALC MV VTI: 35.3 CM
DOP CALCLVOT PEAK VEL VTI: 39.4 CM
E WAVE DECELERATION TIME: 177 MSEC
E/A RATIO: 0.8
E/E' RATIO: 11.88 M/S
ECHO LV POSTERIOR WALL: 1.2 CM (ref 0.6–1.1)
FRACTIONAL SHORTENING: 34 % (ref 28–44)
HR MV ECHO: 66 BPM
INTERVENTRICULAR SEPTUM: 1.2 CM (ref 0.6–1.1)
LEFT ATRIUM SIZE: 4.6 CM
LEFT ATRIUM VOLUME MOD: 128 CM3
LEFT INTERNAL DIMENSION IN SYSTOLE: 4 CM (ref 2.1–4)
LEFT VENTRICLE DIASTOLIC VOLUME: 187 ML
LEFT VENTRICLE SYSTOLIC VOLUME: 70 ML
LEFT VENTRICULAR INTERNAL DIMENSION IN DIASTOLE: 6.1 CM (ref 3.5–6)
LEFT VENTRICULAR MASS: 322.7 G
LV LATERAL E/E' RATIO: 9.18 M/S
LV SEPTAL E/E' RATIO: 16.83 M/S
LVOT MG: 5 MMHG
LVOT MV: 1.02 CM/S
MV MEAN GRADIENT: 3 MMHG
MV PEAK A VEL: 1.26 M/S
MV PEAK E VEL: 1.01 M/S
MV PEAK GRADIENT: 7 MMHG
MV STENOSIS PRESSURE HALF TIME: 60 MS
MV VALVE AREA BY CONTINUITY EQUATION: 3.86 CM2
MV VALVE AREA P 1/2 METHOD: 3.67 CM2
OHS LV EJECTION FRACTION SIMPSONS BIPLANE MOD: 57 %
PISA AR MAX VEL: 5.26 M/S
PISA TR MAX VEL: 2.1 M/S
SINUS: 3.5 CM
TDI LATERAL: 0.11 M/S
TDI SEPTAL: 0.06 M/S
TDI: 0.09 M/S
TR MAX PG: 18 MMHG
TRICUSPID ANNULAR PLANE SYSTOLIC EXCURSION: 2.87 CM

## 2024-02-01 PROCEDURE — 93306 TTE W/DOPPLER COMPLETE: CPT | Mod: 26,,, | Performed by: INTERNAL MEDICINE

## 2024-02-01 PROCEDURE — 76770 US EXAM ABDO BACK WALL COMP: CPT | Mod: TC

## 2024-02-01 PROCEDURE — 93306 TTE W/DOPPLER COMPLETE: CPT

## 2024-02-16 ENCOUNTER — HOSPITAL ENCOUNTER (INPATIENT)
Facility: HOSPITAL | Age: 69
LOS: 2 days | Discharge: HOME | DRG: 280 | End: 2024-02-18
Attending: EMERGENCY MEDICINE | Admitting: INTERNAL MEDICINE
Payer: MEDICARE

## 2024-02-16 DIAGNOSIS — I50.9 CHF (CONGESTIVE HEART FAILURE): ICD-10-CM

## 2024-02-16 DIAGNOSIS — R06.02 SOB (SHORTNESS OF BREATH): ICD-10-CM

## 2024-02-16 DIAGNOSIS — I50.20 SYSTOLIC CONGESTIVE HEART FAILURE, UNSPECIFIED HF CHRONICITY: Primary | ICD-10-CM

## 2024-02-16 DIAGNOSIS — J90 PLEURAL EFFUSION: ICD-10-CM

## 2024-02-16 DIAGNOSIS — R60.9 FLUID RETENTION: ICD-10-CM

## 2024-02-16 LAB
ALBUMIN SERPL-MCNC: 3.8 G/DL (ref 3.4–4.8)
ALBUMIN/GLOB SERPL: 1.2 RATIO (ref 1.1–2)
ALP SERPL-CCNC: 45 UNIT/L (ref 40–150)
ALT SERPL-CCNC: 21 UNIT/L (ref 0–55)
AST SERPL-CCNC: 25 UNIT/L (ref 5–34)
AV INDEX (PROSTH): 0.53
AV MEAN GRADIENT: 18 MMHG
AV PEAK GRADIENT: 32 MMHG
AV REGURGITATION PRESSURE HALF TIME: 291 MS
AV VELOCITY RATIO: 0.52
BASOPHILS # BLD AUTO: 0.04 X10(3)/MCL
BASOPHILS NFR BLD AUTO: 0.5 %
BILIRUB SERPL-MCNC: 0.5 MG/DL
BNP BLD-MCNC: 732.8 PG/ML
BSA FOR ECHO PROCEDURE: 2.44 M2
BUN SERPL-MCNC: 27.4 MG/DL (ref 8.4–25.7)
CALCIUM SERPL-MCNC: 9.2 MG/DL (ref 8.8–10)
CHLORIDE SERPL-SCNC: 107 MMOL/L (ref 98–107)
CO2 SERPL-SCNC: 27 MMOL/L (ref 23–31)
CREAT SERPL-MCNC: 1.07 MG/DL (ref 0.73–1.18)
CV ECHO LV RWT: 0.41 CM
DOP CALC AO PEAK VEL: 2.82 M/S
DOP CALC AO VTI: 55.6 CM
DOP CALC LVOT PEAK VEL: 1.47 M/S
DOP CALCLVOT PEAK VEL VTI: 29.2 CM
E WAVE DECELERATION TIME: 248 MSEC
E/A RATIO: 0.61
E/E' RATIO: 9.73 M/S
ECHO LV POSTERIOR WALL: 1.2 CM (ref 0.6–1.1)
EOSINOPHIL # BLD AUTO: 0.23 X10(3)/MCL (ref 0–0.9)
EOSINOPHIL NFR BLD AUTO: 3 %
ERYTHROCYTE [DISTWIDTH] IN BLOOD BY AUTOMATED COUNT: 15.6 % (ref 11.5–17)
FLUAV AG UPPER RESP QL IA.RAPID: NOT DETECTED
FLUBV AG UPPER RESP QL IA.RAPID: NOT DETECTED
FRACTIONAL SHORTENING: 31 % (ref 28–44)
GFR SERPLBLD CREATININE-BSD FMLA CKD-EPI: >60 MLS/MIN/1.73/M2
GLOBULIN SER-MCNC: 3.1 GM/DL (ref 2.4–3.5)
GLUCOSE SERPL-MCNC: 111 MG/DL (ref 82–115)
HCT VFR BLD AUTO: 39.5 % (ref 42–52)
HGB BLD-MCNC: 13.5 G/DL (ref 14–18)
IMM GRANULOCYTES # BLD AUTO: 0.02 X10(3)/MCL (ref 0–0.04)
IMM GRANULOCYTES NFR BLD AUTO: 0.3 %
INTERVENTRICULAR SEPTUM: 0.9 CM (ref 0.6–1.1)
LEFT ATRIUM SIZE: 3.9 CM
LEFT ATRIUM VOLUME INDEX MOD: 50.6 ML/M2
LEFT ATRIUM VOLUME MOD: 119 CM3
LEFT INTERNAL DIMENSION IN SYSTOLE: 4.1 CM (ref 2.1–4)
LEFT VENTRICLE DIASTOLIC VOLUME INDEX: 73.62 ML/M2
LEFT VENTRICLE DIASTOLIC VOLUME: 173 ML
LEFT VENTRICLE MASS INDEX: 109 G/M2
LEFT VENTRICLE SYSTOLIC VOLUME INDEX: 31.6 ML/M2
LEFT VENTRICLE SYSTOLIC VOLUME: 74.2 ML
LEFT VENTRICULAR INTERNAL DIMENSION IN DIASTOLE: 5.9 CM (ref 3.5–6)
LEFT VENTRICULAR MASS: 255.71 G
LV LATERAL E/E' RATIO: 8.11 M/S
LV SEPTAL E/E' RATIO: 12.17 M/S
LVOT MG: 4 MMHG
LVOT MV: 0.94 CM/S
LYMPHOCYTES # BLD AUTO: 2.27 X10(3)/MCL (ref 0.6–4.6)
LYMPHOCYTES NFR BLD AUTO: 29.8 %
MCH RBC QN AUTO: 33 PG (ref 27–31)
MCHC RBC AUTO-ENTMCNC: 34.2 G/DL (ref 33–36)
MCV RBC AUTO: 96.6 FL (ref 80–94)
MONOCYTES # BLD AUTO: 0.78 X10(3)/MCL (ref 0.1–1.3)
MONOCYTES NFR BLD AUTO: 10.2 %
MV PEAK A VEL: 1.2 M/S
MV PEAK E VEL: 0.73 M/S
NEUTROPHILS # BLD AUTO: 4.29 X10(3)/MCL (ref 2.1–9.2)
NEUTROPHILS NFR BLD AUTO: 56.2 %
NRBC BLD AUTO-RTO: 0 %
OHS QRS DURATION: 108 MS
OHS QTC CALCULATION: 446 MS
PISA AR MAX VEL: 4.86 M/S
PLATELET # BLD AUTO: 193 X10(3)/MCL (ref 130–400)
PMV BLD AUTO: 9.8 FL (ref 7.4–10.4)
POCT GLUCOSE: 136 MG/DL (ref 70–110)
POCT GLUCOSE: 82 MG/DL (ref 70–110)
POTASSIUM SERPL-SCNC: 3.9 MMOL/L (ref 3.5–5.1)
PROT SERPL-MCNC: 6.9 GM/DL (ref 5.8–7.6)
RBC # BLD AUTO: 4.09 X10(6)/MCL (ref 4.7–6.1)
SARS-COV-2 RNA RESP QL NAA+PROBE: NOT DETECTED
SODIUM SERPL-SCNC: 143 MMOL/L (ref 136–145)
TDI LATERAL: 0.09 M/S
TDI SEPTAL: 0.06 M/S
TDI: 0.08 M/S
TRICUSPID ANNULAR PLANE SYSTOLIC EXCURSION: 3.51 CM
TROPONIN I SERPL-MCNC: 0.04 NG/ML (ref 0–0.04)
TROPONIN I SERPL-MCNC: 0.05 NG/ML (ref 0–0.04)
WBC # SPEC AUTO: 7.63 X10(3)/MCL (ref 4.5–11.5)
Z-SCORE OF LEFT VENTRICULAR DIMENSION IN END DIASTOLE: -5.1
Z-SCORE OF LEFT VENTRICULAR DIMENSION IN END SYSTOLE: -2.89

## 2024-02-16 PROCEDURE — 25500020 PHARM REV CODE 255

## 2024-02-16 PROCEDURE — 84484 ASSAY OF TROPONIN QUANT: CPT | Performed by: NURSE PRACTITIONER

## 2024-02-16 PROCEDURE — 93005 ELECTROCARDIOGRAM TRACING: CPT

## 2024-02-16 PROCEDURE — 21400001 HC TELEMETRY ROOM

## 2024-02-16 PROCEDURE — 84484 ASSAY OF TROPONIN QUANT: CPT | Performed by: EMERGENCY MEDICINE

## 2024-02-16 PROCEDURE — 99285 EMERGENCY DEPT VISIT HI MDM: CPT | Mod: 25

## 2024-02-16 PROCEDURE — 85025 COMPLETE CBC W/AUTO DIFF WBC: CPT | Performed by: EMERGENCY MEDICINE

## 2024-02-16 PROCEDURE — 63600175 PHARM REV CODE 636 W HCPCS

## 2024-02-16 PROCEDURE — 25000003 PHARM REV CODE 250: Performed by: INTERNAL MEDICINE

## 2024-02-16 PROCEDURE — 63600175 PHARM REV CODE 636 W HCPCS: Performed by: PHYSICIAN ASSISTANT

## 2024-02-16 PROCEDURE — 87040 BLOOD CULTURE FOR BACTERIA: CPT | Performed by: INTERNAL MEDICINE

## 2024-02-16 PROCEDURE — 83880 ASSAY OF NATRIURETIC PEPTIDE: CPT | Performed by: EMERGENCY MEDICINE

## 2024-02-16 PROCEDURE — 0240U COVID/FLU A&B PCR: CPT | Performed by: EMERGENCY MEDICINE

## 2024-02-16 PROCEDURE — 80053 COMPREHEN METABOLIC PANEL: CPT | Performed by: EMERGENCY MEDICINE

## 2024-02-16 PROCEDURE — 93010 ELECTROCARDIOGRAM REPORT: CPT | Mod: ,,, | Performed by: INTERNAL MEDICINE

## 2024-02-16 RX ORDER — NAPROXEN SODIUM 220 MG/1
81 TABLET, FILM COATED ORAL DAILY
Status: DISCONTINUED | OUTPATIENT
Start: 2024-02-17 | End: 2024-02-18 | Stop reason: HOSPADM

## 2024-02-16 RX ORDER — SODIUM CHLORIDE 0.9 % (FLUSH) 0.9 %
10 SYRINGE (ML) INJECTION EVERY 12 HOURS PRN
Status: DISCONTINUED | OUTPATIENT
Start: 2024-02-16 | End: 2024-02-18 | Stop reason: HOSPADM

## 2024-02-16 RX ORDER — FUROSEMIDE 10 MG/ML
40 INJECTION INTRAMUSCULAR; INTRAVENOUS
Status: DISCONTINUED | OUTPATIENT
Start: 2024-02-17 | End: 2024-02-18 | Stop reason: HOSPADM

## 2024-02-16 RX ORDER — LEVOTHYROXINE SODIUM 50 UG/1
50 TABLET ORAL
Status: DISCONTINUED | OUTPATIENT
Start: 2024-02-17 | End: 2024-02-18 | Stop reason: HOSPADM

## 2024-02-16 RX ORDER — CARVEDILOL 3.12 MG/1
3.12 TABLET ORAL 2 TIMES DAILY
Status: DISCONTINUED | OUTPATIENT
Start: 2024-02-16 | End: 2024-02-18 | Stop reason: HOSPADM

## 2024-02-16 RX ORDER — ACETAMINOPHEN 325 MG/1
650 TABLET ORAL EVERY 8 HOURS PRN
Status: DISCONTINUED | OUTPATIENT
Start: 2024-02-16 | End: 2024-02-18 | Stop reason: HOSPADM

## 2024-02-16 RX ORDER — ONDANSETRON HYDROCHLORIDE 2 MG/ML
4 INJECTION, SOLUTION INTRAVENOUS EVERY 4 HOURS PRN
Status: DISCONTINUED | OUTPATIENT
Start: 2024-02-16 | End: 2024-02-18 | Stop reason: HOSPADM

## 2024-02-16 RX ORDER — IBUPROFEN 200 MG
16 TABLET ORAL
Status: DISCONTINUED | OUTPATIENT
Start: 2024-02-16 | End: 2024-02-18 | Stop reason: HOSPADM

## 2024-02-16 RX ORDER — IBUPROFEN 200 MG
24 TABLET ORAL
Status: DISCONTINUED | OUTPATIENT
Start: 2024-02-16 | End: 2024-02-18 | Stop reason: HOSPADM

## 2024-02-16 RX ORDER — HYDRALAZINE HYDROCHLORIDE 20 MG/ML
10 INJECTION INTRAMUSCULAR; INTRAVENOUS
Status: DISCONTINUED | OUTPATIENT
Start: 2024-02-16 | End: 2024-02-18 | Stop reason: HOSPADM

## 2024-02-16 RX ORDER — LOSARTAN POTASSIUM 25 MG/1
25 TABLET ORAL DAILY
Status: DISCONTINUED | OUTPATIENT
Start: 2024-02-17 | End: 2024-02-18 | Stop reason: HOSPADM

## 2024-02-16 RX ORDER — ENOXAPARIN SODIUM 100 MG/ML
40 INJECTION SUBCUTANEOUS EVERY 24 HOURS
Status: DISCONTINUED | OUTPATIENT
Start: 2024-02-16 | End: 2024-02-18 | Stop reason: HOSPADM

## 2024-02-16 RX ORDER — IBUPROFEN 200 MG
16 TABLET ORAL
Status: DISCONTINUED | OUTPATIENT
Start: 2024-02-16 | End: 2024-02-16

## 2024-02-16 RX ORDER — FUROSEMIDE 10 MG/ML
40 INJECTION INTRAMUSCULAR; INTRAVENOUS
Status: COMPLETED | OUTPATIENT
Start: 2024-02-16 | End: 2024-02-16

## 2024-02-16 RX ORDER — GLUCAGON 1 MG
1 KIT INJECTION
Status: DISCONTINUED | OUTPATIENT
Start: 2024-02-16 | End: 2024-02-16

## 2024-02-16 RX ORDER — ATORVASTATIN CALCIUM 40 MG/1
40 TABLET, FILM COATED ORAL DAILY
Status: DISCONTINUED | OUTPATIENT
Start: 2024-02-17 | End: 2024-02-18 | Stop reason: HOSPADM

## 2024-02-16 RX ORDER — HYDRALAZINE HYDROCHLORIDE 20 MG/ML
10 INJECTION INTRAMUSCULAR; INTRAVENOUS
Status: DISCONTINUED | OUTPATIENT
Start: 2024-02-16 | End: 2024-02-16

## 2024-02-16 RX ORDER — LEVALBUTEROL INHALATION SOLUTION 0.63 MG/3ML
0.63 SOLUTION RESPIRATORY (INHALATION) EVERY 8 HOURS PRN
Status: DISCONTINUED | OUTPATIENT
Start: 2024-02-16 | End: 2024-02-18 | Stop reason: HOSPADM

## 2024-02-16 RX ORDER — GLUCAGON 1 MG
1 KIT INJECTION
Status: DISCONTINUED | OUTPATIENT
Start: 2024-02-16 | End: 2024-02-18 | Stop reason: HOSPADM

## 2024-02-16 RX ORDER — INSULIN ASPART 100 [IU]/ML
0-10 INJECTION, SOLUTION INTRAVENOUS; SUBCUTANEOUS
Status: DISCONTINUED | OUTPATIENT
Start: 2024-02-16 | End: 2024-02-18 | Stop reason: HOSPADM

## 2024-02-16 RX ORDER — IBUPROFEN 200 MG
24 TABLET ORAL
Status: DISCONTINUED | OUTPATIENT
Start: 2024-02-16 | End: 2024-02-16

## 2024-02-16 RX ORDER — ACETAMINOPHEN 325 MG/1
650 TABLET ORAL EVERY 4 HOURS PRN
Status: DISCONTINUED | OUTPATIENT
Start: 2024-02-16 | End: 2024-02-18 | Stop reason: HOSPADM

## 2024-02-16 RX ADMIN — IOPAMIDOL 100 ML: 755 INJECTION, SOLUTION INTRAVENOUS at 01:02

## 2024-02-16 RX ADMIN — FUROSEMIDE 40 MG: 10 INJECTION, SOLUTION INTRAMUSCULAR; INTRAVENOUS at 03:02

## 2024-02-16 RX ADMIN — CARVEDILOL 3.12 MG: 3.12 TABLET, FILM COATED ORAL at 09:02

## 2024-02-16 RX ADMIN — ENOXAPARIN SODIUM 40 MG: 40 INJECTION SUBCUTANEOUS at 05:02

## 2024-02-16 NOTE — PROGRESS NOTES
[facility]    Cardiology  Consult Note    Patient Name: Arturo Ortiz  MRN: [id number]  Admission Date: 2/16/2024  Hospital Length of Stay: 0 days  Code Status: Prior   Attending Provider: No att. providers found   Consulting Provider: LATIA Doe  Primary Care Physician: CHARISSE Wallace Jr., MD  Principal Problem:<principal problem not specified>    Patient information was obtained from patient, past medical records, ER records, and primary team.     Subjective:   Chief Complaint/Reason for Consult:  CP/SOB    HPI:   Mr. Ortiz is a 68 year old male, known to Dr. Patel dating back to 2018, who presented to the hospital with report of SOB, worse over the last week. Patient also reported orthopnea and SOB with exertion. Intermittent episodes of CP. Initial troponin normal. BNP noted to be 732. Patient negative for acute Influenza and COVID-19 Infections. CT Angiogram revealed no evidence of Pulmonary Embolism but did reveal evidence of suspected pulmonary edema. CXR revealed no acute findings. EKG revealed SR with nonspecific T Wave abnormalities. [facility] consulted for cardiac evaluation and management.    PMH: Hypertension, DM II, Hyperlipidemia, Avascular Necrosis Right Femoral Head, Macrocytosis, CELESTINA, Near Syncope, CP, MO, DM II, Hyperlipidemia, OA, Peripheral Neuropathy, VHD/AS (Mild), AI (Moderate to Severe), MR (Mild to Moderate), Chronic Diastolic Dysfunction, Endocarditis (Aortic Valve Vegetation on Noncoronary Cusp), History of COVID-19 Infection (12.20.23), Sepsis (Aortic Valve Endocarditis) (December 2023)  PSH: Cataract Extraction, Eye Surgery, Joint Replacement, Total Hip Arthroplasty  Family History: Sister- Breast Cancer, Father- Hodgkin's Lymphoma, Mother- Lung Cancer, Sister- Sister  Social History: Tobacco- Negative, Alcohol- Occasional Use, Substance Abuse- Negative    Previous Cardiac Diagnostics:   Echocardiogram (2.1.24):  Left Ventricle: The left  ventricle is normal in size. Mildly increased wall thickness. Normal wall motion. There is normal systolic function with a visually estimated ejection fraction of 55%. Grade I diastolic dysfunction.  Right Ventricle: Normal right ventricular cavity size. Systolic function is normal. TAPSE is 2.87 cm.  Left Atrium: Left atrium is moderately dilated.  Right Atrium: Right atrium is mildly dilated.  Aortic Valve: There is moderate aortic valve sclerosis. Mildly restricted motion. There is mild stenosis. Aortic valve area by VTI is 2.08 cm². Aortic valve peak velocity is 2.59 m/s. Mean gradient is 15 mmHg. The dimensionless index is 0.60. There is moderate to severe aortic regurgitation.  Mitral Valve: Moderately calcified posterior leaflet. Chordae calcification present. Mildly restricted motion. There is no stenosis. The mean pressure gradient across the mitral valve is 3 mmHg at a heart rate of 66 bpm. There is mild to moderate regurgitation.  Tricuspid Valve: The tricuspid valve is structurally normal. There is trace regurgitation.    CV US Venous Right Arm (1.29.24):  There was no evidence of deep or superficial vein thrombosis in the right upper extremity.     QUENTIN (12.27.23):  Left Ventricle: The left ventricle is normal in size. Normal wall thickness. Normal wall motion. There is normal systolic function with a visually estimated ejection fraction of 60 - 65%.  Right Ventricle: Normal right ventricular cavity size.  Left Atrium: Left atrium is mildly dilated.  Aortic Valve: The aortic valve is a trileaflet valve. There is a small mobile echogenic irregular mass present on the noncoronary cusp consistent with vegetation.  Mitral Valve: There is mild regurgitation.  Pulmonic Valve: There is mild regurgitation.    CV US Venous Legs (12.24.23):  There was no evidence of deep or superficial vein thrombosis in bilateral lower extremities.      MPI (9.18.18):  Low Risk. No Perfusion Defects.   No Evidence of Ischemia.      Review of patient's allergies indicates:  No Known Allergies  No current facility-administered medications on file prior to encounter.     Current Outpatient Medications on File Prior to Encounter   Medication Sig    albuterol (PROVENTIL/VENTOLIN HFA) 90 mcg/actuation inhaler Inhale 2 puffs into the lungs every 4 (four) hours as needed for Wheezing.    cefTRIAXone (ROCEPHIN) 10 gram injection     fluconazole (DIFLUCAN) 150 MG Tab Take 1 tablet (150 mg total) by mouth every 72 hours as needed (itching). (Patient not taking: Reported on 2/7/2024)    furosemide (LASIX) 20 MG tablet Take 1 tablet (20 mg total) by mouth 2 (two) times daily.    heparin, PORCINE, (HEPARIN, PORCINE,)     levothyroxine (SYNTHROID) 50 MCG tablet Take 1 tablet (50 mcg total) by mouth before breakfast.    multivitamin (ONE DAILY MULTIVITAMIN) per tablet Take 1 tablet by mouth once daily.     Review of Systems   Respiratory:  Positive for shortness of breath. Negative for chest tightness.         Orthopnea   Cardiovascular:  Positive for chest pain.        Described as Anterior Dullness while SOB.   All other systems reviewed and are negative.    Objective:     Vital Signs (Most Recent):  Temp: 97.7 °F (36.5 °C) (02/16/24 0641)  Pulse: 69 (02/16/24 1239)  Resp: 16 (02/16/24 1239)  BP: (!) 174/66 (02/16/24 1239)  SpO2: 97 % (02/16/24 1239) Vital Signs (24h Range):  Temp:  [97.7 °F (36.5 °C)] 97.7 °F (36.5 °C)  Pulse:  [61-69] 69  Resp:  [15-18] 16  SpO2:  [97 %-98 %] 97 %  BP: (161-188)/(59-66) 174/66   Weight: 120.2 kg (265 lb)  Body mass index is 38.02 kg/m².  SpO2: 97 %     No intake or output data in the 24 hours ending 02/16/24 1440  Lines/Drains/Airways       Peripherally Inserted Central Catheter Line  Duration             PICC Double Lumen 01/02/24 1406 right basilic 45 days              Peripheral Intravenous Line  Duration                  Peripheral IV - Single Lumen 02/16/24 1123 20 G Left Forearm <1 day                   Significant Labs:  Recent Results (from the past 72 hour(s))   EKG 12-lead    Collection Time: 02/16/24  6:43 AM   Result Value Ref Range    QRS Duration 108 ms    [facility] QTC Calculation 446 ms   COVID/FLU A&B PCR    Collection Time: 02/16/24  6:45 AM   Result Value Ref Range    Influenza A PCR Not Detected Not Detected    Influenza B PCR Not Detected Not Detected    SARS-CoV-2 PCR Not Detected Not Detected, Negative   Comprehensive metabolic panel    Collection Time: 02/16/24  7:46 AM   Result Value Ref Range    Sodium Level 143 136 - 145 mmol/L    Potassium Level 3.9 3.5 - 5.1 mmol/L    Chloride 107 98 - 107 mmol/L    Carbon Dioxide 27 23 - 31 mmol/L    Glucose Level 111 82 - 115 mg/dL    Blood Urea Nitrogen 27.4 (H) 8.4 - 25.7 mg/dL    Creatinine 1.07 0.73 - 1.18 mg/dL    Calcium Level Total 9.2 8.8 - 10.0 mg/dL    Protein Total 6.9 5.8 - 7.6 gm/dL    Albumin Level 3.8 3.4 - 4.8 g/dL    Globulin 3.1 2.4 - 3.5 gm/dL    Albumin/Globulin Ratio 1.2 1.1 - 2.0 ratio    Bilirubin Total 0.5 <=1.5 mg/dL    Alkaline Phosphatase 45 40 - 150 unit/L    Alanine Aminotransferase 21 0 - 55 unit/L    Aspartate Aminotransferase 25 5 - 34 unit/L    eGFR >60 mls/min/1.73/m2   Troponin I    Collection Time: 02/16/24  7:46 AM   Result Value Ref Range    Troponin-I 0.045 0.000 - 0.045 ng/mL   Brain natriuretic peptide    Collection Time: 02/16/24  7:46 AM   Result Value Ref Range    Natriuretic Peptide 732.8 (H) <=100.0 pg/mL   CBC with Differential    Collection Time: 02/16/24  7:46 AM   Result Value Ref Range    WBC 7.63 4.50 - 11.50 x10(3)/mcL    RBC 4.09 (L) 4.70 - 6.10 x10(6)/mcL    Hgb 13.5 (L) 14.0 - 18.0 g/dL    Hct 39.5 (L) 42.0 - 52.0 %    MCV 96.6 (H) 80.0 - 94.0 fL    MCH 33.0 (H) 27.0 - 31.0 pg    MCHC 34.2 33.0 - 36.0 g/dL    RDW 15.6 11.5 - 17.0 %    Platelet 193 130 - 400 x10(3)/mcL    MPV 9.8 7.4 - 10.4 fL    Neut % 56.2 %    Lymph % 29.8 %    Mono % 10.2 %    Eos % 3.0 %    Basophil % 0.5 %    Lymph # 2.27 0.6 - 4.6  x10(3)/mcL    Neut # 4.29 2.1 - 9.2 x10(3)/mcL    Mono # 0.78 0.1 - 1.3 x10(3)/mcL    Eos # 0.23 0 - 0.9 x10(3)/mcL    Baso # 0.04 <=0.2 x10(3)/mcL    IG# 0.02 0 - 0.04 x10(3)/mcL    IG% 0.3 %    NRBC% 0.0 %     Significant Imaging:  Imaging Results              CTA Chest Non-Coronary (PE Studies) (Final result)  Result time 02/16/24 13:27:24      Final result by Ysabel Ventura MD (02/16/24 13:27:24)                   Impression:      1. No evidence of pulmonary embolus  2. Findings most suggestive of interstitial edema.  Correlate with BNP.  Trace pleural effusions.      Electronically signed by: Ysabel Ventura  Date:    02/16/2024  Time:    13:27               Narrative:    EXAMINATION:  CTA CHEST NON CORONARY (PE STUDIES)    CLINICAL HISTORY:  Pulmonary embolism (PE) suspected, neg D-dimer;    TECHNIQUE:  Helically acquired images with axial, sagittal and coronal reformations were obtained from the thoracic inlet to the lung bases followingthe IV administration of contrast.  CTA timed for evaluation of the pulmonary arteries.  MIP images were performed.    Automated tube current modulation, weight-based exposure dosing, and/or iterative reconstruction technique utilized to reach lowest reasonably achievable exposure rate.    DLP: 421 mGy*cm    COMPARISON:  CTA chest 12/24/2023    FINDINGS:  BASE OF NECK: No significant abnormality.    AORTA: Left-sided aortic arch.  No aneurysm and no significant atherosclerosis    PULMONARY VASCULATURE: Pulmonary arteries enhance normally.  No evidence of pulmonary embolus.    HEART: There are calcifications at the mitral valve annulus and papillary muscle.    VIVIAN/MEDIASTINUM: No enlarged lymph nodes by size criteria.    AIRWAYS: Mild bronchial wall thickening.    LUNGS/PLEURA: There small dependently layering pleural effusions.  Mild basilar septal thickening and ground-glass densities.    UPPER ABDOMEN: No abnormality of the partially imaged upper  abdomen.    THORACIC SOFT TISSUES: Unremarkable.    BONES: No acute fracture. No suspicious lytic or sclerotic lesions.                                       X-Ray Chest AP (Final result)  Result time 02/16/24 07:13:28      Final result by Arturo Mcdonald MD (02/16/24 07:13:28)                   Impression:      No acute findings.      Electronically signed by: Arturo Mcdonald  Date:    02/16/2024  Time:    07:13               Narrative:    EXAMINATION:  XR CHEST AP PORTABLE    CLINICAL HISTORY:  Sob;    COMPARISON:  7 February 2024    FINDINGS:  Frontal view of the chest was obtained. The heart is not significantly enlarged.  There is aortic atherosclerosis.  Lungs are grossly clear.  There is no pneumothorax.                                    EKG:       Telemetry:  Sinus Rhythm    Physical Exam  Vitals and nursing note reviewed.   Constitutional:       General: He is not in acute distress.     Appearance: Normal appearance. He is obese. He is not ill-appearing.   HENT:      Head: Normocephalic.      Mouth/Throat:      Mouth: Mucous membranes are moist.      Pharynx: Oropharynx is clear.   Cardiovascular:      Rate and Rhythm: Normal rate and regular rhythm.      Heart sounds: Murmur heard.   Pulmonary:      Effort: Pulmonary effort is normal. No respiratory distress.      Breath sounds: No rales.      Comments: On RA  Abdominal:      Palpations: Abdomen is soft.      Tenderness: There is no guarding.      Comments: Obese Abdomen   Musculoskeletal:         General: Normal range of motion.      Cervical back: Neck supple.      Right lower leg: No edema.      Left lower leg: No edema.      Comments: Bilateral Lower Extremities Warm   Skin:     General: Skin is warm and dry.   Neurological:      General: No focal deficit present.      Mental Status: He is alert and oriented to person, place, and time. Mental status is at baseline.   Psychiatric:         Mood and Affect: Mood normal.         Behavior: Behavior normal.          Thought Content: Thought content normal.         Judgment: Judgment normal.       Home Medications:   No current facility-administered medications on file prior to encounter.     Current Outpatient Medications on File Prior to Encounter   Medication Sig Dispense Refill    albuterol (PROVENTIL/VENTOLIN HFA) 90 mcg/actuation inhaler Inhale 2 puffs into the lungs every 4 (four) hours as needed for Wheezing. 18 g 1    cefTRIAXone (ROCEPHIN) 10 gram injection       fluconazole (DIFLUCAN) 150 MG Tab Take 1 tablet (150 mg total) by mouth every 72 hours as needed (itching). (Patient not taking: Reported on 2/7/2024) 2 tablet 0    furosemide (LASIX) 20 MG tablet Take 1 tablet (20 mg total) by mouth 2 (two) times daily. 30 tablet 0    heparin, PORCINE, (HEPARIN, PORCINE,)       levothyroxine (SYNTHROID) 50 MCG tablet Take 1 tablet (50 mcg total) by mouth before breakfast. 30 tablet 5    multivitamin (ONE DAILY MULTIVITAMIN) per tablet Take 1 tablet by mouth once daily.       Current Inpatient Medications:  No current facility-administered medications for this encounter.    Current Outpatient Medications:     albuterol (PROVENTIL/VENTOLIN HFA) 90 mcg/actuation inhaler, Inhale 2 puffs into the lungs every 4 (four) hours as needed for Wheezing., Disp: 18 g, Rfl: 1    cefTRIAXone (ROCEPHIN) 10 gram injection, , Disp: , Rfl:     fluconazole (DIFLUCAN) 150 MG Tab, Take 1 tablet (150 mg total) by mouth every 72 hours as needed (itching). (Patient not taking: Reported on 2/7/2024), Disp: 2 tablet, Rfl: 0    furosemide (LASIX) 20 MG tablet, Take 1 tablet (20 mg total) by mouth 2 (two) times daily., Disp: 30 tablet, Rfl: 0    heparin, PORCINE, (HEPARIN, PORCINE,), , Disp: , Rfl:     levothyroxine (SYNTHROID) 50 MCG tablet, Take 1 tablet (50 mcg total) by mouth before breakfast., Disp: 30 tablet, Rfl: 5    multivitamin (ONE DAILY MULTIVITAMIN) per tablet, Take 1 tablet by mouth once daily., Disp: , Rfl:   VTE Risk Mitigation (From  admission, onward)      None          Assessment:   Acute on Chronic Diastolic Heart Failure in the Setting of VHD (AI/AS/MR)    - Grade I Diastolic Dysfunction & EF 55-60%    - No Evidence of Pulmonary Embolism & Concern for Pulmonary Edema (CT Imaging on 2.16.24)  Valvular Heart Disease    - AI (Moderate to Severe), AS (Mild), MR (Mild to Moderate)  Chest Pain    - Initial Troponin Normal    - MPI Low Risk with No Perfusion Defects (2018)  Hypertension  Hyperlipidemia  DM II    - Peripheral Neuropathy  OA Right Hip & Right Knee  History of Avascular Necrosis of Right Femoral Head  Elevated BMI/Obesity  Hypothyroidism  History of Sepsis Related to Aortic Valve Endocarditis with Gram Positive Bacteremia (December 2023)    Plan:   Continue Diuretics as Clinically Indicated. Ensure Accurate I/O & Daily Weights.  Check Echocardiogram Re: SOB with known History of VHD  Trend Cardiac Enzymes  Routine Labs in AM    Thank you for your consult.     Scott Handy, ROSANNE  Cardiology  [facility] - Emergency Dept  02/16/2024     Physician addendum:  I have seen and examined this patient as a split-shared visit with the JEFFREY d/t complicated medical management of above problems written in assessment and high acuity requiring physician expertise in medical decision-making. I performed the substantive portion of the history and exam. Above medical decision-making is also formulated by me.    Cardiovascular exam:  S1, S2  Lungs:  fine crackles at bases.  Extremities:  1+ edema bilaterally    Plan:  Trend cardiac markers.  We will do an echocardiogram.  Medications as above      Carter Hsu MD  Cardiologist

## 2024-02-16 NOTE — ED PROVIDER NOTES
"Encounter Date: 2/16/2024       History     Chief Complaint   Patient presents with    Shortness of Breath     C/o SOB worsening over the last week. Pt report saw PCP on Thursday of last week - XR at that time showed "fluid on the L side of my lungs" - Rx'd lasix at the time but no improvement. Pt reports orthopnea, worsening sob w/ exertion, and intermittent episodes of chest pain w/ exertion.     HPI  Review of patient's allergies indicates:  No Known Allergies  Past Medical History:   Diagnosis Date    Avascular necrosis of right femoral head     DM (diabetes mellitus)     Essential (primary) hypertension     Hypercholesteremia     Macrocytosis     Osteoarthritis of right hip     Osteoarthritis of right knee     Peripheral neuropathy      Past Surgical History:   Procedure Laterality Date    EYE SURGERY      JOINT REPLACEMENT      Hip replacement    TOTAL HIP ARTHROPLASTY Right 05/18/2021     Family History   Problem Relation Age of Onset    Lung cancer Mother         Smoker    Hodgkin's lymphoma Father     Lung cancer Sister     Breast cancer Sister      Social History     Tobacco Use    Smoking status: Never    Smokeless tobacco: Never   Substance Use Topics    Alcohol use: Not Currently     Comment: occ    Drug use: Never     Review of Systems    Physical Exam     Initial Vitals [02/16/24 0641]   BP Pulse Resp Temp SpO2   (!) 161/61 64 18 97.7 °F (36.5 °C) 97 %      MAP       --         Physical Exam    ED Course   Procedures  Labs Reviewed   COMPREHENSIVE METABOLIC PANEL - Abnormal; Notable for the following components:       Result Value    Blood Urea Nitrogen 27.4 (*)     All other components within normal limits   B-TYPE NATRIURETIC PEPTIDE - Abnormal; Notable for the following components:    Natriuretic Peptide 732.8 (*)     All other components within normal limits   CBC WITH DIFFERENTIAL - Abnormal; Notable for the following components:    RBC 4.09 (*)     Hgb 13.5 (*)     Hct 39.5 (*)     MCV 96.6 (*)  "    MCH 33.0 (*)     All other components within normal limits   TROPONIN I - Normal   COVID/FLU A&B PCR - Normal    Narrative:     The Xpert Xpress SARS-CoV-2/FLU/RSV plus is a rapid, multiplexed real-time PCR test intended for the simultaneous qualitative detection and differentiation of SARS-CoV-2, Influenza A, Influenza B, and respiratory syncytial virus (RSV) viral RNA in either nasopharyngeal swab or nasal swab specimens.         CBC W/ AUTO DIFFERENTIAL    Narrative:     The following orders were created for panel order CBC auto differential.  Procedure                               Abnormality         Status                     ---------                               -----------         ------                     CBC with Differential[[id number]]       Abnormal            Final result                 Please view results for these tests on the individual orders.        ECG Results              EKG 12-lead (Final result)        Collection Time Result Time QRS Duration [facility] QTC Calculation    02/16/24 06:43:06 02/16/24 08:38:43 108 446                     Final result by Interface, Lab In [facility] (02/16/24 08:38:49)                   Narrative:    Test Reason : R06.02,    Vent. Rate : 068 BPM     Atrial Rate : 068 BPM     P-R Int : 164 ms          QRS Dur : 108 ms      QT Int : 420 ms       P-R-T Axes : 062 026 144 degrees     QTc Int : 446 ms    Normal sinus rhythm  T wave abnormality, consider lateral ischemia  Abnormal ECG  Confirmed by Jimenez Cruz MD ([id number]) on 2/16/2024 8:38:39 AM    Referred By: [id number]   SELF           Confirmed By:Jimenez Cruz MD                                  Imaging Results              CTA Chest Non-Coronary (PE Studies) (Final result)  Result time 02/16/24 13:27:24      Final result by Ysabel Ventura MD (02/16/24 13:27:24)                   Impression:      1. No evidence of pulmonary embolus  2. Findings most suggestive of interstitial edema.  Correlate with BNP.  Trace  pleural effusions.      Electronically signed by: Ysabel Ventura  Date:    02/16/2024  Time:    13:27               Narrative:    EXAMINATION:  CTA CHEST NON CORONARY (PE STUDIES)    CLINICAL HISTORY:  Pulmonary embolism (PE) suspected, neg D-dimer;    TECHNIQUE:  Helically acquired images with axial, sagittal and coronal reformations were obtained from the thoracic inlet to the lung bases followingthe IV administration of contrast.  CTA timed for evaluation of the pulmonary arteries.  MIP images were performed.    Automated tube current modulation, weight-based exposure dosing, and/or iterative reconstruction technique utilized to reach lowest reasonably achievable exposure rate.    DLP: 421 mGy*cm    COMPARISON:  CTA chest 12/24/2023    FINDINGS:  BASE OF NECK: No significant abnormality.    AORTA: Left-sided aortic arch.  No aneurysm and no significant atherosclerosis    PULMONARY VASCULATURE: Pulmonary arteries enhance normally.  No evidence of pulmonary embolus.    HEART: There are calcifications at the mitral valve annulus and papillary muscle.    VIVIAN/MEDIASTINUM: No enlarged lymph nodes by size criteria.    AIRWAYS: Mild bronchial wall thickening.    LUNGS/PLEURA: There small dependently layering pleural effusions.  Mild basilar septal thickening and ground-glass densities.    UPPER ABDOMEN: No abnormality of the partially imaged upper abdomen.    THORACIC SOFT TISSUES: Unremarkable.    BONES: No acute fracture. No suspicious lytic or sclerotic lesions.                                       X-Ray Chest AP (Final result)  Result time 02/16/24 07:13:28      Final result by Arturo Mcdonald MD (02/16/24 07:13:28)                   Impression:      No acute findings.      Electronically signed by: Arturo Mcdonald  Date:    02/16/2024  Time:    07:13               Narrative:    EXAMINATION:  XR CHEST AP PORTABLE    CLINICAL HISTORY:  Sob;    COMPARISON:  7 February 2024    FINDINGS:  Frontal view of the chest was  obtained. The heart is not significantly enlarged.  There is aortic atherosclerosis.  Lungs are grossly clear.  There is no pneumothorax.                                       Medications   iopamidoL (ISOVUE-370) injection 100 mL (100 mLs Intravenous Given 2/16/24 1317)     Medical Decision Making  Amount and/or Complexity of Data Reviewed  Labs: ordered.  Radiology: ordered.              Attending Attestation:   Physician Attestation Statement for Resident:  As the supervising MD   Physician Attestation Statement: I have personally seen and examined this patient.   I agree with the above history.  -:   As the supervising MD I agree with the above PE.     As the supervising MD I agree with the above treatment, course, plan, and disposition.   -: Patient failing outpatient treatment for his orthopnea, dyspnea on exertion.  Treated with inhaler as well as diuretics with no improvement anemia slight worsening.  Elevated BNP consistent with failure.  Obtain a CT scan to rule out PE, but regardless patient will need to be admitted with Cardiology consultation   I have reviewed and agree with the residents interpretation of the following: x-rays and lab data.  I have reviewed the following: old EKGs.                                       Clinical Impression:  Final diagnoses:  [R06.02] SOB (shortness of breath)                 Josué Plunkett MD  02/16/24 1349       Josué Plunkett MD  02/16/24 1408

## 2024-02-16 NOTE — ED PROVIDER NOTES
"Encounter Date: 2/16/2024       History     Chief Complaint   Patient presents with    Shortness of Breath     C/o SOB worsening over the last week. Pt report saw PCP on Thursday of last week - XR at that time showed "fluid on the L side of my lungs" - Rx'd lasix at the time but no improvement. Pt reports orthopnea, worsening sob w/ exertion, and intermittent episodes of chest pain w/ exertion.     68 year old male with a hx of DM2, HLD, endocarditis (aortic valve vegetation) who presents today with worsening SOB x 1 week. Pt recently saw PCP on Thursday for similar complaint, CXR ordered showed small L sided pleural effusion and was sent home on lasix. He reports no improvement in symptoms, endorsing orthopnea and intermittent chest pressure when laying flat. He denies chest PND, fever, chills, N/V, and abdominal pain.    The history is provided by the patient and the spouse.     Review of patient's allergies indicates:  No Known Allergies  Past Medical History:   Diagnosis Date    Avascular necrosis of right femoral head     DM (diabetes mellitus)     Essential (primary) hypertension     Hypercholesteremia     Macrocytosis     Osteoarthritis of right hip     Osteoarthritis of right knee     Peripheral neuropathy      Past Surgical History:   Procedure Laterality Date    EYE SURGERY      JOINT REPLACEMENT      Hip replacement    TOTAL HIP ARTHROPLASTY Right 05/18/2021     Family History   Problem Relation Age of Onset    Lung cancer Mother         Smoker    Hodgkin's lymphoma Father     Lung cancer Sister     Breast cancer Sister      Social History     Tobacco Use    Smoking status: Never    Smokeless tobacco: Never   Substance Use Topics    Alcohol use: Not Currently     Comment: occ    Drug use: Never     Review of Systems   Constitutional:  Negative for appetite change, chills and fever.   Respiratory:  Positive for shortness of breath.    Cardiovascular:  Negative for chest pain, palpitations and leg swelling. "   Gastrointestinal:  Negative for abdominal pain, nausea and vomiting.   Neurological:  Negative for dizziness, syncope and light-headedness.       Physical Exam     Initial Vitals [02/16/24 0641]   BP Pulse Resp Temp SpO2   (!) 161/61 64 18 97.7 °F (36.5 °C) 97 %      MAP       --         Physical Exam    Constitutional: He appears well-developed. No distress.   HENT:   Head: Normocephalic and atraumatic.   Eyes: Pupils are equal, round, and reactive to light.   Neck: No JVD present.   Cardiovascular:  Regular rhythm and intact distal pulses.     Exam reveals no gallop and no friction rub.       Murmur heard.  RIS: 2-3/6 systolic murmur   Pulmonary/Chest: No respiratory distress. He has no wheezes. He has no rhonchi. He has rales.   Abdominal: He exhibits no distension. There is no abdominal tenderness. There is no rebound.   Musculoskeletal:         General: No edema.     Neurological: He is alert and oriented to person, place, and time. GCS score is 15. GCS eye subscore is 4. GCS verbal subscore is 5. GCS motor subscore is 6.   Skin: Skin is warm.         ED Course   Procedures  Labs Reviewed   COMPREHENSIVE METABOLIC PANEL - Abnormal; Notable for the following components:       Result Value    Blood Urea Nitrogen 27.4 (*)     All other components within normal limits   B-TYPE NATRIURETIC PEPTIDE - Abnormal; Notable for the following components:    Natriuretic Peptide 732.8 (*)     All other components within normal limits   CBC WITH DIFFERENTIAL - Abnormal; Notable for the following components:    RBC 4.09 (*)     Hgb 13.5 (*)     Hct 39.5 (*)     MCV 96.6 (*)     MCH 33.0 (*)     All other components within normal limits   TROPONIN I - Normal   COVID/FLU A&B PCR - Normal    Narrative:     The Xpert Xpress SARS-CoV-2/FLU/RSV plus is a rapid, multiplexed real-time PCR test intended for the simultaneous qualitative detection and differentiation of SARS-CoV-2, Influenza A, Influenza B, and respiratory syncytial  virus (RSV) viral RNA in either nasopharyngeal swab or nasal swab specimens.         CBC W/ AUTO DIFFERENTIAL    Narrative:     The following orders were created for panel order CBC auto differential.  Procedure                               Abnormality         Status                     ---------                               -----------         ------                     CBC with Differential[[id number]]       Abnormal            Final result                 Please view results for these tests on the individual orders.     EKG Readings: (Independently Interpreted)   Initial Reading: No STEMI. Rhythm: Normal Sinus Rhythm. Ectopy: No Ectopy.   T wave inversion in I, V5, V6 which was not present in prior EKG     ECG Results              EKG 12-lead (Final result)        Collection Time Result Time QRS Duration OHS QTC Calculation    02/16/24 06:43:06 02/16/24 08:38:43 108 446                     Final result by Interface, Lab In [facility] (02/16/24 08:38:49)                   Narrative:    Test Reason : R06.02,    Vent. Rate : 068 BPM     Atrial Rate : 068 BPM     P-R Int : 164 ms          QRS Dur : 108 ms      QT Int : 420 ms       P-R-T Axes : 062 026 144 degrees     QTc Int : 446 ms    Normal sinus rhythm  T wave abnormality, consider lateral ischemia  Abnormal ECG  Confirmed by Jimenez Cruz MD ([id number]) on 2/16/2024 8:38:39 AM    Referred By: MARCELA   SELF           Confirmed By:Jimenez Cruz MD                                  Imaging Results              CTA Chest Non-Coronary (PE Studies) (Final result)  Result time 02/16/24 13:27:24      Final result by Ysabel Ventura MD (02/16/24 13:27:24)                   Impression:      1. No evidence of pulmonary embolus  2. Findings most suggestive of interstitial edema.  Correlate with BNP.  Trace pleural effusions.      Electronically signed by: Ysabel Ventura  Date:    02/16/2024  Time:    13:27               Narrative:    EXAMINATION:  CTA CHEST NON  CORONARY (PE STUDIES)    CLINICAL HISTORY:  Pulmonary embolism (PE) suspected, neg D-dimer;    TECHNIQUE:  Helically acquired images with axial, sagittal and coronal reformations were obtained from the thoracic inlet to the lung bases followingthe IV administration of contrast.  CTA timed for evaluation of the pulmonary arteries.  MIP images were performed.    Automated tube current modulation, weight-based exposure dosing, and/or iterative reconstruction technique utilized to reach lowest reasonably achievable exposure rate.    DLP: 421 mGy*cm    COMPARISON:  CTA chest 12/24/2023    FINDINGS:  BASE OF NECK: No significant abnormality.    AORTA: Left-sided aortic arch.  No aneurysm and no significant atherosclerosis    PULMONARY VASCULATURE: Pulmonary arteries enhance normally.  No evidence of pulmonary embolus.    HEART: There are calcifications at the mitral valve annulus and papillary muscle.    VIVIAN/MEDIASTINUM: No enlarged lymph nodes by size criteria.    AIRWAYS: Mild bronchial wall thickening.    LUNGS/PLEURA: There small dependently layering pleural effusions.  Mild basilar septal thickening and ground-glass densities.    UPPER ABDOMEN: No abnormality of the partially imaged upper abdomen.    THORACIC SOFT TISSUES: Unremarkable.    BONES: No acute fracture. No suspicious lytic or sclerotic lesions.                                       X-Ray Chest AP (Final result)  Result time 02/16/24 07:13:28      Final result by Arturo Mcdonald MD (02/16/24 07:13:28)                   Impression:      No acute findings.      Electronically signed by: Arturo Mcdonald  Date:    02/16/2024  Time:    07:13               Narrative:    EXAMINATION:  XR CHEST AP PORTABLE    CLINICAL HISTORY:  Sob;    COMPARISON:  7 February 2024    FINDINGS:  Frontal view of the chest was obtained. The heart is not significantly enlarged.  There is aortic atherosclerosis.  Lungs are grossly clear.  There is no pneumothorax.                                        Medications   hydrALAZINE injection 10 mg (has no administration in time range)   iopamidoL (ISOVUE-370) injection 100 mL (100 mLs Intravenous Given 2/16/24 1317)     Medical Decision Making  Amount and/or Complexity of Data Reviewed  Radiology: ordered.    Risk  Prescription drug management.                          Medical Decision Making:   Initial Assessment:   68 year old male with a hx of DM2, HLD, endocarditis who presents today with worsening SOB x 1 week.   Differential Diagnosis:   New onset CHF, pleural effusion, Pulmonary embolism  Clinical Tests:   Lab Tests: Ordered and Reviewed  The following lab test(s) were unremarkable: CBC, CMP, BNP and Troponin  Radiological Study: Ordered and Reviewed  ED Management:  Labs remarkable for macrocytic anemia, elevated BNP, negative troponin and CTA PE. EKG showing new T wave inversion in the in lateral leads. Previous echo reviewed showing moderate to severe aortic valve regurgitaion. CIS consulted. Admit to hospital medicine.  Other:   I have discussed this case with another health care provider.             Clinical Impression:  Final diagnoses:  [R06.02] SOB (shortness of breath)  [I50.20] Systolic congestive heart failure, unspecified HF chronicity (Primary)  [J90] Pleural effusion          ED Disposition Condition    Admit Stable                Luis oMsley MD  Resident  02/16/24 6560

## 2024-02-16 NOTE — ED TRIAGE NOTES
"C/o SOB worsening over the last week. Pt report saw PCP on Thursday of last week - XR at that time showed "fluid on the L side of my lungs" - Rx'd lasix at the time but no improvement. Pt reports orthopnea, worsening sob w/ exertion, and intermittent episodes of chest pain w/ exertion.  "

## 2024-02-17 LAB
ALBUMIN SERPL-MCNC: 3.7 G/DL (ref 3.4–4.8)
ALBUMIN/GLOB SERPL: 1.3 RATIO (ref 1.1–2)
ALP SERPL-CCNC: 44 UNIT/L (ref 40–150)
ALT SERPL-CCNC: 19 UNIT/L (ref 0–55)
AST SERPL-CCNC: 27 UNIT/L (ref 5–34)
BASOPHILS # BLD AUTO: 0.05 X10(3)/MCL
BASOPHILS NFR BLD AUTO: 0.7 %
BILIRUB SERPL-MCNC: 0.7 MG/DL
BUN SERPL-MCNC: 20.4 MG/DL (ref 8.4–25.7)
CALCIUM SERPL-MCNC: 9.1 MG/DL (ref 8.8–10)
CHLORIDE SERPL-SCNC: 105 MMOL/L (ref 98–107)
CO2 SERPL-SCNC: 27 MMOL/L (ref 23–31)
CREAT SERPL-MCNC: 0.94 MG/DL (ref 0.73–1.18)
EOSINOPHIL # BLD AUTO: 0.29 X10(3)/MCL (ref 0–0.9)
EOSINOPHIL NFR BLD AUTO: 4.2 %
ERYTHROCYTE [DISTWIDTH] IN BLOOD BY AUTOMATED COUNT: 15.2 % (ref 11.5–17)
EST. AVERAGE GLUCOSE BLD GHB EST-MCNC: 82.5 MG/DL
GFR SERPLBLD CREATININE-BSD FMLA CKD-EPI: >60 MLS/MIN/1.73/M2
GLOBULIN SER-MCNC: 2.9 GM/DL (ref 2.4–3.5)
GLUCOSE SERPL-MCNC: 95 MG/DL (ref 82–115)
HBA1C MFR BLD: 4.5 %
HCT VFR BLD AUTO: 40.3 % (ref 42–52)
HGB BLD-MCNC: 13.8 G/DL (ref 14–18)
IMM GRANULOCYTES # BLD AUTO: 0.02 X10(3)/MCL (ref 0–0.04)
IMM GRANULOCYTES NFR BLD AUTO: 0.3 %
LYMPHOCYTES # BLD AUTO: 2.52 X10(3)/MCL (ref 0.6–4.6)
LYMPHOCYTES NFR BLD AUTO: 36.3 %
MAGNESIUM SERPL-MCNC: 2 MG/DL (ref 1.6–2.6)
MCH RBC QN AUTO: 32.5 PG (ref 27–31)
MCHC RBC AUTO-ENTMCNC: 34.2 G/DL (ref 33–36)
MCV RBC AUTO: 95 FL (ref 80–94)
MONOCYTES # BLD AUTO: 0.68 X10(3)/MCL (ref 0.1–1.3)
MONOCYTES NFR BLD AUTO: 9.8 %
NEUTROPHILS # BLD AUTO: 3.39 X10(3)/MCL (ref 2.1–9.2)
NEUTROPHILS NFR BLD AUTO: 48.7 %
NRBC BLD AUTO-RTO: 0 %
PLATELET # BLD AUTO: 221 X10(3)/MCL (ref 130–400)
PMV BLD AUTO: 10 FL (ref 7.4–10.4)
POCT GLUCOSE: 106 MG/DL (ref 70–110)
POCT GLUCOSE: 172 MG/DL (ref 70–110)
POCT GLUCOSE: 88 MG/DL (ref 70–110)
POTASSIUM SERPL-SCNC: 3.8 MMOL/L (ref 3.5–5.1)
PROT SERPL-MCNC: 6.6 GM/DL (ref 5.8–7.6)
RBC # BLD AUTO: 4.24 X10(6)/MCL (ref 4.7–6.1)
SODIUM SERPL-SCNC: 140 MMOL/L (ref 136–145)
TROPONIN I SERPL-MCNC: 0.04 NG/ML (ref 0–0.04)
WBC # SPEC AUTO: 6.95 X10(3)/MCL (ref 4.5–11.5)

## 2024-02-17 PROCEDURE — 25000003 PHARM REV CODE 250: Performed by: INTERNAL MEDICINE

## 2024-02-17 PROCEDURE — 83735 ASSAY OF MAGNESIUM: CPT | Performed by: NURSE PRACTITIONER

## 2024-02-17 PROCEDURE — 85025 COMPLETE CBC W/AUTO DIFF WBC: CPT | Performed by: PHYSICIAN ASSISTANT

## 2024-02-17 PROCEDURE — 80053 COMPREHEN METABOLIC PANEL: CPT | Performed by: PHYSICIAN ASSISTANT

## 2024-02-17 PROCEDURE — 84484 ASSAY OF TROPONIN QUANT: CPT | Mod: 91 | Performed by: NURSE PRACTITIONER

## 2024-02-17 PROCEDURE — 21400001 HC TELEMETRY ROOM

## 2024-02-17 PROCEDURE — 63600175 PHARM REV CODE 636 W HCPCS: Performed by: PHYSICIAN ASSISTANT

## 2024-02-17 PROCEDURE — 83036 HEMOGLOBIN GLYCOSYLATED A1C: CPT | Performed by: INTERNAL MEDICINE

## 2024-02-17 RX ADMIN — ENOXAPARIN SODIUM 40 MG: 40 INJECTION SUBCUTANEOUS at 03:02

## 2024-02-17 RX ADMIN — LEVOTHYROXINE SODIUM 50 MCG: 50 TABLET ORAL at 05:02

## 2024-02-17 RX ADMIN — FUROSEMIDE 40 MG: 10 INJECTION, SOLUTION INTRAMUSCULAR; INTRAVENOUS at 03:02

## 2024-02-17 RX ADMIN — CARVEDILOL 3.12 MG: 3.12 TABLET, FILM COATED ORAL at 09:02

## 2024-02-17 RX ADMIN — CARVEDILOL 3.12 MG: 3.12 TABLET, FILM COATED ORAL at 08:02

## 2024-02-17 RX ADMIN — LOSARTAN POTASSIUM 25 MG: 25 TABLET, FILM COATED ORAL at 08:02

## 2024-02-17 RX ADMIN — ATORVASTATIN CALCIUM 40 MG: 40 TABLET, FILM COATED ORAL at 08:02

## 2024-02-17 RX ADMIN — ASPIRIN 81 MG CHEWABLE TABLET 81 MG: 81 TABLET CHEWABLE at 08:02

## 2024-02-17 NOTE — NURSING
Nurses Note -- 4 Eyes      2/16/2024   6:35 PM      Skin assessed during: Admit      [x] No Altered Skin Integrity Present    []Prevention Measures Documented      [] Yes- Altered Skin Integrity Present or Discovered   [] LDA Added if Not in Epic (Describe Wound)   [] New Altered Skin Integrity was Present on Admit and Documented in LDA   [] Wound Image Taken    Wound Care Consulted? No    Attending Nurse:  Desire Kaur LPN    Second RN/Staff Member:   Celeste Trent RN

## 2024-02-17 NOTE — PROGRESS NOTES
[facility]  Hospital Medicine Progress Note        Chief Complaint: Inpatient Follow-up     HPI:   68 y.o. White male with a past medical history of hypertension, hyperlipidemia, diabetes mellitus type 2, diastolic heart failure grade I. Patient had admission to hospital medicine services from 12/23/2023 to 01/02/2024 for infective aortic valve endocarditis, aerococcus urinae bacteremia and recurrent UTI/right sided pyelonephritis.  Patient completed 6 week course IV Rocephin on 02/09/2024. The patient presented to [facility] on 2/16/2024 with a primary complaint of shortness of breath which has been ongoing for the last week.  Patient reports mostly experiencing dyspnea when lying flat or with exertion.  He was having a nonproductive cough and centralized chest pain which started paroxysmally week ago described as dull in nature without radiation and rated 0/10 on exam.  He denies complaints of fever, chills, nausea, vomiting and diarrhea.  Patient states he was seen by his primary care provider who prescribed him albuterol inhaler and Lasix 20 mg twice a day which he has been compliant with.  Patient was in the restroom and wiping himself with his right hand which had the pulse ox type 2 his finger which he states resulted in a tear near his rectum which was bleeding. Patient is concerned that this will lead to another infection in his blood.      Upon presentation to the ED, temperature 97.7° F, heart rate 64, blood pressure 161/61, respiratory rate 18 and SpO2 97% on room air.  Labs with H&H 13.5/39.5, .8, troponin 0.045.  Influenza A/B and SARS-COV-2 PCR negative.  EKG normal sinus rhythm and T-wave abnormalities considering lateral ischemia.  Chest x-ray with no acute findings.  CTA of the chest negative for pulmonary embolism but findings suggestive of interstitial edema and trace pleural effusions.  In ED patient received 40 mg of IV Lasix.  Patient is admitted to hospital  medicine services for further medical management.       Interval Hx:  Patient feeling much better this morning.  He was currently on room air.  Some mild shortness of breath but improving.  Still with cough.  Good urine output with Lasix.  We discussed the findings of his echocardiogram he was recommendations on aortic valve stenosis    Objective/physical exam:  General: In no acute distress, afebrile  Chest: Clear to auscultation bilaterally  Heart: RRR, +S1, S2, no appreciable murmur  Abdomen: Soft, nontender, BS +  MSK: Warm, no lower extremity edema, no clubbing or cyanosis  Neurologic: Alert and oriented x4, Cranial nerve II-XII intact, Strength 5/5 in all 4 extremities    VITAL SIGNS: 24 HRS MIN & MAX LAST   Temp  Min: 97.4 °F (36.3 °C)  Max: 97.8 °F (36.6 °C) 97.7 °F (36.5 °C)   BP  Min: 143/55  Max: 188/68 (!) 152/75   Pulse  Min: 61  Max: 72  63   Resp  Min: 15  Max: 19 18   SpO2  Min: 92 %  Max: 98 % 97 %       Recent Labs   Lab 02/16/24  0746 02/17/24  0533   WBC 7.63 6.95   RBC 4.09* 4.24*   HGB 13.5* 13.8*   HCT 39.5* 40.3*   MCV 96.6* 95.0*   MCH 33.0* 32.5*   MCHC 34.2 34.2   RDW 15.6 15.2    221   MPV 9.8 10.0       Recent Labs   Lab 02/16/24  0746 02/17/24  0533    140   K 3.9 3.8   CO2 27 27   BUN 27.4* 20.4   CREATININE 1.07 0.94   CALCIUM 9.2 9.1   MG  --  2.00   ALBUMIN 3.8 3.7   ALKPHOS 45 44   ALT 21 19   AST 25 27   BILITOT 0.5 0.7          Microbiology Results (last 7 days)       Procedure Component Value Units Date/Time    Blood Culture [[id number]] Collected: 02/16/24 1920    Order Status: Resulted Specimen: Blood Updated: 02/16/24 2054    Blood Culture [[id number]] Collected: 02/16/24 1920    Order Status: Resulted Specimen: Blood Updated: 02/16/24 2054             Radiology:  Echo    Technically difficult study, Definity contrast was not used in the   study.    Left Ventricle: The left ventricle is moderately dilated. Normal wall   thickness. There is mildly reduced systolic  function with a visually   estimated ejection fraction of 45%. Grade I diastolic dysfunction.    Right Ventricle: Normal right ventricular cavity size. Systolic   function is normal. TAPSE is 3.51 cm.    Left Atrium: Left atrium is dilated.    Aortic Valve: There is severe aortic valve sclerosis. Severely   calcified cusps. Mildly restricted motion. There is mild stenosis. Aortic   valve peak velocity is 2.82 m/s. Mean gradient is 18 mmHg. The   dimensionless index is 0.53. There is moderate to severe aortic   regurgitation.    Mitral Valve: Moderately thickened leaflets. Severely calcified   leaflets. There is moderate mitral annular calcification present.   Moderately restricted motion. There is no stenosis. There is mild   regurgitation.    Tricuspid Valve: There is no stenosis. There is mild regurgitation.    Pulmonic Valve: There is no stenosis. There is no significant   regurgitation.    If clinically indicated a QUENTIN can be helpful to further assess the   severity of AV and MV disease.  CTA Chest Non-Coronary (PE Studies)  Narrative: EXAMINATION:  CTA CHEST NON CORONARY (PE STUDIES)    CLINICAL HISTORY:  Pulmonary embolism (PE) suspected, neg D-dimer;    TECHNIQUE:  Helically acquired images with axial, sagittal and coronal reformations were obtained from the thoracic inlet to the lung bases followingthe IV administration of contrast.  CTA timed for evaluation of the pulmonary arteries.  MIP images were performed.    Automated tube current modulation, weight-based exposure dosing, and/or iterative reconstruction technique utilized to reach lowest reasonably achievable exposure rate.    DLP: 421 mGy*cm    COMPARISON:  CTA chest 12/24/2023    FINDINGS:  BASE OF NECK: No significant abnormality.    AORTA: Left-sided aortic arch.  No aneurysm and no significant atherosclerosis    PULMONARY VASCULATURE: Pulmonary arteries enhance normally.  No evidence of pulmonary embolus.    HEART: There are calcifications at the  mitral valve annulus and papillary muscle.    VIVIAN/MEDIASTINUM: No enlarged lymph nodes by size criteria.    AIRWAYS: Mild bronchial wall thickening.    LUNGS/PLEURA: There small dependently layering pleural effusions.  Mild basilar septal thickening and ground-glass densities.    UPPER ABDOMEN: No abnormality of the partially imaged upper abdomen.    THORACIC SOFT TISSUES: Unremarkable.    BONES: No acute fracture. No suspicious lytic or sclerotic lesions.  Impression: 1. No evidence of pulmonary embolus  2. Findings most suggestive of interstitial edema.  Correlate with BNP.  Trace pleural effusions.    Electronically signed by: Ysabel Ventura  Date:    02/16/2024  Time:    13:27  X-Ray Chest AP  Narrative: EXAMINATION:  XR CHEST AP PORTABLE    CLINICAL HISTORY:  Sob;    COMPARISON:  7 February 2024    FINDINGS:  Frontal view of the chest was obtained. The heart is not significantly enlarged.  There is aortic atherosclerosis.  Lungs are grossly clear.  There is no pneumothorax.  Impression: No acute findings.    Electronically signed by: Arturo Mcdonald  Date:    02/16/2024  Time:    07:13      Scheduled Med:   aspirin  81 mg Oral Daily    atorvastatin  40 mg Oral Daily    carvediloL  3.125 mg Oral BID    enoxparin  40 mg Subcutaneous Daily    furosemide (LASIX) injection  40 mg Intravenous Q12H    levothyroxine  50 mcg Oral Before breakfast    losartan  25 mg Oral Daily        Continuous Infusions:       PRN Meds:  acetaminophen, acetaminophen, dextrose 10%, dextrose 10%, glucagon (human recombinant), glucose, glucose, hydrALAZINE, insulin aspart U-100, levalbuterol, ondansetron, sodium chloride 0.9%       Assessment/Plan:   Acute diastolic heart failure exacerbation  Small pleural effusions bilaterally secondary to above   Normocytic anemia, stable   History of hypertension, hyperlipidemia, diabetes mellitus type 2, aortic valve endocarditis in December 2023,    Labs look great this morning.    Symptomatically  greatly improved.    Remains on Lasix 40 mg IV b.i.d..  Could potentially be changed to oral or discontinued altogether.  He appears to be compensated.    Echocardiogram with preserved systolic function and grade 1 diastolic dysfunction.  Also noted will moderate aortic valve stenosis and regurgitation.  Also with mitral valve regurgitation.  Will follow up Cardiology recommendations on further workup and if intervention he was thought to be required.  Could also consider doing this as an outpatient.    Suspect he will be able to be discharged in the next 24 hours pending cardiology evaluation    Critical care diagnosis: acute diastolic heart failure requiring IV diuretics  Critical care interventions: hands on evaluation, review of labs/radiographs/records and discussions with family  Critical care time spent: >32 minutes        Forest Martinez MD   02/17/2024     All diagnosis and differential diagnosis have been reviewed; assessment and plan has been documented; I have personally reviewed the labs and test results that are presently available; I have reviewed the patients medication list; I have reviewed the consulting providers response and recommendations. I have reviewed or attempted to review medical records based upon their availability    All of the patient's questions have been  addressed and answered. Patient's is agreeable to the above stated plan. I will continue to monitor closely and make adjustments to medical management as needed.  _____________________________________________________________________

## 2024-02-17 NOTE — PROGRESS NOTES
[facility]    Cardiology  Consult Note    Patient Name: Arturo Ortiz  MRN: [id number]  Admission Date: 2/16/2024  Hospital Length of Stay: 1 days  Code Status: Full Code   Attending Provider: Forest Martinez MD   Consulting Provider: [facility]  Primary Care Physician: CHARISSE Wallace Jr., MD  Principal Problem:<principal problem not specified>    Patient information was obtained from patient, past medical records, ER records, and primary team.     Subjective:   Chief Complaint/Reason for Consult:  CP/SOB    HPI:   Mr. Ortiz is a 68 year old male, known to Dr. Patel dating back to 2018, who presented to the hospital with report of SOB, worse over the last week. Patient also reported orthopnea and SOB with exertion. Intermittent episodes of CP. Initial troponin normal. BNP noted to be 732. Patient negative for acute Influenza and COVID-19 Infections. CT Angiogram revealed no evidence of Pulmonary Embolism but did reveal evidence of suspected pulmonary edema. CXR revealed no acute findings. EKG revealed SR with nonspecific T Wave abnormalities. [facility] consulted for cardiac evaluation and management.    2.17.24: NAD noted. VSS. SR on tele. Denies CP/Palps, much improved SOB but still not at his baseline. I&O not accurate. Weight down 3.3 kg since admit.    PMH: Hypertension, DM II, Hyperlipidemia, Avascular Necrosis Right Femoral Head, Macrocytosis, CELESTINA, Near Syncope, CP, MO, DM II, Hyperlipidemia, OA, Peripheral Neuropathy, VHD/AS (Mild), AI (Moderate to Severe), MR (Mild to Moderate), Chronic Diastolic Dysfunction, Endocarditis (Aortic Valve Vegetation on Noncoronary Cusp), History of COVID-19 Infection (12.20.23), Sepsis (Aortic Valve Endocarditis) (December 2023)  PSH: Cataract Extraction, Eye Surgery, Joint Replacement, Total Hip Arthroplasty  Family History: Sister- Breast Cancer, Father- Hodgkin's Lymphoma, Mother- Lung Cancer, Sister- Sister  Social  History: Tobacco- Negative, Alcohol- Occasional Use, Substance Abuse- Negative    Previous Cardiac Diagnostics:   Echo 2.16.24  Technically difficult study, Definity contrast was not used in the study.  Left Ventricle: The left ventricle is moderately dilated. Normal wall thickness. There is mildly reduced systolic function with a visually estimated ejection fraction of 45%. Grade I diastolic dysfunction.  Right Ventricle: Normal right ventricular cavity size. Systolic function is normal. TAPSE is 3.51 cm.  Left Atrium: Left atrium is dilated.  Aortic Valve: There is severe aortic valve sclerosis. Severely calcified cusps. Mildly restricted motion. There is mild stenosis. Aortic valve peak velocity is 2.82 m/s. Mean gradient is 18 mmHg. The dimensionless index is 0.53. There is moderate to severe aortic regurgitation.  Mitral Valve: Moderately thickened leaflets. Severely calcified leaflets. There is moderate mitral annular calcification present. Moderately restricted motion. There is no stenosis. There is mild regurgitation.  Tricuspid Valve: There is no stenosis. There is mild regurgitation.  Pulmonic Valve: There is no stenosis. There is no significant regurgitation.    If clinically indicated a QUENTIN can be helpful to further assess the severity of AV and MV disease.    Echocardiogram (2.1.24):  Left Ventricle: The left ventricle is normal in size. Mildly increased wall thickness. Normal wall motion. There is normal systolic function with a visually estimated ejection fraction of 55%. Grade I diastolic dysfunction.  Right Ventricle: Normal right ventricular cavity size. Systolic function is normal. TAPSE is 2.87 cm.  Left Atrium: Left atrium is moderately dilated.  Right Atrium: Right atrium is mildly dilated.  Aortic Valve: There is moderate aortic valve sclerosis. Mildly restricted motion. There is mild stenosis. Aortic valve area by VTI is 2.08 cm². Aortic valve peak velocity is 2.59 m/s. Mean gradient is 15  mmHg. The dimensionless index is 0.60. There is moderate to severe aortic regurgitation.  Mitral Valve: Moderately calcified posterior leaflet. Chordae calcification present. Mildly restricted motion. There is no stenosis. The mean pressure gradient across the mitral valve is 3 mmHg at a heart rate of 66 bpm. There is mild to moderate regurgitation.  Tricuspid Valve: The tricuspid valve is structurally normal. There is trace regurgitation.    CV US Venous Right Arm (1.29.24):  There was no evidence of deep or superficial vein thrombosis in the right upper extremity.     QUENTIN (12.27.23):  Left Ventricle: The left ventricle is normal in size. Normal wall thickness. Normal wall motion. There is normal systolic function with a visually estimated ejection fraction of 60 - 65%.  Right Ventricle: Normal right ventricular cavity size.  Left Atrium: Left atrium is mildly dilated.  Aortic Valve: The aortic valve is a trileaflet valve. There is a small mobile echogenic irregular mass present on the noncoronary cusp consistent with vegetation.  Mitral Valve: There is mild regurgitation.  Pulmonic Valve: There is mild regurgitation.    CV US Venous Legs (12.24.23):  There was no evidence of deep or superficial vein thrombosis in bilateral lower extremities.      MPI (9.18.18):  Low Risk. No Perfusion Defects.   No Evidence of Ischemia.     Review of patient's allergies indicates:  No Known Allergies  No current facility-administered medications on file prior to encounter.     Current Outpatient Medications on File Prior to Encounter   Medication Sig    furosemide (LASIX) 20 MG tablet Take 1 tablet (20 mg total) by mouth 2 (two) times daily.    levothyroxine (SYNTHROID) 50 MCG tablet Take 1 tablet (50 mcg total) by mouth before breakfast.    multivitamin (ONE DAILY MULTIVITAMIN) per tablet Take 1 tablet by mouth once daily.     Review of Systems   Respiratory:  Negative for chest tightness and shortness of breath.     Cardiovascular:  Negative for chest pain.   All other systems reviewed and are negative.    Objective:     Vital Signs (Most Recent):  Temp: 97.7 °F (36.5 °C) (02/17/24 0343)  Pulse: 63 (02/17/24 0655)  Resp: 18 (02/17/24 0655)  BP: (!) 152/72 (02/17/24 0838)  SpO2: 97 % (02/17/24 0655) Vital Signs (24h Range):  Temp:  [97.4 °F (36.3 °C)-97.8 °F (36.6 °C)] 97.7 °F (36.5 °C)  Pulse:  [61-72] 63  Resp:  [15-19] 18  SpO2:  [92 %-98 %] 97 %  BP: (143-188)/(53-75) 152/72   Weight: 116.9 kg (257 lb 11.5 oz)  Body mass index is 36.98 kg/m².  SpO2: 97 %       Intake/Output Summary (Last 24 hours) at 2/17/2024 0844  Last data filed at 2/16/2024 2137  Gross per 24 hour   Intake 480 ml   Output --   Net 480 ml     Lines/Drains/Airways       Peripheral Intravenous Line  Duration                  Peripheral IV - Single Lumen 02/16/24 1123 20 G Left Forearm <1 day                  Significant Labs:  Recent Results (from the past 72 hour(s))   EKG 12-lead    Collection Time: 02/16/24  6:43 AM   Result Value Ref Range    QRS Duration 108 ms    [facility] QTC Calculation 446 ms   COVID/FLU A&B PCR    Collection Time: 02/16/24  6:45 AM   Result Value Ref Range    Influenza A PCR Not Detected Not Detected    Influenza B PCR Not Detected Not Detected    SARS-CoV-2 PCR Not Detected Not Detected, Negative   Comprehensive metabolic panel    Collection Time: 02/16/24  7:46 AM   Result Value Ref Range    Sodium Level 143 136 - 145 mmol/L    Potassium Level 3.9 3.5 - 5.1 mmol/L    Chloride 107 98 - 107 mmol/L    Carbon Dioxide 27 23 - 31 mmol/L    Glucose Level 111 82 - 115 mg/dL    Blood Urea Nitrogen 27.4 (H) 8.4 - 25.7 mg/dL    Creatinine 1.07 0.73 - 1.18 mg/dL    Calcium Level Total 9.2 8.8 - 10.0 mg/dL    Protein Total 6.9 5.8 - 7.6 gm/dL    Albumin Level 3.8 3.4 - 4.8 g/dL    Globulin 3.1 2.4 - 3.5 gm/dL    Albumin/Globulin Ratio 1.2 1.1 - 2.0 ratio    Bilirubin Total 0.5 <=1.5 mg/dL    Alkaline Phosphatase 45 40 - 150 unit/L    Alanine  Aminotransferase 21 0 - 55 unit/L    Aspartate Aminotransferase 25 5 - 34 unit/L    eGFR >60 mls/min/1.73/m2   Troponin I    Collection Time: 02/16/24  7:46 AM   Result Value Ref Range    Troponin-I 0.045 0.000 - 0.045 ng/mL   Brain natriuretic peptide    Collection Time: 02/16/24  7:46 AM   Result Value Ref Range    Natriuretic Peptide 732.8 (H) <=100.0 pg/mL   CBC with Differential    Collection Time: 02/16/24  7:46 AM   Result Value Ref Range    WBC 7.63 4.50 - 11.50 x10(3)/mcL    RBC 4.09 (L) 4.70 - 6.10 x10(6)/mcL    Hgb 13.5 (L) 14.0 - 18.0 g/dL    Hct 39.5 (L) 42.0 - 52.0 %    MCV 96.6 (H) 80.0 - 94.0 fL    MCH 33.0 (H) 27.0 - 31.0 pg    MCHC 34.2 33.0 - 36.0 g/dL    RDW 15.6 11.5 - 17.0 %    Platelet 193 130 - 400 x10(3)/mcL    MPV 9.8 7.4 - 10.4 fL    Neut % 56.2 %    Lymph % 29.8 %    Mono % 10.2 %    Eos % 3.0 %    Basophil % 0.5 %    Lymph # 2.27 0.6 - 4.6 x10(3)/mcL    Neut # 4.29 2.1 - 9.2 x10(3)/mcL    Mono # 0.78 0.1 - 1.3 x10(3)/mcL    Eos # 0.23 0 - 0.9 x10(3)/mcL    Baso # 0.04 <=0.2 x10(3)/mcL    IG# 0.02 0 - 0.04 x10(3)/mcL    IG% 0.3 %    NRBC% 0.0 %   Troponin I    Collection Time: 02/16/24  4:51 PM   Result Value Ref Range    Troponin-I 0.050 (H) 0.000 - 0.045 ng/mL   POCT glucose    Collection Time: 02/16/24  5:08 PM   Result Value Ref Range    POCT Glucose 136 (H) 70 - 110 mg/dL   Echo    Collection Time: 02/16/24  5:41 PM   Result Value Ref Range    BSA 2.44 m2    LVIDd 5.90 3.5 - 6.0 cm    LV Systolic Volume 74.20 mL    LV Systolic Volume Index 31.6 mL/m2    LVIDs 4.10 (A) 2.1 - 4.0 cm    LV Diastolic Volume 173.00 mL    LV Diastolic Volume Index 73.62 mL/m2    IVS 0.90 0.6 - 1.1 cm    FS 31 28 - 44 %    Left Ventricle Relative Wall Thickness 0.41 cm    Posterior Wall 1.20 (A) 0.6 - 1.1 cm    LV mass 255.71 g    LV Mass Index 109 g/m2    MV Peak E [place] 0.73 m/s    TDI LATERAL 0.09 m/s    TDI SEPTAL 0.06 m/s    E/E' ratio 9.73 m/s    MV Peak A [place] 1.20 m/s    E/A ratio 0.61     E wave  deceleration time 248.00 msec    LV SEPTAL E/E' RATIO 12.17 m/s    LV LATERAL E/E' RATIO 8.11 m/s    LVOT peak denton 1.47 m/s    Left Ventricular Outflow Tract Mean Velocity 0.94 cm/s    Left Ventricular Outflow Tract Mean Gradient 4.00 mmHg    TAPSE 3.51 cm    LA size 3.90 cm    LA volume (mod) 119.00 cm3    LA Volume Index (Mod) 50.6 mL/m2    AV regurgitation pressure 1/2 time 291 ms    AR Max [place] 4.86 m/s    AV mean gradient 18 mmHg    AV peak gradient 32 mmHg    Ao peak denton 2.82 m/s    Ao VTI 55.60 cm    LVOT peak VTI 29.20 cm    AV Velocity Ratio 0.52     AV index (prosthetic) 0.53     Mean e' 0.08 m/s    ZLVIDS -2.89     ZLVIDD -5.10    POCT glucose    Collection Time: 02/16/24 10:30 PM   Result Value Ref Range    POCT Glucose 82 70 - 110 mg/dL   Troponin I    Collection Time: 02/17/24 12:30 AM   Result Value Ref Range    Troponin-I 0.035 0.000 - 0.045 ng/mL   Comprehensive Metabolic Panel (CMP)    Collection Time: 02/17/24  5:33 AM   Result Value Ref Range    Sodium Level 140 136 - 145 mmol/L    Potassium Level 3.8 3.5 - 5.1 mmol/L    Chloride 105 98 - 107 mmol/L    Carbon Dioxide 27 23 - 31 mmol/L    Glucose Level 95 82 - 115 mg/dL    Blood Urea Nitrogen 20.4 8.4 - 25.7 mg/dL    Creatinine 0.94 0.73 - 1.18 mg/dL    Calcium Level Total 9.1 8.8 - 10.0 mg/dL    Protein Total 6.6 5.8 - 7.6 gm/dL    Albumin Level 3.7 3.4 - 4.8 g/dL    Globulin 2.9 2.4 - 3.5 gm/dL    Albumin/Globulin Ratio 1.3 1.1 - 2.0 ratio    Bilirubin Total 0.7 <=1.5 mg/dL    Alkaline Phosphatase 44 40 - 150 unit/L    Alanine Aminotransferase 19 0 - 55 unit/L    Aspartate Aminotransferase 27 5 - 34 unit/L    eGFR >60 mls/min/1.73/m2   Magnesium    Collection Time: 02/17/24  5:33 AM   Result Value Ref Range    Magnesium Level 2.00 1.60 - 2.60 mg/dL   Hemoglobin A1C    Collection Time: 02/17/24  5:33 AM   Result Value Ref Range    Hemoglobin A1c 4.5 <=7.0 %    Estimated Average Glucose 82.5 mg/dL   CBC with Differential    Collection Time:  02/17/24  5:33 AM   Result Value Ref Range    WBC 6.95 4.50 - 11.50 x10(3)/mcL    RBC 4.24 (L) 4.70 - 6.10 x10(6)/mcL    Hgb 13.8 (L) 14.0 - 18.0 g/dL    Hct 40.3 (L) 42.0 - 52.0 %    MCV 95.0 (H) 80.0 - 94.0 fL    MCH 32.5 (H) 27.0 - 31.0 pg    MCHC 34.2 33.0 - 36.0 g/dL    RDW 15.2 11.5 - 17.0 %    Platelet 221 130 - 400 x10(3)/mcL    MPV 10.0 7.4 - 10.4 fL    Neut % 48.7 %    Lymph % 36.3 %    Mono % 9.8 %    Eos % 4.2 %    Basophil % 0.7 %    Lymph # 2.52 0.6 - 4.6 x10(3)/mcL    Neut # 3.39 2.1 - 9.2 x10(3)/mcL    Mono # 0.68 0.1 - 1.3 x10(3)/mcL    Eos # 0.29 0 - 0.9 x10(3)/mcL    Baso # 0.05 <=0.2 x10(3)/mcL    IG# 0.02 0 - 0.04 x10(3)/mcL    IG% 0.3 %    NRBC% 0.0 %     Significant Imaging:  Imaging Results              CTA Chest Non-Coronary (PE Studies) (Final result)  Result time 02/16/24 13:27:24      Final result by Ysabel Ventura MD (02/16/24 13:27:24)                   Impression:      1. No evidence of pulmonary embolus  2. Findings most suggestive of interstitial edema.  Correlate with BNP.  Trace pleural effusions.      Electronically signed by: Ysabel Ventura  Date:    02/16/2024  Time:    13:27               Narrative:    EXAMINATION:  CTA CHEST NON CORONARY (PE STUDIES)    CLINICAL HISTORY:  Pulmonary embolism (PE) suspected, neg D-dimer;    TECHNIQUE:  Helically acquired images with axial, sagittal and coronal reformations were obtained from the thoracic inlet to the lung bases followingthe IV administration of contrast.  CTA timed for evaluation of the pulmonary arteries.  MIP images were performed.    Automated tube current modulation, weight-based exposure dosing, and/or iterative reconstruction technique utilized to reach lowest reasonably achievable exposure rate.    DLP: 421 mGy*cm    COMPARISON:  CTA chest 12/24/2023    FINDINGS:  BASE OF NECK: No significant abnormality.    AORTA: Left-sided aortic arch.  No aneurysm and no significant atherosclerosis    PULMONARY VASCULATURE:  Pulmonary arteries enhance normally.  No evidence of pulmonary embolus.    HEART: There are calcifications at the mitral valve annulus and papillary muscle.    VIVIAN/MEDIASTINUM: No enlarged lymph nodes by size criteria.    AIRWAYS: Mild bronchial wall thickening.    LUNGS/PLEURA: There small dependently layering pleural effusions.  Mild basilar septal thickening and ground-glass densities.    UPPER ABDOMEN: No abnormality of the partially imaged upper abdomen.    THORACIC SOFT TISSUES: Unremarkable.    BONES: No acute fracture. No suspicious lytic or sclerotic lesions.                                       X-Ray Chest AP (Final result)  Result time 02/16/24 07:13:28      Final result by Arturo Mcdonald MD (02/16/24 07:13:28)                   Impression:      No acute findings.      Electronically signed by: Arturo Mcdonald  Date:    02/16/2024  Time:    07:13               Narrative:    EXAMINATION:  XR CHEST AP PORTABLE    CLINICAL HISTORY:  Sob;    COMPARISON:  7 February 2024    FINDINGS:  Frontal view of the chest was obtained. The heart is not significantly enlarged.  There is aortic atherosclerosis.  Lungs are grossly clear.  There is no pneumothorax.                                    EKG:       Telemetry:  Sinus Rhythm    Physical Exam  Vitals and nursing note reviewed.   Constitutional:       General: He is not in acute distress.     Appearance: Normal appearance. He is obese. He is not ill-appearing.   HENT:      Head: Normocephalic.      Mouth/Throat:      Mouth: Mucous membranes are moist.      Pharynx: Oropharynx is clear.   Cardiovascular:      Rate and Rhythm: Normal rate and regular rhythm.      Heart sounds: Murmur heard.   Pulmonary:      Effort: Pulmonary effort is normal. No respiratory distress.      Breath sounds: No rales.      Comments: On RA  Abdominal:      Palpations: Abdomen is soft.      Tenderness: There is no guarding.      Comments: Obese Abdomen   Musculoskeletal:         General:  Normal range of motion.      Cervical back: Neck supple.      Right lower leg: No edema.      Left lower leg: No edema.      Comments: Bilateral Lower Extremities Warm   Skin:     General: Skin is warm and dry.   Neurological:      General: No focal deficit present.      Mental Status: He is alert and oriented to person, place, and time. Mental status is at baseline.   Psychiatric:         Mood and Affect: Mood normal.         Behavior: Behavior normal.         Thought Content: Thought content normal.         Judgment: Judgment normal.     Home Medications:   No current facility-administered medications on file prior to encounter.     Current Outpatient Medications on File Prior to Encounter   Medication Sig Dispense Refill    furosemide (LASIX) 20 MG tablet Take 1 tablet (20 mg total) by mouth 2 (two) times daily. 30 tablet 0    levothyroxine (SYNTHROID) 50 MCG tablet Take 1 tablet (50 mcg total) by mouth before breakfast. 30 tablet 5    multivitamin (ONE DAILY MULTIVITAMIN) per tablet Take 1 tablet by mouth once daily.       Current Inpatient Medications:    Current Facility-Administered Medications:     acetaminophen tablet 650 mg, 650 mg, Oral, Q8H PRN, Darya Mendoza, PA-C    acetaminophen tablet 650 mg, 650 mg, Oral, Q4H PRN, Darya Mendoza PA-BONNY    aspirin chewable tablet 81 mg, 81 mg, Oral, Daily, Eric Alejo MD, 81 mg at 02/17/24 0838    atorvastatin tablet 40 mg, 40 mg, Oral, Daily, Eric Alejo MD, 40 mg at 02/17/24 0838    carvediloL tablet 3.125 mg, 3.125 mg, Oral, BID, Eric Alejo MD, 3.125 mg at 02/17/24 0838    dextrose 10% bolus 125 mL 125 mL, 12.5 g, Intravenous, PRN, Darya Mendoza, PA-C    dextrose 10% bolus 250 mL 250 mL, 25 g, Intravenous, PRN, Darya Mendoza, PA-C    enoxaparin injection 40 mg, 40 mg, Subcutaneous, Daily, Darya Mendoza PA-C, 40 mg at 02/16/24 1706    furosemide injection 40 mg, 40 mg, Intravenous, Q12H, Darya Mendoza PA-C, 40 mg at 02/17/24  0331    glucagon (human recombinant) injection 1 mg, 1 mg, Intramuscular, PRN, Darya Mendoza, PA-BONNY    glucose chewable tablet 16 g, 16 g, Oral, PRN, Darya Mendoza, PA-C    glucose chewable tablet 24 g, 24 g, Oral, PRN, Darya Mendoza, PA-C    hydrALAZINE injection 10 mg, 10 mg, Intravenous, Q2H PRN, Darya Mendoza, PA-BONNY    insulin aspart U-100 injection 0-10 Units, 0-10 Units, Subcutaneous, QID (AC + HS) PRN, Darya Mendoza, PA-BONNY    levalbuterol nebulizer solution 0.63 mg, 0.63 mg, Nebulization, Q8H PRN, Darya Mendoza, PA-BONNY    levothyroxine tablet 50 mcg, 50 mcg, Oral, Before breakfast, Eric Alejo MD, 50 mcg at 02/17/24 0536    losartan tablet 25 mg, 25 mg, Oral, Daily, Eric Alejo MD, 25 mg at 02/17/24 0838    ondansetron injection 4 mg, 4 mg, Intravenous, Q4H PRN, Darya Mendoza, PA-BONNY    sodium chloride 0.9% flush 10 mL, 10 mL, Intravenous, Q12H PRN, Darya Mendoza, PA-C  VTE Risk Mitigation (From admission, onward)           Ordered     enoxaparin injection 40 mg  Daily         02/16/24 1521     IP VTE HIGH RISK PATIENT  Once         02/16/24 1521     Place sequential compression device  Until discontinued         02/16/24 1521                  Assessment:   Acute on Chronic Diastolic Heart Failure in the Setting of VHD (AI/AS/MR)    - Grade I Diastolic Dysfunction & EF 55-60%    - No Evidence of Pulmonary Embolism & Concern for Pulmonary Edema (CT Imaging on 2.16.24)  Valvular Heart Disease    - AI (Moderate to Severe), AS (Mild), MR (Mild to Moderate)  Chest Pain    - Initial Troponin Normal    - MPI Low Risk with No Perfusion Defects (2018)  Hypertension  Hyperlipidemia  DM II    - Peripheral Neuropathy  OA Right Hip & Right Knee  History of Avascular Necrosis of Right Femoral Head  Elevated BMI/Obesity  Hypothyroidism  History of Sepsis Related to Aortic Valve Endocarditis with Gram Positive Bacteremia (December 2023)    Plan:   Continue IV Lasix for now. Can likely  switch to PO tomorrow with plans for increased home dose.  Ensure Accurate I/O & Daily Weights.  Further work-up of VHD can be done as outpatient  Routine Labs in AM    Thank you for your consult.     [facility]  Cardiology  [facility] - Emergency Dept  02/17/2024

## 2024-02-17 NOTE — PLAN OF CARE
Problem: Adult Inpatient Plan of Care  Goal: Plan of Care Review  Outcome: Ongoing, Progressing  Flowsheets (Taken 2/17/2024 2457)  Plan of Care Reviewed With: patient  Goal: Patient-Specific Goal (Individualized)  Outcome: Ongoing, Progressing  Goal: Absence of Hospital-Acquired Illness or Injury  Outcome: Ongoing, Progressing  Goal: Optimal Comfort and Wellbeing  Outcome: Ongoing, Progressing  Goal: Readiness for Transition of Care  Outcome: Ongoing, Progressing     Problem: Diabetes Comorbidity  Goal: Blood Glucose Level Within Targeted Range  Outcome: Ongoing, Progressing     Problem: Infection  Goal: Absence of Infection Signs and Symptoms  Outcome: Ongoing, Progressing

## 2024-02-18 VITALS
WEIGHT: 256.5 LBS | OXYGEN SATURATION: 97 % | TEMPERATURE: 97 F | RESPIRATION RATE: 17 BRPM | BODY MASS INDEX: 36.72 KG/M2 | HEIGHT: 70 IN | SYSTOLIC BLOOD PRESSURE: 119 MMHG | HEART RATE: 68 BPM | DIASTOLIC BLOOD PRESSURE: 47 MMHG

## 2024-02-18 PROBLEM — I50.23 ACUTE ON CHRONIC SYSTOLIC CONGESTIVE HEART FAILURE: Status: ACTIVE | Noted: 2024-02-18

## 2024-02-18 LAB
ANION GAP SERPL CALC-SCNC: 11 MEQ/L
BASOPHILS # BLD AUTO: 0.03 X10(3)/MCL
BASOPHILS NFR BLD AUTO: 0.4 %
BUN SERPL-MCNC: 22.2 MG/DL (ref 8.4–25.7)
CALCIUM SERPL-MCNC: 8.6 MG/DL (ref 8.8–10)
CHLORIDE SERPL-SCNC: 105 MMOL/L (ref 98–107)
CO2 SERPL-SCNC: 26 MMOL/L (ref 23–31)
CREAT SERPL-MCNC: 1.09 MG/DL (ref 0.73–1.18)
CREAT/UREA NIT SERPL: 20
EOSINOPHIL # BLD AUTO: 0.21 X10(3)/MCL (ref 0–0.9)
EOSINOPHIL NFR BLD AUTO: 3 %
ERYTHROCYTE [DISTWIDTH] IN BLOOD BY AUTOMATED COUNT: 15 % (ref 11.5–17)
GFR SERPLBLD CREATININE-BSD FMLA CKD-EPI: >60 MLS/MIN/1.73/M2
GLUCOSE SERPL-MCNC: 93 MG/DL (ref 82–115)
HCT VFR BLD AUTO: 40.8 % (ref 42–52)
HGB BLD-MCNC: 13.6 G/DL (ref 14–18)
IMM GRANULOCYTES # BLD AUTO: 0.02 X10(3)/MCL (ref 0–0.04)
IMM GRANULOCYTES NFR BLD AUTO: 0.3 %
LYMPHOCYTES # BLD AUTO: 2.62 X10(3)/MCL (ref 0.6–4.6)
LYMPHOCYTES NFR BLD AUTO: 37.6 %
MCH RBC QN AUTO: 31.9 PG (ref 27–31)
MCHC RBC AUTO-ENTMCNC: 33.3 G/DL (ref 33–36)
MCV RBC AUTO: 95.8 FL (ref 80–94)
MONOCYTES # BLD AUTO: 0.72 X10(3)/MCL (ref 0.1–1.3)
MONOCYTES NFR BLD AUTO: 10.3 %
NEUTROPHILS # BLD AUTO: 3.37 X10(3)/MCL (ref 2.1–9.2)
NEUTROPHILS NFR BLD AUTO: 48.4 %
NRBC BLD AUTO-RTO: 0 %
PLATELET # BLD AUTO: 197 X10(3)/MCL (ref 130–400)
PMV BLD AUTO: 9.8 FL (ref 7.4–10.4)
POCT GLUCOSE: 95 MG/DL (ref 70–110)
POTASSIUM SERPL-SCNC: 3.6 MMOL/L (ref 3.5–5.1)
RBC # BLD AUTO: 4.26 X10(6)/MCL (ref 4.7–6.1)
SODIUM SERPL-SCNC: 142 MMOL/L (ref 136–145)
WBC # SPEC AUTO: 6.97 X10(3)/MCL (ref 4.5–11.5)

## 2024-02-18 PROCEDURE — 85025 COMPLETE CBC W/AUTO DIFF WBC: CPT | Performed by: HOSPITALIST

## 2024-02-18 PROCEDURE — 25000003 PHARM REV CODE 250: Performed by: INTERNAL MEDICINE

## 2024-02-18 PROCEDURE — 63600175 PHARM REV CODE 636 W HCPCS: Performed by: PHYSICIAN ASSISTANT

## 2024-02-18 PROCEDURE — 80048 BASIC METABOLIC PNL TOTAL CA: CPT | Performed by: HOSPITALIST

## 2024-02-18 RX ORDER — ATORVASTATIN CALCIUM 40 MG/1
40 TABLET, FILM COATED ORAL DAILY
Qty: 90 TABLET | Refills: 3 | Status: SHIPPED | OUTPATIENT
Start: 2024-02-18 | End: 2025-02-17

## 2024-02-18 RX ORDER — LOSARTAN POTASSIUM 25 MG/1
25 TABLET ORAL DAILY
Qty: 90 TABLET | Refills: 3 | Status: SHIPPED | OUTPATIENT
Start: 2024-02-18 | End: 2025-02-17

## 2024-02-18 RX ORDER — CARVEDILOL 3.12 MG/1
3.12 TABLET ORAL 2 TIMES DAILY
Qty: 60 TABLET | Refills: 11 | Status: SHIPPED | OUTPATIENT
Start: 2024-02-18 | End: 2025-02-17

## 2024-02-18 RX ORDER — FUROSEMIDE 20 MG/1
40 TABLET ORAL 2 TIMES DAILY
Qty: 30 TABLET | Refills: 0 | Status: SHIPPED | OUTPATIENT
Start: 2024-02-18 | End: 2024-03-14

## 2024-02-18 RX ADMIN — FUROSEMIDE 40 MG: 10 INJECTION, SOLUTION INTRAMUSCULAR; INTRAVENOUS at 05:02

## 2024-02-18 RX ADMIN — CARVEDILOL 3.12 MG: 3.12 TABLET, FILM COATED ORAL at 08:02

## 2024-02-18 RX ADMIN — ATORVASTATIN CALCIUM 40 MG: 40 TABLET, FILM COATED ORAL at 08:02

## 2024-02-18 RX ADMIN — ASPIRIN 81 MG CHEWABLE TABLET 81 MG: 81 TABLET CHEWABLE at 08:02

## 2024-02-18 RX ADMIN — LEVOTHYROXINE SODIUM 50 MCG: 50 TABLET ORAL at 05:02

## 2024-02-18 NOTE — DISCHARGE SUMMARY
[facility] Hospital Medicine Discharge Summary    Admit Date: 2/16/2024  Discharge Date and Time: 2/18/20246:52 AM  Admitting Physician: Hospitalist team   Discharging Physician: Forest Martinez MD.  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 White male with a past medical history of hypertension, hyperlipidemia, diabetes mellitus type 2, diastolic heart failure grade I. Patient had admission to hospital medicine services from 12/23/2023 to 01/02/2024 for infective aortic valve endocarditis, aerococcus urinae bacteremia and recurrent UTI/right sided pyelonephritis.  Patient completed 6 week course IV Rocephin on 02/09/2024. The patient presented to [facility] on 2/16/2024 with a primary complaint of shortness of breath which has been ongoing for the last week.  Patient reports mostly experiencing dyspnea when lying flat or with exertion.  He was having a nonproductive cough and centralized chest pain which started paroxysmally week ago described as dull in nature without radiation and rated 0/10 on exam.  He denies complaints of fever, chills, nausea, vomiting and diarrhea.  Patient states he was seen by his primary care provider who prescribed him albuterol inhaler and Lasix 20 mg twice a day which he has been compliant with.  Patient was in the restroom and wiping himself with his right hand which had the pulse ox type 2 his finger which he states resulted in a tear near his rectum which was bleeding. Patient is concerned that this will lead to another infection in his blood.      Upon presentation to the ED, temperature 97.7° F, heart rate 64, blood pressure 161/61, respiratory rate 18 and  "SpO2 97% on room air.  Labs with H&H 13.5/39.5, .8, troponin 0.045.  Influenza A/B and SARS-COV-2 PCR negative.  EKG normal sinus rhythm and T-wave abnormalities considering lateral ischemia.  Chest x-ray with no acute findings.  CTA of the chest negative for pulmonary embolism but findings suggestive of interstitial edema and trace pleural effusions.  In ED patient received 40 mg of IV Lasix.  Patient is admitted to hospital medicine services for further medical management. Patient continued to make progress and was weaned to room air.  Cardiology consulted and reocmmended increasing home lasix to 40mg BID and following GDMT.  He will be discharged on 2/18 with close outpatient follow up.  Prescriptions sent to his pharmacy of choice.  Discussed discharge plan in detial with the patient and all questions answered.       Vitals:  Blood pressure (!) 117/44, pulse 61, temperature 98 °F (36.7 °C), temperature source Oral, resp. rate 18, height 5' 10" (1.778 m), weight 116.3 kg (256 lb 8 oz), SpO2 (!) 94 %..    Physical Exam:  Awake, Alert, Oriented x 3, No new focal Neurologic deficit, Normal Affect  NC/AT, PERRLA, EOMI  Supple neck, no JVD, No cervical lymphadenopathy  Symmetrical chest, Good air entry bilaterally. No rhonchi, wheezes, crackles appreciated  RRR, No gallop, rub or murmur  +ve Bowel sounds x4, Abd soft and non tender, no rebound, guarding or rigidity  No Cyanosis, cludding or edema, No new rash or bruises    Procedures Performed: No admission procedures for hospital encounter.     Significant Diagnostic Studies: See Full reports for all details  Admission on 02/16/2024   Component Date Value    Sodium Level 02/16/2024 143     Potassium Level 02/16/2024 3.9     Chloride 02/16/2024 107     Carbon Dioxide 02/16/2024 27     Glucose Level 02/16/2024 111     Blood Urea Nitrogen 02/16/2024 27.4 (H)     Creatinine 02/16/2024 1.07     Calcium Level Total 02/16/2024 9.2     Protein Total 02/16/2024 6.9     " Albumin Level 02/16/2024 3.8     Globulin 02/16/2024 3.1     Albumin/Globulin Ratio 02/16/2024 1.2     Bilirubin Total 02/16/2024 0.5     Alkaline Phosphatase 02/16/2024 45     Alanine Aminotransferase 02/16/2024 21     Aspartate Aminotransfera* 02/16/2024 25     eGFR 02/16/2024 >60     Troponin-I 02/16/2024 0.045     Natriuretic Peptide 02/16/2024 732.8 (H)     QRS Duration 02/16/2024 108     [facility] QTC Calculation 02/16/2024 446     WBC 02/16/2024 7.63     RBC 02/16/2024 4.09 (L)     Hgb 02/16/2024 13.5 (L)     Hct 02/16/2024 39.5 (L)     MCV 02/16/2024 96.6 (H)     MCH 02/16/2024 33.0 (H)     MCHC 02/16/2024 34.2     RDW 02/16/2024 15.6     Platelet 02/16/2024 193     MPV 02/16/2024 9.8     Neut % 02/16/2024 56.2     Lymph % 02/16/2024 29.8     Mono % 02/16/2024 10.2     Eos % 02/16/2024 3.0     Basophil % 02/16/2024 0.5     Lymph # 02/16/2024 2.27     Neut # 02/16/2024 4.29     Mono # 02/16/2024 0.78     Eos # 02/16/2024 0.23     Baso # 02/16/2024 0.04     IG# 02/16/2024 0.02     IG% 02/16/2024 0.3     NRBC% 02/16/2024 0.0     Influenza A PCR 02/16/2024 Not Detected     Influenza B PCR 02/16/2024 Not Detected     SARS-CoV-2 PCR 02/16/2024 Not Detected     BSA 02/16/2024 2.44     LVIDd 02/16/2024 5.90     LV Systolic Volume 02/16/2024 74.20     LV Systolic Volume Index 02/16/2024 31.6     LVIDs 02/16/2024 4.10 (A)     LV Diastolic Volume 02/16/2024 173.00     LV Diastolic Volume Index 02/16/2024 73.62     IVS 02/16/2024 0.90     FS 02/16/2024 31     Left Ventricle Relative * 02/16/2024 0.41     Posterior Wall 02/16/2024 1.20 (A)     LV mass 02/16/2024 255.71     LV Mass Index 02/16/2024 109     MV Peak E [place] 02/16/2024 0.73     TDI LATERAL 02/16/2024 0.09     TDI SEPTAL 02/16/2024 0.06     E/E' ratio 02/16/2024 9.73     MV Peak A [place] 02/16/2024 1.20     E/A ratio 02/16/2024 0.61     E wave deceleration time 02/16/2024 248.00     LV SEPTAL E/E' RATIO 02/16/2024 12.17     LV LATERAL E/E' RATIO 02/16/2024 8.11     LVOT  peak denton 02/16/2024 1.47     Left Ventricular Outflow* 02/16/2024 0.94     Left Ventricular Outflow* 02/16/2024 4.00     TAPSE 02/16/2024 3.51     LA size 02/16/2024 3.90     LA volume (mod) 02/16/2024 119.00     LA Volume Index (Mod) 02/16/2024 50.6     AV regurgitation pressur* 02/16/2024 291     AR Max [place] 02/16/2024 4.86     AV mean gradient 02/16/2024 18     AV peak gradient 02/16/2024 32     Ao peak denton 02/16/2024 2.82     Ao VTI 02/16/2024 55.60     LVOT peak VTI 02/16/2024 29.20     AV Velocity Ratio 02/16/2024 0.52     AV index (prosthetic) 02/16/2024 0.53     Mean e' 02/16/2024 0.08     ZLVIDS 02/16/2024 -2.89     ZLVIDD 02/16/2024 -5.10     Troponin-I 02/16/2024 0.050 (H)     CULTURE, BLOOD ([facility]) 02/16/2024 No Growth At 24 Hours     CULTURE, BLOOD ([facility]) 02/16/2024 No Growth At 24 Hours     POCT Glucose 02/16/2024 136 (H)     Troponin-I 02/17/2024 0.035     POCT Glucose 02/16/2024 82     Sodium Level 02/17/2024 140     Potassium Level 02/17/2024 3.8     Chloride 02/17/2024 105     Carbon Dioxide 02/17/2024 27     Glucose Level 02/17/2024 95     Blood Urea Nitrogen 02/17/2024 20.4     Creatinine 02/17/2024 0.94     Calcium Level Total 02/17/2024 9.1     Protein Total 02/17/2024 6.6     Albumin Level 02/17/2024 3.7     Globulin 02/17/2024 2.9     Albumin/Globulin Ratio 02/17/2024 1.3     Bilirubin Total 02/17/2024 0.7     Alkaline Phosphatase 02/17/2024 44     Alanine Aminotransferase 02/17/2024 19     Aspartate Aminotransfera* 02/17/2024 27     eGFR 02/17/2024 >60     Magnesium Level 02/17/2024 2.00     Hemoglobin A1c 02/17/2024 4.5     Estimated Average Glucose 02/17/2024 82.5     WBC 02/17/2024 6.95     RBC 02/17/2024 4.24 (L)     Hgb 02/17/2024 13.8 (L)     Hct 02/17/2024 40.3 (L)     MCV 02/17/2024 95.0 (H)     MCH 02/17/2024 32.5 (H)     MCHC 02/17/2024 34.2     RDW 02/17/2024 15.2     Platelet 02/17/2024 221     MPV 02/17/2024 10.0     Neut % 02/17/2024 48.7     Lymph % 02/17/2024 36.3     Mono %  02/17/2024 9.8     Eos % 02/17/2024 4.2     Basophil % 02/17/2024 0.7     Lymph # 02/17/2024 2.52     Neut # 02/17/2024 3.39     Mono # 02/17/2024 0.68     Eos # 02/17/2024 0.29     Baso # 02/17/2024 0.05     IG# 02/17/2024 0.02     IG% 02/17/2024 0.3     NRBC% 02/17/2024 0.0     POCT Glucose 02/17/2024 88     POCT Glucose 02/17/2024 172 (H)     POCT Glucose 02/17/2024 106         Microbiology Results (last 7 days)       Procedure Component Value Units Date/Time    Blood Culture [[id number]]  (Normal) Collected: 02/16/24 1920    Order Status: Completed Specimen: Blood Updated: 02/17/24 2200     CULTURE, BLOOD ([facility]) No Growth At 24 Hours    Blood Culture [[id number]]  (Normal) Collected: 02/16/24 1920    Order Status: Completed Specimen: Blood Updated: 02/17/24 2101     CULTURE, BLOOD ([facility]) No Growth At 24 Hours             Echo    Result Date: 2/16/2024    Technically difficult study, Definity contrast was not used in the study.   Left Ventricle: The left ventricle is moderately dilated. Normal wall thickness. There is mildly reduced systolic function with a visually estimated ejection fraction of 45%. Grade I diastolic dysfunction.   Right Ventricle: Normal right ventricular cavity size. Systolic function is normal. TAPSE is 3.51 cm.   Left Atrium: Left atrium is dilated.   Aortic Valve: There is severe aortic valve sclerosis. Severely calcified cusps. Mildly restricted motion. There is mild stenosis. Aortic valve peak velocity is 2.82 m/s. Mean gradient is 18 mmHg. The dimensionless index is 0.53. There is moderate to severe aortic regurgitation.   Mitral Valve: Moderately thickened leaflets. Severely calcified leaflets. There is moderate mitral annular calcification present. Moderately restricted motion. There is no stenosis. There is mild regurgitation.   Tricuspid Valve: There is no stenosis. There is mild regurgitation.   Pulmonic Valve: There is no stenosis. There is no significant regurgitation. If  clinically indicated a QUENTIN can be helpful to further assess the severity of AV and MV disease.    CTA Chest Non-Coronary (PE Studies)    Result Date: 2/16/2024  EXAMINATION: CTA CHEST NON CORONARY (PE STUDIES) CLINICAL HISTORY: Pulmonary embolism (PE) suspected, neg D-dimer; TECHNIQUE: Helically acquired images with axial, sagittal and coronal reformations were obtained from the thoracic inlet to the lung bases followingthe IV administration of contrast.  CTA timed for evaluation of the pulmonary arteries.  MIP images were performed. Automated tube current modulation, weight-based exposure dosing, and/or iterative reconstruction technique utilized to reach lowest reasonably achievable exposure rate. DLP: 421 mGy*cm COMPARISON: CTA chest 12/24/2023 FINDINGS: BASE OF NECK: No significant abnormality. AORTA: Left-sided aortic arch.  No aneurysm and no significant atherosclerosis PULMONARY VASCULATURE: Pulmonary arteries enhance normally.  No evidence of pulmonary embolus. HEART: There are calcifications at the mitral valve annulus and papillary muscle. VIVIAN/MEDIASTINUM: No enlarged lymph nodes by size criteria. AIRWAYS: Mild bronchial wall thickening. LUNGS/PLEURA: There small dependently layering pleural effusions.  Mild basilar septal thickening and ground-glass densities. UPPER ABDOMEN: No abnormality of the partially imaged upper abdomen. THORACIC SOFT TISSUES: Unremarkable. BONES: No acute fracture. No suspicious lytic or sclerotic lesions.     1. No evidence of pulmonary embolus 2. Findings most suggestive of interstitial edema.  Correlate with BNP.  Trace pleural effusions. Electronically signed by: Ysabel Ventura Date:    02/16/2024 Time:    13:27    X-Ray Chest AP    Result Date: 2/16/2024  EXAMINATION: XR CHEST AP PORTABLE CLINICAL HISTORY: Sob; COMPARISON: 7 February 2024 FINDINGS: Frontal view of the chest was obtained. The heart is not significantly enlarged.  There is aortic atherosclerosis.  Lungs are  grossly clear.  There is no pneumothorax.     No acute findings. Electronically signed by: Arturo Mcdonald Date:    02/16/2024 Time:    07:13    X-Ray Chest PA And Lateral    Result Date: 2/7/2024  See  for faxed results. IMPRESSION: Please see  or link in Chart Review from order for report  This procedure was auto-finalized by: Virtual Radiologist    US Retroperitoneal Complete    Result Date: 2/1/2024  EXAMINATION: US RETROPERITONEAL COMPLETE CLINICAL HISTORY: Rule out obstruction COMPARISON: None. FINDINGS: Right kidney measures 10.8 cm. Left kidney measures 12.6 cm.  No stones or hydronephrosis.  Renal echogenicity is normal.  Simple cyst on the left kidney upper pole measures 1.9 cm. Urinary bladder wall is mildly thickened.     1. No acute findings. 2. Urinary bladder wall thickening Electronically signed by: Farrukh You MD Date:    02/01/2024 Time:    19:29    Echo    Result Date: 2/1/2024    Left Ventricle: The left ventricle is normal in size. Mildly increased wall thickness. Normal wall motion. There is normal systolic function with a visually estimated ejection fraction of 55%. Grade I diastolic dysfunction.   Right Ventricle: Normal right ventricular cavity size. Systolic function is normal. TAPSE is 2.87 cm.   Left Atrium: Left atrium is moderately dilated.   Right Atrium: Right atrium is mildly dilated.   Aortic Valve: There is moderate aortic valve sclerosis. Mildly restricted motion. There is mild stenosis. Aortic valve area by VTI is 2.08 cm². Aortic valve peak velocity is 2.59 m/s. Mean gradient is 15 mmHg. The dimensionless index is 0.60. There is moderate to severe aortic regurgitation.   Mitral Valve: Moderately calcified posterior leaflet. Chordae calcification present. Mildly restricted motion. There is no stenosis. The mean pressure gradient across the mitral valve is 3 mmHg at a heart rate of 66 bpm. There is mild to moderate regurgitation.   Tricuspid Valve: The  tricuspid valve is structurally normal. There is trace regurgitation.     CV Ultrasound doppler venous arm right    Result Date: 1/31/2024  There was no evidence of deep or superficial vein thrombosis in the right upper extremity.  - pulls last radiology orders        Medication List        START taking these medications      atorvastatin 40 MG tablet  Commonly known as: LIPITOR  Take 1 tablet (40 mg total) by mouth once daily.     carvediloL 3.125 MG tablet  Commonly known as: COREG  Take 1 tablet (3.125 mg total) by mouth 2 (two) times daily.     losartan 25 MG tablet  Commonly known as: COZAAR  Take 1 tablet (25 mg total) by mouth once daily.            CHANGE how you take these medications      furosemide 20 MG tablet  Commonly known as: LASIX  Take 2 tablets (40 mg total) by mouth 2 (two) times a day.  What changed:   how much to take  when to take this            CONTINUE taking these medications      levothyroxine 50 MCG tablet  Commonly known as: SYNTHROID  Take 1 tablet (50 mcg total) by mouth before breakfast.     ONE DAILY MULTIVITAMIN per tablet  Generic drug: multivitamin               Where to Get Your Medications        These medications were sent to [facility]/pharmacy #[id number] [place] [address]      Phone: [phone]   atorvastatin 40 MG tablet  carvediloL 3.125 MG tablet  furosemide 20 MG tablet  losartan 25 MG tablet          Explained in detail to the patient about the discharge plan, medications, and follow-up visits. Pt understands and agrees with the treatment plan  Discharged Condition: stable  Diet: cardiac  Disposition: home    Medications Per [facility] med rec  Activities as tolerated  Follow up with your PCP in 2 wks   For further questions contact hospitalist office    Discharge time 33 minutes    For worsening symptoms, chest pain, shortness of breath, increased abdominal pain, high grade fever, stroke or stroke like symptoms,  immediately go to the nearest Emergency Room or call 911 as soon as possible.        Forest Edwards M.D, on 2/18/2024. at 6:52 AM.

## 2024-02-20 ENCOUNTER — PATIENT OUTREACH (OUTPATIENT)
Dept: ADMINISTRATIVE | Facility: CLINIC | Age: 69
End: 2024-02-20
Payer: MEDICARE

## 2024-02-20 NOTE — PROGRESS NOTES
C3 nurse attempted to contact Arturo Ortiz  for a TCC post hospital discharge follow up call. No answer. Left voicemail with callback information. The patient does not have a scheduled HOSFU appointment. Message sent to PCP staff for assistance with scheduling visit with patient.

## 2024-02-21 ENCOUNTER — PATIENT MESSAGE (OUTPATIENT)
Dept: ADMINISTRATIVE | Facility: CLINIC | Age: 69
End: 2024-02-21
Payer: MEDICARE

## 2024-02-21 PROBLEM — I10 PRIMARY HYPERTENSION: Status: ACTIVE | Noted: 2024-02-21

## 2024-02-21 PROBLEM — I50.31 ACUTE DIASTOLIC CONGESTIVE HEART FAILURE: Status: ACTIVE | Noted: 2024-02-18

## 2024-02-21 LAB
BACTERIA BLD CULT: NORMAL
BACTERIA BLD CULT: NORMAL

## 2024-02-21 PROCEDURE — 83880 ASSAY OF NATRIURETIC PEPTIDE: CPT | Performed by: FAMILY MEDICINE

## 2024-02-22 ENCOUNTER — TELEPHONE (OUTPATIENT)
Dept: INFECTIOUS DISEASES | Facility: HOSPITAL | Age: 69
End: 2024-02-22
Payer: MEDICARE

## 2024-02-22 ENCOUNTER — OFFICE VISIT (OUTPATIENT)
Dept: INFECTIOUS DISEASES | Facility: CLINIC | Age: 69
End: 2024-02-22
Payer: MEDICARE

## 2024-02-22 VITALS
HEIGHT: 70 IN | HEART RATE: 78 BPM | BODY MASS INDEX: 37.22 KG/M2 | DIASTOLIC BLOOD PRESSURE: 65 MMHG | OXYGEN SATURATION: 96 % | WEIGHT: 260 LBS | RESPIRATION RATE: 18 BRPM | SYSTOLIC BLOOD PRESSURE: 135 MMHG

## 2024-02-22 DIAGNOSIS — I38 ENDOCARDITIS, UNSPECIFIED CHRONICITY, UNSPECIFIED ENDOCARDITIS TYPE: ICD-10-CM

## 2024-02-22 DIAGNOSIS — I35.1 MODERATE TO SEVERE AORTIC VALVE REGURGITATION: ICD-10-CM

## 2024-02-22 DIAGNOSIS — N17.9 AKI (ACUTE KIDNEY INJURY): ICD-10-CM

## 2024-02-22 DIAGNOSIS — R78.81 BACTEREMIA: Primary | ICD-10-CM

## 2024-02-22 PROCEDURE — 99215 OFFICE O/P EST HI 40 MIN: CPT | Mod: S$GLB,,,

## 2024-02-22 PROCEDURE — 3044F HG A1C LEVEL LT 7.0%: CPT | Mod: CPTII,S$GLB,,

## 2024-02-22 PROCEDURE — 1159F MED LIST DOCD IN RCRD: CPT | Mod: CPTII,S$GLB,,

## 2024-02-22 PROCEDURE — 4010F ACE/ARB THERAPY RXD/TAKEN: CPT | Mod: CPTII,S$GLB,,

## 2024-02-22 PROCEDURE — 3288F FALL RISK ASSESSMENT DOCD: CPT | Mod: CPTII,S$GLB,,

## 2024-02-22 PROCEDURE — 1101F PT FALLS ASSESS-DOCD LE1/YR: CPT | Mod: CPTII,S$GLB,,

## 2024-02-22 PROCEDURE — 99999 PR PBB SHADOW E&M-EST. PATIENT-LVL IV: CPT | Mod: PBBFAC,,,

## 2024-02-22 PROCEDURE — 3075F SYST BP GE 130 - 139MM HG: CPT | Mod: CPTII,S$GLB,,

## 2024-02-22 PROCEDURE — 3078F DIAST BP <80 MM HG: CPT | Mod: CPTII,S$GLB,,

## 2024-02-22 RX ORDER — AMOXICILLIN 500 MG/1
500 TABLET, FILM COATED ORAL EVERY 12 HOURS
Qty: 60 TABLET | Refills: 0 | Status: SHIPPED | OUTPATIENT
Start: 2024-02-22 | End: 2024-03-06 | Stop reason: SDUPTHER

## 2024-02-22 RX ORDER — AMOXICILLIN 500 MG/1
500 CAPSULE ORAL EVERY 8 HOURS
Qty: 90 CAPSULE | Refills: 0 | Status: SHIPPED | OUTPATIENT
Start: 2024-02-22 | End: 2024-02-22 | Stop reason: DRUGHIGH

## 2024-02-22 NOTE — TELEPHONE ENCOUNTER
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, initial TTE done on that day showing moderate aortic valve sclerosis and mild stenosis, QUENTIN done 12/27/2023 mobile echogenic irregular mass, prior to completion of antibiotics, patient had repeat TTE, again showing moderate aortic valve sclerosis mild stenosis but this time with moderate to severe aortic regurgitation, however patient appears to have deteriorated in mid February, needed to be hospitalized with significant shortness of breath and diagnosed with heart failure, TTE done at that point on 02/16/2024 showing moderate to severe aortic regurgitation again, with severe sclerosis of the aortic valve.  Patient was discharged with furosemide.      He presented today for evaluation, our major concern was the acute onset of heart failure and severe valvular dysfunction.  I contacted the patient and his wife over the phone, they report that he has been feeling much better, his shortness of breath has improved, his cough has resolved, they deny having any fevers, no chills, no night sweats.      It is likely that this presentation is concerning for sequelae of endocarditis with valvular damage.  I discussed the case with Dr. Gil with cardiology and Dr. Sierra with cardiovascular surgery, referrals placed to both the, patient will likely require QUENTIN +/- left heart catheterization and surgical evaluation for valvular replacement.      In the meantime, I will restart the patient on low-dose of amoxicillin 500 mg p.o. q.12 hours for suppression while valvular issue is sorted out.  Will also repeat his labs next week as his creatinine appears to increased since his discharge.  Finally, will place an order for blood cultures, I instructed the patient and his wife to present to the lab and obtain blood cultures should he develop fevers, chills or any concerning  infectious symptoms.    Discussed the above with the patient and his wife over the phone.    Leodan Sharp MD  Infectious Disease  [facility]

## 2024-02-22 NOTE — PROGRESS NOTES
Subjective:       Patient ID: Arturo Ortiz 68 y.o.     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 He to the ER on 12/20/2023 with complaints of fever, chills, and night sweats over the last month.  He had been treated for a UTI with oral cefuroxime on 11/03/2023 and doxycycline on 11/22/2023.  He says that he had dysuria and frequency at that time as well as fever that did not resolve with oral antibiotics.  Blood cultures were drawn however patient left.  Of note he was also COVID positive was given a course of Remdesivir.  He was called back to report to the ER on 12/23/2023 due to blood cultures revealing Aerococcus urinae.  TTE on 12/24/2023 with out evidence vegetation however does reveal moderate aortic valve sclerosis.  EF 55-60%.  Repeat blood cultures on 12/23 and 12/25 remained positive.  He had a CT abdomen and pelvis on 12/26 revealing prostate calcifications and a nonobstructing 8 mm right renal caliceal calculus.  Patchy cortical hypoenhancement at the lower pole right kidney  may be related to pyelonephritis or scarring.  He was taken for a QUENTIN on 12/27 revealing vegetation on aortic valve.  EF during QUENTIN 60 65% and normal valvular function.  Blood cultures cleared on 12/29.  He was discharged home to complete a 6 week course of ceftriaxone 2 g IV daily until 02/09/2024.       01/23/2024 office visit:  Mr. Ortiz presents to clinic for follow-up today.  He is currently completing a 6 week course of IV ceftriaxone for aortic valve endocarditis.  He denies any fever, chills, or night sweats.  He did have some dizziness upon discharge however it is resolving.  He denies any nausea,  vomiting, or diarrhea with antimicrobials.  He is complaining of redness and itching around groin he is putting topical antifungal with some improvement in redness in pruritus.  He denies missing any doses of IV antibiotics.  He does complain of a sore throat that began around 4 days ago.  Denies any cough associated. He denies any dysuria, frequency, or foul odor.    02/22/2024 office visit:   Mr. Ortiz presents to clinic for follow-up today.  He completed IV ceftriaxone on 02/09/2024.  He reports that he developed shortness of breath 3-4 days after completion of IV therapy in which he could not lay flat.  He presented to the ER on 02/16/2024 for further evaluation.  Blood cultures during that time were negative.  Repeat echocardiogram on 02/16 with worsening aortic valve function.  He was given IV and then transitioned to p.o. furosemide for discharge.  He was discharged on 02/18.  Per patient's report, he has been trying to get in touch with Cardiology for follow-up to no avail.  He reports that since she has been discharged in taking oral furosemide 40 mg p.o. b.i.d. he has not been experiencing any shortness of breath.  His wife reports that they have been going to the gym and walking on a treadmill.  Denies any systemic signs of infection such as fever, chills, or night sweats.  He reports that his yeast infection in his bilateral groin has resolved.  His PICC line was removed upon completion of his antimicrobial course.  He does have slight skin irritation and erythema on his right upper extremity secondary to adhesive.      Past Medical History:   Diagnosis Date    Avascular necrosis of right femoral head     DM (diabetes mellitus)     Essential (primary) hypertension     Hypercholesteremia     Macrocytosis     Osteoarthritis of right hip     Osteoarthritis of right knee     Peripheral neuropathy         Past Surgical History:   Procedure Laterality Date    EYE SURGERY      JOINT REPLACEMENT      Hip  replacement    TOTAL HIP ARTHROPLASTY Right 05/18/2021        Social History     Socioeconomic History    Marital status:     Number of children: 6   Tobacco Use    Smoking status: Never    Smokeless tobacco: Never   Substance and Sexual Activity    Alcohol use: Not Currently     Comment: occ    Drug use: Never    Sexual activity: Yes     Partners: Female     Birth control/protection: None   Social History Narrative    ** Merged History Encounter **          Social Determinants of Health     Financial Resource Strain: Low Risk  (12/27/2023)    Overall Financial Resource Strain (CARDIA)     Difficulty of Paying Living Expenses: Not very hard   Food Insecurity: No Food Insecurity (12/27/2023)    Hunger Vital Sign     Worried About Running Out of Food in the Last Year: Never true     Ran Out of Food in the Last Year: Never true   Transportation Needs: No Transportation Needs (12/27/2023)    PRAPARE - Transportation     Lack of Transportation (Medical): No     Lack of Transportation (Non-Medical): No   Social Connections: Unknown (12/27/2023)    Social Connection and Isolation Panel [NHANES]     Frequency of Communication with Friends and Family: More than three times a week     Frequency of Social Gatherings with Friends and Family: Twice a week     Marital Status:    Housing Stability: Unknown (12/27/2023)    Housing Stability Vital Sign     Unable to Pay for Housing in the Last Year: No     Unstable Housing in the Last Year: No        Family History   Problem Relation Age of Onset    Lung cancer Mother         Smoker    Hodgkin's lymphoma Father     Lung cancer Sister     Breast cancer Sister         Review of patient's allergies indicates:  No Known Allergies     Immunization History   Administered Date(s) Administered    COVID-19, MRNA, LN-S, PF (Pfizer) (Gray Cap) 07/01/2022    COVID-19, MRNA, LN-S, PF (Pfizer) (Purple Cap) 02/22/2021, 02/22/2021, 03/15/2021, 03/15/2021    Influenza 10/15/2021     "Influenza (FLUAD) - Quadrivalent - Adjuvanted - PF *Preferred* (65+) 10/30/2023    Influenza - Quadrivalent - High Dose - PF (65 years and older) 10/16/2022    Influenza - Quadrivalent - PF *Preferred* (6 months and older) 11/02/2020    Influenza - Trivalent - PF (ADULT) 10/15/2021    Pneumococcal Conjugate - 20 Valent 10/25/2022    Td (ADULT) 07/12/2005    Zoster Recombinant 10/25/2022        Review of Systems   All other systems reviewed and are negative.         Objective:      /65 (BP Location: Right arm)   Pulse 78   Resp 18   Ht 5' 10" (1.778 m)   Wt 117.9 kg (260 lb)   SpO2 96%   BMI 37.31 kg/m²      Physical Exam  Vitals reviewed.   Constitutional:       General: He is not in acute distress.     Appearance: Normal appearance. He is obese. He is not toxic-appearing.   HENT:      Mouth/Throat:      Mouth: Mucous membranes are moist.      Pharynx: No oropharyngeal exudate or posterior oropharyngeal erythema.   Eyes:      General: No scleral icterus.     Conjunctiva/sclera: Conjunctivae normal.      Pupils: Pupils are equal, round, and reactive to light.   Cardiovascular:      Rate and Rhythm: Normal rate and regular rhythm.      Pulses: Normal pulses.      Heart sounds: Murmur heard.   Pulmonary:      Effort: Pulmonary effort is normal. No respiratory distress.      Breath sounds: Normal breath sounds.   Abdominal:      General: Abdomen is flat. Bowel sounds are normal.      Palpations: Abdomen is soft.      Tenderness: There is no abdominal tenderness.   Musculoskeletal:         General: No swelling.      Cervical back: Normal range of motion and neck supple.      Comments: Does have some scarring in skin abrasions from PICC line dressing on right upper extremity. Slight erythema secondary to skin irritation.  No edema, swelling, warmth or drainage noted.   Lymphadenopathy:      Cervical: No cervical adenopathy.   Skin:     General: Skin is warm and dry.      Findings: No rash.      Comments: Yeast " infection resolved   Neurological:      General: No focal deficit present.      Mental Status: He is alert and oriented to person, place, and time.   Psychiatric:         Mood and Affect: Mood normal.         Behavior: Behavior normal.          Labs: Reviewed most recent relevant labs available, notable results highlighted in this note    Imaging: Reviewed most recent relevant imaging studies available, notable results highlighted in this note    Assessment:       Problem List Items Addressed This Visit          Cardiac/Vascular    Endocarditis    Relevant Medications    amoxicillin (AMOXIL) 500 MG Tab    Other Relevant Orders    Blood Culture    Blood Culture    Ambulatory referral/consult to Cardiothoracic Surgery    CBC Auto Differential    Comprehensive Metabolic Panel    Moderate to severe aortic valve regurgitation    Relevant Orders    Ambulatory referral/consult to Cardiothoracic Surgery       Renal/    MANDY (acute kidney injury)       ID    Aerococcus urinae bacteremia - Primary    Relevant Medications    amoxicillin (AMOXIL) 500 MG Tab    Other Relevant Orders    Blood Culture    Blood Culture    Ambulatory referral/consult to Cardiothoracic Surgery    CBC Auto Differential    Comprehensive Metabolic Panel            Plan:   -Mr. Ortiz completed 6 weeks of IV ceftriaxone 2 g IV Q 24 hours on 02/09/2024 for Aerococcus urinae bacteremia and native aortic valve endocarditis.   -he denied any systemic signs of infection, however he began to develop increasing shortness of breath 3-4 days after completion of IV ceftriaxone.  He was unable to lie flat secondary to shortness of breath in which he presented to the ER for evaluation on 02/16/2024.  -echocardiogram on 02/16/2024 with evidence of severe aortic valve sclerosis as well as moderate-to-severe aortic regurgitation.  -previous echocardiogram on 02/01/2024 with moderate aortic valve sclerosis and moderate-to-severe aortic regurgitation.   -given worsening  of valvular function would like patient to have a CV surgery evaluation. Referral placed for Dr. Sierra in [facility].   -Spoke with Dr. Gil's office and made follow-up appt for patient on Wednesday 2/28 at 1050am.  Would like patient to be evaluated for QUENTIN to further assess the severity of aortic valve disease.  -Will prescribe Amoxicillin 500mg po q12 hours for now.  I sent in an electronic script to patient's preferred pharmacy.  -we will order blood cultures x2 given history of bacteremia.  He denies any systemic signs of infection at this time such as fever, chills, or night sweats.  -we will repeat CBC and CMP in 1 week for ongoing monitoring of renal and kidney function.  He does have a slight acute kidney injury on his labs from 02/21/2024 with a creatinine increased to 1.31 from 1.09 on 02/18/2024.  -renal ultrasound on 02/01/2024 without any evidence of obstruction.  -Case discussed with Dr. Sharp who is reaching out to Dr. Gil and Dr. Sierra.   -educated patient extensively on if fever, chills, or night sweats develop or even worsening shortness of breath he is to present to the ER for further evaluation including blood cultures and a QUENTIN.  -discussed with the patient and his wife  -we will follow up again in ID clinic in 2 weeks.    Follow up in about 2 weeks (around 3/7/2024).    60 minutes of total time spent on the encounter, which includes face to face time and non-face to face time preparing to see the patient (eg, review of tests), Obtaining and/or reviewing separately obtained history, Documenting clinical information in the electronic or other health record, Independently interpreting results (not separately reported) and communicating results to the patient/family/caregiver, or Care coordination (not separately reported).

## 2024-03-06 ENCOUNTER — OFFICE VISIT (OUTPATIENT)
Dept: INFECTIOUS DISEASES | Facility: CLINIC | Age: 69
End: 2024-03-06
Payer: MEDICARE

## 2024-03-06 VITALS
HEIGHT: 70 IN | RESPIRATION RATE: 18 BRPM | DIASTOLIC BLOOD PRESSURE: 68 MMHG | WEIGHT: 260 LBS | HEART RATE: 67 BPM | SYSTOLIC BLOOD PRESSURE: 113 MMHG | OXYGEN SATURATION: 97 % | BODY MASS INDEX: 37.22 KG/M2

## 2024-03-06 DIAGNOSIS — E11.9 TYPE 2 DIABETES MELLITUS WITHOUT COMPLICATION, WITHOUT LONG-TERM CURRENT USE OF INSULIN: ICD-10-CM

## 2024-03-06 DIAGNOSIS — I38 ENDOCARDITIS, UNSPECIFIED CHRONICITY, UNSPECIFIED ENDOCARDITIS TYPE: ICD-10-CM

## 2024-03-06 DIAGNOSIS — I50.31 ACUTE DIASTOLIC CONGESTIVE HEART FAILURE: Primary | ICD-10-CM

## 2024-03-06 DIAGNOSIS — I35.1 MODERATE TO SEVERE AORTIC VALVE REGURGITATION: ICD-10-CM

## 2024-03-06 DIAGNOSIS — R78.81 BACTEREMIA: ICD-10-CM

## 2024-03-06 PROCEDURE — 99214 OFFICE O/P EST MOD 30 MIN: CPT | Mod: S$GLB,,,

## 2024-03-06 PROCEDURE — 1159F MED LIST DOCD IN RCRD: CPT | Mod: CPTII,S$GLB,,

## 2024-03-06 PROCEDURE — 3044F HG A1C LEVEL LT 7.0%: CPT | Mod: CPTII,S$GLB,,

## 2024-03-06 PROCEDURE — 3078F DIAST BP <80 MM HG: CPT | Mod: CPTII,S$GLB,,

## 2024-03-06 PROCEDURE — 1101F PT FALLS ASSESS-DOCD LE1/YR: CPT | Mod: CPTII,S$GLB,,

## 2024-03-06 PROCEDURE — 99999 PR PBB SHADOW E&M-EST. PATIENT-LVL IV: CPT | Mod: PBBFAC,,,

## 2024-03-06 PROCEDURE — 3008F BODY MASS INDEX DOCD: CPT | Mod: CPTII,S$GLB,,

## 2024-03-06 PROCEDURE — 1111F DSCHRG MED/CURRENT MED MERGE: CPT | Mod: CPTII,S$GLB,,

## 2024-03-06 PROCEDURE — 4010F ACE/ARB THERAPY RXD/TAKEN: CPT | Mod: CPTII,S$GLB,,

## 2024-03-06 PROCEDURE — 3288F FALL RISK ASSESSMENT DOCD: CPT | Mod: CPTII,S$GLB,,

## 2024-03-06 PROCEDURE — 3074F SYST BP LT 130 MM HG: CPT | Mod: CPTII,S$GLB,,

## 2024-03-06 RX ORDER — AMOXICILLIN 500 MG/1
500 CAPSULE ORAL 3 TIMES DAILY
COMMUNITY
Start: 2024-02-22 | End: 2024-03-12

## 2024-03-06 NOTE — PROGRESS NOTES
Subjective:       Patient ID: Arturo Ortiz 68 y.o.     Chief Complaint:   Chief Complaint   Patient presents with    2 WEEKS F/U Aerococcus urinae bacteremia        HPI:  This is a 68 year old male with a past medical history of diabetes, hypertension, peripheral neuropathy, avascular necrosis of the right femoral head ultimately ending in a right total hip replacement in May of 2021, who was hospitalized from 12/23/2024-01/02/2024 at [facility]. He to the ER on 12/20/2023 with complaints of fever, chills, and night sweats over the last month.  He had been treated for a UTI with oral cefuroxime on 11/03/2023 and doxycycline on 11/22/2023.  He says that he had dysuria and frequency at that time as well as fever that did not resolve with oral antibiotics.  Blood cultures were drawn however patient left.  Of note he was also COVID positive was given a course of Remdesivir.  He was called back to report to the ER on 12/23/2023 due to blood cultures revealing Aerococcus urinae.  TTE on 12/24/2023 with out evidence vegetation however does reveal moderate aortic valve sclerosis.  EF 55-60%.  Repeat blood cultures on 12/23 and 12/25 remained positive.  He had a CT abdomen and pelvis on 12/26 revealing prostate calcifications and a nonobstructing 8 mm right renal caliceal calculus.  Patchy cortical hypoenhancement at the lower pole right kidney  may be related to pyelonephritis or scarring.  He was taken for a QUENTIN on 12/27 revealing vegetation on aortic valve.  EF during QUENTIN 60 65% and normal valvular function.  Blood cultures cleared on 12/29.  He was discharged home to complete a 6 week course of ceftriaxone 2 g IV daily until 02/09/2024.       01/23/2024 office visit:  Mr. Ortiz presents to clinic for follow-up today.  He is currently completing a 6 week course of IV ceftriaxone for aortic valve endocarditis.  He denies any fever, chills, or night sweats.  He did have some dizziness upon discharge however it is  resolving.  He denies any nausea, vomiting, or diarrhea with antimicrobials.  He is complaining of redness and itching around groin he is putting topical antifungal with some improvement in redness in pruritus.  He denies missing any doses of IV antibiotics.  He does complain of a sore throat that began around 4 days ago.  Denies any cough associated. He denies any dysuria, frequency, or foul odor.    02/22/2024 office visit:   Mr. Ortiz presents to clinic for follow-up today.  He completed IV ceftriaxone on 02/09/2024.  He reports that he developed shortness of breath 3-4 days after completion of IV therapy in which he could not lay flat.  He presented to the ER on 02/16/2024 for further evaluation.  Blood cultures during that time were negative.  Repeat echocardiogram on 02/16 with worsening aortic valve function.  He was given IV and then transitioned to p.o. furosemide for discharge.  He was discharged on 02/18.  Per patient's report, he has been trying to get in touch with Cardiology for follow-up to no avail.  He reports that since she has been discharged in taking oral furosemide 40 mg p.o. b.i.d. he has not been experiencing any shortness of breath.  His wife reports that they have been going to the gym and walking on a treadmill.  Denies any systemic signs of infection such as fever, chills, or night sweats.  He reports that his yeast infection in his bilateral groin has resolved.  His PICC line was removed upon completion of his antimicrobial course.  He does have slight skin irritation and erythema on his right upper extremity secondary to adhesive.    03./06/2024 office visit:   Mr. Ortiz presents for follow-up today accompanied by his wife.  He was able to see Dr. Gil who has scheduled a QUENTIN for 3/14.  He also discontinue Lasix and it was instructed to only take Lasix when needed.  He reports that he did not start taking amoxicillin because he did not have any systemic signs of infection.  He  reports that shortness of breath has improved and he is able to continue his activities of daily living without getting short of breath.      Past Medical History:   Diagnosis Date    Avascular necrosis of right femoral head     DM (diabetes mellitus)     Essential (primary) hypertension     Hypercholesteremia     Macrocytosis     Osteoarthritis of right hip     Osteoarthritis of right knee     Peripheral neuropathy         Past Surgical History:   Procedure Laterality Date    EYE SURGERY      JOINT REPLACEMENT      Hip replacement    TOTAL HIP ARTHROPLASTY Right 05/18/2021        Social History     Socioeconomic History    Marital status:     Number of children: 6   Tobacco Use    Smoking status: Never    Smokeless tobacco: Never   Substance and Sexual Activity    Alcohol use: Not Currently     Comment: occ    Drug use: Never    Sexual activity: Yes     Partners: Female     Birth control/protection: None   Social History Narrative    ** Merged History Encounter **          Social Determinants of Health     Financial Resource Strain: Low Risk  (12/27/2023)    Overall Financial Resource Strain (CARDIA)     Difficulty of Paying Living Expenses: Not very hard   Food Insecurity: No Food Insecurity (12/27/2023)    Hunger Vital Sign     Worried About Running Out of Food in the Last Year: Never true     Ran Out of Food in the Last Year: Never true   Transportation Needs: No Transportation Needs (12/27/2023)    PRAPARE - Transportation     Lack of Transportation (Medical): No     Lack of Transportation (Non-Medical): No   Social Connections: Unknown (12/27/2023)    Social Connection and Isolation Panel [NHANES]     Frequency of Communication with Friends and Family: More than three times a week     Frequency of Social Gatherings with Friends and Family: Twice a week     Marital Status:    Housing Stability: Unknown (12/27/2023)    Housing Stability Vital Sign     Unable to Pay for Housing in the Last Year: No  "    Unstable Housing in the Last Year: No        Family History   Problem Relation Age of Onset    Lung cancer Mother         Smoker    Hodgkin's lymphoma Father     Lung cancer Sister     Breast cancer Sister         Review of patient's allergies indicates:  No Known Allergies     Immunization History   Administered Date(s) Administered    COVID-19, MRNA, LN-S, PF (Pfizer) (Gray Cap) 07/01/2022    COVID-19, MRNA, LN-S, PF (Pfizer) (Purple Cap) 02/22/2021, 02/22/2021, 03/15/2021, 03/15/2021    Influenza 10/15/2021    Influenza (FLUAD) - Quadrivalent - Adjuvanted - PF *Preferred* (65+) 10/30/2023    Influenza - Quadrivalent - High Dose - PF (65 years and older) 10/16/2022    Influenza - Quadrivalent - PF *Preferred* (6 months and older) 11/02/2020    Influenza - Trivalent - PF (ADULT) 10/15/2021    Pneumococcal Conjugate - 20 Valent 10/25/2022    Td (ADULT) 07/12/2005    Zoster Recombinant 10/25/2022        Review of Systems   All other systems reviewed and are negative.         Objective:      /68 (BP Location: Right arm)   Pulse 67   Resp 18   Ht 5' 10" (1.778 m)   Wt 117.9 kg (260 lb)   SpO2 97%   BMI 37.31 kg/m²      Physical Exam  Vitals reviewed.   Constitutional:       General: He is not in acute distress.     Appearance: Normal appearance. He is obese. He is not toxic-appearing.   HENT:      Mouth/Throat:      Mouth: Mucous membranes are moist.      Pharynx: No oropharyngeal exudate or posterior oropharyngeal erythema.   Eyes:      General: No scleral icterus.     Conjunctiva/sclera: Conjunctivae normal.      Pupils: Pupils are equal, round, and reactive to light.   Cardiovascular:      Rate and Rhythm: Normal rate and regular rhythm.      Pulses: Normal pulses.      Heart sounds: Murmur heard.   Pulmonary:      Effort: Pulmonary effort is normal. No respiratory distress.      Breath sounds: Normal breath sounds.   Abdominal:      General: Abdomen is flat. Bowel sounds are normal.      Palpations: " Abdomen is soft.      Tenderness: There is no abdominal tenderness.   Musculoskeletal:         General: No swelling.      Cervical back: Normal range of motion and neck supple.   Lymphadenopathy:      Cervical: No cervical adenopathy.   Skin:     General: Skin is warm and dry.      Findings: No rash.   Neurological:      General: No focal deficit present.      Mental Status: He is alert and oriented to person, place, and time.   Psychiatric:         Mood and Affect: Mood normal.         Behavior: Behavior normal.          Labs: Reviewed most recent relevant labs available, notable results highlighted in this note    Imaging: Reviewed most recent relevant imaging studies available, notable results highlighted in this note    Assessment:       Problem List Items Addressed This Visit          Cardiac/Vascular    Acute diastolic congestive heart failure - Primary    Endocarditis    Moderate to severe aortic valve regurgitation       ID    Aerococcus urinae bacteremia       Endocrine    Diabetes mellitus              Plan:   -Mr. Ortiz completed 6 weeks of IV ceftriaxone 2 g IV Q 24 hours on 02/09/2024 for Aerococcus urinae bacteremia and native aortic valve endocarditis.   -he denied any systemic signs of infection, however he began to develop increasing shortness of breath 3-4 days after completion of IV ceftriaxone.  He was unable to lie flat secondary to shortness of breath in which he presented to the ER for evaluation on 02/16/2024.  -echocardiogram on 02/16/2024 with evidence of severe aortic valve sclerosis as well as moderate-to-severe aortic regurgitation.  -previous echocardiogram on 02/01/2024 with moderate aortic valve sclerosis and moderate-to-severe aortic regurgitation.       -patient was able to see Dr. Gil who scheduled QUENTIN on 3/14.   -patient declines CV surgery evaluation until QUENTIN results have been obtained  -patient did not start amoxicillin 500 mg p.o. because he reports he did not develop  any systemic signs of infection such as fever, chills, or night sweats.  -if systemic signs of infection develop, I educated patient to obtain blood cultures and lab lab work that had been previously ordered in epic prior to picking and starting amoxicillin 500 mg p.o. q.12 hours.  -we will continue to monitor off of antibiotics at this time  -we will await QUENTIN results  -we will follow up again in 4 weeks    Follow up in about 4 weeks (around 4/3/2024).    35 minutes of total time spent on the encounter, which includes face to face time and non-face to face time preparing to see the patient (eg, review of tests), Obtaining and/or reviewing separately obtained history, Documenting clinical information in the electronic or other health record, Independently interpreting results (not separately reported) and communicating results to the patient/family/caregiver, or Care coordination (not separately reported).

## 2024-03-12 ENCOUNTER — TELEPHONE (OUTPATIENT)
Dept: INFECTIOUS DISEASES | Facility: CLINIC | Age: 69
End: 2024-03-12
Payer: MEDICARE

## 2024-03-12 ENCOUNTER — HOSPITAL ENCOUNTER (OUTPATIENT)
Dept: RADIOLOGY | Facility: HOSPITAL | Age: 69
Discharge: HOME | End: 2024-03-12
Attending: GENERAL PRACTICE
Payer: MEDICARE

## 2024-03-12 DIAGNOSIS — R78.81 BACTEREMIA: ICD-10-CM

## 2024-03-12 DIAGNOSIS — R50.9 FUO (FEVER OF UNKNOWN ORIGIN): Primary | ICD-10-CM

## 2024-03-12 DIAGNOSIS — R50.9 FUO (FEVER OF UNKNOWN ORIGIN): ICD-10-CM

## 2024-03-12 PROCEDURE — 71046 X-RAY EXAM CHEST 2 VIEWS: CPT | Mod: TC

## 2024-03-12 RX ORDER — AMOXICILLIN 875 MG/1
875 TABLET, FILM COATED ORAL 2 TIMES DAILY
Qty: 20 TABLET | Refills: 0 | Status: SHIPPED | OUTPATIENT
Start: 2024-03-12 | End: 2024-03-14 | Stop reason: HOSPADM

## 2024-03-12 NOTE — TELEPHONE ENCOUNTER
Patient's wife contacted our office stating the patient is having fevers, reportedly woke up this morning feeling myalgias, generalized weakness, generally feeling unwell.  Denies having any fevers this morning however in the afternoon he developed fever of 100.5, took antipyretic but does not appear to have had a significant effect.  He has generalized myalgias but no other localizing symptoms.  He does report that he has had urinary tract infections in the past without having urinary symptoms.  I called Mr. Ortiz and his wife back and discuss the current situation and my concerns.    Given his valvular abnormalities, no clear focus of infection at this time with documented fever, I am certainly concerned about recurrence of endocarditis or bacteremia.  He certainly can have other foci of infection including respiratory tract infection, urinary tract infection however given his history most concerning would be infection of the valve and bacteremia.  I expressed my concerns and recommended that they presented the emergency department, patient and his wife were reluctant about that given their experience at the previous visit and due to financial constraints.  Again I emphasized that that would be the safer route in order to obtain a SHAKEEL (outpatient shakeel is scheduled for 03/14/2024) as well as blood cultures and initiate IV antibiotics.  Again they stressed that they would prefer not to present to the emergency department at this time and initiate outpatient evaluation.  I strongly advised to present to the emergency department should he worsen in any way or have new symptoms.      In the meantime, I will start an infectious evaluation with chest x-ray, CBC, CMP, urinalysis with reflex to culture, blood cultures as well as flu, COVID, RSV swab.  Given his history and tenuous cardiac state, I will also start him on p.o. amoxicillin 875 mg q.12 hours based on prior cultures and susceptibilities.  I explained that p.o.  antibiotics will be inferior to IV antibiotics should his symptoms be caused by bacteremia and valvular disease.  Both patient and his wife expressed understanding of the situation, would still like to proceed with outpatient evaluation with backup plan to present to the emergency department should the patient experience any worsening in his condition.    Leodan Sharp MD  Infectious Disease  [facility]

## 2024-03-12 NOTE — TELEPHONE ENCOUNTER
----- Message from Claudia Zelaya sent at 3/12/2024  2:37 PM CDT -----  Regarding: fever  Pt started running fever 100.5, he was told if he's to start running fever to go do labs and Dr. Shrap would have a prescription (Amoxil) already on file for him at the pharmacy. The wife called the pharmacy and there's no medication on file for him. Pt # [phone]

## 2024-03-12 NOTE — TELEPHONE ENCOUNTER
Spoke with pt wife and she said that pt has been running fever with no other symptoms.. I relayed the message to Dr. Sharp and he added NP Rickey Camejo to the secure chat conversation in which she asked that the pt wife be transferred to her line. Call was transferred.

## 2024-03-13 ENCOUNTER — TELEPHONE (OUTPATIENT)
Dept: INFECTIOUS DISEASES | Facility: CLINIC | Age: 69
End: 2024-03-13
Payer: MEDICARE

## 2024-03-13 RX ORDER — CIPROFLOXACIN 500 MG/1
500 TABLET ORAL 2 TIMES DAILY
Qty: 20 TABLET | Refills: 0 | Status: SHIPPED | OUTPATIENT
Start: 2024-03-13 | End: 2024-03-23

## 2024-03-13 NOTE — TELEPHONE ENCOUNTER
Spoke with Mrs. Ortiz.  She reports that her  is feeling better today.  He is now taken 2 doses of amoxicillin without any adverse effects.  He denies any fever this morning and even has a little bit of an appetite today.  He was feeling well enough to go to work.  His lab results show a slight increase in leukocytosis as well as urinalysis with 51-99 WBCs and urine culture showing Gram-negative rods thus far.  I would like to transition him to ciprofloxacin 500 mg q.12 for coverage of the Gram-negative rods as well as coverage for Aerococcus.  We will continue to monitor pending blood cultures as well as finalize urine culture reports.  He is scheduled to undergo QUENTIN tomorrow with Dr. Gil.  I instructed them that if he develops worsening fever, or generalized malaise he should contact our office or present to the ER for further evaluation.

## 2024-03-14 ENCOUNTER — HOSPITAL ENCOUNTER (OUTPATIENT)
Dept: CARDIOLOGY | Facility: HOSPITAL | Age: 69
Discharge: HOME | End: 2024-03-14
Attending: INTERNAL MEDICINE
Payer: MEDICARE

## 2024-03-14 ENCOUNTER — ANESTHESIA (OUTPATIENT)
Dept: CARDIOLOGY | Facility: HOSPITAL | Age: 69
End: 2024-03-14
Payer: MEDICARE

## 2024-03-14 ENCOUNTER — ANESTHESIA EVENT (OUTPATIENT)
Dept: CARDIOLOGY | Facility: HOSPITAL | Age: 69
End: 2024-03-14
Payer: MEDICARE

## 2024-03-14 VITALS
RESPIRATION RATE: 21 BRPM | OXYGEN SATURATION: 99 % | SYSTOLIC BLOOD PRESSURE: 119 MMHG | DIASTOLIC BLOOD PRESSURE: 47 MMHG | HEART RATE: 54 BPM

## 2024-03-14 VITALS
BODY MASS INDEX: 36.58 KG/M2 | TEMPERATURE: 98 F | RESPIRATION RATE: 18 BRPM | OXYGEN SATURATION: 96 % | SYSTOLIC BLOOD PRESSURE: 115 MMHG | WEIGHT: 255.5 LBS | HEIGHT: 70 IN | DIASTOLIC BLOOD PRESSURE: 41 MMHG | HEART RATE: 52 BPM

## 2024-03-14 DIAGNOSIS — I35.1 MODERATE TO SEVERE AORTIC VALVE REGURGITATION: Primary | ICD-10-CM

## 2024-03-14 DIAGNOSIS — R60.9 FLUID RETENTION: ICD-10-CM

## 2024-03-14 DIAGNOSIS — I48.19 PERSISTENT ATRIAL FIBRILLATION: ICD-10-CM

## 2024-03-14 DIAGNOSIS — I50.9 CHF (CONGESTIVE HEART FAILURE): ICD-10-CM

## 2024-03-14 DIAGNOSIS — I50.9 HEART FAILURE, UNSPECIFIED: Primary | ICD-10-CM

## 2024-03-14 LAB
AV REGURGITATION PRESSURE HALF TIME: 253 MS
BSA FOR ECHO PROCEDURE: 2.39 M2
PISA AR MAX VEL: 3.93 M/S
POCT GLUCOSE: 103 MG/DL (ref 70–110)

## 2024-03-14 PROCEDURE — 37000008 HC ANESTHESIA 1ST 15 MINUTES

## 2024-03-14 PROCEDURE — D9220A PRA ANESTHESIA: Mod: CRNA,,,

## 2024-03-14 PROCEDURE — 63600175 PHARM REV CODE 636 W HCPCS

## 2024-03-14 PROCEDURE — D9220A PRA ANESTHESIA: Mod: ANES,,, | Performed by: ANESTHESIOLOGY

## 2024-03-14 PROCEDURE — 25000003 PHARM REV CODE 250

## 2024-03-14 PROCEDURE — 93325 DOPPLER ECHO COLOR FLOW MAPG: CPT

## 2024-03-14 RX ORDER — LIDOCAINE HYDROCHLORIDE 20 MG/ML
INJECTION, SOLUTION EPIDURAL; INFILTRATION; INTRACAUDAL; PERINEURAL
Status: DISCONTINUED | OUTPATIENT
Start: 2024-03-14 | End: 2024-03-14

## 2024-03-14 RX ORDER — PROPOFOL 10 MG/ML
VIAL (ML) INTRAVENOUS
Status: DISCONTINUED | OUTPATIENT
Start: 2024-03-14 | End: 2024-03-14

## 2024-03-14 RX ORDER — GLYCOPYRROLATE 0.2 MG/ML
INJECTION INTRAMUSCULAR; INTRAVENOUS
Status: DISCONTINUED | OUTPATIENT
Start: 2024-03-14 | End: 2024-03-14

## 2024-03-14 RX ORDER — LIDOCAINE HYDROCHLORIDE 10 MG/ML
1 INJECTION, SOLUTION EPIDURAL; INFILTRATION; INTRACAUDAL; PERINEURAL ONCE
Status: DISCONTINUED | OUTPATIENT
Start: 2024-03-14 | End: 2024-03-15 | Stop reason: HOSPADM

## 2024-03-14 RX ORDER — FUROSEMIDE 20 MG/1
40 TABLET ORAL
Qty: 30 TABLET | Refills: 0 | Status: SHIPPED | OUTPATIENT
Start: 2024-03-14 | End: 2025-03-14

## 2024-03-14 RX ORDER — SODIUM CHLORIDE, SODIUM GLUCONATE, SODIUM ACETATE, POTASSIUM CHLORIDE AND MAGNESIUM CHLORIDE 30; 37; 368; 526; 502 MG/100ML; MG/100ML; MG/100ML; MG/100ML; MG/100ML
INJECTION, SOLUTION INTRAVENOUS CONTINUOUS
Status: DISCONTINUED | OUTPATIENT
Start: 2024-03-14 | End: 2024-03-15 | Stop reason: HOSPADM

## 2024-03-14 RX ORDER — PHENYLEPHRINE HYDROCHLORIDE 10 MG/ML
INJECTION INTRAVENOUS
Status: DISCONTINUED | OUTPATIENT
Start: 2024-03-14 | End: 2024-03-14

## 2024-03-14 RX ADMIN — GLYCOPYRROLATE 0.1 MG: 0.2 INJECTION INTRAMUSCULAR; INTRAVENOUS at 09:03

## 2024-03-14 RX ADMIN — LIDOCAINE HYDROCHLORIDE 80 MG: 20 INJECTION, SOLUTION EPIDURAL; INFILTRATION; INTRACAUDAL; PERINEURAL at 09:03

## 2024-03-14 RX ADMIN — PROPOFOL 50 MG: 10 INJECTION, EMULSION INTRAVENOUS at 09:03

## 2024-03-14 RX ADMIN — PROPOFOL 100 MG: 10 INJECTION, EMULSION INTRAVENOUS at 09:03

## 2024-03-14 RX ADMIN — PHENYLEPHRINE HYDROCHLORIDE 100 MCG: 10 INJECTION INTRAVENOUS at 09:03

## 2024-03-14 RX ADMIN — SODIUM CHLORIDE, SODIUM GLUCONATE, SODIUM ACETATE, POTASSIUM CHLORIDE AND MAGNESIUM CHLORIDE: 526; 502; 368; 37; 30 INJECTION, SOLUTION INTRAVENOUS at 09:03

## 2024-03-14 NOTE — ANESTHESIA PREPROCEDURE EVALUATION
03/14/2024  Arturo Ortiz is a 68 y.o., male, who presents for the following:    Procedure: TRANSESOPHAGEAL ECHO (QUENTIN) (CUPID ONLY)   Diagnosis:      Persistent atrial fibrillation [I48.19]      CHF (congestive heart failure) [I50.9]   Location: [facility] - Cath Lab Pre/Post           Past Medical History:   Diagnosis Date    Avascular necrosis of right femoral head      DM (diabetes mellitus)      Essential (primary) hypertension      Hypercholesteremia      Macrocytosis      Osteoarthritis of right hip      Osteoarthritis of right knee      Peripheral neuropathy     PMH includes Morbid Obesity    TTE ( 2/16/24) :    Technically difficult study, Definity contrast was not used in the study.    Left Ventricle: The left ventricle is moderately dilated. Normal wall thickness. There is mildly reduced systolic function with a visually estimated ejection fraction of 45%. Grade I diastolic dysfunction.    Right Ventricle: Normal right ventricular cavity size. Systolic function is normal. TAPSE is 3.51 cm.    Left Atrium: Left atrium is dilated.    Aortic Valve: There is severe aortic valve sclerosis. Severely calcified cusps. Mildly restricted motion. There is mild stenosis. Aortic valve peak velocity is 2.82 m/s. Mean gradient is 18 mmHg. The dimensionless index is 0.53. There is moderate to severe aortic regurgitation.    Mitral Valve: Moderately thickened leaflets. Severely calcified leaflets. There is moderate mitral annular calcification present. Moderately restricted motion. There is no stenosis. There is mild regurgitation.    Tricuspid Valve: There is no stenosis. There is mild regurgitation.    Pulmonic Valve: There is no stenosis. There is no significant regurgitation.     If clinically indicated a QUENTIN can be helpful to further assess the severity of AV and MV  disease.    LAB:            Pre-op Assessment    I have reviewed the Patient Summary Reports.     I have reviewed the Nursing Notes. I have reviewed the NPO Status.   I have reviewed the Medications.     Review of Systems  Anesthesia Hx:  No problems with previous Anesthesia             Denies Family Hx of Anesthesia complications.    Denies Personal Hx of Anesthesia complications.                    Cardiovascular:     Hypertension       CHF                                 Pulmonary:  Pulmonary Normal                       Renal/:  Chronic Renal Disease, ARF                Musculoskeletal:  Arthritis               Neurological:        Peripheral Neuropathy                          Endocrine:  Diabetes Hypothyroidism        Obesity / BMI > 30      Physical Exam  General: Alert and Oriented    Airway:  Mallampati: II   Mouth Opening: Normal  TM Distance: Normal  Tongue: Normal  Neck ROM: Normal ROM    Dental:  Intact    Chest/Lungs:  Normal Respiratory Rate    Heart:  Rate: Normal  Rhythm: Regular Rhythm        Anesthesia Plan  Type of Anesthesia, risks & benefits discussed:    Anesthesia Type: Gen Natural Airway  Intra-op Monitoring Plan: Standard ASA Monitors  Post Op Pain Control Plan: IV/PO Opioids PRN  Induction:  IV  Airway Plan: Direct  Informed Consent: Informed consent signed with the Patient and all parties understand the risks and agree with anesthesia plan.  All questions answered. Patient consented to blood products? No  ASA Score: 3  Day of Surgery Review of History & Physical: H&P Update referred to the surgeon/provider.  Anesthesia Plan Notes: Nasal cannula vs facemask supplemental oxygenation   For patients with JEROME/obesity, may consider SuperNoval Nasal CPAP      Ready For Surgery From Anesthesia Perspective.     .

## 2024-03-14 NOTE — ANESTHESIA POSTPROCEDURE EVALUATION
Anesthesia Post Evaluation    Patient: Arturo Ortiz    Procedure(s) Performed: * No procedures listed *    Final Anesthesia Type: general      Patient location during evaluation: Cath Lab  Patient participation: Yes- Able to Participate  Level of consciousness: oriented and awake  Post-procedure vital signs: reviewed and stable  Pain management: adequate  Airway patency: patent    PONV status at discharge: No PONV  Anesthetic complications: no      Cardiovascular status: stable and hemodynamically stable  Respiratory status: spontaneous ventilation and unassisted  Hydration status: euvolemic  Follow-up not needed.  Comments: [facility]              Vitals Value Taken Time   /41 03/14/24 1017   Temp * 03/14/24 1135   Pulse 55 03/14/24 1025   Resp 10 03/14/24 1025   SpO2 100 % 03/14/24 1025   Vitals shown include unvalidated device data.      No case tracking events are documented in the log.      Pain/Mary Jane Score: No data recorded

## 2024-03-14 NOTE — PLAN OF CARE
Patient bed rest completed. Vitals baseline. AAOx4. Patient ambulated to restroom. Discharge instructions given to patient and spouse; both verbalize understanding. IV removed and patient transported via wheelchair to wife's vehicle. Patient in no acute distress upon discharge.

## 2024-03-14 NOTE — TRANSFER OF CARE
"Anesthesia Transfer of Care Note    Patient: Arturo Ortiz    Procedure(s) Performed: * No procedures listed *    Patient location: Cath Lab    Anesthesia Type: MAC    Transport from OR: Transported from OR on room air with adequate spontaneous ventilation    Post pain: adequate analgesia    Post assessment: no apparent anesthetic complications    Post vital signs: stable    Level of consciousness: awake and alert    Nausea/Vomiting: no nausea/vomiting    Complications: none    Transfer of care protocol was followed      Last vitals: Visit Vitals  BP (!) 119/47   Pulse (!) 52   Temp 36.7 °C (98 °F) (Oral)   Resp 20   Ht 5' 10" (1.778 m)   Wt 115.9 kg (255 lb 8.2 oz)   SpO2 100%   BMI 36.66 kg/m²     "

## 2024-03-14 NOTE — DISCHARGE SUMMARY
[facility] - Cath Lab Pre/Post  Discharge Note  Short Stay    Transesophageal echo (QUENTIN)      OUTCOME: Patient tolerated treatment/procedure well without complication and is now ready for discharge.    DISPOSITION: Home or Self Care    FINAL DIAGNOSIS:  aortic insufficiency    FOLLOWUP: In clinic    DISCHARGE INSTRUCTIONS:  No discharge procedures on file.     TIME SPENT ON DISCHARGE: 31 minutes

## 2024-04-04 ENCOUNTER — OFFICE VISIT (OUTPATIENT)
Dept: INFECTIOUS DISEASES | Facility: CLINIC | Age: 69
End: 2024-04-04
Payer: MEDICARE

## 2024-04-04 VITALS
OXYGEN SATURATION: 95 % | HEIGHT: 70 IN | WEIGHT: 255.5 LBS | SYSTOLIC BLOOD PRESSURE: 112 MMHG | RESPIRATION RATE: 18 BRPM | BODY MASS INDEX: 36.58 KG/M2 | DIASTOLIC BLOOD PRESSURE: 60 MMHG | HEART RATE: 63 BPM

## 2024-04-04 DIAGNOSIS — I38 ENDOCARDITIS, UNSPECIFIED CHRONICITY, UNSPECIFIED ENDOCARDITIS TYPE: ICD-10-CM

## 2024-04-04 DIAGNOSIS — I35.1 MODERATE TO SEVERE AORTIC VALVE REGURGITATION: ICD-10-CM

## 2024-04-04 DIAGNOSIS — E11.9 TYPE 2 DIABETES MELLITUS WITHOUT COMPLICATION, WITHOUT LONG-TERM CURRENT USE OF INSULIN: ICD-10-CM

## 2024-04-04 DIAGNOSIS — I50.31 ACUTE DIASTOLIC CONGESTIVE HEART FAILURE: Primary | ICD-10-CM

## 2024-04-04 DIAGNOSIS — R78.81 BACTEREMIA: ICD-10-CM

## 2024-04-04 PROCEDURE — 1101F PT FALLS ASSESS-DOCD LE1/YR: CPT | Mod: CPTII,S$GLB,,

## 2024-04-04 PROCEDURE — 3008F BODY MASS INDEX DOCD: CPT | Mod: CPTII,S$GLB,,

## 2024-04-04 PROCEDURE — 4010F ACE/ARB THERAPY RXD/TAKEN: CPT | Mod: CPTII,S$GLB,,

## 2024-04-04 PROCEDURE — 99999 PR PBB SHADOW E&M-EST. PATIENT-LVL III: CPT | Mod: PBBFAC,,,

## 2024-04-04 PROCEDURE — 3074F SYST BP LT 130 MM HG: CPT | Mod: CPTII,S$GLB,,

## 2024-04-04 PROCEDURE — 3288F FALL RISK ASSESSMENT DOCD: CPT | Mod: CPTII,S$GLB,,

## 2024-04-04 PROCEDURE — 99214 OFFICE O/P EST MOD 30 MIN: CPT | Mod: S$GLB,,,

## 2024-04-04 PROCEDURE — 3044F HG A1C LEVEL LT 7.0%: CPT | Mod: CPTII,S$GLB,,

## 2024-04-04 PROCEDURE — 3078F DIAST BP <80 MM HG: CPT | Mod: CPTII,S$GLB,,

## 2024-04-04 PROCEDURE — 1159F MED LIST DOCD IN RCRD: CPT | Mod: CPTII,S$GLB,,

## 2024-04-04 NOTE — PROGRESS NOTES
Subjective:       Patient ID: Arturo Ortiz 68 y.o.     Chief Complaint:   Chief Complaint   Patient presents with    4WEEK F/U Acute diastolic congestive heart failure        HPI:  This is a 68 year old male with a past medical history of diabetes, hypertension, peripheral neuropathy, avascular necrosis of the right femoral head ultimately ending in a right total hip replacement in May of 2021, who was hospitalized from 12/23/2024-01/02/2024 at [facility]. He to the ER on 12/20/2023 with complaints of fever, chills, and night sweats over the last month.  He had been treated for a UTI with oral cefuroxime on 11/03/2023 and doxycycline on 11/22/2023.  He says that he had dysuria and frequency at that time as well as fever that did not resolve with oral antibiotics.  Blood cultures were drawn however patient left.  Of note he was also COVID positive was given a course of Remdesivir.  He was called back to report to the ER on 12/23/2023 due to blood cultures revealing Aerococcus urinae.  TTE on 12/24/2023 with out evidence vegetation however does reveal moderate aortic valve sclerosis.  EF 55-60%.  Repeat blood cultures on 12/23 and 12/25 remained positive.  He had a CT abdomen and pelvis on 12/26 revealing prostate calcifications and a nonobstructing 8 mm right renal caliceal calculus.  Patchy cortical hypoenhancement at the lower pole right kidney  may be related to pyelonephritis or scarring.  He was taken for a QUENTIN on 12/27 revealing vegetation on aortic valve.  EF during QUENTIN 60 65% and normal valvular function.  Blood cultures cleared on 12/29.  He was discharged home to complete a 6 week course of ceftriaxone 2 g IV daily until 02/09/2024.       01/23/2024 office visit:  Mr. Ortiz presents to clinic for follow-up today.  He is currently completing a 6 week course of IV ceftriaxone for aortic valve endocarditis.  He denies any fever, chills, or night sweats.  He did have some dizziness upon discharge however  it is resolving.  He denies any nausea, vomiting, or diarrhea with antimicrobials.  He is complaining of redness and itching around groin he is putting topical antifungal with some improvement in redness in pruritus.  He denies missing any doses of IV antibiotics.  He does complain of a sore throat that began around 4 days ago.  Denies any cough associated. He denies any dysuria, frequency, or foul odor.    02/22/2024 office visit:   Mr. Ortiz presents to clinic for follow-up today.  He completed IV ceftriaxone on 02/09/2024.  He reports that he developed shortness of breath 3-4 days after completion of IV therapy in which he could not lay flat.  He presented to the ER on 02/16/2024 for further evaluation.  Blood cultures during that time were negative.  Repeat echocardiogram on 02/16 with worsening aortic valve function.  He was given IV and then transitioned to p.o. furosemide for discharge.  He was discharged on 02/18.  Per patient's report, he has been trying to get in touch with Cardiology for follow-up to no avail.  He reports that since she has been discharged in taking oral furosemide 40 mg p.o. b.i.d. he has not been experiencing any shortness of breath.  His wife reports that they have been going to the gym and walking on a treadmill.  Denies any systemic signs of infection such as fever, chills, or night sweats.  He reports that his yeast infection in his bilateral groin has resolved.  His PICC line was removed upon completion of his antimicrobial course.  He does have slight skin irritation and erythema on his right upper extremity secondary to adhesive.    03/06/2024 office visit:   Mr. Ortiz presents for follow-up today accompanied by his wife.  He was able to see Dr. Gil who has scheduled a QUENTIN for 3/14.  He also discontinued Lasix and it was instructed to only take Lasix when needed.  He reports that he did not start taking amoxicillin because he did not have any systemic signs of infection.   He reports that shortness of breath has improved and he is able to continue his activities of daily living without getting short of breath.    04/04/2024 office visit:   Mr. Ortiz presents for follow-up today accompanied by his wife.  Denies any fever, chills, or night sweats. Occasional crackles and SOB, takes lasix prn   Denies any nausea, vomiting, or diarrhea. He had a QUENTIN on 03/14/2024 without any evidence of infectious endocarditis.  LV function is 50%.      Past Medical History:   Diagnosis Date    Avascular necrosis of right femoral head     DM (diabetes mellitus)     Essential (primary) hypertension     Hypercholesteremia     Macrocytosis     Osteoarthritis of right hip     Osteoarthritis of right knee     Peripheral neuropathy         Past Surgical History:   Procedure Laterality Date    EYE SURGERY      JOINT REPLACEMENT      Hip replacement    TOTAL HIP ARTHROPLASTY Right 05/18/2021        Social History     Socioeconomic History    Marital status:     Number of children: 6   Tobacco Use    Smoking status: Never    Smokeless tobacco: Never   Substance and Sexual Activity    Alcohol use: Not Currently     Comment: occ    Drug use: Never    Sexual activity: Yes     Partners: Female     Birth control/protection: None   Social History Narrative    ** Merged History Encounter **          Social Determinants of Health     Financial Resource Strain: Low Risk  (12/27/2023)    Overall Financial Resource Strain (CARDIA)     Difficulty of Paying Living Expenses: Not very hard   Food Insecurity: No Food Insecurity (12/27/2023)    Hunger Vital Sign     Worried About Running Out of Food in the Last Year: Never true     Ran Out of Food in the Last Year: Never true   Transportation Needs: No Transportation Needs (12/27/2023)    PRAPARE - Transportation     Lack of Transportation (Medical): No     Lack of Transportation (Non-Medical): No   Social Connections: Unknown (12/27/2023)    Social Connection and  "Isolation Panel [NHANES]     Frequency of Communication with Friends and Family: More than three times a week     Frequency of Social Gatherings with Friends and Family: Twice a week     Marital Status:    Housing Stability: Unknown (12/27/2023)    Housing Stability Vital Sign     Unable to Pay for Housing in the Last Year: No     Unstable Housing in the Last Year: No        Family History   Problem Relation Age of Onset    Lung cancer Mother         Smoker    Hodgkin's lymphoma Father     Lung cancer Sister     Breast cancer Sister         Review of patient's allergies indicates:  No Known Allergies     Immunization History   Administered Date(s) Administered    COVID-19, MRNA, LN-S, PF (Pfizer) (Gray Cap) 07/01/2022    COVID-19, MRNA, LN-S, PF (Pfizer) (Purple Cap) 02/22/2021, 02/22/2021, 03/15/2021, 03/15/2021    Influenza 10/15/2021    Influenza (FLUAD) - Quadrivalent - Adjuvanted - PF *Preferred* (65+) 10/30/2023    Influenza - Quadrivalent - High Dose - PF (65 years and older) 10/16/2022    Influenza - Quadrivalent - PF *Preferred* (6 months and older) 11/02/2020    Influenza - Trivalent - PF (ADULT) 10/15/2021    Pneumococcal Conjugate - 20 Valent 10/25/2022    Td (ADULT) 07/12/2005    Zoster Recombinant 10/25/2022        Review of Systems   All other systems reviewed and are negative.         Objective:      /60 (BP Location: Left arm)   Pulse 63   Resp 18   Ht 5' 10" (1.778 m)   Wt 115.9 kg (255 lb 8.2 oz)   SpO2 95%   BMI 36.66 kg/m²      Physical Exam  Vitals reviewed.   Constitutional:       General: He is not in acute distress.     Appearance: Normal appearance. He is obese. He is not toxic-appearing.   HENT:      Mouth/Throat:      Mouth: Mucous membranes are moist.      Pharynx: No oropharyngeal exudate or posterior oropharyngeal erythema.   Eyes:      General: No scleral icterus.     Conjunctiva/sclera: Conjunctivae normal.      Pupils: Pupils are equal, round, and reactive to " light.   Cardiovascular:      Rate and Rhythm: Normal rate and regular rhythm.      Pulses: Normal pulses.      Heart sounds: Murmur heard.   Pulmonary:      Effort: Pulmonary effort is normal. No respiratory distress.      Breath sounds: Normal breath sounds.   Abdominal:      General: Abdomen is flat. Bowel sounds are normal.      Palpations: Abdomen is soft.      Tenderness: There is no abdominal tenderness.   Musculoskeletal:         General: No swelling.      Cervical back: Normal range of motion and neck supple.   Lymphadenopathy:      Cervical: No cervical adenopathy.   Skin:     General: Skin is warm and dry.      Findings: No rash.   Neurological:      General: No focal deficit present.      Mental Status: He is alert and oriented to person, place, and time.   Psychiatric:         Mood and Affect: Mood normal.         Behavior: Behavior normal.          Labs: Reviewed most recent relevant labs available, notable results highlighted in this note    Imaging: Reviewed most recent relevant imaging studies available, notable results highlighted in this note    Assessment:       Problem List Items Addressed This Visit          Cardiac/Vascular    Acute diastolic congestive heart failure - Primary    Endocarditis    Moderate to severe aortic valve regurgitation       ID    Aerococcus urinae bacteremia       Endocrine    Diabetes mellitus                Plan:   -Mr. Ortiz completed 6 weeks of IV ceftriaxone 2 g IV Q 24 hours on 02/09/2024 for Aerococcus urinae bacteremia and native aortic valve endocarditis.   -he denied any systemic signs of infection, however he began to develop increasing shortness of breath 3-4 days after completion of IV ceftriaxone.  He was unable to lie flat secondary to shortness of breath in which he presented to the ER for evaluation on 02/16/2024.  -echocardiogram on 02/16/2024 with evidence of severe aortic valve sclerosis as well as moderate-to-severe aortic regurgitation.  -previous  echocardiogram on 02/01/2024 with moderate aortic valve sclerosis and moderate-to-severe aortic regurgitation.       -patient was able to see Dr. Gil who performed QUENTIN on 3/14 and did not see any evidence of infective endocarditis. The aortic valve dis have moderately restricted tricuspid with severe aortic insufficiency.   -Patient is being followed and managed by cardiology  -Continues without systemic signs of infection at this time.   -continue to monitor off of antibiotics at this time  -no need for further follow-up in ID clinic     Follow up if symptoms worsen or fail to improve.    35 minutes of total time spent on the encounter, which includes face to face time and non-face to face time preparing to see the patient (eg, review of tests), Obtaining and/or reviewing separately obtained history, Documenting clinical information in the electronic or other health record, Independently interpreting results (not separately reported) and communicating results to the patient/family/caregiver, or Care coordination (not separately reported).

## 2024-05-27 PROBLEM — N17.9 AKI (ACUTE KIDNEY INJURY): Status: RESOLVED | Noted: 2024-02-22 | Resolved: 2024-05-27

## 2024-07-23 ENCOUNTER — HOSPITAL ENCOUNTER (INPATIENT)
Facility: HOSPITAL | Age: 69
LOS: 8 days | Discharge: HOME HEALTH | DRG: 216 | End: 2024-07-31
Attending: EMERGENCY MEDICINE | Admitting: INTERNAL MEDICINE
Payer: MEDICARE

## 2024-07-23 DIAGNOSIS — I20.89 ANGINA AT REST: ICD-10-CM

## 2024-07-23 DIAGNOSIS — I35.1 SEVERE AORTIC INSUFFICIENCY: ICD-10-CM

## 2024-07-23 DIAGNOSIS — I35.0 AORTIC STENOSIS: ICD-10-CM

## 2024-07-23 DIAGNOSIS — I35.9 AORTIC VALVE DISEASE: ICD-10-CM

## 2024-07-23 DIAGNOSIS — I49.9 ABNORMAL HEART RHYTHM: ICD-10-CM

## 2024-07-23 DIAGNOSIS — I25.10 CAD (CORONARY ARTERY DISEASE): ICD-10-CM

## 2024-07-23 DIAGNOSIS — R06.02 SOB (SHORTNESS OF BREATH): ICD-10-CM

## 2024-07-23 DIAGNOSIS — R07.9 CHEST PAIN: ICD-10-CM

## 2024-07-23 DIAGNOSIS — I50.9 CONGESTIVE HEART FAILURE, UNSPECIFIED HF CHRONICITY, UNSPECIFIED HEART FAILURE TYPE: Primary | ICD-10-CM

## 2024-07-23 LAB
ALBUMIN SERPL-MCNC: 3.8 G/DL (ref 3.4–4.8)
ALBUMIN/GLOB SERPL: 1.2 RATIO (ref 1.1–2)
ALP SERPL-CCNC: 49 UNIT/L (ref 40–150)
ALT SERPL-CCNC: 24 UNIT/L (ref 0–55)
ANION GAP SERPL CALC-SCNC: 6 MEQ/L
AST SERPL-CCNC: 25 UNIT/L (ref 5–34)
BASOPHILS # BLD AUTO: 0.02 X10(3)/MCL
BASOPHILS NFR BLD AUTO: 0.2 %
BILIRUB SERPL-MCNC: 1 MG/DL
BNP BLD-MCNC: 1620.1 PG/ML
BUN SERPL-MCNC: 27.6 MG/DL (ref 8.4–25.7)
CALCIUM SERPL-MCNC: 9.4 MG/DL (ref 8.8–10)
CHLORIDE SERPL-SCNC: 109 MMOL/L (ref 98–107)
CO2 SERPL-SCNC: 27 MMOL/L (ref 23–31)
CREAT SERPL-MCNC: 1.26 MG/DL (ref 0.73–1.18)
CREAT/UREA NIT SERPL: 22
EOSINOPHIL # BLD AUTO: 0.1 X10(3)/MCL (ref 0–0.9)
EOSINOPHIL NFR BLD AUTO: 0.8 %
ERYTHROCYTE [DISTWIDTH] IN BLOOD BY AUTOMATED COUNT: 14.4 % (ref 11.5–17)
FLUAV AG UPPER RESP QL IA.RAPID: NOT DETECTED
FLUBV AG UPPER RESP QL IA.RAPID: NOT DETECTED
GFR SERPLBLD CREATININE-BSD FMLA CKD-EPI: >60 ML/MIN/1.73/M2
GLOBULIN SER-MCNC: 3.1 GM/DL (ref 2.4–3.5)
GLUCOSE SERPL-MCNC: 118 MG/DL (ref 82–115)
HCT VFR BLD AUTO: 38.3 % (ref 42–52)
HGB BLD-MCNC: 13.1 G/DL (ref 14–18)
IMM GRANULOCYTES # BLD AUTO: 0.03 X10(3)/MCL (ref 0–0.04)
IMM GRANULOCYTES NFR BLD AUTO: 0.3 %
INR PPP: 1.2
LYMPHOCYTES # BLD AUTO: 1.29 X10(3)/MCL (ref 0.6–4.6)
LYMPHOCYTES NFR BLD AUTO: 10.9 %
MCH RBC QN AUTO: 32.7 PG (ref 27–31)
MCHC RBC AUTO-ENTMCNC: 34.2 G/DL (ref 33–36)
MCV RBC AUTO: 95.5 FL (ref 80–94)
MONOCYTES # BLD AUTO: 0.97 X10(3)/MCL (ref 0.1–1.3)
MONOCYTES NFR BLD AUTO: 8.2 %
NEUTROPHILS # BLD AUTO: 9.43 X10(3)/MCL (ref 2.1–9.2)
NEUTROPHILS NFR BLD AUTO: 79.6 %
NRBC BLD AUTO-RTO: 0 %
OHS QRS DURATION: 106 MS
OHS QTC CALCULATION: 462 MS
PLATELET # BLD AUTO: 145 X10(3)/MCL (ref 130–400)
PMV BLD AUTO: 10.2 FL (ref 7.4–10.4)
POTASSIUM SERPL-SCNC: 4 MMOL/L (ref 3.5–5.1)
PROT SERPL-MCNC: 6.9 GM/DL (ref 5.8–7.6)
PROTHROMBIN TIME: 14.5 SECONDS (ref 12.5–14.5)
RBC # BLD AUTO: 4.01 X10(6)/MCL (ref 4.7–6.1)
SARS-COV-2 RNA RESP QL NAA+PROBE: NOT DETECTED
SODIUM SERPL-SCNC: 142 MMOL/L (ref 136–145)
TROPONIN I SERPL-MCNC: 0.02 NG/ML (ref 0–0.04)
WBC # BLD AUTO: 11.84 X10(3)/MCL (ref 4.5–11.5)

## 2024-07-23 PROCEDURE — 96374 THER/PROPH/DIAG INJ IV PUSH: CPT

## 2024-07-23 PROCEDURE — 25000003 PHARM REV CODE 250

## 2024-07-23 PROCEDURE — 85025 COMPLETE CBC W/AUTO DIFF WBC: CPT | Performed by: STUDENT IN AN ORGANIZED HEALTH CARE EDUCATION/TRAINING PROGRAM

## 2024-07-23 PROCEDURE — 85610 PROTHROMBIN TIME: CPT | Performed by: STUDENT IN AN ORGANIZED HEALTH CARE EDUCATION/TRAINING PROGRAM

## 2024-07-23 PROCEDURE — 83880 ASSAY OF NATRIURETIC PEPTIDE: CPT | Performed by: STUDENT IN AN ORGANIZED HEALTH CARE EDUCATION/TRAINING PROGRAM

## 2024-07-23 PROCEDURE — 93010 ELECTROCARDIOGRAM REPORT: CPT | Mod: ,,, | Performed by: INTERNAL MEDICINE

## 2024-07-23 PROCEDURE — 80053 COMPREHEN METABOLIC PANEL: CPT | Performed by: STUDENT IN AN ORGANIZED HEALTH CARE EDUCATION/TRAINING PROGRAM

## 2024-07-23 PROCEDURE — 0240U COVID/FLU A&B PCR: CPT

## 2024-07-23 PROCEDURE — P9047 ALBUMIN (HUMAN), 25%, 50ML: HCPCS | Mod: JG

## 2024-07-23 PROCEDURE — 63600175 PHARM REV CODE 636 W HCPCS

## 2024-07-23 PROCEDURE — 83880 ASSAY OF NATRIURETIC PEPTIDE: CPT | Performed by: FAMILY MEDICINE

## 2024-07-23 PROCEDURE — 99285 EMERGENCY DEPT VISIT HI MDM: CPT | Mod: 25

## 2024-07-23 PROCEDURE — 11000001 HC ACUTE MED/SURG PRIVATE ROOM

## 2024-07-23 PROCEDURE — 93005 ELECTROCARDIOGRAM TRACING: CPT

## 2024-07-23 PROCEDURE — 63600175 PHARM REV CODE 636 W HCPCS: Performed by: EMERGENCY MEDICINE

## 2024-07-23 PROCEDURE — 84484 ASSAY OF TROPONIN QUANT: CPT | Performed by: STUDENT IN AN ORGANIZED HEALTH CARE EDUCATION/TRAINING PROGRAM

## 2024-07-23 RX ORDER — TALC
6 POWDER (GRAM) TOPICAL NIGHTLY PRN
Status: DISCONTINUED | OUTPATIENT
Start: 2024-07-23 | End: 2024-07-30

## 2024-07-23 RX ORDER — ACETAMINOPHEN 500 MG
1000 TABLET ORAL EVERY 6 HOURS PRN
Status: DISCONTINUED | OUTPATIENT
Start: 2024-07-23 | End: 2024-07-31 | Stop reason: HOSPADM

## 2024-07-23 RX ORDER — ACETAMINOPHEN 500 MG
1000 TABLET ORAL EVERY 6 HOURS PRN
Status: DISCONTINUED | OUTPATIENT
Start: 2024-07-23 | End: 2024-07-23

## 2024-07-23 RX ORDER — GLUCAGON 1 MG
1 KIT INJECTION
Status: DISCONTINUED | OUTPATIENT
Start: 2024-07-23 | End: 2024-07-31 | Stop reason: HOSPADM

## 2024-07-23 RX ORDER — SODIUM CHLORIDE 0.9 % (FLUSH) 0.9 %
10 SYRINGE (ML) INJECTION
Status: DISCONTINUED | OUTPATIENT
Start: 2024-07-23 | End: 2024-07-31 | Stop reason: HOSPADM

## 2024-07-23 RX ORDER — ENOXAPARIN SODIUM 100 MG/ML
40 INJECTION SUBCUTANEOUS EVERY 24 HOURS
Status: DISCONTINUED | OUTPATIENT
Start: 2024-07-24 | End: 2024-07-30

## 2024-07-23 RX ORDER — AMOXICILLIN 250 MG
1 CAPSULE ORAL 2 TIMES DAILY PRN
Status: DISCONTINUED | OUTPATIENT
Start: 2024-07-23 | End: 2024-07-31 | Stop reason: HOSPADM

## 2024-07-23 RX ORDER — IBUPROFEN 200 MG
16 TABLET ORAL
Status: DISCONTINUED | OUTPATIENT
Start: 2024-07-23 | End: 2024-07-31 | Stop reason: HOSPADM

## 2024-07-23 RX ORDER — FUROSEMIDE 10 MG/ML
40 INJECTION INTRAMUSCULAR; INTRAVENOUS 3 TIMES DAILY
Status: DISCONTINUED | OUTPATIENT
Start: 2024-07-24 | End: 2024-07-24

## 2024-07-23 RX ORDER — ALUMINUM HYDROXIDE, MAGNESIUM HYDROXIDE, AND SIMETHICONE 1200; 120; 1200 MG/30ML; MG/30ML; MG/30ML
30 SUSPENSION ORAL 4 TIMES DAILY PRN
Status: DISCONTINUED | OUTPATIENT
Start: 2024-07-23 | End: 2024-07-31 | Stop reason: HOSPADM

## 2024-07-23 RX ORDER — ONDANSETRON HYDROCHLORIDE 2 MG/ML
4 INJECTION, SOLUTION INTRAVENOUS EVERY 4 HOURS PRN
Status: DISCONTINUED | OUTPATIENT
Start: 2024-07-23 | End: 2024-07-31 | Stop reason: HOSPADM

## 2024-07-23 RX ORDER — IBUPROFEN 200 MG
24 TABLET ORAL
Status: DISCONTINUED | OUTPATIENT
Start: 2024-07-23 | End: 2024-07-31 | Stop reason: HOSPADM

## 2024-07-23 RX ORDER — FUROSEMIDE 10 MG/ML
40 INJECTION INTRAMUSCULAR; INTRAVENOUS
Status: COMPLETED | OUTPATIENT
Start: 2024-07-23 | End: 2024-07-23

## 2024-07-23 RX ORDER — PROCHLORPERAZINE EDISYLATE 5 MG/ML
5 INJECTION INTRAMUSCULAR; INTRAVENOUS EVERY 6 HOURS PRN
Status: DISCONTINUED | OUTPATIENT
Start: 2024-07-23 | End: 2024-07-30

## 2024-07-23 RX ORDER — BISACODYL 10 MG/1
10 SUPPOSITORY RECTAL DAILY PRN
Status: DISCONTINUED | OUTPATIENT
Start: 2024-07-23 | End: 2024-07-31 | Stop reason: HOSPADM

## 2024-07-23 RX ORDER — HYDRALAZINE HYDROCHLORIDE 20 MG/ML
10 INJECTION INTRAMUSCULAR; INTRAVENOUS EVERY 4 HOURS PRN
Status: DISCONTINUED | OUTPATIENT
Start: 2024-07-23 | End: 2024-07-31 | Stop reason: HOSPADM

## 2024-07-23 RX ORDER — NALOXONE HCL 0.4 MG/ML
0.02 VIAL (ML) INJECTION
Status: DISCONTINUED | OUTPATIENT
Start: 2024-07-23 | End: 2024-07-31 | Stop reason: HOSPADM

## 2024-07-23 RX ADMIN — FUROSEMIDE 40 MG: 10 INJECTION, SOLUTION INTRAMUSCULAR; INTRAVENOUS at 09:07

## 2024-07-24 PROBLEM — I50.9 CONGESTIVE HEART FAILURE: Status: ACTIVE | Noted: 2024-07-24

## 2024-07-24 LAB
ANION GAP SERPL CALC-SCNC: 12 MEQ/L
APICAL FOUR CHAMBER EJECTION FRACTION: 50 %
APICAL TWO CHAMBER EJECTION FRACTION: 48 %
AV INDEX (PROSTH): 0.55
AV MEAN GRADIENT: 19 MMHG
AV PEAK GRADIENT: 35 MMHG
AV REGURGITATION PRESSURE HALF TIME: 259 MS
AV VALVE AREA BY VELOCITY RATIO: 1.75 CM²
AV VALVE AREA: 1.9 CM²
AV VELOCITY RATIO: 0.51
BASOPHILS # BLD AUTO: 0.03 X10(3)/MCL
BASOPHILS NFR BLD AUTO: 0.2 %
BSA FOR ECHO PROCEDURE: 2.44 M2
BUN SERPL-MCNC: 25 MG/DL (ref 8.4–25.7)
CALCIUM SERPL-MCNC: 9.5 MG/DL (ref 8.8–10)
CHLORIDE SERPL-SCNC: 105 MMOL/L (ref 98–107)
CO2 SERPL-SCNC: 23 MMOL/L (ref 23–31)
CREAT SERPL-MCNC: 1.09 MG/DL (ref 0.73–1.18)
CREAT/UREA NIT SERPL: 23
CV ECHO LV RWT: 0.45 CM
DOP CALC AO PEAK VEL: 2.97 M/S
DOP CALC AO VTI: 61.9 CM
DOP CALC LVOT AREA: 3.5 CM2
DOP CALC LVOT DIAMETER: 2.1 CM
DOP CALC LVOT PEAK VEL: 1.5 M/S
DOP CALC LVOT STROKE VOLUME: 117.36 CM3
DOP CALC MV VTI: 39.6 CM
DOP CALCLVOT PEAK VEL VTI: 33.9 CM
E WAVE DECELERATION TIME: 227 MSEC
E/A RATIO: 1.32
E/E' RATIO: 15.47 M/S
ECHO LV POSTERIOR WALL: 1.5 CM (ref 0.6–1.1)
EOSINOPHIL # BLD AUTO: 0.1 X10(3)/MCL (ref 0–0.9)
EOSINOPHIL NFR BLD AUTO: 0.8 %
ERYTHROCYTE [DISTWIDTH] IN BLOOD BY AUTOMATED COUNT: 14.3 % (ref 11.5–17)
FRACTIONAL SHORTENING: 30 % (ref 28–44)
GFR SERPLBLD CREATININE-BSD FMLA CKD-EPI: >60 ML/MIN/1.73/M2
GLUCOSE SERPL-MCNC: 122 MG/DL (ref 82–115)
HCT VFR BLD AUTO: 41.1 % (ref 42–52)
HGB BLD-MCNC: 14.3 G/DL (ref 14–18)
IMM GRANULOCYTES # BLD AUTO: 0.07 X10(3)/MCL (ref 0–0.04)
IMM GRANULOCYTES NFR BLD AUTO: 0.6 %
INTERVENTRICULAR SEPTUM: 1.3 CM (ref 0.6–1.1)
LEFT ATRIUM AREA SYSTOLIC (APICAL 2 CHAMBER): 29.3 CM2
LEFT ATRIUM AREA SYSTOLIC (APICAL 4 CHAMBER): 26.9 CM2
LEFT ATRIUM SIZE: 4.9 CM
LEFT ATRIUM VOLUME INDEX MOD: 41.4 ML/M2
LEFT ATRIUM VOLUME MOD: 97.8 CM3
LEFT INTERNAL DIMENSION IN SYSTOLE: 4.7 CM (ref 2.1–4)
LEFT VENTRICLE DIASTOLIC VOLUME INDEX: 98.03 ML/M2
LEFT VENTRICLE DIASTOLIC VOLUME: 231.36 ML
LEFT VENTRICLE END DIASTOLIC VOLUME APICAL 2 CHAMBER: 227 ML
LEFT VENTRICLE END DIASTOLIC VOLUME APICAL 4 CHAMBER: 205 ML
LEFT VENTRICLE END SYSTOLIC VOLUME APICAL 2 CHAMBER: 106 ML
LEFT VENTRICLE END SYSTOLIC VOLUME APICAL 4 CHAMBER: 88 ML
LEFT VENTRICLE MASS INDEX: 196 G/M2
LEFT VENTRICLE SYSTOLIC VOLUME INDEX: 43.4 ML/M2
LEFT VENTRICLE SYSTOLIC VOLUME: 102.36 ML
LEFT VENTRICULAR INTERNAL DIMENSION IN DIASTOLE: 6.7 CM (ref 3.5–6)
LEFT VENTRICULAR MASS: 463.7 G
LV LATERAL E/E' RATIO: 12.89 M/S
LV SEPTAL E/E' RATIO: 19.33 M/S
LVED V (TEICH): 231.36 ML
LVES V (TEICH): 102.36 ML
LVOT MG: 5 MMHG
LVOT MV: 1.01 CM/S
LYMPHOCYTES # BLD AUTO: 1.43 X10(3)/MCL (ref 0.6–4.6)
LYMPHOCYTES NFR BLD AUTO: 11.6 %
MCH RBC QN AUTO: 32.9 PG (ref 27–31)
MCHC RBC AUTO-ENTMCNC: 34.8 G/DL (ref 33–36)
MCV RBC AUTO: 94.5 FL (ref 80–94)
MONOCYTES # BLD AUTO: 1.01 X10(3)/MCL (ref 0.1–1.3)
MONOCYTES NFR BLD AUTO: 8.2 %
MV MEAN GRADIENT: 3 MMHG
MV PEAK A VEL: 0.88 M/S
MV PEAK E VEL: 1.16 M/S
MV PEAK GRADIENT: 7 MMHG
MV STENOSIS PRESSURE HALF TIME: 98 MS
MV VALVE AREA BY CONTINUITY EQUATION: 2.96 CM2
MV VALVE AREA P 1/2 METHOD: 2.24 CM2
NEUTROPHILS # BLD AUTO: 9.68 X10(3)/MCL (ref 2.1–9.2)
NEUTROPHILS NFR BLD AUTO: 78.6 %
NRBC BLD AUTO-RTO: 0 %
OHS CV RV/LV RATIO: 0.63 CM
OHS LV EJECTION FRACTION SIMPSONS BIPLANE MOD: 49 %
OHS QRS DURATION: 108 MS
OHS QTC CALCULATION: 508 MS
PISA AR MAX VEL: 4.16 M/S
PISA TR MAX VEL: 2 M/S
PLATELET # BLD AUTO: 143 X10(3)/MCL (ref 130–400)
PMV BLD AUTO: 9.8 FL (ref 7.4–10.4)
POTASSIUM SERPL-SCNC: 3.4 MMOL/L (ref 3.5–5.1)
RA PRESSURE ESTIMATED: 8 MMHG
RBC # BLD AUTO: 4.35 X10(6)/MCL (ref 4.7–6.1)
RIGHT VENTRICULAR END-DIASTOLIC DIMENSION: 4.2 CM
RV TB RVSP: 10 MMHG
SODIUM SERPL-SCNC: 140 MMOL/L (ref 136–145)
TDI LATERAL: 0.09 M/S
TDI SEPTAL: 0.06 M/S
TDI: 0.08 M/S
TR MAX PG: 16 MMHG
TRICUSPID ANNULAR PLANE SYSTOLIC EXCURSION: 2.39 CM
TROPONIN I SERPL-MCNC: 0.03 NG/ML (ref 0–0.04)
TROPONIN I SERPL-MCNC: 0.04 NG/ML (ref 0–0.04)
TV REST PULMONARY ARTERY PRESSURE: 24 MMHG
WBC # BLD AUTO: 12.32 X10(3)/MCL (ref 4.5–11.5)
Z-SCORE OF LEFT VENTRICULAR DIMENSION IN END DIASTOLE: -4.04
Z-SCORE OF LEFT VENTRICULAR DIMENSION IN END SYSTOLE: -1.97

## 2024-07-24 PROCEDURE — 84484 ASSAY OF TROPONIN QUANT: CPT | Performed by: INTERNAL MEDICINE

## 2024-07-24 PROCEDURE — 93010 ELECTROCARDIOGRAM REPORT: CPT | Mod: ,,, | Performed by: INTERNAL MEDICINE

## 2024-07-24 PROCEDURE — 93005 ELECTROCARDIOGRAM TRACING: CPT

## 2024-07-24 PROCEDURE — 99900031 HC PATIENT EDUCATION (STAT)

## 2024-07-24 PROCEDURE — 25000003 PHARM REV CODE 250: Performed by: INTERNAL MEDICINE

## 2024-07-24 PROCEDURE — 94640 AIRWAY INHALATION TREATMENT: CPT

## 2024-07-24 PROCEDURE — 63600175 PHARM REV CODE 636 W HCPCS: Performed by: INTERNAL MEDICINE

## 2024-07-24 PROCEDURE — 87040 BLOOD CULTURE FOR BACTERIA: CPT | Performed by: NURSE PRACTITIONER

## 2024-07-24 PROCEDURE — 84484 ASSAY OF TROPONIN QUANT: CPT | Performed by: NURSE PRACTITIONER

## 2024-07-24 PROCEDURE — 36415 COLL VENOUS BLD VENIPUNCTURE: CPT | Performed by: NURSE PRACTITIONER

## 2024-07-24 PROCEDURE — 94760 N-INVAS EAR/PLS OXIMETRY 1: CPT

## 2024-07-24 PROCEDURE — 11000001 HC ACUTE MED/SURG PRIVATE ROOM

## 2024-07-24 PROCEDURE — 85025 COMPLETE CBC W/AUTO DIFF WBC: CPT | Performed by: NURSE PRACTITIONER

## 2024-07-24 PROCEDURE — 21400001 HC TELEMETRY ROOM

## 2024-07-24 PROCEDURE — 99900035 HC TECH TIME PER 15 MIN (STAT)

## 2024-07-24 PROCEDURE — 27000221 HC OXYGEN, UP TO 24 HOURS

## 2024-07-24 PROCEDURE — 80048 BASIC METABOLIC PNL TOTAL CA: CPT | Performed by: NURSE PRACTITIONER

## 2024-07-24 PROCEDURE — 63600175 PHARM REV CODE 636 W HCPCS: Performed by: NURSE PRACTITIONER

## 2024-07-24 PROCEDURE — 25000242 PHARM REV CODE 250 ALT 637 W/ HCPCS: Performed by: NURSE PRACTITIONER

## 2024-07-24 RX ORDER — LEVOTHYROXINE SODIUM 50 UG/1
50 TABLET ORAL
Status: DISCONTINUED | OUTPATIENT
Start: 2024-07-24 | End: 2024-07-31 | Stop reason: HOSPADM

## 2024-07-24 RX ORDER — POTASSIUM CHLORIDE 20 MEQ/1
20 TABLET, EXTENDED RELEASE ORAL ONCE
Status: COMPLETED | OUTPATIENT
Start: 2024-07-24 | End: 2024-07-24

## 2024-07-24 RX ORDER — IPRATROPIUM BROMIDE AND ALBUTEROL SULFATE 2.5; .5 MG/3ML; MG/3ML
3 SOLUTION RESPIRATORY (INHALATION) EVERY 4 HOURS PRN
Status: DISCONTINUED | OUTPATIENT
Start: 2024-07-24 | End: 2024-07-31 | Stop reason: HOSPADM

## 2024-07-24 RX ORDER — FUROSEMIDE 10 MG/ML
40 INJECTION INTRAMUSCULAR; INTRAVENOUS EVERY 12 HOURS
Status: DISCONTINUED | OUTPATIENT
Start: 2024-07-24 | End: 2024-07-24

## 2024-07-24 RX ORDER — ATORVASTATIN CALCIUM 10 MG/1
20 TABLET, FILM COATED ORAL DAILY
Status: DISCONTINUED | OUTPATIENT
Start: 2024-07-24 | End: 2024-07-31

## 2024-07-24 RX ORDER — CARVEDILOL 3.12 MG/1
3.12 TABLET ORAL 2 TIMES DAILY
Status: DISCONTINUED | OUTPATIENT
Start: 2024-07-24 | End: 2024-07-31 | Stop reason: HOSPADM

## 2024-07-24 RX ORDER — LOSARTAN POTASSIUM 25 MG/1
25 TABLET ORAL DAILY
Status: DISCONTINUED | OUTPATIENT
Start: 2024-07-24 | End: 2024-07-31 | Stop reason: HOSPADM

## 2024-07-24 RX ORDER — FUROSEMIDE 10 MG/ML
40 INJECTION INTRAMUSCULAR; INTRAVENOUS ONCE
Status: COMPLETED | OUTPATIENT
Start: 2024-07-24 | End: 2024-07-24

## 2024-07-24 RX ORDER — FUROSEMIDE 10 MG/ML
40 INJECTION INTRAMUSCULAR; INTRAVENOUS ONCE
Status: DISCONTINUED | OUTPATIENT
Start: 2024-07-24 | End: 2024-07-24

## 2024-07-24 RX ORDER — SODIUM CHLORIDE 0.9 % (FLUSH) 0.9 %
10 SYRINGE (ML) INJECTION
Status: DISCONTINUED | OUTPATIENT
Start: 2024-07-24 | End: 2024-07-31 | Stop reason: HOSPADM

## 2024-07-24 RX ORDER — HYDRALAZINE HYDROCHLORIDE 20 MG/ML
10 INJECTION INTRAMUSCULAR; INTRAVENOUS EVERY 4 HOURS PRN
Status: DISCONTINUED | OUTPATIENT
Start: 2024-07-24 | End: 2024-07-24

## 2024-07-24 RX ADMIN — CARVEDILOL 3.12 MG: 3.12 TABLET, FILM COATED ORAL at 08:07

## 2024-07-24 RX ADMIN — FUROSEMIDE 40 MG: 10 INJECTION, SOLUTION INTRAMUSCULAR; INTRAVENOUS at 08:07

## 2024-07-24 RX ADMIN — LOSARTAN POTASSIUM 25 MG: 25 TABLET, FILM COATED ORAL at 08:07

## 2024-07-24 RX ADMIN — ATORVASTATIN CALCIUM 20 MG: 10 TABLET, FILM COATED ORAL at 08:07

## 2024-07-24 RX ADMIN — LEVOTHYROXINE SODIUM 50 MCG: 50 TABLET ORAL at 05:07

## 2024-07-24 RX ADMIN — ENOXAPARIN SODIUM 40 MG: 40 INJECTION SUBCUTANEOUS at 04:07

## 2024-07-24 RX ADMIN — HYDRALAZINE HYDROCHLORIDE 10 MG: 20 INJECTION INTRAMUSCULAR; INTRAVENOUS at 02:07

## 2024-07-24 RX ADMIN — POTASSIUM CHLORIDE 20 MEQ: 1500 TABLET, EXTENDED RELEASE ORAL at 08:07

## 2024-07-24 RX ADMIN — ONDANSETRON 4 MG: 2 INJECTION INTRAMUSCULAR; INTRAVENOUS at 02:07

## 2024-07-24 RX ADMIN — IPRATROPIUM BROMIDE AND ALBUTEROL SULFATE 3 ML: .5; 3 SOLUTION RESPIRATORY (INHALATION) at 02:07

## 2024-07-25 ENCOUNTER — ANESTHESIA EVENT (OUTPATIENT)
Dept: CARDIOLOGY | Facility: HOSPITAL | Age: 69
End: 2024-07-25
Payer: MEDICARE

## 2024-07-25 ENCOUNTER — ANESTHESIA (OUTPATIENT)
Dept: CARDIOLOGY | Facility: HOSPITAL | Age: 69
End: 2024-07-25
Payer: MEDICARE

## 2024-07-25 LAB
ABORH RETYPE: NORMAL
ALBUMIN SERPL-MCNC: 3.4 G/DL (ref 3.4–4.8)
ALBUMIN/GLOB SERPL: 1.5 RATIO (ref 1.1–2)
ALP SERPL-CCNC: 37 UNIT/L (ref 40–150)
ALT SERPL-CCNC: 21 UNIT/L (ref 0–55)
ANION GAP SERPL CALC-SCNC: 7 MEQ/L
APTT PPP: 22.1 SECONDS (ref 23.2–33.7)
AST SERPL-CCNC: 21 UNIT/L (ref 5–34)
BACTERIA #/AREA URNS AUTO: ABNORMAL /HPF
BASOPHILS # BLD AUTO: 0.03 X10(3)/MCL
BASOPHILS NFR BLD AUTO: 0.4 %
BILIRUB SERPL-MCNC: 0.9 MG/DL
BILIRUB UR QL STRIP.AUTO: NEGATIVE
BSA FOR ECHO PROCEDURE: 2.44 M2
BUN SERPL-MCNC: 33.2 MG/DL (ref 8.4–25.7)
CALCIUM SERPL-MCNC: 8.5 MG/DL (ref 8.8–10)
CHLORIDE SERPL-SCNC: 106 MMOL/L (ref 98–107)
CLARITY UR: CLEAR
CO2 SERPL-SCNC: 26 MMOL/L (ref 23–31)
COLOR UR AUTO: ABNORMAL
CREAT SERPL-MCNC: 1.22 MG/DL (ref 0.73–1.18)
CREAT/UREA NIT SERPL: 27
EOSINOPHIL # BLD AUTO: 0.44 X10(3)/MCL (ref 0–0.9)
EOSINOPHIL NFR BLD AUTO: 6 %
ERYTHROCYTE [DISTWIDTH] IN BLOOD BY AUTOMATED COUNT: 14.5 % (ref 11.5–17)
EST. AVERAGE GLUCOSE BLD GHB EST-MCNC: 99.7 MG/DL
GFR SERPLBLD CREATININE-BSD FMLA CKD-EPI: >60 ML/MIN/1.73/M2
GLOBULIN SER-MCNC: 2.3 GM/DL (ref 2.4–3.5)
GLUCOSE SERPL-MCNC: 95 MG/DL (ref 82–115)
GLUCOSE UR QL STRIP: NORMAL
GROUP & RH: NORMAL
HBA1C MFR BLD: 5.1 %
HCT VFR BLD AUTO: 36 % (ref 42–52)
HGB BLD-MCNC: 12.2 G/DL (ref 14–18)
HGB UR QL STRIP: NEGATIVE
IMM GRANULOCYTES # BLD AUTO: 0.02 X10(3)/MCL (ref 0–0.04)
IMM GRANULOCYTES NFR BLD AUTO: 0.3 %
INDIRECT COOMBS: NORMAL
INR PPP: 1.1
KETONES UR QL STRIP: NEGATIVE
LEUKOCYTE ESTERASE UR QL STRIP: 25
LYMPHOCYTES # BLD AUTO: 2.78 X10(3)/MCL (ref 0.6–4.6)
LYMPHOCYTES NFR BLD AUTO: 37.7 %
MAGNESIUM SERPL-MCNC: 2 MG/DL (ref 1.6–2.6)
MCH RBC QN AUTO: 33 PG (ref 27–31)
MCHC RBC AUTO-ENTMCNC: 33.9 G/DL (ref 33–36)
MCV RBC AUTO: 97.3 FL (ref 80–94)
MONOCYTES # BLD AUTO: 1 X10(3)/MCL (ref 0.1–1.3)
MONOCYTES NFR BLD AUTO: 13.6 %
MRSA PCR SCRN (OHS): NOT DETECTED
NEUTROPHILS # BLD AUTO: 3.11 X10(3)/MCL (ref 2.1–9.2)
NEUTROPHILS NFR BLD AUTO: 42 %
NITRITE UR QL STRIP: NEGATIVE
NRBC BLD AUTO-RTO: 0 %
PH UR STRIP: 6 [PH]
PLATELET # BLD AUTO: 140 X10(3)/MCL (ref 130–400)
PLATELETS.RETICULATED NFR BLD AUTO: 4.3 % (ref 0.9–11.2)
PMV BLD AUTO: 10.6 FL (ref 7.4–10.4)
POCT GLUCOSE: 107 MG/DL (ref 70–110)
POCT GLUCOSE: 94 MG/DL (ref 70–110)
POTASSIUM SERPL-SCNC: 3.7 MMOL/L (ref 3.5–5.1)
PROT SERPL-MCNC: 5.7 GM/DL (ref 5.8–7.6)
PROT UR QL STRIP: NEGATIVE
PROTHROMBIN TIME: 14.4 SECONDS (ref 12.5–14.5)
RBC # BLD AUTO: 3.7 X10(6)/MCL (ref 4.7–6.1)
RBC #/AREA URNS AUTO: ABNORMAL /HPF
SODIUM SERPL-SCNC: 139 MMOL/L (ref 136–145)
SP GR UR STRIP.AUTO: 1.02 (ref 1–1.03)
SPECIMEN OUTDATE: NORMAL
SQUAMOUS #/AREA URNS LPF: ABNORMAL /HPF
UROBILINOGEN UR STRIP-ACNC: NORMAL
WBC # BLD AUTO: 7.38 X10(3)/MCL (ref 4.5–11.5)
WBC #/AREA URNS AUTO: ABNORMAL /HPF

## 2024-07-25 PROCEDURE — 86900 BLOOD TYPING SEROLOGIC ABO: CPT | Performed by: INTERNAL MEDICINE

## 2024-07-25 PROCEDURE — 36415 COLL VENOUS BLD VENIPUNCTURE: CPT | Performed by: INTERNAL MEDICINE

## 2024-07-25 PROCEDURE — 93005 ELECTROCARDIOGRAM TRACING: CPT

## 2024-07-25 PROCEDURE — B2111ZZ FLUOROSCOPY OF MULTIPLE CORONARY ARTERIES USING LOW OSMOLAR CONTRAST: ICD-10-PCS | Performed by: INTERNAL MEDICINE

## 2024-07-25 PROCEDURE — 83735 ASSAY OF MAGNESIUM: CPT | Performed by: INTERNAL MEDICINE

## 2024-07-25 PROCEDURE — C1887 CATHETER, GUIDING: HCPCS | Performed by: INTERNAL MEDICINE

## 2024-07-25 PROCEDURE — 63600175 PHARM REV CODE 636 W HCPCS: Performed by: NURSE ANESTHETIST, CERTIFIED REGISTERED

## 2024-07-25 PROCEDURE — 99152 MOD SED SAME PHYS/QHP 5/>YRS: CPT | Performed by: INTERNAL MEDICINE

## 2024-07-25 PROCEDURE — 81001 URINALYSIS AUTO W/SCOPE: CPT | Performed by: INTERNAL MEDICINE

## 2024-07-25 PROCEDURE — 86850 RBC ANTIBODY SCREEN: CPT | Performed by: INTERNAL MEDICINE

## 2024-07-25 PROCEDURE — 27000221 HC OXYGEN, UP TO 24 HOURS

## 2024-07-25 PROCEDURE — 25000003 PHARM REV CODE 250: Performed by: INTERNAL MEDICINE

## 2024-07-25 PROCEDURE — 87641 MR-STAPH DNA AMP PROBE: CPT | Performed by: INTERNAL MEDICINE

## 2024-07-25 PROCEDURE — 25000003 PHARM REV CODE 250: Performed by: NURSE ANESTHETIST, CERTIFIED REGISTERED

## 2024-07-25 PROCEDURE — 94760 N-INVAS EAR/PLS OXIMETRY 1: CPT

## 2024-07-25 PROCEDURE — 86901 BLOOD TYPING SEROLOGIC RH(D): CPT | Performed by: INTERNAL MEDICINE

## 2024-07-25 PROCEDURE — 4A023N7 MEASUREMENT OF CARDIAC SAMPLING AND PRESSURE, LEFT HEART, PERCUTANEOUS APPROACH: ICD-10-PCS | Performed by: INTERNAL MEDICINE

## 2024-07-25 PROCEDURE — 85025 COMPLETE CBC W/AUTO DIFF WBC: CPT | Performed by: INTERNAL MEDICINE

## 2024-07-25 PROCEDURE — 85610 PROTHROMBIN TIME: CPT | Performed by: INTERNAL MEDICINE

## 2024-07-25 PROCEDURE — C1894 INTRO/SHEATH, NON-LASER: HCPCS | Performed by: INTERNAL MEDICINE

## 2024-07-25 PROCEDURE — 21400001 HC TELEMETRY ROOM

## 2024-07-25 PROCEDURE — 83036 HEMOGLOBIN GLYCOSYLATED A1C: CPT | Performed by: INTERNAL MEDICINE

## 2024-07-25 PROCEDURE — 63600175 PHARM REV CODE 636 W HCPCS: Performed by: NURSE PRACTITIONER

## 2024-07-25 PROCEDURE — 93454 CORONARY ARTERY ANGIO S&I: CPT | Performed by: INTERNAL MEDICINE

## 2024-07-25 PROCEDURE — 93010 ELECTROCARDIOGRAM REPORT: CPT | Mod: ,,, | Performed by: INTERNAL MEDICINE

## 2024-07-25 PROCEDURE — 80053 COMPREHEN METABOLIC PANEL: CPT | Performed by: INTERNAL MEDICINE

## 2024-07-25 PROCEDURE — 25500020 PHARM REV CODE 255: Performed by: INTERNAL MEDICINE

## 2024-07-25 PROCEDURE — 85730 THROMBOPLASTIN TIME PARTIAL: CPT | Performed by: INTERNAL MEDICINE

## 2024-07-25 PROCEDURE — C1769 GUIDE WIRE: HCPCS | Performed by: INTERNAL MEDICINE

## 2024-07-25 PROCEDURE — 63600175 PHARM REV CODE 636 W HCPCS: Performed by: INTERNAL MEDICINE

## 2024-07-25 RX ORDER — INSULIN ASPART 100 [IU]/ML
0-5 INJECTION, SOLUTION INTRAVENOUS; SUBCUTANEOUS
Status: DISCONTINUED | OUTPATIENT
Start: 2024-07-25 | End: 2024-07-31 | Stop reason: HOSPADM

## 2024-07-25 RX ORDER — LIDOCAINE HYDROCHLORIDE 20 MG/ML
INJECTION, SOLUTION EPIDURAL; INFILTRATION; INTRACAUDAL; PERINEURAL
Status: DISCONTINUED | OUTPATIENT
Start: 2024-07-25 | End: 2024-07-25

## 2024-07-25 RX ORDER — MIDAZOLAM HYDROCHLORIDE 1 MG/ML
INJECTION INTRAMUSCULAR; INTRAVENOUS
Status: DISCONTINUED | OUTPATIENT
Start: 2024-07-25 | End: 2024-07-25 | Stop reason: HOSPADM

## 2024-07-25 RX ORDER — NITROGLYCERIN 20 MG/100ML
INJECTION INTRAVENOUS
Status: DISCONTINUED | OUTPATIENT
Start: 2024-07-25 | End: 2024-07-25 | Stop reason: HOSPADM

## 2024-07-25 RX ORDER — FENTANYL CITRATE 50 UG/ML
INJECTION, SOLUTION INTRAMUSCULAR; INTRAVENOUS
Status: DISCONTINUED | OUTPATIENT
Start: 2024-07-25 | End: 2024-07-25 | Stop reason: HOSPADM

## 2024-07-25 RX ORDER — LIDOCAINE HYDROCHLORIDE 10 MG/ML
INJECTION, SOLUTION INFILTRATION; PERINEURAL
Status: DISCONTINUED | OUTPATIENT
Start: 2024-07-25 | End: 2024-07-25 | Stop reason: HOSPADM

## 2024-07-25 RX ORDER — IOPAMIDOL 755 MG/ML
INJECTION, SOLUTION INTRAVASCULAR
Status: DISCONTINUED | OUTPATIENT
Start: 2024-07-25 | End: 2024-07-25 | Stop reason: HOSPADM

## 2024-07-25 RX ORDER — PROPOFOL 10 MG/ML
VIAL (ML) INTRAVENOUS
Status: DISCONTINUED | OUTPATIENT
Start: 2024-07-25 | End: 2024-07-25

## 2024-07-25 RX ORDER — MUPIROCIN 20 MG/G
OINTMENT TOPICAL 2 TIMES DAILY
Status: DISCONTINUED | OUTPATIENT
Start: 2024-07-25 | End: 2024-07-31 | Stop reason: HOSPADM

## 2024-07-25 RX ORDER — VERAPAMIL HYDROCHLORIDE 2.5 MG/ML
INJECTION, SOLUTION INTRAVENOUS
Status: DISCONTINUED | OUTPATIENT
Start: 2024-07-25 | End: 2024-07-25 | Stop reason: HOSPADM

## 2024-07-25 RX ORDER — HEPARIN SODIUM 1000 [USP'U]/ML
INJECTION, SOLUTION INTRAVENOUS; SUBCUTANEOUS
Status: DISCONTINUED | OUTPATIENT
Start: 2024-07-25 | End: 2024-07-25 | Stop reason: HOSPADM

## 2024-07-25 RX ADMIN — MUPIROCIN: 20 OINTMENT TOPICAL at 09:07

## 2024-07-25 RX ADMIN — LIDOCAINE HYDROCHLORIDE 4 ML: 20 INJECTION, SOLUTION EPIDURAL; INFILTRATION; INTRACAUDAL; PERINEURAL at 09:07

## 2024-07-25 RX ADMIN — CARVEDILOL 3.12 MG: 3.12 TABLET, FILM COATED ORAL at 09:07

## 2024-07-25 RX ADMIN — LOSARTAN POTASSIUM 25 MG: 25 TABLET, FILM COATED ORAL at 08:07

## 2024-07-25 RX ADMIN — PROPOFOL 70 MG: 10 INJECTION, EMULSION INTRAVENOUS at 09:07

## 2024-07-25 RX ADMIN — ENOXAPARIN SODIUM 40 MG: 40 INJECTION SUBCUTANEOUS at 05:07

## 2024-07-25 RX ADMIN — SODIUM CHLORIDE, SODIUM GLUCONATE, SODIUM ACETATE, POTASSIUM CHLORIDE AND MAGNESIUM CHLORIDE: 526; 502; 368; 37; 30 INJECTION, SOLUTION INTRAVENOUS at 09:07

## 2024-07-25 RX ADMIN — ATORVASTATIN CALCIUM 20 MG: 10 TABLET, FILM COATED ORAL at 08:07

## 2024-07-25 NOTE — TRANSFER OF CARE
"Anesthesia Transfer of Care Note    Patient: Arturo Ortiz    Procedure(s) Performed: * No procedures listed *    Patient location: Cath Lab    Anesthesia Type: MAC    Transport from OR: Transported from OR on room air with adequate spontaneous ventilation    Post pain: adequate analgesia    Post assessment: no apparent anesthetic complications    Post vital signs: stable    Level of consciousness: awake, alert and oriented    Nausea/Vomiting: no nausea/vomiting    Complications: none    Transfer of care protocol was followed      Last vitals: Visit Vitals  BP (!) 127/47 (BP Location: Right arm, Patient Position: Lying)   Pulse 62   Temp 36.5 °C (97.7 °F) (Skin)   Resp 18   Ht 5' 10" (1.778 m)   Wt 120.6 kg (265 lb 14 oz)   SpO2 96%   BMI 38.15 kg/m²     "

## 2024-07-25 NOTE — ANESTHESIA PREPROCEDURE EVALUATION
07/25/2024  Arturo Ortiz is a 69 y.o., male.    Patient is a  69-year-old male, known to [facility], Dr. Gil, with a history of DM2, HTN, severe aortic insufficiency, avascular necrosis right hip, CHF with last EF 45% in grade 1 diastolic dysfunction, infective endocarditis in early 2024 completed antibiotics, valvular heart disease. He presented to the ER with c/o dyspnea and chest pain over last several weeks.  He was treated on outpatient basis for community-acquired pneumonia but continued to have worsening dyspnea, especially when lying flat, despite completing antibiotics.  He denies any fever or current productive cough. On arrival to the ER patient was afebrile and hemodynamically stable satting in the low 90s on room air was placed on 2 L nasal cannula.  Laboratory work showed a BNP of 1600, normal troponin, and EKG showed no acute signs of ischemia.  Chest x-ray showed pulmonary vascular congestion. He was admitted to hospital medicine and [facility] was consulted for CHF exacerbation.            PMH: Severe Aortic Stenosis, HTN, Carotid Stenosis, HTN, Obesity, Type II DM, HLD, Pre-Syncope  PSH: Bilateral Cataract Sx  Family History: None  Social History:  Denies use of tobacco, nicotine, ETOH or illicit drugs     Previous Cardiac Diagnostics:   TTE (6.17.24):  1.             The study quality is average.   2.             The left ventricle is mildly enlarged. Global left ventricular systolic function is normal. The left ventricular ejection fraction is 55%. Noted left ventricular hypertrophy. Concentric left ventricular hypertrophy is present. It is mild to moderate.   3.The left atrial diameter is moderately increased.  4.Moderate mitral annular calcification is noted.  5.             Moderate aortic valve stenosis is present. The trans-aortic peak velocity is 3.2 m/s. The trans-aortic mean  gradient is 19.9 mmHg.   6.Moderate to severe (3+) aortic regurgitation. Mild to moderate (1-2+) mitral regurgitation. Mild (1+) tricuspid regurgitation.  7.The pulmonary artery systolic pressure is 40.5 mmHg. Evidence of pulmonary hypertension is noted.      QUENTIN (3.14.2024)    No evidence of infectious endocarditis of the aortic valve.    The aortic valve is a thickened and moderately restricted tricuspid with severe aortic insufficiency.  There is a eccentric regurgitant jets and can not exclude leaflet perforation.  The pressure half-time was 253 milliseconds.    The LV function is estimated at 50%.    The mitral valve appears structurally normal with no evidence of significant stenosis or insufficiency.    The tricuspid valve appears structurally normal with no evidence of significant stenosis or insufficiency    The pulmonic valve is not well visualized however demonstrates no significant abnormalities    The aorta is unremarkable and there was no evidence of AAA    Pre-op Assessment    I have reviewed the Patient Summary Reports.     I have reviewed the Nursing Notes. I have reviewed the NPO Status.   I have reviewed the Medications.     Review of Systems  Anesthesia Hx:  No problems with previous Anesthesia                Cardiovascular:     Hypertension Valvular problems/Murmurs, AI, AS      CHF                                 Musculoskeletal:  Arthritis               Neurological:    Neuromuscular Disease,                                   Endocrine:  Diabetes Hypothyroidism              Physical Exam  General: Cooperative, Alert and Oriented    Airway:  Mallampati: II   Mouth Opening: Normal  TM Distance: Normal  Tongue: Normal  Neck ROM: Normal ROM    Dental:  Intact        Anesthesia Plan  Type of Anesthesia, risks & benefits discussed:    Anesthesia Type: Gen Natural Airway  Intra-op Monitoring Plan: Standard ASA Monitors  Post Op Pain Control Plan: multimodal analgesia  Induction:  IV  Informed  Consent: Informed consent signed with the Patient and all parties understand the risks and agree with anesthesia plan.  All questions answered.   ASA Score: 3  Day of Surgery Review of History & Physical: H&P Update referred to the surgeon/provider.I have interviewed and examined the patient. I have reviewed the patient's H&P dated: There are no significant changes.     Ready For Surgery From Anesthesia Perspective.     .

## 2024-07-25 NOTE — ANESTHESIA POSTPROCEDURE EVALUATION
"Anesthesia Post Evaluation    Patient: Arturo Ortiz    Procedure(s) Performed: * No procedures listed *    Final Anesthesia Type: general      Patient location during evaluation: Cath Lab  Patient participation: Yes- Able to Participate  Level of consciousness: awake  Post-procedure vital signs: reviewed and stable  Pain management: adequate  Airway patency: patent  JEROME mitigation strategies: Multimodal analgesia  PONV status at discharge: No PONV  Anesthetic complications: no      Cardiovascular status: blood pressure returned to baseline  Respiratory status: spontaneous ventilation  Hydration status: euvolemic  Follow-up not needed.                BP (!) 127/47 (BP Location: Right arm, Patient Position: Lying)   Pulse 62   Temp 36.5 °C (97.7 °F) (Skin)   Resp 18   Ht 5' 10" (1.778 m)   Wt 120.6 kg (265 lb 14 oz)   SpO2 96%   BMI 38.15 kg/m²      No case tracking events are documented in the log.      Pain/Mary Jane Score: No data recorded        "

## 2024-07-26 ENCOUNTER — ANESTHESIA (OUTPATIENT)
Dept: SURGERY | Facility: HOSPITAL | Age: 69
End: 2024-07-26
Payer: MEDICARE

## 2024-07-26 ENCOUNTER — ANESTHESIA EVENT (OUTPATIENT)
Dept: SURGERY | Facility: HOSPITAL | Age: 69
End: 2024-07-26
Payer: MEDICARE

## 2024-07-26 LAB
ABO + RH BLD: NORMAL
ALBUMIN SERPL-MCNC: 3.1 G/DL (ref 3.4–4.8)
ALBUMIN/GLOB SERPL: 1.8 RATIO (ref 1.1–2)
ALLENS TEST BLOOD GAS (OHS): ABNORMAL
ALP SERPL-CCNC: 29 UNIT/L (ref 40–150)
ALT SERPL-CCNC: 15 UNIT/L (ref 0–55)
ANION GAP SERPL CALC-SCNC: 6 MEQ/L
ANION GAP SERPL CALC-SCNC: 7 MEQ/L
APTT PPP: 26.2 SECONDS (ref 23.2–33.7)
AST SERPL-CCNC: 21 UNIT/L (ref 5–34)
BASE EXCESS BLD CALC-SCNC: 1.7 MMOL/L (ref -2–2)
BASE EXCESS BLD CALC-SCNC: 1.8 MMOL/L
BASE EXCESS BLD CALC-SCNC: 3.5 MMOL/L (ref -2–2)
BASOPHILS # BLD AUTO: 0.02 X10(3)/MCL
BASOPHILS # BLD AUTO: 0.02 X10(3)/MCL
BASOPHILS NFR BLD AUTO: 0.2 %
BASOPHILS NFR BLD AUTO: 0.2 %
BILIRUB SERPL-MCNC: 0.9 MG/DL
BLD PROD TYP BPU: NORMAL
BLOOD GAS SAMPLE TYPE (OHS): ABNORMAL
BLOOD UNIT EXPIRATION DATE: NORMAL
BLOOD UNIT TYPE CODE: 9500
BSA FOR ECHO PROCEDURE: 2.44 M2
BUN SERPL-MCNC: 22.3 MG/DL (ref 8.4–25.7)
BUN SERPL-MCNC: 26 MG/DL (ref 8.4–25.7)
CA-I BLD-SCNC: 1.12 MMOL/L (ref 1.12–1.23)
CA-I BLD-SCNC: 1.21 MMOL/L (ref 1.12–1.23)
CA-I BLD-SCNC: 1.21 MMOL/L (ref 1.12–1.23)
CALCIUM SERPL-MCNC: 8.4 MG/DL (ref 8.8–10)
CALCIUM SERPL-MCNC: 8.7 MG/DL (ref 8.8–10)
CHLORIDE SERPL-SCNC: 109 MMOL/L (ref 98–107)
CHLORIDE SERPL-SCNC: 113 MMOL/L (ref 98–107)
CO2 BLDA-SCNC: 27.9 MMOL/L
CO2 BLDA-SCNC: 28.6 MMOL/L
CO2 BLDA-SCNC: 29.9 MMOL/L
CO2 SERPL-SCNC: 24 MMOL/L (ref 23–31)
CO2 SERPL-SCNC: 26 MMOL/L (ref 23–31)
COHGB MFR BLDA: 2.1 % (ref 0.5–1.5)
COHGB MFR BLDA: 2.2 % (ref 0.5–1.5)
CREAT SERPL-MCNC: 1 MG/DL (ref 0.73–1.18)
CREAT SERPL-MCNC: 1.06 MG/DL (ref 0.73–1.18)
CREAT/UREA NIT SERPL: 22
CREAT/UREA NIT SERPL: 25
CROSSMATCH INTERPRETATION: NORMAL
DISPENSE STATUS: NORMAL
DRAWN BY BLOOD GAS (OHS): ABNORMAL
EOSINOPHIL # BLD AUTO: 0.23 X10(3)/MCL (ref 0–0.9)
EOSINOPHIL # BLD AUTO: 0.42 X10(3)/MCL (ref 0–0.9)
EOSINOPHIL NFR BLD AUTO: 2.5 %
EOSINOPHIL NFR BLD AUTO: 4.7 %
ERYTHROCYTE [DISTWIDTH] IN BLOOD BY AUTOMATED COUNT: 14.5 % (ref 11.5–17)
ERYTHROCYTE [DISTWIDTH] IN BLOOD BY AUTOMATED COUNT: 14.5 % (ref 11.5–17)
FIO2: 100
FIO2: 70
FIO2: 70
FIO2: 80
GFR SERPLBLD CREATININE-BSD FMLA CKD-EPI: >60 ML/MIN/1.73/M2
GFR SERPLBLD CREATININE-BSD FMLA CKD-EPI: >60 ML/MIN/1.73/M2
GLOBULIN SER-MCNC: 1.7 GM/DL (ref 2.4–3.5)
GLUCOSE SERPL-MCNC: 112 MG/DL (ref 70–110)
GLUCOSE SERPL-MCNC: 118 MG/DL (ref 70–110)
GLUCOSE SERPL-MCNC: 137 MG/DL (ref 70–110)
GLUCOSE SERPL-MCNC: 140 MG/DL (ref 82–115)
GLUCOSE SERPL-MCNC: 150 MG/DL (ref 70–110)
GLUCOSE SERPL-MCNC: 152 MG/DL (ref 70–110)
GLUCOSE SERPL-MCNC: 86 MG/DL (ref 70–110)
GLUCOSE SERPL-MCNC: 96 MG/DL (ref 82–115)
GLUCOSE SERPL-MCNC: 99 MG/DL (ref 70–110)
HCO3 BLDA-SCNC: 26.6 MMOL/L (ref 22–26)
HCO3 BLDA-SCNC: 27.2 MMOL/L (ref 22–26)
HCO3 BLDA-SCNC: 28.5 MMOL/L (ref 22–26)
HCO3 UR-SCNC: 23.7 MMOL/L (ref 24–28)
HCO3 UR-SCNC: 24 MMOL/L (ref 24–28)
HCO3 UR-SCNC: 24.1 MMOL/L (ref 24–28)
HCO3 UR-SCNC: 25.2 MMOL/L (ref 24–28)
HCO3 UR-SCNC: 25.7 MMOL/L (ref 24–28)
HCO3 UR-SCNC: 26.7 MMOL/L (ref 24–28)
HCO3 UR-SCNC: 27.6 MMOL/L (ref 24–28)
HCT VFR BLD AUTO: 32.5 % (ref 42–52)
HCT VFR BLD AUTO: 32.5 % (ref 42–52)
HCT VFR BLD AUTO: 35.3 % (ref 42–52)
HCT VFR BLD CALC: 27 %PCV (ref 36–54)
HCT VFR BLD CALC: 28 %PCV (ref 36–54)
HCT VFR BLD CALC: 30 %PCV (ref 36–54)
HCT VFR BLD CALC: 31 %PCV (ref 36–54)
HGB BLD-MCNC: 10 G/DL
HGB BLD-MCNC: 11 G/DL
HGB BLD-MCNC: 11.1 G/DL (ref 14–18)
HGB BLD-MCNC: 11.2 G/DL (ref 14–18)
HGB BLD-MCNC: 11.9 G/DL (ref 14–18)
HGB BLD-MCNC: 9 G/DL
IMM GRANULOCYTES # BLD AUTO: 0.02 X10(3)/MCL (ref 0–0.04)
IMM GRANULOCYTES # BLD AUTO: 0.06 X10(3)/MCL (ref 0–0.04)
IMM GRANULOCYTES NFR BLD AUTO: 0.2 %
IMM GRANULOCYTES NFR BLD AUTO: 0.6 %
INHALED O2 CONCENTRATION: 60 %
INHALED O2 CONCENTRATION: 60 %
INHALED O2 CONCENTRATION: ABNORMAL %
INR PPP: 1.5
LPM (OHS): 2
LYMPHOCYTES # BLD AUTO: 2.18 X10(3)/MCL (ref 0.6–4.6)
LYMPHOCYTES # BLD AUTO: 2.58 X10(3)/MCL (ref 0.6–4.6)
LYMPHOCYTES NFR BLD AUTO: 23.5 %
LYMPHOCYTES NFR BLD AUTO: 28.9 %
MAGNESIUM SERPL-MCNC: 2.1 MG/DL (ref 1.6–2.6)
MAGNESIUM SERPL-MCNC: 2.9 MG/DL (ref 1.6–2.6)
MCH RBC QN AUTO: 32.5 PG (ref 27–31)
MCH RBC QN AUTO: 33.6 PG (ref 27–31)
MCHC RBC AUTO-ENTMCNC: 33.7 G/DL (ref 33–36)
MCHC RBC AUTO-ENTMCNC: 34.5 G/DL (ref 33–36)
MCV RBC AUTO: 96.4 FL (ref 80–94)
MCV RBC AUTO: 97.6 FL (ref 80–94)
MECH RR (OHS): 20 B/MIN
METHGB MFR BLDA: 1.1 % (ref 0.4–1.5)
METHGB MFR BLDA: 1.1 % (ref 0.4–1.5)
MODE (OHS): ABNORMAL
MODE (OHS): ABNORMAL
MONOCYTES # BLD AUTO: 0.33 X10(3)/MCL (ref 0.1–1.3)
MONOCYTES # BLD AUTO: 0.93 X10(3)/MCL (ref 0.1–1.3)
MONOCYTES NFR BLD AUTO: 10.4 %
MONOCYTES NFR BLD AUTO: 3.6 %
NEUTROPHILS # BLD AUTO: 4.95 X10(3)/MCL (ref 2.1–9.2)
NEUTROPHILS # BLD AUTO: 6.44 X10(3)/MCL (ref 2.1–9.2)
NEUTROPHILS NFR BLD AUTO: 55.6 %
NEUTROPHILS NFR BLD AUTO: 69.6 %
NRBC BLD AUTO-RTO: 0 %
NRBC BLD AUTO-RTO: 0 %
O2 HB BLOOD GAS (OHS): 93 % (ref 94–97)
O2 HB BLOOD GAS (OHS): 94.6 % (ref 94–97)
OHS QRS DURATION: 98 MS
OHS QTC CALCULATION: 447 MS
OXYGEN DEVICE BLOOD GAS (OHS): ABNORMAL
OXYHGB MFR BLDA: 11.7 G/DL (ref 12–16)
OXYHGB MFR BLDA: 13 G/DL (ref 12–16)
PCO2 BLDA: 37.9 MMHG (ref 35–45)
PCO2 BLDA: 38 MMHG (ref 35–45)
PCO2 BLDA: 41.2 MMHG (ref 35–45)
PCO2 BLDA: 41.7 MMHG (ref 35–45)
PCO2 BLDA: 42 MMHG (ref 35–45)
PCO2 BLDA: 42.5 MMHG (ref 35–45)
PCO2 BLDA: 44 MMHG (ref 35–45)
PCO2 BLDA: 45 MMHG (ref 35–45)
PCO2 BLDA: 46.5 MMHG (ref 35–45)
PCO2 BLDA: 51.3 MMHG (ref 35–45)
PEEP RESPIRATORY: 5 CMH2O
PEEP RESPIRATORY: 5 CMH2O
PH BLDA: 7.39 [PH] (ref 7.35–7.45)
PH BLDA: 7.41 [PH] (ref 7.35–7.45)
PH BLDA: 7.42 [PH] (ref 7.35–7.45)
PH SMN: 7.34 [PH] (ref 7.35–7.45)
PH SMN: 7.35 [PH] (ref 7.35–7.45)
PH SMN: 7.37 [PH] (ref 7.35–7.45)
PH SMN: 7.37 [PH] (ref 7.35–7.45)
PH SMN: 7.4 [PH] (ref 7.35–7.45)
PH SMN: 7.41 [PH] (ref 7.35–7.45)
PH SMN: 7.43 [PH] (ref 7.35–7.45)
PHOSPHATE SERPL-MCNC: 2.8 MG/DL (ref 2.3–4.7)
PLATELET # BLD AUTO: 154 X10(3)/MCL (ref 130–400)
PLATELET # BLD AUTO: 79 X10(3)/MCL (ref 130–400)
PLATELETS.RETICULATED NFR BLD AUTO: 4.2 % (ref 0.9–11.2)
PMV BLD AUTO: 10.4 FL (ref 7.4–10.4)
PMV BLD AUTO: 10.6 FL (ref 7.4–10.4)
PO2 BLDA: 165 MMHG (ref 80–100)
PO2 BLDA: 302 MMHG (ref 80–100)
PO2 BLDA: 304 MMHG (ref 80–100)
PO2 BLDA: 346 MMHG (ref 80–100)
PO2 BLDA: 39 MMHG (ref 40–60)
PO2 BLDA: 412 MMHG (ref 80–100)
PO2 BLDA: 45 MMHG (ref 40–60)
PO2 BLDA: 71 MMHG (ref 80–100)
PO2 BLDA: 71 MMHG (ref 80–100)
PO2 BLDA: 80 MMHG (ref 80–100)
POC BE: -1 MMOL/L
POC BE: -1 MMOL/L
POC BE: -2 MMOL/L
POC BE: 1 MMOL/L
POC BE: 2 MMOL/L
POC IONIZED CALCIUM: 1.02 MMOL/L (ref 1.06–1.42)
POC IONIZED CALCIUM: 1.04 MMOL/L (ref 1.06–1.42)
POC IONIZED CALCIUM: 1.05 MMOL/L (ref 1.06–1.42)
POC IONIZED CALCIUM: 1.09 MMOL/L (ref 1.06–1.42)
POC IONIZED CALCIUM: 1.11 MMOL/L (ref 1.06–1.42)
POC IONIZED CALCIUM: 1.18 MMOL/L (ref 1.06–1.42)
POC IONIZED CALCIUM: 1.27 MMOL/L (ref 1.06–1.42)
POC SATURATED O2: 100 % (ref 95–100)
POC SATURATED O2: 72 % (ref 95–100)
POC SATURATED O2: 78 % (ref 95–100)
POC TCO2: 25 MMOL/L (ref 23–27)
POC TCO2: 25 MMOL/L (ref 23–27)
POC TCO2: 25 MMOL/L (ref 24–29)
POC TCO2: 26 MMOL/L (ref 23–27)
POC TCO2: 27 MMOL/L (ref 23–27)
POC TCO2: 28 MMOL/L (ref 24–29)
POC TCO2: 29 MMOL/L (ref 23–27)
POCT GLUCOSE: 113 MG/DL (ref 70–110)
POCT GLUCOSE: 123 MG/DL (ref 70–110)
POCT GLUCOSE: 124 MG/DL (ref 70–110)
POCT GLUCOSE: 129 MG/DL (ref 70–110)
POCT GLUCOSE: 132 MG/DL (ref 70–110)
POCT GLUCOSE: 133 MG/DL (ref 70–110)
POCT GLUCOSE: 141 MG/DL (ref 70–110)
POCT GLUCOSE: 141 MG/DL (ref 70–110)
POCT GLUCOSE: 142 MG/DL (ref 70–110)
POCT GLUCOSE: 144 MG/DL (ref 70–110)
POCT GLUCOSE: 145 MG/DL (ref 70–110)
POTASSIUM BLD-SCNC: 3.1 MMOL/L (ref 3.5–5.1)
POTASSIUM BLD-SCNC: 3.3 MMOL/L (ref 3.5–5.1)
POTASSIUM BLD-SCNC: 3.4 MMOL/L (ref 3.5–5.1)
POTASSIUM BLD-SCNC: 3.8 MMOL/L (ref 3.5–5.1)
POTASSIUM BLD-SCNC: 3.8 MMOL/L (ref 3.5–5.1)
POTASSIUM BLD-SCNC: 3.9 MMOL/L (ref 3.5–5.1)
POTASSIUM BLD-SCNC: 4.1 MMOL/L (ref 3.5–5.1)
POTASSIUM BLOOD GAS (OHS): 3.3 MMOL/L (ref 3.5–5)
POTASSIUM BLOOD GAS (OHS): 3.4 MMOL/L (ref 3.5–5)
POTASSIUM BLOOD GAS (OHS): 3.4 MMOL/L (ref 3.5–5)
POTASSIUM SERPL-SCNC: 3.4 MMOL/L (ref 3.5–5.1)
POTASSIUM SERPL-SCNC: 3.5 MMOL/L (ref 3.5–5.1)
POTASSIUM SERPL-SCNC: 3.5 MMOL/L (ref 3.5–5.1)
PROT SERPL-MCNC: 4.8 GM/DL (ref 5.8–7.6)
PROTHROMBIN TIME: 17.4 SECONDS (ref 12.5–14.5)
PS (OHS): 10 CMH2O
PS (OHS): 10 CMH2O
RBC # BLD AUTO: 3.33 X10(6)/MCL (ref 4.7–6.1)
RBC # BLD AUTO: 3.66 X10(6)/MCL (ref 4.7–6.1)
SAMPLE SITE BLOOD GAS (OHS): ABNORMAL
SAMPLE: ABNORMAL
SAO2 % BLDA: 94 %
SAO2 % BLDA: 94.3 %
SAO2 % BLDA: 95.8 %
SODIUM BLD-SCNC: 141 MMOL/L (ref 136–145)
SODIUM BLD-SCNC: 142 MMOL/L (ref 136–145)
SODIUM BLD-SCNC: 143 MMOL/L (ref 136–145)
SODIUM BLD-SCNC: 144 MMOL/L (ref 136–145)
SODIUM BLOOD GAS (OHS): 137 MMOL/L (ref 137–145)
SODIUM BLOOD GAS (OHS): 137 MMOL/L (ref 137–145)
SODIUM BLOOD GAS (OHS): 141 MMOL/L (ref 137–145)
SODIUM SERPL-SCNC: 141 MMOL/L (ref 136–145)
SODIUM SERPL-SCNC: 144 MMOL/L (ref 136–145)
SPONT+MECH VT ON VENT: 500 ML
UNIT NUMBER: NORMAL
WBC # BLD AUTO: 8.92 X10(3)/MCL (ref 4.5–11.5)
WBC # BLD AUTO: 9.26 X10(3)/MCL (ref 4.5–11.5)

## 2024-07-26 PROCEDURE — 80053 COMPREHEN METABOLIC PANEL: CPT | Performed by: THORACIC SURGERY (CARDIOTHORACIC VASCULAR SURGERY)

## 2024-07-26 PROCEDURE — 94002 VENT MGMT INPAT INIT DAY: CPT

## 2024-07-26 PROCEDURE — 36000713 HC OR TIME LEV V EA ADD 15 MIN: Performed by: THORACIC SURGERY (CARDIOTHORACIC VASCULAR SURGERY)

## 2024-07-26 PROCEDURE — 84100 ASSAY OF PHOSPHORUS: CPT | Performed by: THORACIC SURGERY (CARDIOTHORACIC VASCULAR SURGERY)

## 2024-07-26 PROCEDURE — 33405 REPLACEMENT AORTIC VALVE OPN: CPT | Mod: 82,,, | Performed by: PHYSICIAN ASSISTANT

## 2024-07-26 PROCEDURE — 85025 COMPLETE CBC W/AUTO DIFF WBC: CPT

## 2024-07-26 PROCEDURE — 25000003 PHARM REV CODE 250: Performed by: NURSE ANESTHETIST, CERTIFIED REGISTERED

## 2024-07-26 PROCEDURE — 63600175 PHARM REV CODE 636 W HCPCS: Performed by: ANESTHESIOLOGY

## 2024-07-26 PROCEDURE — 37000008 HC ANESTHESIA 1ST 15 MINUTES: Performed by: THORACIC SURGERY (CARDIOTHORACIC VASCULAR SURGERY)

## 2024-07-26 PROCEDURE — 82803 BLOOD GASES ANY COMBINATION: CPT

## 2024-07-26 PROCEDURE — 85018 HEMOGLOBIN: CPT | Performed by: THORACIC SURGERY (CARDIOTHORACIC VASCULAR SURGERY)

## 2024-07-26 PROCEDURE — P9035 PLATELET PHERES LEUKOREDUCED: HCPCS | Performed by: INTERNAL MEDICINE

## 2024-07-26 PROCEDURE — 85730 THROMBOPLASTIN TIME PARTIAL: CPT | Performed by: THORACIC SURGERY (CARDIOTHORACIC VASCULAR SURGERY)

## 2024-07-26 PROCEDURE — 84132 ASSAY OF SERUM POTASSIUM: CPT | Performed by: THORACIC SURGERY (CARDIOTHORACIC VASCULAR SURGERY)

## 2024-07-26 PROCEDURE — 36600 WITHDRAWAL OF ARTERIAL BLOOD: CPT

## 2024-07-26 PROCEDURE — 37000009 HC ANESTHESIA EA ADD 15 MINS: Performed by: THORACIC SURGERY (CARDIOTHORACIC VASCULAR SURGERY)

## 2024-07-26 PROCEDURE — 88311 DECALCIFY TISSUE: CPT

## 2024-07-26 PROCEDURE — S5010 5% DEXTROSE AND 0.45% SALINE: HCPCS

## 2024-07-26 PROCEDURE — 88305 TISSUE EXAM BY PATHOLOGIST: CPT | Performed by: THORACIC SURGERY (CARDIOTHORACIC VASCULAR SURGERY)

## 2024-07-26 PROCEDURE — 0T7D8ZZ DILATION OF URETHRA, VIA NATURAL OR ARTIFICIAL OPENING ENDOSCOPIC: ICD-10-PCS | Performed by: NURSE PRACTITIONER

## 2024-07-26 PROCEDURE — 99900031 HC PATIENT EDUCATION (STAT)

## 2024-07-26 PROCEDURE — 27201423 OPTIME MED/SURG SUP & DEVICES STERILE SUPPLY: Performed by: THORACIC SURGERY (CARDIOTHORACIC VASCULAR SURGERY)

## 2024-07-26 PROCEDURE — 25000003 PHARM REV CODE 250: Performed by: PHYSICIAN ASSISTANT

## 2024-07-26 PROCEDURE — 36415 COLL VENOUS BLD VENIPUNCTURE: CPT

## 2024-07-26 PROCEDURE — C1894 INTRO/SHEATH, NON-LASER: HCPCS | Performed by: THORACIC SURGERY (CARDIOTHORACIC VASCULAR SURGERY)

## 2024-07-26 PROCEDURE — 85014 HEMATOCRIT: CPT | Performed by: THORACIC SURGERY (CARDIOTHORACIC VASCULAR SURGERY)

## 2024-07-26 PROCEDURE — 94760 N-INVAS EAR/PLS OXIMETRY 1: CPT | Mod: XB

## 2024-07-26 PROCEDURE — 30243R1 TRANSFUSION OF NONAUTOLOGOUS PLATELETS INTO CENTRAL VEIN, PERCUTANEOUS APPROACH: ICD-10-PCS | Performed by: THORACIC SURGERY (CARDIOTHORACIC VASCULAR SURGERY)

## 2024-07-26 PROCEDURE — 36415 COLL VENOUS BLD VENIPUNCTURE: CPT | Performed by: THORACIC SURGERY (CARDIOTHORACIC VASCULAR SURGERY)

## 2024-07-26 PROCEDURE — 02L70CK OCCLUSION OF LEFT ATRIAL APPENDAGE WITH EXTRALUMINAL DEVICE, OPEN APPROACH: ICD-10-PCS | Performed by: THORACIC SURGERY (CARDIOTHORACIC VASCULAR SURGERY)

## 2024-07-26 PROCEDURE — 83735 ASSAY OF MAGNESIUM: CPT

## 2024-07-26 PROCEDURE — 5A1221Z PERFORMANCE OF CARDIAC OUTPUT, CONTINUOUS: ICD-10-PCS | Performed by: THORACIC SURGERY (CARDIOTHORACIC VASCULAR SURGERY)

## 2024-07-26 PROCEDURE — 99900035 HC TECH TIME PER 15 MIN (STAT)

## 2024-07-26 PROCEDURE — 27200966 HC CLOSED SUCTION SYSTEM

## 2024-07-26 PROCEDURE — 94761 N-INVAS EAR/PLS OXIMETRY MLT: CPT | Mod: XB

## 2024-07-26 PROCEDURE — C9248 INJ, CLEVIDIPINE BUTYRATE: HCPCS | Performed by: THORACIC SURGERY (CARDIOTHORACIC VASCULAR SURGERY)

## 2024-07-26 PROCEDURE — 63600175 PHARM REV CODE 636 W HCPCS

## 2024-07-26 PROCEDURE — 25000003 PHARM REV CODE 250: Performed by: INTERNAL MEDICINE

## 2024-07-26 PROCEDURE — 80048 BASIC METABOLIC PNL TOTAL CA: CPT

## 2024-07-26 PROCEDURE — 27100171 HC OXYGEN HIGH FLOW UP TO 24 HOURS

## 2024-07-26 PROCEDURE — 85610 PROTHROMBIN TIME: CPT | Performed by: THORACIC SURGERY (CARDIOTHORACIC VASCULAR SURGERY)

## 2024-07-26 PROCEDURE — 20000000 HC ICU ROOM

## 2024-07-26 PROCEDURE — C1729 CATH, DRAINAGE: HCPCS | Performed by: THORACIC SURGERY (CARDIOTHORACIC VASCULAR SURGERY)

## 2024-07-26 PROCEDURE — 63600175 PHARM REV CODE 636 W HCPCS: Performed by: PHYSICIAN ASSISTANT

## 2024-07-26 PROCEDURE — 85025 COMPLETE CBC W/AUTO DIFF WBC: CPT | Performed by: THORACIC SURGERY (CARDIOTHORACIC VASCULAR SURGERY)

## 2024-07-26 PROCEDURE — 83735 ASSAY OF MAGNESIUM: CPT | Performed by: THORACIC SURGERY (CARDIOTHORACIC VASCULAR SURGERY)

## 2024-07-26 PROCEDURE — 0T9B80Z DRAINAGE OF BLADDER WITH DRAINAGE DEVICE, VIA NATURAL OR ARTIFICIAL OPENING ENDOSCOPIC: ICD-10-PCS | Performed by: NURSE PRACTITIONER

## 2024-07-26 PROCEDURE — 86923 COMPATIBILITY TEST ELECTRIC: CPT | Mod: 91 | Performed by: PHYSICIAN ASSISTANT

## 2024-07-26 PROCEDURE — 63600175 PHARM REV CODE 636 W HCPCS: Performed by: NURSE ANESTHETIST, CERTIFIED REGISTERED

## 2024-07-26 PROCEDURE — 27800903 OPTIME MED/SURG SUP & DEVICES OTHER IMPLANTS: Performed by: THORACIC SURGERY (CARDIOTHORACIC VASCULAR SURGERY)

## 2024-07-26 PROCEDURE — 63600175 PHARM REV CODE 636 W HCPCS: Performed by: THORACIC SURGERY (CARDIOTHORACIC VASCULAR SURGERY)

## 2024-07-26 PROCEDURE — 02RF08Z REPLACEMENT OF AORTIC VALVE WITH ZOOPLASTIC TISSUE, OPEN APPROACH: ICD-10-PCS | Performed by: THORACIC SURGERY (CARDIOTHORACIC VASCULAR SURGERY)

## 2024-07-26 PROCEDURE — C1887 CATHETER, GUIDING: HCPCS | Performed by: THORACIC SURGERY (CARDIOTHORACIC VASCULAR SURGERY)

## 2024-07-26 PROCEDURE — 94003 VENT MGMT INPAT SUBQ DAY: CPT

## 2024-07-26 PROCEDURE — 36620 INSERTION CATHETER ARTERY: CPT | Performed by: ANESTHESIOLOGY

## 2024-07-26 PROCEDURE — 33405 REPLACEMENT AORTIC VALVE OPN: CPT | Mod: ,,, | Performed by: THORACIC SURGERY (CARDIOTHORACIC VASCULAR SURGERY)

## 2024-07-26 PROCEDURE — 63600175 PHARM REV CODE 636 W HCPCS: Performed by: NURSE PRACTITIONER

## 2024-07-26 PROCEDURE — 36000712 HC OR TIME LEV V 1ST 15 MIN: Performed by: THORACIC SURGERY (CARDIOTHORACIC VASCULAR SURGERY)

## 2024-07-26 PROCEDURE — 37799 UNLISTED PX VASCULAR SURGERY: CPT

## 2024-07-26 PROCEDURE — 25000003 PHARM REV CODE 250

## 2024-07-26 DEVICE — VLV 305U227 MOSAIC AOR ULT CII US TEMP I
Type: IMPLANTABLE DEVICE | Site: AORTA | Status: FUNCTIONAL
Brand: MOSAIC ULTRA®

## 2024-07-26 RX ORDER — LACTULOSE 10 G/15ML
20 SOLUTION ORAL EVERY 6 HOURS PRN
Status: DISCONTINUED | OUTPATIENT
Start: 2024-07-26 | End: 2024-07-31 | Stop reason: HOSPADM

## 2024-07-26 RX ORDER — FAMOTIDINE 10 MG/ML
20 INJECTION INTRAVENOUS DAILY
Status: DISCONTINUED | OUTPATIENT
Start: 2024-07-26 | End: 2024-07-29

## 2024-07-26 RX ORDER — METHOCARBAMOL 100 MG/ML
500 INJECTION, SOLUTION INTRAMUSCULAR; INTRAVENOUS 3 TIMES DAILY PRN
Status: ACTIVE | OUTPATIENT
Start: 2024-07-26 | End: 2024-07-29

## 2024-07-26 RX ORDER — MAGNESIUM SULFATE HEPTAHYDRATE 40 MG/ML
4 INJECTION, SOLUTION INTRAVENOUS
Status: DISCONTINUED | OUTPATIENT
Start: 2024-07-26 | End: 2024-07-31 | Stop reason: HOSPADM

## 2024-07-26 RX ORDER — MUPIROCIN 20 MG/G
OINTMENT TOPICAL 2 TIMES DAILY
Status: DISPENSED | OUTPATIENT
Start: 2024-07-26 | End: 2024-07-28

## 2024-07-26 RX ORDER — ROCURONIUM BROMIDE 10 MG/ML
INJECTION, SOLUTION INTRAVENOUS
Status: DISCONTINUED | OUTPATIENT
Start: 2024-07-26 | End: 2024-07-26

## 2024-07-26 RX ORDER — EPHEDRINE SULFATE 50 MG/ML
INJECTION, SOLUTION INTRAVENOUS
Status: DISCONTINUED | OUTPATIENT
Start: 2024-07-26 | End: 2024-07-26

## 2024-07-26 RX ORDER — MIDAZOLAM HYDROCHLORIDE 2 MG/2ML
2 INJECTION, SOLUTION INTRAMUSCULAR; INTRAVENOUS ONCE
Status: COMPLETED | OUTPATIENT
Start: 2024-07-26 | End: 2024-07-26

## 2024-07-26 RX ORDER — TRANEXAMIC ACID 100 MG/ML
INJECTION, SOLUTION INTRAVENOUS
Status: DISCONTINUED | OUTPATIENT
Start: 2024-07-26 | End: 2024-07-26

## 2024-07-26 RX ORDER — ALBUMIN HUMAN 50 G/1000ML
12.5 SOLUTION INTRAVENOUS
Status: DISCONTINUED | OUTPATIENT
Start: 2024-07-26 | End: 2024-07-31 | Stop reason: HOSPADM

## 2024-07-26 RX ORDER — DEXMEDETOMIDINE HYDROCHLORIDE 4 UG/ML
0-1.4 INJECTION, SOLUTION INTRAVENOUS CONTINUOUS
Status: DISCONTINUED | OUTPATIENT
Start: 2024-07-26 | End: 2024-07-28 | Stop reason: HOSPADM

## 2024-07-26 RX ORDER — HYDROCODONE BITARTRATE AND ACETAMINOPHEN 5; 325 MG/1; MG/1
1 TABLET ORAL EVERY 4 HOURS PRN
Status: DISCONTINUED | OUTPATIENT
Start: 2024-07-26 | End: 2024-07-31 | Stop reason: HOSPADM

## 2024-07-26 RX ORDER — DOCUSATE SODIUM 100 MG/1
100 CAPSULE, LIQUID FILLED ORAL 2 TIMES DAILY
Status: DISCONTINUED | OUTPATIENT
Start: 2024-07-27 | End: 2024-07-31 | Stop reason: HOSPADM

## 2024-07-26 RX ORDER — POTASSIUM CHLORIDE 14.9 MG/ML
20 INJECTION INTRAVENOUS
Status: DISCONTINUED | OUTPATIENT
Start: 2024-07-26 | End: 2024-07-31 | Stop reason: HOSPADM

## 2024-07-26 RX ORDER — PROTAMINE SULFATE 10 MG/ML
INJECTION, SOLUTION INTRAVENOUS
Status: DISCONTINUED | OUTPATIENT
Start: 2024-07-26 | End: 2024-07-26

## 2024-07-26 RX ORDER — LIDOCAINE HYDROCHLORIDE 20 MG/ML
INJECTION, SOLUTION EPIDURAL; INFILTRATION; INTRACAUDAL; PERINEURAL
Status: DISCONTINUED | OUTPATIENT
Start: 2024-07-26 | End: 2024-07-26

## 2024-07-26 RX ORDER — FENTANYL CITRATE 50 UG/ML
INJECTION, SOLUTION INTRAMUSCULAR; INTRAVENOUS
Status: DISCONTINUED | OUTPATIENT
Start: 2024-07-26 | End: 2024-07-26

## 2024-07-26 RX ORDER — DESMOPRESSIN ACETATE 4 UG/ML
INJECTION, SOLUTION INTRAVENOUS; SUBCUTANEOUS
Status: DISCONTINUED | OUTPATIENT
Start: 2024-07-26 | End: 2024-07-26

## 2024-07-26 RX ORDER — ETOMIDATE 2 MG/ML
INJECTION INTRAVENOUS
Status: DISCONTINUED | OUTPATIENT
Start: 2024-07-26 | End: 2024-07-26

## 2024-07-26 RX ORDER — SUCRALFATE 1 G/1
1 TABLET ORAL
Status: DISCONTINUED | OUTPATIENT
Start: 2024-07-27 | End: 2024-07-31 | Stop reason: HOSPADM

## 2024-07-26 RX ORDER — CALCIUM GLUCONATE 20 MG/ML
2 INJECTION, SOLUTION INTRAVENOUS
Status: DISCONTINUED | OUTPATIENT
Start: 2024-07-26 | End: 2024-07-31 | Stop reason: HOSPADM

## 2024-07-26 RX ORDER — HEPARIN 100 UNIT/ML
5 SYRINGE INTRAVENOUS
Status: DISCONTINUED | OUTPATIENT
Start: 2024-07-26 | End: 2024-07-26 | Stop reason: HOSPADM

## 2024-07-26 RX ORDER — ASPIRIN 81 MG/1
81 TABLET ORAL DAILY
Status: DISCONTINUED | OUTPATIENT
Start: 2024-07-27 | End: 2024-07-31 | Stop reason: HOSPADM

## 2024-07-26 RX ORDER — FOLIC ACID 1 MG/1
1 TABLET ORAL DAILY
Status: DISCONTINUED | OUTPATIENT
Start: 2024-07-27 | End: 2024-07-31 | Stop reason: HOSPADM

## 2024-07-26 RX ORDER — CEFAZOLIN SODIUM 2 G/50ML
2 SOLUTION INTRAVENOUS
Status: DISCONTINUED | OUTPATIENT
Start: 2024-07-26 | End: 2024-07-27

## 2024-07-26 RX ORDER — MUPIROCIN 20 MG/G
OINTMENT TOPICAL
Status: DISCONTINUED | OUTPATIENT
Start: 2024-07-26 | End: 2024-07-26 | Stop reason: HOSPADM

## 2024-07-26 RX ORDER — MIDAZOLAM HYDROCHLORIDE 2 MG/2ML
INJECTION, SOLUTION INTRAMUSCULAR; INTRAVENOUS
Status: DISPENSED
Start: 2024-07-26 | End: 2024-07-26

## 2024-07-26 RX ORDER — CALCIUM GLUCONATE 20 MG/ML
3 INJECTION, SOLUTION INTRAVENOUS
Status: DISCONTINUED | OUTPATIENT
Start: 2024-07-26 | End: 2024-07-31 | Stop reason: HOSPADM

## 2024-07-26 RX ORDER — ACETAMINOPHEN 10 MG/ML
1000 INJECTION, SOLUTION INTRAVENOUS EVERY 8 HOURS
Status: DISCONTINUED | OUTPATIENT
Start: 2024-07-26 | End: 2024-07-27

## 2024-07-26 RX ORDER — MAGNESIUM SULFATE HEPTAHYDRATE 40 MG/ML
2 INJECTION, SOLUTION INTRAVENOUS
Status: DISCONTINUED | OUTPATIENT
Start: 2024-07-26 | End: 2024-07-31 | Stop reason: HOSPADM

## 2024-07-26 RX ORDER — MORPHINE SULFATE 4 MG/ML
4 INJECTION, SOLUTION INTRAMUSCULAR; INTRAVENOUS EVERY 4 HOURS PRN
Status: DISCONTINUED | OUTPATIENT
Start: 2024-07-26 | End: 2024-07-30

## 2024-07-26 RX ORDER — OXYCODONE HYDROCHLORIDE 10 MG/1
10 TABLET ORAL EVERY 4 HOURS PRN
Status: DISCONTINUED | OUTPATIENT
Start: 2024-07-26 | End: 2024-07-30

## 2024-07-26 RX ORDER — LOPERAMIDE HYDROCHLORIDE 2 MG/1
2 CAPSULE ORAL CONTINUOUS PRN
Status: DISCONTINUED | OUTPATIENT
Start: 2024-07-26 | End: 2024-07-31 | Stop reason: HOSPADM

## 2024-07-26 RX ORDER — CALCIUM CHLORIDE INJECTION 100 MG/ML
INJECTION, SOLUTION INTRAVENOUS
Status: DISCONTINUED | OUTPATIENT
Start: 2024-07-26 | End: 2024-07-26

## 2024-07-26 RX ORDER — CALCIUM GLUCONATE 20 MG/ML
1 INJECTION, SOLUTION INTRAVENOUS
Status: DISCONTINUED | OUTPATIENT
Start: 2024-07-26 | End: 2024-07-31 | Stop reason: HOSPADM

## 2024-07-26 RX ORDER — KETOROLAC TROMETHAMINE 30 MG/ML
15 INJECTION, SOLUTION INTRAMUSCULAR; INTRAVENOUS ONCE
Status: COMPLETED | OUTPATIENT
Start: 2024-07-26 | End: 2024-07-26

## 2024-07-26 RX ORDER — POTASSIUM CHLORIDE 14.9 MG/ML
20 INJECTION INTRAVENOUS
Status: COMPLETED | OUTPATIENT
Start: 2024-07-26 | End: 2024-07-26

## 2024-07-26 RX ORDER — POTASSIUM CHLORIDE 29.8 MG/ML
40 INJECTION INTRAVENOUS
Status: DISCONTINUED | OUTPATIENT
Start: 2024-07-26 | End: 2024-07-31 | Stop reason: HOSPADM

## 2024-07-26 RX ORDER — MIDAZOLAM HYDROCHLORIDE 1 MG/ML
INJECTION INTRAMUSCULAR; INTRAVENOUS
Status: DISCONTINUED | OUTPATIENT
Start: 2024-07-26 | End: 2024-07-26

## 2024-07-26 RX ORDER — PHENYLEPHRINE HYDROCHLORIDE 10 MG/ML
INJECTION INTRAVENOUS
Status: DISCONTINUED | OUTPATIENT
Start: 2024-07-26 | End: 2024-07-26

## 2024-07-26 RX ORDER — DEXTROSE MONOHYDRATE AND SODIUM CHLORIDE 5; .45 G/100ML; G/100ML
INJECTION, SOLUTION INTRAVENOUS CONTINUOUS
Status: DISCONTINUED | OUTPATIENT
Start: 2024-07-26 | End: 2024-07-28

## 2024-07-26 RX ADMIN — POTASSIUM CHLORIDE 20 MEQ: 14.9 INJECTION, SOLUTION INTRAVENOUS at 06:07

## 2024-07-26 RX ADMIN — CARVEDILOL 3.12 MG: 3.12 TABLET, FILM COATED ORAL at 07:07

## 2024-07-26 RX ADMIN — MUPIROCIN: 20 OINTMENT TOPICAL at 07:07

## 2024-07-26 RX ADMIN — CEFAZOLIN SODIUM 2 G: 2 SOLUTION INTRAVENOUS at 11:07

## 2024-07-26 RX ADMIN — OXYCODONE HYDROCHLORIDE 10 MG: 10 TABLET ORAL at 01:07

## 2024-07-26 RX ADMIN — SODIUM CHLORIDE, SODIUM GLUCONATE, SODIUM ACETATE, POTASSIUM CHLORIDE AND MAGNESIUM CHLORIDE: 526; 502; 368; 37; 30 INJECTION, SOLUTION INTRAVENOUS at 08:07

## 2024-07-26 RX ADMIN — DEXMEDETOMIDINE HYDROCHLORIDE 0.6 MCG/KG/HR: 400 INJECTION INTRAVENOUS at 10:07

## 2024-07-26 RX ADMIN — TRANEXAMIC ACID 1000 MG: 100 INJECTION, SOLUTION INTRAVENOUS at 09:07

## 2024-07-26 RX ADMIN — HYDROCODONE BITARTRATE AND ACETAMINOPHEN 1 TABLET: 5; 325 TABLET ORAL at 03:07

## 2024-07-26 RX ADMIN — EPINEPHRINE 0.02 MCG/KG/MIN: 1 INJECTION INTRAMUSCULAR; INTRAVENOUS; SUBCUTANEOUS at 11:07

## 2024-07-26 RX ADMIN — OXYCODONE HYDROCHLORIDE 10 MG: 10 TABLET ORAL at 06:07

## 2024-07-26 RX ADMIN — ETOMIDATE 16 MG: 2 INJECTION INTRAVENOUS at 08:07

## 2024-07-26 RX ADMIN — DESMOPRESSIN ACETATE 28 MCG: 4 SOLUTION INTRAVENOUS at 11:07

## 2024-07-26 RX ADMIN — PHENYLEPHRINE HYDROCHLORIDE 50 MCG: 10 INJECTION INTRAVENOUS at 09:07

## 2024-07-26 RX ADMIN — SODIUM CHLORIDE 2 UNITS/HR: 9 INJECTION, SOLUTION INTRAVENOUS at 10:07

## 2024-07-26 RX ADMIN — PROTAMINE SULFATE 350 MG: 10 INJECTION, SOLUTION INTRAVENOUS at 11:07

## 2024-07-26 RX ADMIN — DEXTROSE AND SODIUM CHLORIDE: 5; 450 INJECTION, SOLUTION INTRAVENOUS at 01:07

## 2024-07-26 RX ADMIN — DEXTROSE MONOHYDRATE 3 G: 5 INJECTION INTRAVENOUS at 08:07

## 2024-07-26 RX ADMIN — CLEVIPIDINE 5 MG/HR: 0.5 EMULSION INTRAVENOUS at 06:07

## 2024-07-26 RX ADMIN — ACETAMINOPHEN 1000 MG: 10 INJECTION, SOLUTION INTRAVENOUS at 11:07

## 2024-07-26 RX ADMIN — EPHEDRINE SULFATE 10 MG: 50 INJECTION INTRAVENOUS at 11:07

## 2024-07-26 RX ADMIN — TRANEXAMIC ACID 1000 MG: 100 INJECTION, SOLUTION INTRAVENOUS at 11:07

## 2024-07-26 RX ADMIN — ACETAMINOPHEN 1000 MG: 10 INJECTION, SOLUTION INTRAVENOUS at 01:07

## 2024-07-26 RX ADMIN — CALCIUM CHLORIDE INJECTION 500 MG: 100 INJECTION, SOLUTION INTRAVENOUS at 11:07

## 2024-07-26 RX ADMIN — ONDANSETRON 4 MG: 2 INJECTION INTRAMUSCULAR; INTRAVENOUS at 06:07

## 2024-07-26 RX ADMIN — CARVEDILOL 3.12 MG: 3.12 TABLET, FILM COATED ORAL at 05:07

## 2024-07-26 RX ADMIN — EPHEDRINE SULFATE 10 MG: 50 INJECTION INTRAVENOUS at 08:07

## 2024-07-26 RX ADMIN — ROCURONIUM BROMIDE 80 MG: 10 SOLUTION INTRAVENOUS at 08:07

## 2024-07-26 RX ADMIN — CLEVIPIDINE 2 MG/HR: 0.5 EMULSION INTRAVENOUS at 01:07

## 2024-07-26 RX ADMIN — FENTANYL CITRATE 250 MCG: 50 INJECTION, SOLUTION INTRAMUSCULAR; INTRAVENOUS at 08:07

## 2024-07-26 RX ADMIN — MIDAZOLAM HYDROCHLORIDE 2 MG: 1 INJECTION, SOLUTION INTRAMUSCULAR; INTRAVENOUS at 08:07

## 2024-07-26 RX ADMIN — LIDOCAINE HYDROCHLORIDE 4 ML: 20 INJECTION, SOLUTION INTRAVENOUS at 08:07

## 2024-07-26 RX ADMIN — EPHEDRINE SULFATE 10 MG: 50 INJECTION INTRAVENOUS at 09:07

## 2024-07-26 RX ADMIN — KETOROLAC TROMETHAMINE 15 MG: 30 INJECTION, SOLUTION INTRAMUSCULAR at 08:07

## 2024-07-26 RX ADMIN — POTASSIUM CHLORIDE 20 MEQ: 14.9 INJECTION, SOLUTION INTRAVENOUS at 01:07

## 2024-07-26 RX ADMIN — INSULIN HUMAN 1.2 UNITS/HR: 1 INJECTION, SOLUTION INTRAVENOUS at 01:07

## 2024-07-26 NOTE — ANESTHESIA PROCEDURE NOTES
QUENTIN    Diagnosis: Aortic insufficiency  Patient location during procedure: OR  Timeout: 7/26/2024 9:10 AM  Procedure end time: 7/26/2024 9:30 AM  Surgery related to: Aortic valve replacement  Exam type: Baseline    Staffing  Performed: anesthesiologist     Anesthesiologist: Clifford Barr MD        Anesthesiologist Present  Yes      Setup & Induction  Patient preparation: bite block inserted  Probe Insertion: easy  Exam: limitedDoppler Echo: 2D, 3D, continuous wave Doppler, pulse wave Doppler and color flow mapping.  Exam     Left Heart  Left Atruim: normal and 4.4  Left appendage velocity:50    Left Ventricle: 6.1 cm, mildy abnormal (men 6.0-6.3; women 5.3-5.6)  LV Wall Thickness (posterior wall):1.1 cm, mildly abnormal (men 1.1-1.3 cm; women 1.0-1.2 cm)    LVAD  Estimated Ejection Fraction: 35-44% moderate  Regional Wall Abnormalities: no RWMA            Right Heart  Right Ventricle: normal  Right Ventricle Function: normal  Right Atria:  normal    Intra Atrial Septum  PFO: no shunt by color flow doppler          Right Ventricle  Size: normal, Free Wall Thickness: normal    Aortic Valve:  Stenosis: moderate.  Morphology: trileaflet    AV Vmax: 321 cm/s2  Regurgitation: severe (4+) aortic regurgitation, Floqw reversal Descending aorta      Mitral Valve:   Morphology:normal  Jet Description: VC 0.3, mild-to-moderate and centrally-directed    Tricuspid Valve:  Morphology: normal  Regurgitation: mild    Pulmonic Valve:  Morphology:normal  Regurgitation(color flow): none    Great Vessels  Ascending Aorta Atherosclerosis: 1=nl-min dz  Descending Aorta Atherosclerosis: 1=nl-min dz      Effusions left pleural    SummaryFindings discussed with surgeon.    Other Findings

## 2024-07-26 NOTE — ANESTHESIA PREPROCEDURE EVALUATION
07/26/2024  Arturo Ortiz is a 69 y.o., male.    Patient is a  69-year-old male, known to [facility], Dr. Gil, with a history of DM2, HTN, severe aortic insufficiency, avascular necrosis right hip, CHF with last EF 45% in grade 1 diastolic dysfunction, infective endocarditis in early 2024 completed antibiotics, valvular heart disease. He presented to the ER with c/o dyspnea and chest pain over last several weeks.  He was treated on outpatient basis for community-acquired pneumonia but continued to have worsening dyspnea, especially when lying flat, despite completing antibiotics.  He denies any fever or current productive cough. On arrival to the ER patient was afebrile and hemodynamically stable satting in the low 90s on room air was placed on 2 L nasal cannula.  Laboratory work showed a BNP of 1600, normal troponin, and EKG showed no acute signs of ischemia.  Chest x-ray showed pulmonary vascular congestion. He was admitted to hospital medicine and [facility] was consulted for CHF exacerbation.            PMH: Severe Aortic Stenosis, HTN, Carotid Stenosis, HTN, Obesity, Type II DM, HLD, Pre-Syncope  PSH: Bilateral Cataract Sx  Family History: None  Social History:  Denies use of tobacco, nicotine, ETOH or illicit drugs     Previous Cardiac Diagnostics:   TTE (6.17.24):  1. The study quality is average.   2.  The left ventricle is mildly enlarged. Global left ventricular systolic function is normal. The left ventricular ejection fraction is 55%. Noted left ventricular hypertrophy. Concentric left ventricular hypertrophy is present. It is mild to moderate.   3.The left atrial diameter is moderately increased.  4.Moderate mitral annular calcification is noted.  5. Moderate aortic valve stenosis is present. The trans-aortic peak velocity is 3.2 m/s. The trans-aortic mean gradient is 19.9 mmHg.   6.Moderate to  severe (3+) aortic regurgitation. Mild to moderate (1-2+) mitral regurgitation. Mild (1+) tricuspid regurgitation.  7.The pulmonary artery systolic pressure is 40.5 mmHg. Evidence of pulmonary hypertension is noted.      QUENTIN (3.14.2024)    No evidence of infectious endocarditis of the aortic valve.    The aortic valve is a thickened and moderately restricted tricuspid with severe aortic insufficiency.  There is a eccentric regurgitant jets and can not exclude leaflet perforation.  The pressure half-time was 253 milliseconds.    The LV function is estimated at 50%.    The mitral valve appears structurally normal with no evidence of significant stenosis or insufficiency.    The tricuspid valve appears structurally normal with no evidence of significant stenosis or insufficiency    The pulmonic valve is not well visualized however demonstrates no significant abnormalities    The aorta is unremarkable and there was no evidence of AAA    Pre-op Assessment    I have reviewed the Patient Summary Reports.     I have reviewed the Nursing Notes. I have reviewed the NPO Status.   I have reviewed the Medications.     Review of Systems  Anesthesia Hx:  No problems with previous Anesthesia                Cardiovascular:     Hypertension Valvular problems/Murmurs, AI, AS      CHF                                 Musculoskeletal:  Arthritis               Neurological:    Neuromuscular Disease,                                   Endocrine:  Diabetes Hypothyroidism              Physical Exam  General: Cooperative, Alert and Oriented    Airway:  Mallampati: II   Mouth Opening: Normal  TM Distance: Normal  Tongue: Normal  Neck ROM: Normal ROM    Dental:  Intact        Anesthesia Plan  Type of Anesthesia, risks & benefits discussed:    Anesthesia Type: Gen ETT  Intra-op Monitoring Plan: Standard ASA Monitors, Art Line, Central Line and QUENTIN  Post Op Pain Control Plan: multimodal analgesia  Induction:  IV  Airway Plan: Video,  Post-Induction  Informed Consent: Informed consent signed with the Patient and all parties understand the risks and agree with anesthesia plan.  All questions answered. Patient consented to blood products? Yes  ASA Score: 3  Day of Surgery Review of History & Physical: H&P Update referred to the surgeon/provider.I have interviewed and examined the patient. I have reviewed the patient's H&P dated: There are no significant changes.     Ready For Surgery From Anesthesia Perspective.     .

## 2024-07-26 NOTE — ANESTHESIA PROCEDURE NOTES
QUENTIN    Diagnosis: Aortic valve replacement  Patient location during procedure: OR  Timeout: 7/26/2024 10:50 AM  Procedure end time: 7/26/2024 11:01 AM  Surgery related to: Aortic valve replacement  Exam type: Final    Staffing  Performed: anesthesiologist     Anesthesiologist: Clifford Barr MD        Anesthesiologist Present  Yes      Setup & Induction  Exam: limitedDoppler Echo: 2D, continuous wave Doppler and color flow mapping.  Exam     Left Heart  Left Atruim: mildly abnormal (men 4.1-4.6; women 3.9-4.2)    Left Ventricle: cm, mildy abnormal (men 6.0-6.3; women 5.3-5.6)  LV Wall Thickness (posterior wall):cm, mildly abnormal (men 1.1-1.3 cm; women 1.0-1.2 cm)    LVAD  Estimated Ejection Fraction: 35-44% moderate  Regional Wall Abnormalities: no RWMA            Right Heart  Right Ventricle: normal  Right Ventricle Function: normal  Right Atria:  normal    Intra Atrial Septum          Right Ventricle  Size: normal, Free Wall Thickness: normal    Aortic Valve:  Stenosis: none.  Morphology: prosthetic valve  Prosthesis: bioprosthetic    AV Mean Gradient: 8 mmHg  Regurgitation: no aortic valve regurgitation      Mitral Valve:   Morphology:normal  Jet Description: mild and centrally-directed    Tricuspid Valve:  Morphology: normal  Regurgitation: mild    Pulmonic Valve:  Morphology:normal    Great Vessels  Ascending Aorta Atherosclerosis: 1=nl-min dz  Descending Aorta Atherosclerosis: 1=nl-min dz      Effusions none    SummaryFindings discussed with surgeon.    Other Findings

## 2024-07-26 NOTE — ANESTHESIA POSTPROCEDURE EVALUATION
Anesthesia Post Evaluation    Patient: Arturo Ortiz    Procedure(s) Performed: Procedure(s) (LRB):  Replacement-valve-aortic (N/A)    Final Anesthesia Type: general      Patient location during evaluation: ICU  Patient participation: No - Unable to Participate, Intubation  Level of consciousness: sedated  Post-procedure vital signs: reviewed and stable  Pain management: adequate  Airway patency: patent  JEROME mitigation strategies: Extubation while patient is awake  PONV status at discharge: No PONV  Anesthetic complications: no      Cardiovascular status: blood pressure returned to baseline  Respiratory status: ETT and ventilator  Hydration status: euvolemic  Follow-up not needed.              Vitals Value Taken Time   /53 07/26/24 1224   Temp 97.6 07/26/24 1230   Pulse 60 07/26/24 1229   Resp 21 07/26/24 1229   SpO2 99 07/26/24 1230   Vitals shown include unfiled device data.      No case tracking events are documented in the log.      Pain/Mary Jane Score: Mary Jane Score: 9 (7/25/2024 10:51 AM)

## 2024-07-26 NOTE — ANESTHESIA PROCEDURE NOTES
Central Line    Diagnosis: Aortic insufficiency  Patient location during procedure: done in OR  Procedure Urgency: Routine  Procedure start time: 7/26/2024 8:51 AM  Timeout: 7/26/2024 8:50 AM  Procedure end time: 7/26/2024 9:00 AM      Staffing  Authorizing Provider: Clifford Barr MD  Performing Provider: Clifford Barr MD    Staffing  Anesthesiologist: Clifford Barr MD  Performed by: Clifford Barr MD  Authorized by: Clifford Barr MD    Anesthesiologist was present at the time of the procedure.  Preanesthetic Checklist  Completed: patient identified, IV checked, site marked, risks and benefits discussed, surgical consent, monitors and equipment checked, pre-op evaluation, timeout performed and anesthesia consent given  Indication   Indication: hemodynamic monitoring, vascular access, med administration     Anesthesia   general anesthesia    Central Line   Skin Prep: skin prepped with ChloraPrep, skin prep agent completely dried prior to procedure  Sterile Barriers Followed: Yes    All five maximal barriers used- gloves, gown, cap, mask, and large sterile sheet    hand hygiene performed prior to central venous catheter insertion  Location: right internal jugular.   Catheter type: double lumen  Catheter Size: 9 Fr  Inserted Catheter Length: 15 cm  Ultrasound: vascular probe with ultrasound   Vessel Caliber: medium, patent, compressibility normal  Needle advanced into vessel with real time Ultrasound guidance.  Guidewire confirmed in vessel.  sterile gel and probe cover used in ultrasound-guided central venous catheter insertion   Manometry: Venous cannualation confirmed by visual estimation of blood vessel pressure using manometry.  Insertion Attempts: 1   Securement:line sutured, chlorhexidine patch and sterile dressing applied    Post-Procedure    Adverse Events:none      Guidewire Guidewire removed intact.

## 2024-07-26 NOTE — TRANSFER OF CARE
"Anesthesia Transfer of Care Note    Patient: Arturo Ortiz    Procedure(s) Performed: Procedure(s) (LRB):  Replacement-valve-aortic (N/A)    Patient location: ICU    Anesthesia Type: general    Transport from OR: Continuos invasive BP monitoring in transport. Transported from OR intubated on 100% O2 by AMBU with assisted ventilation. Continuous ECG monitoring in transport. Continuous SpO2 monitoring in transport. Upon arrival to PACU/ICU, patient attached to ventilator and auscultated to confirm bilateral breath sounds and adequate TV    Post pain: adequate analgesia    Post assessment: no apparent anesthetic complications    Post vital signs: stable    Level of consciousness: sedated    Nausea/Vomiting: no nausea/vomiting    Complications: none    Transfer of care protocol was followed      Last vitals: Visit Vitals  /74 (BP Location: Right arm, Patient Position: Lying)   Pulse (!) 59   Temp 36.4 °C (97.5 °F) (Oral)   Resp (!) 21   Ht 5' 10" (1.778 m)   Wt 118.3 kg (260 lb 12.9 oz)   SpO2 96%   BMI 37.42 kg/m²     "

## 2024-07-26 NOTE — ANESTHESIA PROCEDURE NOTES
Arterial    Diagnosis: Aortic stenosis  Doctor requesting consult: Rod    Patient location during procedure: pre-op  Timeout: 7/26/2024 8:10 AM  Procedure end time: 7/26/2024 8:15 AM    Staffing  Authorizing Provider: Clifford Barr MD  Performing Provider: Clifford Barr MD    Staffing  Performed by: Clifford Barr MD  Authorized by: Clifford Barr MD    Anesthesiologist was present at the time of the procedure.    Preanesthetic Checklist  Completed: patient identified, IV checked, site marked, risks and benefits discussed, surgical consent, monitors and equipment checked, pre-op evaluation, timeout performed and anesthesia consent givenArterial  Skin Prep: chlorhexidine gluconate  Local Infiltration: lidocaine  Orientation: left  Location: radial    Catheter Size: 20 G  Catheter placement by Ultrasound guidance. Heme positive aspiration all ports.   Vessel Caliber: medium, patent, compressibility normal  Needle advanced into vessel with real time Ultrasound guidance.  Guidewire confirmed in vessel.Insertion Attempts: 1  Assessment  Dressing: secured with tape and tegaderm  Patient: Tolerated well

## 2024-07-27 LAB
ABO + RH BLD: NORMAL
ALBUMIN SERPL-MCNC: 3.2 G/DL (ref 3.4–4.8)
ALBUMIN/GLOB SERPL: 1.6 RATIO (ref 1.1–2)
ALP SERPL-CCNC: 33 UNIT/L (ref 40–150)
ALT SERPL-CCNC: 14 UNIT/L (ref 0–55)
ANION GAP SERPL CALC-SCNC: 5 MEQ/L
AST SERPL-CCNC: 29 UNIT/L (ref 5–34)
BILIRUB SERPL-MCNC: 1.2 MG/DL
BLD PROD TYP BPU: NORMAL
BLOOD UNIT EXPIRATION DATE: NORMAL
BLOOD UNIT TYPE CODE: 6200
BUN SERPL-MCNC: 15.8 MG/DL (ref 8.4–25.7)
CALCIUM SERPL-MCNC: 8 MG/DL (ref 8.8–10)
CHLORIDE SERPL-SCNC: 110 MMOL/L (ref 98–107)
CO2 SERPL-SCNC: 24 MMOL/L (ref 23–31)
CREAT SERPL-MCNC: 1.03 MG/DL (ref 0.73–1.18)
CREAT/UREA NIT SERPL: 15
CROSSMATCH INTERPRETATION: NORMAL
DISPENSE STATUS: NORMAL
ERYTHROCYTE [DISTWIDTH] IN BLOOD BY AUTOMATED COUNT: 14.5 % (ref 11.5–17)
GFR SERPLBLD CREATININE-BSD FMLA CKD-EPI: >60 ML/MIN/1.73/M2
GLOBULIN SER-MCNC: 2 GM/DL (ref 2.4–3.5)
GLUCOSE SERPL-MCNC: 130 MG/DL (ref 82–115)
HCT VFR BLD AUTO: 27.9 % (ref 42–52)
HGB BLD-MCNC: 9.3 G/DL (ref 14–18)
MCH RBC QN AUTO: 32.7 PG (ref 27–31)
MCHC RBC AUTO-ENTMCNC: 33.3 G/DL (ref 33–36)
MCV RBC AUTO: 98.2 FL (ref 80–94)
NRBC BLD AUTO-RTO: 0 %
PLATELET # BLD AUTO: 124 X10(3)/MCL (ref 130–400)
PLATELETS.RETICULATED NFR BLD AUTO: 3.3 % (ref 0.9–11.2)
PMV BLD AUTO: 10.1 FL (ref 7.4–10.4)
POCT GLUCOSE: 123 MG/DL (ref 70–110)
POCT GLUCOSE: 128 MG/DL (ref 70–110)
POCT GLUCOSE: 129 MG/DL (ref 70–110)
POCT GLUCOSE: 130 MG/DL (ref 70–110)
POCT GLUCOSE: 132 MG/DL (ref 70–110)
POCT GLUCOSE: 132 MG/DL (ref 70–110)
POCT GLUCOSE: 133 MG/DL (ref 70–110)
POCT GLUCOSE: 137 MG/DL (ref 70–110)
POCT GLUCOSE: 140 MG/DL (ref 70–110)
POCT GLUCOSE: 141 MG/DL (ref 70–110)
POCT GLUCOSE: 97 MG/DL (ref 70–110)
POTASSIUM SERPL-SCNC: 4.4 MMOL/L (ref 3.5–5.1)
PROT SERPL-MCNC: 5.2 GM/DL (ref 5.8–7.6)
RBC # BLD AUTO: 2.84 X10(6)/MCL (ref 4.7–6.1)
SODIUM SERPL-SCNC: 139 MMOL/L (ref 136–145)
UNIT NUMBER: NORMAL
WBC # BLD AUTO: 9.84 X10(3)/MCL (ref 4.5–11.5)

## 2024-07-27 PROCEDURE — 99024 POSTOP FOLLOW-UP VISIT: CPT | Mod: ,,,

## 2024-07-27 PROCEDURE — 99900035 HC TECH TIME PER 15 MIN (STAT)

## 2024-07-27 PROCEDURE — 94760 N-INVAS EAR/PLS OXIMETRY 1: CPT

## 2024-07-27 PROCEDURE — 63600175 PHARM REV CODE 636 W HCPCS: Mod: JZ,JG

## 2024-07-27 PROCEDURE — 21400001 HC TELEMETRY ROOM

## 2024-07-27 PROCEDURE — 36415 COLL VENOUS BLD VENIPUNCTURE: CPT

## 2024-07-27 PROCEDURE — 99900031 HC PATIENT EDUCATION (STAT)

## 2024-07-27 PROCEDURE — 97110 THERAPEUTIC EXERCISES: CPT

## 2024-07-27 PROCEDURE — 94799 UNLISTED PULMONARY SVC/PX: CPT

## 2024-07-27 PROCEDURE — 85027 COMPLETE CBC AUTOMATED: CPT

## 2024-07-27 PROCEDURE — 80053 COMPREHEN METABOLIC PANEL: CPT

## 2024-07-27 PROCEDURE — 25000003 PHARM REV CODE 250: Performed by: INTERNAL MEDICINE

## 2024-07-27 PROCEDURE — 63600175 PHARM REV CODE 636 W HCPCS: Performed by: NURSE PRACTITIONER

## 2024-07-27 PROCEDURE — 25000003 PHARM REV CODE 250

## 2024-07-27 PROCEDURE — 27000221 HC OXYGEN, UP TO 24 HOURS

## 2024-07-27 PROCEDURE — P9045 ALBUMIN (HUMAN), 5%, 250 ML: HCPCS | Mod: JZ,JG

## 2024-07-27 RX ORDER — FUROSEMIDE 10 MG/ML
20 INJECTION INTRAMUSCULAR; INTRAVENOUS EVERY 12 HOURS
Status: COMPLETED | OUTPATIENT
Start: 2024-07-27 | End: 2024-07-27

## 2024-07-27 RX ORDER — GUAIFENESIN AND DEXTROMETHORPHAN HYDROBROMIDE 10; 100 MG/5ML; MG/5ML
10 SYRUP ORAL EVERY 4 HOURS PRN
Status: DISCONTINUED | OUTPATIENT
Start: 2024-07-27 | End: 2024-07-31 | Stop reason: HOSPADM

## 2024-07-27 RX ORDER — BENZONATATE 100 MG/1
100 CAPSULE ORAL 3 TIMES DAILY PRN
Status: DISCONTINUED | OUTPATIENT
Start: 2024-07-27 | End: 2024-07-31 | Stop reason: HOSPADM

## 2024-07-27 RX ADMIN — GUAIFENESIN AND DEXTROMETHORPHAN 10 ML: 100; 10 SYRUP ORAL at 10:07

## 2024-07-27 RX ADMIN — OXYCODONE HYDROCHLORIDE 10 MG: 10 TABLET ORAL at 08:07

## 2024-07-27 RX ADMIN — SUCRALFATE 1 G: 1 TABLET ORAL at 06:07

## 2024-07-27 RX ADMIN — OXYCODONE HYDROCHLORIDE 10 MG: 10 TABLET ORAL at 01:07

## 2024-07-27 RX ADMIN — DOCUSATE SODIUM 100 MG: 100 CAPSULE, LIQUID FILLED ORAL at 09:07

## 2024-07-27 RX ADMIN — SUCRALFATE 1 G: 1 TABLET ORAL at 08:07

## 2024-07-27 RX ADMIN — ASPIRIN 81 MG: 81 TABLET, COATED ORAL at 08:07

## 2024-07-27 RX ADMIN — FOLIC ACID 1 MG: 1 TABLET ORAL at 08:07

## 2024-07-27 RX ADMIN — FUROSEMIDE 20 MG: 10 INJECTION, SOLUTION INTRAMUSCULAR; INTRAVENOUS at 10:07

## 2024-07-27 RX ADMIN — SUCRALFATE 1 G: 1 TABLET ORAL at 10:07

## 2024-07-27 RX ADMIN — MUPIROCIN: 20 OINTMENT TOPICAL at 08:07

## 2024-07-27 RX ADMIN — ATORVASTATIN CALCIUM 20 MG: 10 TABLET, FILM COATED ORAL at 08:07

## 2024-07-27 RX ADMIN — ENOXAPARIN SODIUM 40 MG: 40 INJECTION SUBCUTANEOUS at 04:07

## 2024-07-27 RX ADMIN — CARVEDILOL 3.12 MG: 3.12 TABLET, FILM COATED ORAL at 08:07

## 2024-07-27 RX ADMIN — FAMOTIDINE 20 MG: 10 INJECTION, SOLUTION INTRAVENOUS at 08:07

## 2024-07-27 RX ADMIN — LEVOTHYROXINE SODIUM 50 MCG: 50 TABLET ORAL at 06:07

## 2024-07-27 RX ADMIN — OXYCODONE HYDROCHLORIDE 10 MG: 10 TABLET ORAL at 05:07

## 2024-07-27 RX ADMIN — FUROSEMIDE 20 MG: 10 INJECTION, SOLUTION INTRAMUSCULAR; INTRAVENOUS at 08:07

## 2024-07-27 RX ADMIN — OXYCODONE HYDROCHLORIDE 10 MG: 10 TABLET ORAL at 09:07

## 2024-07-27 RX ADMIN — DOCUSATE SODIUM 100 MG: 100 CAPSULE, LIQUID FILLED ORAL at 08:07

## 2024-07-27 RX ADMIN — ALBUMIN (HUMAN) 12.5 G: 12.5 SOLUTION INTRAVENOUS at 12:07

## 2024-07-27 RX ADMIN — HYDROCODONE BITARTRATE AND ACETAMINOPHEN 1 TABLET: 5; 325 TABLET ORAL at 02:07

## 2024-07-27 RX ADMIN — BENZONATATE 100 MG: 100 CAPSULE ORAL at 10:07

## 2024-07-27 RX ADMIN — SUCRALFATE 1 G: 1 TABLET ORAL at 04:07

## 2024-07-28 LAB
ABS NEUT (OLG): 11.72 X10(3)/MCL (ref 2.1–9.2)
ALBUMIN SERPL-MCNC: 3.2 G/DL (ref 3.4–4.8)
ALBUMIN/GLOB SERPL: 1.5 RATIO (ref 1.1–2)
ALP SERPL-CCNC: 35 UNIT/L (ref 40–150)
ALT SERPL-CCNC: 12 UNIT/L (ref 0–55)
ANION GAP SERPL CALC-SCNC: 6 MEQ/L
AST SERPL-CCNC: 21 UNIT/L (ref 5–34)
BILIRUB SERPL-MCNC: 1.4 MG/DL
BUN SERPL-MCNC: 17.1 MG/DL (ref 8.4–25.7)
CALCIUM SERPL-MCNC: 8 MG/DL (ref 8.8–10)
CHLORIDE SERPL-SCNC: 102 MMOL/L (ref 98–107)
CO2 SERPL-SCNC: 26 MMOL/L (ref 23–31)
CREAT SERPL-MCNC: 1.2 MG/DL (ref 0.73–1.18)
CREAT/UREA NIT SERPL: 14
ERYTHROCYTE [DISTWIDTH] IN BLOOD BY AUTOMATED COUNT: 14.7 % (ref 11.5–17)
GFR SERPLBLD CREATININE-BSD FMLA CKD-EPI: >60 ML/MIN/1.73/M2
GLOBULIN SER-MCNC: 2.1 GM/DL (ref 2.4–3.5)
GLUCOSE SERPL-MCNC: 135 MG/DL (ref 82–115)
HCT VFR BLD AUTO: 27 % (ref 42–52)
HGB BLD-MCNC: 9.2 G/DL (ref 14–18)
INSTRUMENT WBC (OLG): 13.47 X10(3)/MCL
LYMPHOCYTES NFR BLD MANUAL: 0.4 X10(3)/MCL
LYMPHOCYTES NFR BLD MANUAL: 3 %
MAGNESIUM SERPL-MCNC: 1.9 MG/DL (ref 1.6–2.6)
MCH RBC QN AUTO: 33.3 PG (ref 27–31)
MCHC RBC AUTO-ENTMCNC: 34.1 G/DL (ref 33–36)
MCV RBC AUTO: 97.8 FL (ref 80–94)
MONOCYTES NFR BLD MANUAL: 1.21 X10(3)/MCL (ref 0.1–1.3)
MONOCYTES NFR BLD MANUAL: 9 %
NEUTROPHILS NFR BLD MANUAL: 87 %
NRBC BLD AUTO-RTO: 0 %
PLATELET # BLD AUTO: 125 X10(3)/MCL (ref 130–400)
PLATELET # BLD EST: ABNORMAL 10*3/UL
PLATELETS.RETICULATED NFR BLD AUTO: 4.2 % (ref 0.9–11.2)
PMV BLD AUTO: 10.4 FL (ref 7.4–10.4)
POCT GLUCOSE: 144 MG/DL (ref 70–110)
POCT GLUCOSE: 147 MG/DL (ref 70–110)
POCT GLUCOSE: 170 MG/DL (ref 70–110)
POTASSIUM SERPL-SCNC: 4.6 MMOL/L (ref 3.5–5.1)
PROT SERPL-MCNC: 5.3 GM/DL (ref 5.8–7.6)
RBC # BLD AUTO: 2.76 X10(6)/MCL (ref 4.7–6.1)
RBC MORPH BLD: NORMAL
SODIUM SERPL-SCNC: 134 MMOL/L (ref 136–145)
WBC # BLD AUTO: 13.47 X10(3)/MCL (ref 4.5–11.5)

## 2024-07-28 PROCEDURE — 80053 COMPREHEN METABOLIC PANEL: CPT

## 2024-07-28 PROCEDURE — 99900035 HC TECH TIME PER 15 MIN (STAT)

## 2024-07-28 PROCEDURE — 36415 COLL VENOUS BLD VENIPUNCTURE: CPT

## 2024-07-28 PROCEDURE — 83735 ASSAY OF MAGNESIUM: CPT | Performed by: NURSE PRACTITIONER

## 2024-07-28 PROCEDURE — 99024 POSTOP FOLLOW-UP VISIT: CPT | Mod: ,,,

## 2024-07-28 PROCEDURE — 25000003 PHARM REV CODE 250: Performed by: INTERNAL MEDICINE

## 2024-07-28 PROCEDURE — 94799 UNLISTED PULMONARY SVC/PX: CPT

## 2024-07-28 PROCEDURE — 94760 N-INVAS EAR/PLS OXIMETRY 1: CPT

## 2024-07-28 PROCEDURE — 99900031 HC PATIENT EDUCATION (STAT)

## 2024-07-28 PROCEDURE — 63600175 PHARM REV CODE 636 W HCPCS: Performed by: NURSE PRACTITIONER

## 2024-07-28 PROCEDURE — 25000003 PHARM REV CODE 250

## 2024-07-28 PROCEDURE — 27000221 HC OXYGEN, UP TO 24 HOURS

## 2024-07-28 PROCEDURE — 21400001 HC TELEMETRY ROOM

## 2024-07-28 PROCEDURE — 85027 COMPLETE CBC AUTOMATED: CPT | Performed by: NURSE PRACTITIONER

## 2024-07-28 PROCEDURE — 85007 BL SMEAR W/DIFF WBC COUNT: CPT | Performed by: NURSE PRACTITIONER

## 2024-07-28 PROCEDURE — 11000001 HC ACUTE MED/SURG PRIVATE ROOM

## 2024-07-28 RX ORDER — FUROSEMIDE 20 MG/1
20 TABLET ORAL ONCE
Status: COMPLETED | OUTPATIENT
Start: 2024-07-28 | End: 2024-07-28

## 2024-07-28 RX ORDER — MAGNESIUM SULFATE HEPTAHYDRATE 40 MG/ML
2 INJECTION, SOLUTION INTRAVENOUS ONCE
Status: COMPLETED | OUTPATIENT
Start: 2024-07-28 | End: 2024-07-28

## 2024-07-28 RX ADMIN — MUPIROCIN: 20 OINTMENT TOPICAL at 08:07

## 2024-07-28 RX ADMIN — MAGNESIUM SULFATE HEPTAHYDRATE 2 G: 40 INJECTION, SOLUTION INTRAVENOUS at 02:07

## 2024-07-28 RX ADMIN — SUCRALFATE 1 G: 1 TABLET ORAL at 11:07

## 2024-07-28 RX ADMIN — HYDROCODONE BITARTRATE AND ACETAMINOPHEN 1 TABLET: 5; 325 TABLET ORAL at 06:07

## 2024-07-28 RX ADMIN — FUROSEMIDE 20 MG: 20 TABLET ORAL at 08:07

## 2024-07-28 RX ADMIN — SUCRALFATE 1 G: 1 TABLET ORAL at 05:07

## 2024-07-28 RX ADMIN — FOLIC ACID 1 MG: 1 TABLET ORAL at 08:07

## 2024-07-28 RX ADMIN — DOCUSATE SODIUM 100 MG: 100 CAPSULE, LIQUID FILLED ORAL at 08:07

## 2024-07-28 RX ADMIN — CARVEDILOL 3.12 MG: 3.12 TABLET, FILM COATED ORAL at 08:07

## 2024-07-28 RX ADMIN — ENOXAPARIN SODIUM 40 MG: 40 INJECTION SUBCUTANEOUS at 04:07

## 2024-07-28 RX ADMIN — ASPIRIN 81 MG: 81 TABLET, COATED ORAL at 08:07

## 2024-07-28 RX ADMIN — DOCUSATE SODIUM 100 MG: 100 CAPSULE, LIQUID FILLED ORAL at 10:07

## 2024-07-28 RX ADMIN — SUCRALFATE 1 G: 1 TABLET ORAL at 10:07

## 2024-07-28 RX ADMIN — GUAIFENESIN AND DEXTROMETHORPHAN 10 ML: 100; 10 SYRUP ORAL at 06:07

## 2024-07-28 RX ADMIN — LEVOTHYROXINE SODIUM 50 MCG: 50 TABLET ORAL at 05:07

## 2024-07-28 RX ADMIN — FAMOTIDINE 20 MG: 10 INJECTION, SOLUTION INTRAVENOUS at 08:07

## 2024-07-28 RX ADMIN — ONDANSETRON 4 MG: 2 INJECTION INTRAMUSCULAR; INTRAVENOUS at 09:07

## 2024-07-28 RX ADMIN — ATORVASTATIN CALCIUM 20 MG: 10 TABLET, FILM COATED ORAL at 08:07

## 2024-07-29 LAB
ALBUMIN SERPL-MCNC: 3 G/DL (ref 3.4–4.8)
ALBUMIN/GLOB SERPL: 1.3 RATIO (ref 1.1–2)
ALP SERPL-CCNC: 35 UNIT/L (ref 40–150)
ALT SERPL-CCNC: 15 UNIT/L (ref 0–55)
ANION GAP SERPL CALC-SCNC: 8 MEQ/L
AST SERPL-CCNC: 22 UNIT/L (ref 5–34)
BACTERIA BLD CULT: NORMAL
BACTERIA BLD CULT: NORMAL
BASOPHILS # BLD AUTO: 0.02 X10(3)/MCL
BASOPHILS NFR BLD AUTO: 0.2 %
BILIRUB SERPL-MCNC: 1.2 MG/DL
BUN SERPL-MCNC: 18.6 MG/DL (ref 8.4–25.7)
CALCIUM SERPL-MCNC: 7.6 MG/DL (ref 8.8–10)
CHLORIDE SERPL-SCNC: 99 MMOL/L (ref 98–107)
CO2 SERPL-SCNC: 22 MMOL/L (ref 23–31)
CREAT SERPL-MCNC: 1.02 MG/DL (ref 0.73–1.18)
CREAT/UREA NIT SERPL: 18
EOSINOPHIL # BLD AUTO: 0.16 X10(3)/MCL (ref 0–0.9)
EOSINOPHIL NFR BLD AUTO: 1.2 %
ERYTHROCYTE [DISTWIDTH] IN BLOOD BY AUTOMATED COUNT: 14.6 % (ref 11.5–17)
GFR SERPLBLD CREATININE-BSD FMLA CKD-EPI: >60 ML/MIN/1.73/M2
GLOBULIN SER-MCNC: 2.3 GM/DL (ref 2.4–3.5)
GLUCOSE SERPL-MCNC: 126 MG/DL (ref 82–115)
HCT VFR BLD AUTO: 25 % (ref 42–52)
HGB BLD-MCNC: 8.4 G/DL (ref 14–18)
IMM GRANULOCYTES # BLD AUTO: 0.08 X10(3)/MCL (ref 0–0.04)
IMM GRANULOCYTES NFR BLD AUTO: 0.6 %
LYMPHOCYTES # BLD AUTO: 1.6 X10(3)/MCL (ref 0.6–4.6)
LYMPHOCYTES NFR BLD AUTO: 12.1 %
MAGNESIUM SERPL-MCNC: 2.1 MG/DL (ref 1.6–2.6)
MCH RBC QN AUTO: 33.2 PG (ref 27–31)
MCHC RBC AUTO-ENTMCNC: 33.6 G/DL (ref 33–36)
MCV RBC AUTO: 98.8 FL (ref 80–94)
MONOCYTES # BLD AUTO: 2.03 X10(3)/MCL (ref 0.1–1.3)
MONOCYTES NFR BLD AUTO: 15.4 %
NEUTROPHILS # BLD AUTO: 9.31 X10(3)/MCL (ref 2.1–9.2)
NEUTROPHILS NFR BLD AUTO: 70.5 %
NRBC BLD AUTO-RTO: 0 %
OHS QRS DURATION: 110 MS
OHS QTC CALCULATION: 454 MS
PLATELET # BLD AUTO: 114 X10(3)/MCL (ref 130–400)
PMV BLD AUTO: 10.8 FL (ref 7.4–10.4)
POCT GLUCOSE: 135 MG/DL (ref 70–110)
POCT GLUCOSE: 139 MG/DL (ref 70–110)
POTASSIUM SERPL-SCNC: 4.2 MMOL/L (ref 3.5–5.1)
PROT SERPL-MCNC: 5.3 GM/DL (ref 5.8–7.6)
RBC # BLD AUTO: 2.53 X10(6)/MCL (ref 4.7–6.1)
SODIUM SERPL-SCNC: 129 MMOL/L (ref 136–145)
WBC # BLD AUTO: 13.23 X10(3)/MCL (ref 4.5–11.5)

## 2024-07-29 PROCEDURE — 94799 UNLISTED PULMONARY SVC/PX: CPT

## 2024-07-29 PROCEDURE — 63600175 PHARM REV CODE 636 W HCPCS: Performed by: NURSE PRACTITIONER

## 2024-07-29 PROCEDURE — 83735 ASSAY OF MAGNESIUM: CPT | Performed by: NURSE PRACTITIONER

## 2024-07-29 PROCEDURE — 25000003 PHARM REV CODE 250: Performed by: NURSE PRACTITIONER

## 2024-07-29 PROCEDURE — 63600175 PHARM REV CODE 636 W HCPCS

## 2024-07-29 PROCEDURE — 80053 COMPREHEN METABOLIC PANEL: CPT

## 2024-07-29 PROCEDURE — 85025 COMPLETE CBC W/AUTO DIFF WBC: CPT | Performed by: NURSE PRACTITIONER

## 2024-07-29 PROCEDURE — 97110 THERAPEUTIC EXERCISES: CPT

## 2024-07-29 PROCEDURE — 25000003 PHARM REV CODE 250: Performed by: INTERNAL MEDICINE

## 2024-07-29 PROCEDURE — 25000003 PHARM REV CODE 250

## 2024-07-29 PROCEDURE — 21400001 HC TELEMETRY ROOM

## 2024-07-29 PROCEDURE — 94760 N-INVAS EAR/PLS OXIMETRY 1: CPT

## 2024-07-29 PROCEDURE — 93005 ELECTROCARDIOGRAM TRACING: CPT

## 2024-07-29 PROCEDURE — 36415 COLL VENOUS BLD VENIPUNCTURE: CPT

## 2024-07-29 PROCEDURE — 27000221 HC OXYGEN, UP TO 24 HOURS

## 2024-07-29 PROCEDURE — 99024 POSTOP FOLLOW-UP VISIT: CPT | Mod: ,,,

## 2024-07-29 PROCEDURE — 93010 ELECTROCARDIOGRAM REPORT: CPT | Mod: ,,, | Performed by: INTERNAL MEDICINE

## 2024-07-29 RX ORDER — FAMOTIDINE 20 MG/1
20 TABLET, FILM COATED ORAL DAILY
Status: DISCONTINUED | OUTPATIENT
Start: 2024-07-29 | End: 2024-07-31 | Stop reason: HOSPADM

## 2024-07-29 RX ORDER — FUROSEMIDE 10 MG/ML
20 INJECTION INTRAMUSCULAR; INTRAVENOUS EVERY 12 HOURS
Status: COMPLETED | OUTPATIENT
Start: 2024-07-29 | End: 2024-07-29

## 2024-07-29 RX ADMIN — FUROSEMIDE 20 MG: 10 INJECTION, SOLUTION INTRAMUSCULAR; INTRAVENOUS at 09:07

## 2024-07-29 RX ADMIN — SUCRALFATE 1 G: 1 TABLET ORAL at 04:07

## 2024-07-29 RX ADMIN — DOCUSATE SODIUM 100 MG: 100 CAPSULE, LIQUID FILLED ORAL at 09:07

## 2024-07-29 RX ADMIN — CARVEDILOL 3.12 MG: 3.12 TABLET, FILM COATED ORAL at 08:07

## 2024-07-29 RX ADMIN — FAMOTIDINE 20 MG: 20 TABLET, FILM COATED ORAL at 09:07

## 2024-07-29 RX ADMIN — DOCUSATE SODIUM 100 MG: 100 CAPSULE, LIQUID FILLED ORAL at 08:07

## 2024-07-29 RX ADMIN — CARVEDILOL 3.12 MG: 3.12 TABLET, FILM COATED ORAL at 09:07

## 2024-07-29 RX ADMIN — FOLIC ACID 1 MG: 1 TABLET ORAL at 08:07

## 2024-07-29 RX ADMIN — ENOXAPARIN SODIUM 40 MG: 40 INJECTION SUBCUTANEOUS at 04:07

## 2024-07-29 RX ADMIN — ACETAMINOPHEN 1000 MG: 500 TABLET ORAL at 09:07

## 2024-07-29 RX ADMIN — SUCRALFATE 1 G: 1 TABLET ORAL at 06:07

## 2024-07-29 RX ADMIN — LEVOTHYROXINE SODIUM 50 MCG: 50 TABLET ORAL at 06:07

## 2024-07-29 RX ADMIN — ASPIRIN 81 MG: 81 TABLET, COATED ORAL at 08:07

## 2024-07-29 RX ADMIN — FUROSEMIDE 20 MG: 10 INJECTION, SOLUTION INTRAMUSCULAR; INTRAVENOUS at 08:07

## 2024-07-29 RX ADMIN — ATORVASTATIN CALCIUM 20 MG: 10 TABLET, FILM COATED ORAL at 08:07

## 2024-07-29 RX ADMIN — MUPIROCIN: 20 OINTMENT TOPICAL at 11:07

## 2024-07-29 RX ADMIN — SUCRALFATE 1 G: 1 TABLET ORAL at 09:07

## 2024-07-30 LAB
ABO + RH BLD: NORMAL
ABO + RH BLD: NORMAL
ANION GAP SERPL CALC-SCNC: 8 MEQ/L
BASOPHILS # BLD AUTO: 0.03 X10(3)/MCL
BASOPHILS NFR BLD AUTO: 0.3 %
BLD PROD TYP BPU: NORMAL
BLD PROD TYP BPU: NORMAL
BLOOD UNIT EXPIRATION DATE: NORMAL
BLOOD UNIT EXPIRATION DATE: NORMAL
BLOOD UNIT TYPE CODE: 600
BLOOD UNIT TYPE CODE: 6200
BUN SERPL-MCNC: 20.2 MG/DL (ref 8.4–25.7)
CALCIUM SERPL-MCNC: 8.2 MG/DL (ref 8.8–10)
CHLORIDE SERPL-SCNC: 102 MMOL/L (ref 98–107)
CO2 SERPL-SCNC: 25 MMOL/L (ref 23–31)
CREAT SERPL-MCNC: 1.11 MG/DL (ref 0.73–1.18)
CREAT/UREA NIT SERPL: 18
CROSSMATCH INTERPRETATION: NORMAL
CROSSMATCH INTERPRETATION: NORMAL
DISPENSE STATUS: NORMAL
DISPENSE STATUS: NORMAL
EOSINOPHIL # BLD AUTO: 0.22 X10(3)/MCL (ref 0–0.9)
EOSINOPHIL NFR BLD AUTO: 1.9 %
ERYTHROCYTE [DISTWIDTH] IN BLOOD BY AUTOMATED COUNT: 14.6 % (ref 11.5–17)
GFR SERPLBLD CREATININE-BSD FMLA CKD-EPI: >60 ML/MIN/1.73/M2
GLUCOSE SERPL-MCNC: 118 MG/DL (ref 82–115)
GROUP & RH: NORMAL
HCT VFR BLD AUTO: 24.2 % (ref 42–52)
HGB BLD-MCNC: 8.6 G/DL (ref 14–18)
IMM GRANULOCYTES # BLD AUTO: 0.09 X10(3)/MCL (ref 0–0.04)
IMM GRANULOCYTES NFR BLD AUTO: 0.8 %
INDIRECT COOMBS: NORMAL
LYMPHOCYTES # BLD AUTO: 1.79 X10(3)/MCL (ref 0.6–4.6)
LYMPHOCYTES NFR BLD AUTO: 15.2 %
MAGNESIUM SERPL-MCNC: 2.3 MG/DL (ref 1.6–2.6)
MCH RBC QN AUTO: 33.5 PG (ref 27–31)
MCHC RBC AUTO-ENTMCNC: 35.5 G/DL (ref 33–36)
MCV RBC AUTO: 94.2 FL (ref 80–94)
MONOCYTES # BLD AUTO: 2 X10(3)/MCL (ref 0.1–1.3)
MONOCYTES NFR BLD AUTO: 17 %
NEUTROPHILS # BLD AUTO: 7.61 X10(3)/MCL (ref 2.1–9.2)
NEUTROPHILS NFR BLD AUTO: 64.8 %
NRBC BLD AUTO-RTO: 0 %
OHS QRS DURATION: 114 MS
OHS QTC CALCULATION: 478 MS
PLATELET # BLD AUTO: 164 X10(3)/MCL (ref 130–400)
PMV BLD AUTO: 10.8 FL (ref 7.4–10.4)
POCT GLUCOSE: 127 MG/DL (ref 70–110)
POCT GLUCOSE: 148 MG/DL (ref 70–110)
POTASSIUM SERPL-SCNC: 4 MMOL/L (ref 3.5–5.1)
PSYCHE PATHOLOGY RESULT: NORMAL
RBC # BLD AUTO: 2.57 X10(6)/MCL (ref 4.7–6.1)
SODIUM SERPL-SCNC: 135 MMOL/L (ref 136–145)
SPECIMEN OUTDATE: NORMAL
UNIT NUMBER: NORMAL
UNIT NUMBER: NORMAL
WBC # BLD AUTO: 11.74 X10(3)/MCL (ref 4.5–11.5)

## 2024-07-30 PROCEDURE — 97110 THERAPEUTIC EXERCISES: CPT

## 2024-07-30 PROCEDURE — 80048 BASIC METABOLIC PNL TOTAL CA: CPT | Performed by: THORACIC SURGERY (CARDIOTHORACIC VASCULAR SURGERY)

## 2024-07-30 PROCEDURE — P9016 RBC LEUKOCYTES REDUCED: HCPCS

## 2024-07-30 PROCEDURE — 63600175 PHARM REV CODE 636 W HCPCS

## 2024-07-30 PROCEDURE — 86850 RBC ANTIBODY SCREEN: CPT

## 2024-07-30 PROCEDURE — 36415 COLL VENOUS BLD VENIPUNCTURE: CPT | Performed by: THORACIC SURGERY (CARDIOTHORACIC VASCULAR SURGERY)

## 2024-07-30 PROCEDURE — 93010 ELECTROCARDIOGRAM REPORT: CPT | Mod: ,,, | Performed by: INTERNAL MEDICINE

## 2024-07-30 PROCEDURE — 85025 COMPLETE CBC W/AUTO DIFF WBC: CPT

## 2024-07-30 PROCEDURE — 36430 TRANSFUSION BLD/BLD COMPNT: CPT

## 2024-07-30 PROCEDURE — 86901 BLOOD TYPING SEROLOGIC RH(D): CPT

## 2024-07-30 PROCEDURE — 21400001 HC TELEMETRY ROOM

## 2024-07-30 PROCEDURE — 25000003 PHARM REV CODE 250

## 2024-07-30 PROCEDURE — 94760 N-INVAS EAR/PLS OXIMETRY 1: CPT

## 2024-07-30 PROCEDURE — 86923 COMPATIBILITY TEST ELECTRIC: CPT | Mod: 91

## 2024-07-30 PROCEDURE — 25000003 PHARM REV CODE 250: Performed by: INTERNAL MEDICINE

## 2024-07-30 PROCEDURE — 83735 ASSAY OF MAGNESIUM: CPT | Performed by: THORACIC SURGERY (CARDIOTHORACIC VASCULAR SURGERY)

## 2024-07-30 PROCEDURE — 36415 COLL VENOUS BLD VENIPUNCTURE: CPT

## 2024-07-30 PROCEDURE — 93005 ELECTROCARDIOGRAM TRACING: CPT

## 2024-07-30 PROCEDURE — 86900 BLOOD TYPING SEROLOGIC ABO: CPT

## 2024-07-30 PROCEDURE — 99024 POSTOP FOLLOW-UP VISIT: CPT | Mod: ,,,

## 2024-07-30 RX ORDER — FUROSEMIDE 20 MG/1
20 TABLET ORAL ONCE
Status: COMPLETED | OUTPATIENT
Start: 2024-07-30 | End: 2024-07-30

## 2024-07-30 RX ORDER — HYDROCODONE BITARTRATE AND ACETAMINOPHEN 500; 5 MG/1; MG/1
TABLET ORAL
Status: DISCONTINUED | OUTPATIENT
Start: 2024-07-30 | End: 2024-07-31 | Stop reason: HOSPADM

## 2024-07-30 RX ADMIN — ASPIRIN 81 MG: 81 TABLET, COATED ORAL at 08:07

## 2024-07-30 RX ADMIN — FUROSEMIDE 20 MG: 20 TABLET ORAL at 11:07

## 2024-07-30 RX ADMIN — LOSARTAN POTASSIUM 25 MG: 25 TABLET, FILM COATED ORAL at 08:07

## 2024-07-30 RX ADMIN — AMIODARONE HYDROCHLORIDE 0.5 MG/MIN: 1.8 INJECTION, SOLUTION INTRAVENOUS at 11:07

## 2024-07-30 RX ADMIN — SUCRALFATE 1 G: 1 TABLET ORAL at 04:07

## 2024-07-30 RX ADMIN — SUCRALFATE 1 G: 1 TABLET ORAL at 08:07

## 2024-07-30 RX ADMIN — DOCUSATE SODIUM 100 MG: 100 CAPSULE, LIQUID FILLED ORAL at 08:07

## 2024-07-30 RX ADMIN — AMIODARONE HYDROCHLORIDE 150 MG: 1.5 INJECTION, SOLUTION INTRAVENOUS at 03:07

## 2024-07-30 RX ADMIN — ATORVASTATIN CALCIUM 20 MG: 10 TABLET, FILM COATED ORAL at 08:07

## 2024-07-30 RX ADMIN — FUROSEMIDE 20 MG: 20 TABLET ORAL at 04:07

## 2024-07-30 RX ADMIN — CARVEDILOL 3.12 MG: 3.12 TABLET, FILM COATED ORAL at 08:07

## 2024-07-30 RX ADMIN — APIXABAN 5 MG: 5 TABLET, FILM COATED ORAL at 08:07

## 2024-07-30 RX ADMIN — MUPIROCIN: 20 OINTMENT TOPICAL at 08:07

## 2024-07-30 RX ADMIN — FOLIC ACID 1 MG: 1 TABLET ORAL at 08:07

## 2024-07-30 RX ADMIN — LEVOTHYROXINE SODIUM 50 MCG: 50 TABLET ORAL at 04:07

## 2024-07-30 RX ADMIN — FAMOTIDINE 20 MG: 20 TABLET, FILM COATED ORAL at 08:07

## 2024-07-30 RX ADMIN — AMIODARONE HYDROCHLORIDE 1 MG/MIN: 1.8 INJECTION, SOLUTION INTRAVENOUS at 03:07

## 2024-07-31 VITALS
HEART RATE: 63 BPM | WEIGHT: 264.56 LBS | OXYGEN SATURATION: 95 % | BODY MASS INDEX: 37.87 KG/M2 | SYSTOLIC BLOOD PRESSURE: 130 MMHG | HEIGHT: 70 IN | TEMPERATURE: 98 F | DIASTOLIC BLOOD PRESSURE: 74 MMHG | RESPIRATION RATE: 18 BRPM

## 2024-07-31 LAB
ANION GAP SERPL CALC-SCNC: 7 MEQ/L
BASOPHILS # BLD AUTO: 0.05 X10(3)/MCL
BASOPHILS NFR BLD AUTO: 0.5 %
BUN SERPL-MCNC: 16.6 MG/DL (ref 8.4–25.7)
CALCIUM SERPL-MCNC: 8.3 MG/DL (ref 8.8–10)
CHLORIDE SERPL-SCNC: 103 MMOL/L (ref 98–107)
CO2 SERPL-SCNC: 28 MMOL/L (ref 23–31)
CREAT SERPL-MCNC: 0.96 MG/DL (ref 0.73–1.18)
CREAT/UREA NIT SERPL: 17
EOSINOPHIL # BLD AUTO: 0.18 X10(3)/MCL (ref 0–0.9)
EOSINOPHIL NFR BLD AUTO: 1.9 %
ERYTHROCYTE [DISTWIDTH] IN BLOOD BY AUTOMATED COUNT: 15.2 % (ref 11.5–17)
GFR SERPLBLD CREATININE-BSD FMLA CKD-EPI: >60 ML/MIN/1.73/M2
GLUCOSE SERPL-MCNC: 124 MG/DL (ref 82–115)
HCT VFR BLD AUTO: 28 % (ref 42–52)
HGB BLD-MCNC: 9.8 G/DL (ref 14–18)
IMM GRANULOCYTES # BLD AUTO: 0.13 X10(3)/MCL (ref 0–0.04)
IMM GRANULOCYTES NFR BLD AUTO: 1.3 %
LYMPHOCYTES # BLD AUTO: 1.7 X10(3)/MCL (ref 0.6–4.6)
LYMPHOCYTES NFR BLD AUTO: 17.7 %
MAGNESIUM SERPL-MCNC: 2.3 MG/DL (ref 1.6–2.6)
MCH RBC QN AUTO: 32.5 PG (ref 27–31)
MCHC RBC AUTO-ENTMCNC: 35 G/DL (ref 33–36)
MCV RBC AUTO: 92.7 FL (ref 80–94)
MONOCYTES # BLD AUTO: 1.38 X10(3)/MCL (ref 0.1–1.3)
MONOCYTES NFR BLD AUTO: 14.3 %
NEUTROPHILS # BLD AUTO: 6.19 X10(3)/MCL (ref 2.1–9.2)
NEUTROPHILS NFR BLD AUTO: 64.3 %
NRBC BLD AUTO-RTO: 0.3 %
OHS QRS DURATION: 110 MS
OHS QTC CALCULATION: 478 MS
PLATELET # BLD AUTO: 213 X10(3)/MCL (ref 130–400)
PMV BLD AUTO: 9.5 FL (ref 7.4–10.4)
POCT GLUCOSE: 125 MG/DL (ref 70–110)
POTASSIUM SERPL-SCNC: 3.6 MMOL/L (ref 3.5–5.1)
RBC # BLD AUTO: 3.02 X10(6)/MCL (ref 4.7–6.1)
SODIUM SERPL-SCNC: 138 MMOL/L (ref 136–145)
WBC # BLD AUTO: 9.63 X10(3)/MCL (ref 4.5–11.5)

## 2024-07-31 PROCEDURE — 83735 ASSAY OF MAGNESIUM: CPT

## 2024-07-31 PROCEDURE — 36415 COLL VENOUS BLD VENIPUNCTURE: CPT

## 2024-07-31 PROCEDURE — 80048 BASIC METABOLIC PNL TOTAL CA: CPT

## 2024-07-31 PROCEDURE — 25000003 PHARM REV CODE 250: Performed by: INTERNAL MEDICINE

## 2024-07-31 PROCEDURE — 25000003 PHARM REV CODE 250

## 2024-07-31 PROCEDURE — 85025 COMPLETE CBC W/AUTO DIFF WBC: CPT

## 2024-07-31 PROCEDURE — 63600175 PHARM REV CODE 636 W HCPCS

## 2024-07-31 PROCEDURE — 93010 ELECTROCARDIOGRAM REPORT: CPT | Mod: ,,, | Performed by: INTERNAL MEDICINE

## 2024-07-31 PROCEDURE — 94760 N-INVAS EAR/PLS OXIMETRY 1: CPT

## 2024-07-31 PROCEDURE — 25000003 PHARM REV CODE 250: Performed by: NURSE PRACTITIONER

## 2024-07-31 PROCEDURE — 93005 ELECTROCARDIOGRAM TRACING: CPT

## 2024-07-31 PROCEDURE — 99024 POSTOP FOLLOW-UP VISIT: CPT | Mod: ,,,

## 2024-07-31 PROCEDURE — 25000003 PHARM REV CODE 250: Performed by: THORACIC SURGERY (CARDIOTHORACIC VASCULAR SURGERY)

## 2024-07-31 RX ORDER — AMIODARONE HYDROCHLORIDE 200 MG/1
200 TABLET ORAL 2 TIMES DAILY
Status: DISCONTINUED | OUTPATIENT
Start: 2024-08-03 | End: 2024-07-31 | Stop reason: HOSPADM

## 2024-07-31 RX ORDER — AMIODARONE HYDROCHLORIDE 200 MG/1
200 TABLET ORAL DAILY
Qty: 30 TABLET | Refills: 1 | Status: SHIPPED | OUTPATIENT
Start: 2024-08-06 | End: 2024-10-05

## 2024-07-31 RX ORDER — AMIODARONE HYDROCHLORIDE 200 MG/1
400 TABLET ORAL 2 TIMES DAILY
Status: DISCONTINUED | OUTPATIENT
Start: 2024-07-31 | End: 2024-07-31 | Stop reason: HOSPADM

## 2024-07-31 RX ORDER — HYDROCODONE BITARTRATE AND ACETAMINOPHEN 5; 325 MG/1; MG/1
1 TABLET ORAL EVERY 6 HOURS PRN
Qty: 30 TABLET | Refills: 0 | Status: SHIPPED | OUTPATIENT
Start: 2024-07-31

## 2024-07-31 RX ORDER — AMIODARONE HYDROCHLORIDE 200 MG/1
200 TABLET ORAL 2 TIMES DAILY
Qty: 6 TABLET | Refills: 0 | Status: ON HOLD | OUTPATIENT
Start: 2024-08-03 | End: 2024-08-07 | Stop reason: HOSPADM

## 2024-07-31 RX ORDER — ATORVASTATIN CALCIUM 40 MG/1
40 TABLET, FILM COATED ORAL DAILY
Status: DISCONTINUED | OUTPATIENT
Start: 2024-08-01 | End: 2024-07-31 | Stop reason: HOSPADM

## 2024-07-31 RX ORDER — ASPIRIN 81 MG/1
81 TABLET ORAL DAILY
Qty: 30 TABLET | Refills: 11 | Status: SHIPPED | OUTPATIENT
Start: 2024-08-01 | End: 2025-08-01

## 2024-07-31 RX ORDER — POTASSIUM CHLORIDE 20 MEQ/1
40 TABLET, EXTENDED RELEASE ORAL ONCE
Status: COMPLETED | OUTPATIENT
Start: 2024-07-31 | End: 2024-07-31

## 2024-07-31 RX ORDER — LOSARTAN POTASSIUM 25 MG/1
25 TABLET ORAL DAILY
Qty: 90 TABLET | Refills: 3 | Status: SHIPPED | OUTPATIENT
Start: 2024-07-31 | End: 2025-07-31

## 2024-07-31 RX ORDER — AMIODARONE HYDROCHLORIDE 200 MG/1
200 TABLET ORAL DAILY
Status: DISCONTINUED | OUTPATIENT
Start: 2024-08-06 | End: 2024-07-31 | Stop reason: HOSPADM

## 2024-07-31 RX ORDER — AMIODARONE HYDROCHLORIDE 200 MG/1
400 TABLET ORAL 2 TIMES DAILY
Status: DISCONTINUED | OUTPATIENT
Start: 2024-07-31 | End: 2024-07-31

## 2024-07-31 RX ORDER — AMIODARONE HYDROCHLORIDE 400 MG/1
400 TABLET ORAL 2 TIMES DAILY
Qty: 5 TABLET | Refills: 0 | Status: ON HOLD | OUTPATIENT
Start: 2024-07-31 | End: 2024-08-07 | Stop reason: HOSPADM

## 2024-07-31 RX ADMIN — DOCUSATE SODIUM 100 MG: 100 CAPSULE, LIQUID FILLED ORAL at 09:07

## 2024-07-31 RX ADMIN — APIXABAN 5 MG: 5 TABLET, FILM COATED ORAL at 09:07

## 2024-07-31 RX ADMIN — ASPIRIN 81 MG: 81 TABLET, COATED ORAL at 09:07

## 2024-07-31 RX ADMIN — MUPIROCIN: 20 OINTMENT TOPICAL at 11:07

## 2024-07-31 RX ADMIN — POTASSIUM CHLORIDE 40 MEQ: 1500 TABLET, EXTENDED RELEASE ORAL at 02:07

## 2024-07-31 RX ADMIN — CARVEDILOL 3.12 MG: 3.12 TABLET, FILM COATED ORAL at 09:07

## 2024-07-31 RX ADMIN — LEVOTHYROXINE SODIUM 50 MCG: 50 TABLET ORAL at 06:07

## 2024-07-31 RX ADMIN — FOLIC ACID 1 MG: 1 TABLET ORAL at 09:07

## 2024-07-31 RX ADMIN — AMIODARONE HYDROCHLORIDE 0.5 MG/MIN: 1.8 INJECTION, SOLUTION INTRAVENOUS at 11:07

## 2024-07-31 RX ADMIN — LOSARTAN POTASSIUM 25 MG: 25 TABLET, FILM COATED ORAL at 09:07

## 2024-07-31 RX ADMIN — FAMOTIDINE 20 MG: 20 TABLET, FILM COATED ORAL at 09:07

## 2024-07-31 RX ADMIN — AMIODARONE HYDROCHLORIDE 400 MG: 200 TABLET ORAL at 02:07

## 2024-07-31 RX ADMIN — ATORVASTATIN CALCIUM 20 MG: 10 TABLET, FILM COATED ORAL at 09:07

## 2024-08-01 PROCEDURE — G0180 MD CERTIFICATION HHA PATIENT: HCPCS | Mod: ,,, | Performed by: THORACIC SURGERY (CARDIOTHORACIC VASCULAR SURGERY)

## 2024-08-02 ENCOUNTER — PATIENT OUTREACH (OUTPATIENT)
Dept: ADMINISTRATIVE | Facility: CLINIC | Age: 69
End: 2024-08-02
Payer: MEDICARE

## 2024-08-02 NOTE — PROGRESS NOTES
C3 nurse spoke with patient's wife for a TCC post hospital discharge follow up call. The patient has a scheduled HOSFU appointment with Dr. Gil on 08/09/2024 @ 1045 am. Appointment with Dr. Sierra on 08/21/2024 @ 920 am.  The patient does not have a scheduled HOSFU appointment with CHARISSE Wallace Jr., MD  within 5-7 days post hospital discharge date 07/31/2024.     Message sent to PCP staff requesting they contact patient and schedule follow up appointment.

## 2024-08-04 ENCOUNTER — HOSPITAL ENCOUNTER (INPATIENT)
Facility: HOSPITAL | Age: 69
LOS: 3 days | Discharge: HOME | DRG: 871 | End: 2024-08-07
Attending: EMERGENCY MEDICINE | Admitting: INTERNAL MEDICINE
Payer: MEDICARE

## 2024-08-04 DIAGNOSIS — D72.829 LEUKOCYTOSIS, UNSPECIFIED TYPE: ICD-10-CM

## 2024-08-04 DIAGNOSIS — R78.81 BACTEREMIA: ICD-10-CM

## 2024-08-04 DIAGNOSIS — R09.02 HYPOXIA: ICD-10-CM

## 2024-08-04 DIAGNOSIS — R53.1 GENERALIZED WEAKNESS: ICD-10-CM

## 2024-08-04 DIAGNOSIS — J90 PLEURAL EFFUSION ON LEFT: ICD-10-CM

## 2024-08-04 DIAGNOSIS — I31.39 PERICARDIAL EFFUSION: Primary | ICD-10-CM

## 2024-08-04 DIAGNOSIS — R50.9 FEVER: ICD-10-CM

## 2024-08-04 LAB
ALBUMIN SERPL-MCNC: 3.5 G/DL (ref 3.4–4.8)
ALBUMIN/GLOB SERPL: 1 RATIO (ref 1.1–2)
ALP SERPL-CCNC: 52 UNIT/L (ref 40–150)
ALT SERPL-CCNC: 43 UNIT/L (ref 0–55)
ANION GAP SERPL CALC-SCNC: 12 MEQ/L
AST SERPL-CCNC: 29 UNIT/L (ref 5–34)
BACTERIA #/AREA URNS AUTO: NORMAL /HPF
BASOPHILS # BLD AUTO: 0.04 X10(3)/MCL
BASOPHILS NFR BLD AUTO: 0.3 %
BILIRUB SERPL-MCNC: 1.7 MG/DL
BILIRUB UR QL STRIP.AUTO: NEGATIVE
BNP BLD-MCNC: 621.4 PG/ML
BUN SERPL-MCNC: 17.2 MG/DL (ref 8.4–25.7)
CALCIUM SERPL-MCNC: 8.7 MG/DL (ref 8.8–10)
CHLORIDE SERPL-SCNC: 103 MMOL/L (ref 98–107)
CLARITY UR: CLEAR
CO2 SERPL-SCNC: 23 MMOL/L (ref 23–31)
COLOR UR AUTO: NORMAL
CREAT SERPL-MCNC: 1.14 MG/DL (ref 0.73–1.18)
CREAT/UREA NIT SERPL: 15
EOSINOPHIL # BLD AUTO: 0.08 X10(3)/MCL (ref 0–0.9)
EOSINOPHIL NFR BLD AUTO: 0.5 %
ERYTHROCYTE [DISTWIDTH] IN BLOOD BY AUTOMATED COUNT: 15.6 % (ref 11.5–17)
FLUAV AG UPPER RESP QL IA.RAPID: NOT DETECTED
FLUBV AG UPPER RESP QL IA.RAPID: NOT DETECTED
GFR SERPLBLD CREATININE-BSD FMLA CKD-EPI: >60 ML/MIN/1.73/M2
GLOBULIN SER-MCNC: 3.4 GM/DL (ref 2.4–3.5)
GLUCOSE SERPL-MCNC: 119 MG/DL (ref 82–115)
GLUCOSE UR QL STRIP: NORMAL
HCT VFR BLD AUTO: 35.6 % (ref 42–52)
HGB BLD-MCNC: 11.9 G/DL (ref 14–18)
HGB UR QL STRIP: NEGATIVE
IMM GRANULOCYTES # BLD AUTO: 0.21 X10(3)/MCL (ref 0–0.04)
IMM GRANULOCYTES NFR BLD AUTO: 1.4 %
KETONES UR QL STRIP: NEGATIVE
LACTATE SERPL-SCNC: 0.9 MMOL/L (ref 0.5–2.2)
LEUKOCYTE ESTERASE UR QL STRIP: NEGATIVE
LYMPHOCYTES # BLD AUTO: 0.62 X10(3)/MCL (ref 0.6–4.6)
LYMPHOCYTES NFR BLD AUTO: 4.1 %
MCH RBC QN AUTO: 32.6 PG (ref 27–31)
MCHC RBC AUTO-ENTMCNC: 33.4 G/DL (ref 33–36)
MCV RBC AUTO: 97.5 FL (ref 80–94)
MONOCYTES # BLD AUTO: 0.55 X10(3)/MCL (ref 0.1–1.3)
MONOCYTES NFR BLD AUTO: 3.7 %
NEUTROPHILS # BLD AUTO: 13.53 X10(3)/MCL (ref 2.1–9.2)
NEUTROPHILS NFR BLD AUTO: 90 %
NITRITE UR QL STRIP: NEGATIVE
NRBC BLD AUTO-RTO: 0 %
PH UR STRIP: 6.5 [PH]
PLATELET # BLD AUTO: 410 X10(3)/MCL (ref 130–400)
PLATELETS.RETICULATED NFR BLD AUTO: 1.4 % (ref 0.9–11.2)
PMV BLD AUTO: 8.7 FL (ref 7.4–10.4)
POTASSIUM SERPL-SCNC: 4.5 MMOL/L (ref 3.5–5.1)
PROT SERPL-MCNC: 6.9 GM/DL (ref 5.8–7.6)
PROT UR QL STRIP: NEGATIVE
RBC # BLD AUTO: 3.65 X10(6)/MCL (ref 4.7–6.1)
RBC #/AREA URNS AUTO: NORMAL /HPF
SARS-COV-2 RNA RESP QL NAA+PROBE: NOT DETECTED
SODIUM SERPL-SCNC: 138 MMOL/L (ref 136–145)
SP GR UR STRIP.AUTO: 1.01 (ref 1–1.03)
SQUAMOUS #/AREA URNS LPF: NORMAL /HPF
TROPONIN I SERPL-MCNC: 0.39 NG/ML (ref 0–0.04)
TROPONIN I SERPL-MCNC: 0.4 NG/ML (ref 0–0.04)
TROPONIN I SERPL-MCNC: 0.42 NG/ML (ref 0–0.04)
UROBILINOGEN UR STRIP-ACNC: NORMAL
WBC # BLD AUTO: 15.03 X10(3)/MCL (ref 4.5–11.5)
WBC #/AREA URNS AUTO: NORMAL /HPF

## 2024-08-04 PROCEDURE — 25000003 PHARM REV CODE 250: Performed by: PHYSICIAN ASSISTANT

## 2024-08-04 PROCEDURE — 36415 COLL VENOUS BLD VENIPUNCTURE: CPT | Performed by: PHYSICIAN ASSISTANT

## 2024-08-04 PROCEDURE — 83605 ASSAY OF LACTIC ACID: CPT | Performed by: PHYSICIAN ASSISTANT

## 2024-08-04 PROCEDURE — 63600175 PHARM REV CODE 636 W HCPCS: Performed by: INTERNAL MEDICINE

## 2024-08-04 PROCEDURE — 80053 COMPREHEN METABOLIC PANEL: CPT | Performed by: PHYSICIAN ASSISTANT

## 2024-08-04 PROCEDURE — 11000001 HC ACUTE MED/SURG PRIVATE ROOM

## 2024-08-04 PROCEDURE — 21400001 HC TELEMETRY ROOM

## 2024-08-04 PROCEDURE — 96375 TX/PRO/DX INJ NEW DRUG ADDON: CPT

## 2024-08-04 PROCEDURE — 87040 BLOOD CULTURE FOR BACTERIA: CPT | Performed by: PHYSICIAN ASSISTANT

## 2024-08-04 PROCEDURE — 81001 URINALYSIS AUTO W/SCOPE: CPT | Performed by: PHYSICIAN ASSISTANT

## 2024-08-04 PROCEDURE — 25000003 PHARM REV CODE 250: Performed by: INTERNAL MEDICINE

## 2024-08-04 PROCEDURE — 93010 ELECTROCARDIOGRAM REPORT: CPT | Mod: ,,, | Performed by: INTERNAL MEDICINE

## 2024-08-04 PROCEDURE — 96365 THER/PROPH/DIAG IV INF INIT: CPT

## 2024-08-04 PROCEDURE — 84484 ASSAY OF TROPONIN QUANT: CPT | Performed by: PHYSICIAN ASSISTANT

## 2024-08-04 PROCEDURE — 93005 ELECTROCARDIOGRAM TRACING: CPT

## 2024-08-04 PROCEDURE — 25000003 PHARM REV CODE 250: Performed by: EMERGENCY MEDICINE

## 2024-08-04 PROCEDURE — 96361 HYDRATE IV INFUSION ADD-ON: CPT

## 2024-08-04 PROCEDURE — 85025 COMPLETE CBC W/AUTO DIFF WBC: CPT | Performed by: PHYSICIAN ASSISTANT

## 2024-08-04 PROCEDURE — 0240U COVID/FLU A&B PCR: CPT | Performed by: PHYSICIAN ASSISTANT

## 2024-08-04 PROCEDURE — 99285 EMERGENCY DEPT VISIT HI MDM: CPT | Mod: 25

## 2024-08-04 PROCEDURE — 63600175 PHARM REV CODE 636 W HCPCS: Performed by: PHYSICIAN ASSISTANT

## 2024-08-04 PROCEDURE — 63600175 PHARM REV CODE 636 W HCPCS: Performed by: EMERGENCY MEDICINE

## 2024-08-04 PROCEDURE — 83880 ASSAY OF NATRIURETIC PEPTIDE: CPT | Performed by: EMERGENCY MEDICINE

## 2024-08-04 RX ORDER — FAMOTIDINE 20 MG/1
20 TABLET, FILM COATED ORAL DAILY
Status: DISCONTINUED | OUTPATIENT
Start: 2024-08-04 | End: 2024-08-07 | Stop reason: HOSPADM

## 2024-08-04 RX ORDER — FOLIC ACID 1 MG/1
1 TABLET ORAL DAILY
Status: DISCONTINUED | OUTPATIENT
Start: 2024-08-04 | End: 2024-08-07 | Stop reason: HOSPADM

## 2024-08-04 RX ORDER — ACETAMINOPHEN 325 MG/1
650 TABLET ORAL
Status: COMPLETED | OUTPATIENT
Start: 2024-08-04 | End: 2024-08-04

## 2024-08-04 RX ORDER — ONDANSETRON HYDROCHLORIDE 2 MG/ML
4 INJECTION, SOLUTION INTRAVENOUS EVERY 6 HOURS PRN
Status: DISCONTINUED | OUTPATIENT
Start: 2024-08-04 | End: 2024-08-07 | Stop reason: HOSPADM

## 2024-08-04 RX ORDER — ATORVASTATIN CALCIUM 40 MG/1
40 TABLET, FILM COATED ORAL DAILY
Status: DISCONTINUED | OUTPATIENT
Start: 2024-08-04 | End: 2024-08-07 | Stop reason: HOSPADM

## 2024-08-04 RX ORDER — ONDANSETRON HYDROCHLORIDE 2 MG/ML
4 INJECTION, SOLUTION INTRAVENOUS
Status: COMPLETED | OUTPATIENT
Start: 2024-08-04 | End: 2024-08-04

## 2024-08-04 RX ORDER — ASPIRIN 81 MG/1
81 TABLET ORAL DAILY
Status: DISCONTINUED | OUTPATIENT
Start: 2024-08-04 | End: 2024-08-07 | Stop reason: HOSPADM

## 2024-08-04 RX ORDER — AMIODARONE HYDROCHLORIDE 200 MG/1
200 TABLET ORAL DAILY
Status: DISCONTINUED | OUTPATIENT
Start: 2024-08-04 | End: 2024-08-07 | Stop reason: HOSPADM

## 2024-08-04 RX ORDER — LEVOTHYROXINE SODIUM 50 UG/1
50 TABLET ORAL
Status: DISCONTINUED | OUTPATIENT
Start: 2024-08-05 | End: 2024-08-07 | Stop reason: HOSPADM

## 2024-08-04 RX ORDER — CARVEDILOL 3.12 MG/1
3.12 TABLET ORAL 2 TIMES DAILY
Status: DISCONTINUED | OUTPATIENT
Start: 2024-08-04 | End: 2024-08-07 | Stop reason: HOSPADM

## 2024-08-04 RX ORDER — LOSARTAN POTASSIUM 25 MG/1
25 TABLET ORAL DAILY
Status: DISCONTINUED | OUTPATIENT
Start: 2024-08-04 | End: 2024-08-07 | Stop reason: HOSPADM

## 2024-08-04 RX ORDER — SUCRALFATE 1 G/1
1 TABLET ORAL
Status: DISCONTINUED | OUTPATIENT
Start: 2024-08-04 | End: 2024-08-05

## 2024-08-04 RX ADMIN — FAMOTIDINE 20 MG: 20 TABLET, FILM COATED ORAL at 02:08

## 2024-08-04 RX ADMIN — CARVEDILOL 3.12 MG: 3.12 TABLET, FILM COATED ORAL at 09:08

## 2024-08-04 RX ADMIN — PIPERACILLIN AND TAZOBACTAM 4.5 G: 4; .5 INJECTION, POWDER, LYOPHILIZED, FOR SOLUTION INTRAVENOUS; PARENTERAL at 11:08

## 2024-08-04 RX ADMIN — CEFEPIME 2 G: 2 INJECTION, POWDER, FOR SOLUTION INTRAVENOUS at 02:08

## 2024-08-04 RX ADMIN — CARVEDILOL 3.12 MG: 3.12 TABLET, FILM COATED ORAL at 02:08

## 2024-08-04 RX ADMIN — SUCRALFATE 1 G: 1 TABLET ORAL at 09:08

## 2024-08-04 RX ADMIN — APIXABAN 5 MG: 5 TABLET, FILM COATED ORAL at 02:08

## 2024-08-04 RX ADMIN — VANCOMYCIN HYDROCHLORIDE 2000 MG: 500 INJECTION, POWDER, LYOPHILIZED, FOR SOLUTION INTRAVENOUS at 03:08

## 2024-08-04 RX ADMIN — ACETAMINOPHEN 650 MG: 325 TABLET, FILM COATED ORAL at 10:08

## 2024-08-04 RX ADMIN — LOSARTAN POTASSIUM 25 MG: 25 TABLET, FILM COATED ORAL at 02:08

## 2024-08-04 RX ADMIN — SODIUM CHLORIDE 500 ML: 9 INJECTION, SOLUTION INTRAVENOUS at 10:08

## 2024-08-04 RX ADMIN — ASPIRIN 81 MG: 81 TABLET, COATED ORAL at 02:08

## 2024-08-04 RX ADMIN — FOLIC ACID 1 MG: 1 TABLET ORAL at 02:08

## 2024-08-04 RX ADMIN — AMIODARONE HYDROCHLORIDE 200 MG: 200 TABLET ORAL at 02:08

## 2024-08-04 RX ADMIN — ATORVASTATIN CALCIUM 40 MG: 40 TABLET, FILM COATED ORAL at 02:08

## 2024-08-04 RX ADMIN — APIXABAN 5 MG: 5 TABLET, FILM COATED ORAL at 09:08

## 2024-08-04 RX ADMIN — ONDANSETRON 4 MG: 2 INJECTION INTRAMUSCULAR; INTRAVENOUS at 10:08

## 2024-08-04 RX ADMIN — SUCRALFATE 1 G: 1 TABLET ORAL at 04:08

## 2024-08-04 NOTE — NURSING
Nurses Note -- 4 Eyes      8/4/2024   6:39 PM      Skin assessed during: Admit      [x] No Altered Skin Integrity Present    []Prevention Measures Documented      [] Yes- Altered Skin Integrity Present or Discovered   [] LDA Added if Not in Epic (Describe Wound)   [] New Altered Skin Integrity was Present on Admit and Documented in LDA   [] Wound Image Taken    Wound Care Consulted? No    Attending Nurse:  PERLITA Judge      Second RN/Staff Member:  PERLITA Selby

## 2024-08-04 NOTE — PROGRESS NOTES
"Pharmacokinetic Initial Assessment: IV Vancomycin    Assessment/Plan:    Initiate intravenous vancomycin with loading dose of 2000 mg once followed by a maintenance dose of vancomycin 1750mg IV every 12 hours  Desired empiric serum trough concentration is 15 to 20 mcg/mL  Draw vancomycin trough level 60 min prior to 5th dose on 08/06 at approximately 1400  Pharmacy will continue to follow and monitor vancomycin.      Please contact pharmacy at extension [phone] with any questions regarding this assessment.     Thank you for the consult,   Torri Batista       Patient brief summary:  Arturo Ortiz is a 69 y.o. male initiated on antimicrobial therapy with IV Vancomycin for treatment of suspected bacteremia    Drug Allergies:   Review of patient's allergies indicates:  No Known Allergies    Actual Body Weight:   115.7 kg    Renal Function:   Estimated Creatinine Clearance: 80.1 mL/min (based on SCr of 1.14 mg/dL).,     Dialysis Method (if applicable):  Slight aramis, will continue to monitor    CBC (last 72 hours):  Recent Labs   Lab Result Units 08/04/24  1000   WBC x10(3)/mcL 15.03*   Hgb g/dL 11.9*   Hct % 35.6*   Platelet x10(3)/mcL 410*   Mono % % 3.7   Eos % % 0.5   Basophil % % 0.3       Metabolic Panel (last 72 hours):  Recent Labs   Lab Result Units 08/04/24  1000 08/04/24  1332   Sodium mmol/L 138  --    Potassium mmol/L 4.5  --    Chloride mmol/L 103  --    CO2 mmol/L 23  --    Glucose mg/dL 119*  --    Glucose, UA   --  Normal   Blood Urea Nitrogen mg/dL 17.2  --    Creatinine mg/dL 1.14  --    Albumin g/dL 3.5  --    Bilirubin Total mg/dL 1.7*  --    ALP unit/L 52  --    AST unit/L 29  --    ALT unit/L 43  --        Drug levels (last 3 results):  No results for input(s): "VANCOMYCINRA", "VANCORANDOM", "VANCOMYCINPE", "VANCOPEAK", "VANCOMYCINTR", "VANCOTROUGH" in the last 72 hours.    Microbiologic Results:  Microbiology Results (last 7 days)       Procedure Component Value Units Date/Time    Respiratory " Culture [[id number]]     Order Status: Sent Specimen: Sputum     Blood culture #1 **CANNOT BE ORDERED STAT** [[id number]] Collected: 08/04/24 1000    Order Status: Resulted Specimen: Blood Updated: 08/04/24 1023    Blood culture #2 **CANNOT BE ORDERED STAT** [[id number]] Collected: 08/04/24 1000    Order Status: Resulted Specimen: Blood Updated: 08/04/24 1021

## 2024-08-04 NOTE — H&P
[facility] Medicine History & Physical Examination       Patient Name: Arturo Ortiz  MRN: [id number]  Patient Class: IP- Inpatient   Admission Date: 8/4/2024   Admitting Physician: Chema Parsons MD   Length of Stay: 0  Attending Physician: Chema Parsons MD   Primary Care Provider: CHARISSE Wallace Jr., MD  Face-to-Face encounter date: 08/04/2024  Code Status: Full Code   Chief Complaint: Post-op Problem (Pt discharged Wednesday for heart valve replacement. Wife reports pt has felt lethargic, poor appetite, fever this morning. )        Patient information was obtained from patient, patient's family, past medical records and ER records.     HISTORY OF PRESENT ILLNESS:   Arturo Ortiz is a 69 y.o. White male with a past medical history of hypertension, hyperlipidemia, CHF with LVEF 45% and grade 1 diastolic dysfunction, diabetes mellitus type 2, aortic valve replacement on 07/26/2024 with Dr. Sierra and on Eliquis, diabetes mellitus type 2, hypothyroidism. The patient presented to [facility] on 8/4/2024 with a primary complaint of generalized weakness and fever.  Patient was states he began to experience generalized weakness yesterday (08/03/2024) worsening today.  Associated symptoms include fever, occasional nonproductive cough, nausea.  He denies complaints of chest pain, abdominal pain, vomiting, diarrhea, burning with urination urinary frequency.    Upon presentation to the ED, temperature 101° F, blood pressure 137/84, heart rate 77, respiratory rate sixteen and SpO2 95% on room air.  Labs with WBC 15.03, H&H 11.9/35.6, MCV 97.5, platelet count for 410, glucose 119, .4, troponin 0.389.  Influenza A/B and SARS-COV-2 PCR negative.  UA negative for infection.  EKG normal sinus rhythm, ST and T-wave abnormalities considering inferior ischemia and prolonged QT interval.  Chest x-ray with left small pleural effusion and left lower lung zone mild atelectasis and/or  infiltrates.  In ED patient received Tylenol and Zosyn.  Patient is admitted to hospital medicine services for further medical management.    PAST MEDICAL HISTORY:     Past Medical History:   Diagnosis Date    Avascular necrosis of right femoral head     DM (diabetes mellitus)     Essential (primary) hypertension     Hypercholesteremia     Macrocytosis     Osteoarthritis of right hip     Osteoarthritis of right knee     Peripheral neuropathy        PAST SURGICAL HISTORY:     Past Surgical History:   Procedure Laterality Date    AORTIC VALVE REPLACEMENT N/A 7/26/2024    Procedure: Replacement-valve-aortic;  Surgeon: Sita Sierra MD;  Location: [facility] OR;  Service: Cardiology;  Laterality: N/A;  ECHO NOTIFIED    CORONARY ANGIOGRAPHY N/A 7/25/2024    Procedure: ANGIOGRAM, CORONARY ARTERY;  Surgeon: Stanislaw Gil MD;  Location: [facility] CATH LAB;  Service: Cardiology;  Laterality: N/A;    EYE SURGERY      JOINT REPLACEMENT      Hip replacement    TOTAL HIP ARTHROPLASTY Right 05/18/2021       ALLERGIES:   Patient has no known allergies.    FAMILY HISTORY:   Reviewed and negative    SOCIAL HISTORY:     Social History     Tobacco Use    Smoking status: Never    Smokeless tobacco: Never   Substance Use Topics    Alcohol use: Not Currently     Comment: occ        Screening for Social Drivers for health:  Patient screened for food insecurity, housing instability, transportation needs, utility difficulties, and interpersonal safety (select all that apply as identified as concern)  []Housing or Food  []Transportation Needs  []Utility Difficulties  []Interpersonal safety  []None    HOME MEDICATIONS:     Prior to Admission medications    Medication Sig Start Date End Date Taking? Authorizing Provider   albuterol (PROVENTIL) 2.5 mg /3 mL (0.083 %) nebulizer solution Take 3 mLs (2.5 mg total) by nebulization every 4 (four) hours as needed for Wheezing. Rescue 7/23/24 7/23/25  CHARISSE Wallace Jr., MD   amiodarone (PACERONE)  200 MG Tab Take 1 tablet (200 mg total) by mouth 2 (two) times daily. for 3 days 8/3/24 8/6/24  Dinora Todd FNP   amiodarone (PACERONE) 200 MG Tab Take 1 tablet (200 mg total) by mouth once daily. 8/6/24 10/5/24  Dinora Todd FNP   amiodarone (PACERONE) 400 MG tablet Take 1 tablet (400 mg total) by mouth 2 (two) times daily. for 5 doses 7/31/24 8/3/24  Dinora Todd FNP   apixaban (ELIQUIS) 5 mg Tab Take 1 tablet (5 mg total) by mouth 2 (two) times daily. 7/31/24   Dinora Todd FNP   aspirin (ECOTRIN) 81 MG EC tablet Take 1 tablet (81 mg total) by mouth once daily. 8/1/24 8/1/25  Dinora Todd FNP   atorvastatin (LIPITOR) 40 MG tablet Take 1 tablet (40 mg total) by mouth once daily.  Patient taking differently: Take by mouth once daily. Start date tomorrow per MD 2/18/24 2/17/25  Forest Martinez MD   carvediloL (COREG) 3.125 MG tablet Take 1 tablet (3.125 mg total) by mouth 2 (two) times daily. 2/18/24 2/17/25  Forest Martinez MD   furosemide (LASIX) 20 MG tablet Take 2 tablets (40 mg total) by mouth as needed (prn for edema and HASTINGS). 3/14/24 3/14/25  Josué Sparks FNP   HYDROcodone-acetaminophen (NORCO) 5-325 mg per tablet Take 1 tablet by mouth every 6 (six) hours as needed for Pain.  Patient not taking: Reported on 8/2/2024 7/31/24   Dinora Todd FNP   levothyroxine (SYNTHROID) 50 MCG tablet TAKE 1 TABLET BY MOUTH BEFORE BREAKFAST. 6/6/24   CHARISSE Wallace Jr., MD   losartan (COZAAR) 25 MG tablet Take 1 tablet (25 mg total) by mouth once daily. 7/31/24 7/31/25  Yocham, Dinora, FNP       REVIEW OF SYSTEMS:   Except as documented, all other systems reviewed and negative     PHYSICAL EXAM:     VITAL SIGNS: 24 HRS MIN & MAX LAST   Temp  Min: 100.2 °F (37.9 °C)  Max: 101 °F (38.3 °C) 100.2 °F (37.9 °C)   BP  Min: 137/70  Max: 148/72 139/77   Pulse  Min: 71  Max: 77  71   Resp  Min: 16  Max: 20 20   SpO2  Min: 91 %  Max: 96 % 96 %       General appearance: Well-developed, well-nourished male in no  apparent distress.    HEENT: Atraumatic head. Moist mucous membranes of oral cavity.  Lungs: Clear to auscultation bilaterally.   Heart: Regular rate and rhythm.   Abdomen: Soft, non-distended, non-tender. Bowel sounds are normal.   Extremities: No cyanosis, clubbing. No deformities.  Skin: No Rash. Warm and dry.  Thoracostomy incision without erythema or drainage.  Neuro: Awake, alert and oriented. Motor and sensory exams grossly intact.  Psych/mental status: Appropriate mood and affect. Cooperative. Responds appropriately to questions.       LABS AND IMAGING:     Recent Labs   Lab 07/30/24  0351 07/31/24 0457 08/04/24  1000   WBC 11.74* 9.63 15.03*   RBC 2.57* 3.02* 3.65*   HGB 8.6* 9.8* 11.9*   HCT 24.2* 28.0* 35.6*   MCV 94.2* 92.7 97.5*   MCH 33.5* 32.5* 32.6*   MCHC 35.5 35.0 33.4   RDW 14.6 15.2 15.6    213 410*   MPV 10.8* 9.5 8.7       Recent Labs   Lab 07/29/24  0435 07/30/24  0351 07/31/24  0457 08/04/24  1000   * 135* 138 138   K 4.2 4.0 3.6 4.5   CL 99 102 103 103   CO2 22* 25 28 23   BUN 18.6 20.2 16.6 17.2   CREATININE 1.02 1.11 0.96 1.14   CALCIUM 7.6* 8.2* 8.3* 8.7*   MG 2.10 2.30 2.30  --    ALBUMIN 3.0*  --   --  3.5   ALKPHOS 35*  --   --  52   ALT 15  --   --  43   AST 22  --   --  29   BILITOT 1.2  --   --  1.7*       Microbiology Results (last 7 days)       Procedure Component Value Units Date/Time    Respiratory Culture [[id number]]     Order Status: Sent Specimen: Sputum     Blood culture #1 **CANNOT BE ORDERED STAT** [[id number]] Collected: 08/04/24 1000    Order Status: Resulted Specimen: Blood Updated: 08/04/24 1023    Blood culture #2 **CANNOT BE ORDERED STAT** [[id number]] Collected: 08/04/24 1000    Order Status: Resulted Specimen: Blood Updated: 08/04/24 1021             X-Ray Chest 1 View  Narrative: EXAMINATION:  XR CHEST 1 VIEW    CLINICAL HISTORY:  Fever, unspecified    TECHNIQUE:  One view    COMPARISON:  July 29, 2024.    FINDINGS:  Cardiopericardial silhouette  enlarged appearance is similar.  Sternotomy changes.  There is small left pleural effusion and left lower lung zone subtle opacities which may be secondary to atelectasis with the possibility of associated mild infiltrates.  Lungs otherwise are clear.  There is no pulmonary edema.  No pneumothorax.  Impression: Left small pleural effusion.  Left lower lung zone mild atelectasis and/or infiltrates.    Electronically signed by: Efe Bahena  Date:    08/04/2024  Time:    09:56        ASSESSMENT & PLAN:   Assessment:  Left lower lung zone atelectasis versus infiltrate  Small left pleural effusion  Leukocytosis and fever  NSTEMI, type unknown   Macrocytic anemia, stable   History of hypertension, hyperlipidemia, CHF with LVEF 45% and grade 1 diastolic dysfunction, diabetes mellitus type 2, aortic valve replacement on 07/26/2024 with Dr. Sierra and on Eliquis, diabetes mellitus type 2, hypothyroidism    Plan:  - Continue with vancomycin.  Will change Zosyn to cefepime  - Follow results of blood cultures   - Cardiology consulted for NSTEMI.  Appreciate recommendations   - Trend troponin   - Telemetry monitoring  - Echo ordered to rule out endocarditis  - CV surgery consulted.  Appreciate recommendation  - Resume appropriate home medications when deemed necessary   - Labs in AM      VTE Prophylaxis: will be placed on Eliquis for DVT prophylaxis and will be advised to be as mobile as possible and sit in a chair as tolerated      __________________________________________________________________________  INPATIENT LIST OF MEDICATIONS     Current Facility-Administered Medications:     amiodarone tablet 200 mg, 200 mg, Oral, Daily, Chema Parsons MD, 200 mg at 08/04/24 1401    apixaban tablet 5 mg, 5 mg, Oral, BID, Chema Parsons MD, 5 mg at 08/04/24 1401    aspirin EC tablet 81 mg, 81 mg, Oral, Daily, Chema Parsons MD, 81 mg at 08/04/24 1401    atorvastatin tablet 40 mg, 40 mg, Oral, Daily, Chema Parsons MD, 40 mg at 08/04/24  1401    carvediloL tablet 3.125 mg, 3.125 mg, Oral, BID, Chema Parsons MD, 3.125 mg at 08/04/24 1401    ceFEPIme (MAXIPIME) 2 g in D5W 100 mL IVPB (MB+), 2 g, Intravenous, Q8H, Chema Parsons MD    famotidine tablet 20 mg, 20 mg, Oral, Daily, Chema Parsons MD, 20 mg at 08/04/24 1403    folic acid tablet 1 mg, 1 mg, Oral, Daily, Chema Parsons MD, 1 mg at 08/04/24 1401    [START ON 8/5/2024] levothyroxine tablet 50 mcg, 50 mcg, Oral, Before breakfast, Chema Parsons MD    losartan tablet 25 mg, 25 mg, Oral, Daily, Chema Parsons MD, 25 mg at 08/04/24 1401    ondansetron injection 4 mg, 4 mg, Intravenous, Q6H PRN, Chema Parsons MD    sucralfate tablet 1 g, 1 g, Oral, QID (AC & HS), Chema Parsons MD    Pharmacy to dose Vancomycin consult, , , Once **AND** vancomycin - pharmacy to dose, , Intravenous, pharmacy to manage frequency, Chema Parsons MD    [START ON 8/5/2024] vancomycin 1.75 g in 5 % dextrose 500 mL IVPB, 1,750 mg, Intravenous, Q12H, Chema Parsons MD    vancomycin 2 g in dextrose 5 % 500 mL IVPB, 2,000 mg, Intravenous, Once, Chema Parsons MD    Current Outpatient Medications:     albuterol (PROVENTIL) 2.5 mg /3 mL (0.083 %) nebulizer solution, Take 3 mLs (2.5 mg total) by nebulization every 4 (four) hours as needed for Wheezing. Rescue, Disp: 60 mL, Rfl: 5    amiodarone (PACERONE) 200 MG Tab, Take 1 tablet (200 mg total) by mouth 2 (two) times daily. for 3 days, Disp: 6 tablet, Rfl: 0    [START ON 8/6/2024] amiodarone (PACERONE) 200 MG Tab, Take 1 tablet (200 mg total) by mouth once daily., Disp: 30 tablet, Rfl: 1    apixaban (ELIQUIS) 5 mg Tab, Take 1 tablet (5 mg total) by mouth 2 (two) times daily., Disp: 60 tablet, Rfl: 1    aspirin (ECOTRIN) 81 MG EC tablet, Take 1 tablet (81 mg total) by mouth once daily., Disp: 30 tablet, Rfl: 11    atorvastatin (LIPITOR) 40 MG tablet, Take 1 tablet (40 mg total) by mouth once daily. (Patient taking differently: Take by mouth once daily. Start date tomorrow per MD), Disp:  90 tablet, Rfl: 3    carvediloL (COREG) 3.125 MG tablet, Take 1 tablet (3.125 mg total) by mouth 2 (two) times daily., Disp: 60 tablet, Rfl: 11    furosemide (LASIX) 20 MG tablet, Take 2 tablets (40 mg total) by mouth as needed (prn for edema and HASTINGS)., Disp: 30 tablet, Rfl: 0    HYDROcodone-acetaminophen (NORCO) 5-325 mg per tablet, Take 1 tablet by mouth every 6 (six) hours as needed for Pain. (Patient not taking: Reported on 8/2/2024), Disp: 30 tablet, Rfl: 0    levothyroxine (SYNTHROID) 50 MCG tablet, TAKE 1 TABLET BY MOUTH BEFORE BREAKFAST., Disp: 90 tablet, Rfl: 0    losartan (COZAAR) 25 MG tablet, Take 1 tablet (25 mg total) by mouth once daily., Disp: 90 tablet, Rfl: 3      Scheduled Meds:   amiodarone  200 mg Oral Daily    apixaban  5 mg Oral BID    aspirin  81 mg Oral Daily    atorvastatin  40 mg Oral Daily    carvediloL  3.125 mg Oral BID    ceFEPime IV (PEDS and ADULTS)  2 g Intravenous Q8H    famotidine  20 mg Oral Daily    folic acid  1 mg Oral Daily    [START ON 8/5/2024] levothyroxine  50 mcg Oral Before breakfast    losartan  25 mg Oral Daily    sucralfate  1 g Oral QID (AC & HS)    [START ON 8/5/2024] vancomycin (VANCOCIN) IV (PEDS and ADULTS)  1,750 mg Intravenous Q12H    vancomycin (VANCOCIN) IV (PEDS and ADULTS)  2,000 mg Intravenous Once     Continuous Infusions:  PRN Meds:.  Current Facility-Administered Medications:     ondansetron, 4 mg, Intravenous, Q6H PRN    Pharmacy to dose Vancomycin consult, , , Once **AND** vancomycin - pharmacy to dose, , Intravenous, pharmacy to manage frequency      Discharge Planning and Disposition: Anticipated discharge to be determined.    IDarya PA, have reviewed and discussed the case with Dr. Chema Parsons MD    Please see the following addendum for further assessment and plan from there attending MD.      Portion of this note is dictated using EMR integrated voice recognition software and may be subjected to voice recognition errors not corrected  with proofreading. Please  for clarification if needed.       Darya Mendoza PA-C  08/04/2024

## 2024-08-04 NOTE — ED PROVIDER NOTES
Encounter Date: 8/4/2024    SCRIBE #1 NOTE: I, Erick Paez, am scribing for, and in the presence of,  Justice Enciso MD. I have scribed the following portions of the note - the EKG reading. Other sections scribed: HPI, ROS, PE.       History     Chief Complaint   Patient presents with    Post-op Problem     Pt discharged Wednesday for heart valve replacement. Wife reports pt has felt lethargic, poor appetite, fever this morning.      69 y.o. male with hx DM and HLD presents to the ED for fever that began this morning. Pt reports he had a valve replacement surgery here with Dr. Mariela Carbone on July 26th and was discharged on July 31st. Pt reports he has been lethargic since the surgery and that he woke up this morning with weakness, fever, nausea, and body aches. Pt denies any SOB, cough, chest pain, or abdominal pain. Pt states he has not been able to eat or take his medications today because of the nausea. Pt is on Eliquis. Pt's PCP is PARMINDER Wallace Jr., NP and his cardiologist is Dr. Gil.     Pt's wife reports the pt had a slight wet cough yesterday but it has since resolved.     The history is provided by the patient and the spouse. No  was used.     Review of patient's allergies indicates:  No Known Allergies  Past Medical History:   Diagnosis Date    Avascular necrosis of right femoral head     DM (diabetes mellitus)     Essential (primary) hypertension     Hypercholesteremia     Macrocytosis     Osteoarthritis of right hip     Osteoarthritis of right knee     Peripheral neuropathy      Past Surgical History:   Procedure Laterality Date    AORTIC VALVE REPLACEMENT N/A 7/26/2024    Procedure: Replacement-valve-aortic;  Surgeon: Sita Sierra MD;  Location: [facility] OR;  Service: Cardiology;  Laterality: N/A;  ECHO NOTIFIED    CORONARY ANGIOGRAPHY N/A 7/25/2024    Procedure: ANGIOGRAM, CORONARY ARTERY;  Surgeon: Stanislaw Gil MD;  Location: [facility] CATH LAB;  Service:  Cardiology;  Laterality: N/A;    EYE SURGERY      JOINT REPLACEMENT      Hip replacement    TOTAL HIP ARTHROPLASTY Right 05/18/2021     Family History   Problem Relation Name Age of Onset    Lung cancer Mother          Smoker    Hodgkin's lymphoma Father      Lung cancer Sister      Breast cancer Sister       Social History     Tobacco Use    Smoking status: Never    Smokeless tobacco: Never   Substance Use Topics    Alcohol use: Not Currently     Comment: occ    Drug use: Never     Review of Systems   Constitutional:  Positive for fatigue and fever.   Respiratory:  Negative for cough and shortness of breath.    Cardiovascular:  Negative for chest pain.   Gastrointestinal:  Positive for nausea. Negative for abdominal pain and vomiting.       Physical Exam     Initial Vitals [08/04/24 0942]   BP Pulse Resp Temp SpO2   137/84 77 16 (!) 101 °F (38.3 °C) 95 %      MAP       --         Physical Exam    Nursing note and vitals reviewed.  Constitutional: No distress.   HENT:   Head: Normocephalic and atraumatic.   Eyes: EOM are normal.   Neck: Trachea normal. Neck supple.   Normal range of motion.  Cardiovascular:  Normal rate and regular rhythm.           Murmur heard.  Systolic murmur is present with a grade of 2/6.  Pulmonary/Chest: Breath sounds normal. No respiratory distress.   Thoracostomy chest incision well-healing, no infection    Abdominal: Abdomen is soft. Bowel sounds are normal. He exhibits no distension. There is no abdominal tenderness. There is no rebound and no guarding.   Musculoskeletal:         General: Normal range of motion.      Cervical back: Normal range of motion and neck supple.      Lumbar back: Normal.     Neurological: He is alert and oriented to person, place, and time. He has normal strength.   Skin: Skin is warm and dry. No rash noted.   Psychiatric: He has a normal mood and affect.         ED Course   Critical Care    Date/Time: 8/4/2024 1:19 PM    Performed by: Justice Enciso,  MD  Authorized by: Justice Enciso MD  Direct patient critical care time: 20 minutes  Additional history critical care time: 5 minutes  Ordering / reviewing critical care time: 10 minutes  Documentation critical care time: 10 minutes  Consulting other physicians critical care time: 10 minutes  Total critical care time (exclusive of procedural time) : 55 minutes  Critical care time was exclusive of separately billable procedures and treating other patients and teaching time.  Critical care was necessary to treat or prevent imminent or life-threatening deterioration of the following conditions: cardiac failure, circulatory failure, sepsis and respiratory failure.  Critical care was time spent personally by me on the following activities: development of treatment plan with patient or surrogate, discussions with consultants, discussions with primary provider, interpretation of cardiac output measurements, evaluation of patient's response to treatment, examination of patient, obtaining history from patient or surrogate, ordering and review of laboratory studies, ordering and performing treatments and interventions, ordering and review of radiographic studies, pulse oximetry, re-evaluation of patient's condition and review of old charts.        Labs Reviewed   COMPREHENSIVE METABOLIC PANEL - Abnormal       Result Value    Sodium 138      Potassium 4.5      Chloride 103      CO2 23      Glucose 119 (*)     Blood Urea Nitrogen 17.2      Creatinine 1.14      Calcium 8.7 (*)     Protein Total 6.9      Albumin 3.5      Globulin 3.4      Albumin/Globulin Ratio 1.0 (*)     Bilirubin Total 1.7 (*)     ALP 52      ALT 43      AST 29      eGFR >60      Anion Gap 12.0      BUN/Creatinine Ratio 15     TROPONIN I - Abnormal    Troponin-I 0.389 (*)    CBC WITH DIFFERENTIAL - Abnormal    WBC 15.03 (*)     RBC 3.65 (*)     Hgb 11.9 (*)     Hct 35.6 (*)     MCV 97.5 (*)     MCH 32.6 (*)     MCHC 33.4      RDW 15.6      Platelet 410  (*)     MPV 8.7      Neut % 90.0      Lymph % 4.1      Mono % 3.7      Eos % 0.5      Basophil % 0.3      Lymph # 0.62      Neut # 13.53 (*)     Mono # 0.55      Eos # 0.08      Baso # 0.04      IG# 0.21 (*)     IG% 1.4      NRBC% 0.0      IPF 1.4     B-TYPE NATRIURETIC PEPTIDE - Abnormal    Natriuretic Peptide 621.4 (*)    COVID/FLU A&B PCR - Normal    Influenza A PCR Not Detected      Influenza B PCR Not Detected      SARS-CoV-2 PCR Not Detected      Narrative:     The Xpert Xpress SARS-CoV-2/FLU/RSV plus is a rapid, multiplexed real-time PCR test intended for the simultaneous qualitative detection and differentiation of SARS-CoV-2, Influenza A, Influenza B, and respiratory syncytial virus (RSV) viral RNA in either nasopharyngeal swab or nasal swab specimens.         LACTIC ACID, PLASMA - Normal    Lactic Acid Level 0.9     BLOOD CULTURE [facility]   BLOOD CULTURE [facility]   CBC W/ AUTO DIFFERENTIAL    Narrative:     The following orders were created for panel order CBC auto differential.  Procedure                               Abnormality         Status                     ---------                               -----------         ------                     CBC with Differential[[id number]]       Abnormal            Final result                 Please view results for these tests on the individual orders.   URINALYSIS, REFLEX TO URINE CULTURE     EKG Readings: (Independently Interpreted)   Initial Reading: No STEMI. Rhythm: Normal Sinus Rhythm. Heart Rate: 77. Ectopy: No Ectopy. Conduction: Normal. ST Segments: Normal ST Segments. T Waves Flipped: III, AVF, V5 and V6. Axis: Right Axis Deviation. Clinical Impression: Normal Sinus Rhythm   Done at 0936. Prolonged QT waves.       Imaging Results              X-Ray Chest 1 View (Final result)  Result time 08/04/24 09:56:23      Final result by Efe Bahena MD (08/04/24 09:56:23)                   Impression:      Left small pleural effusion.  Left lower lung zone mild  atelectasis and/or infiltrates.      Electronically signed by: Efe Bahena  Date:    08/04/2024  Time:    09:56               Narrative:    EXAMINATION:  XR CHEST 1 VIEW    CLINICAL HISTORY:  Fever, unspecified    TECHNIQUE:  One view    COMPARISON:  July 29, 2024.    FINDINGS:  Cardiopericardial silhouette enlarged appearance is similar.  Sternotomy changes.  There is small left pleural effusion and left lower lung zone subtle opacities which may be secondary to atelectasis with the possibility of associated mild infiltrates.  Lungs otherwise are clear.  There is no pulmonary edema.  No pneumothorax.                                       Medications   ondansetron injection 4 mg (4 mg Intravenous Given 8/4/24 1019)   sodium chloride 0.9% bolus 500 mL 500 mL (0 mLs Intravenous Stopped 8/4/24 1115)   acetaminophen tablet 650 mg (650 mg Oral Given 8/4/24 1012)   piperacillin-tazobactam (ZOSYN) 4.5 g in D5W 100 mL IVPB (MB+) (0 g Intravenous Stopped 8/4/24 1225)     Medical Decision Making  Differential diagnosis includes but is not limited to hypoxia, shortness breath, pneumonia, pleural effusion, status post valve replacement, fever    Amount and/or Complexity of Data Reviewed  Labs: ordered.     Details: Abnormal labs include total bilirubin of 1.7, [place] [address], H&H is slightly low at 11.9 and 35.6.  Troponin is 0.389, flu and COVID are negative, BNP is elevated at 621.  Urinalysis is currently pending  Radiology: ordered and independent interpretation performed. Decision-making details documented in ED Course.     Details: Patient has a left pleural effusion on chest x-ray  ECG/medicine tests: ordered and independent interpretation performed. Decision-making details documented in ED Course.     Details: No STEMI. Rhythm: Normal Sinus Rhythm. Heart Rate: 77. Ectopy: No Ectopy. Conduction: Normal. ST Segments: Normal ST Segments. T Waves Flipped: III, AVF, V5 and V6. Axis: Right Axis Deviation.  Clinical Impression: Normal Sinus Rhythm. Done at 0936. Prolonged QT waves.   Discussion of management or test interpretation with external provider(s): Patient presenting with a fever of 101.  Patient was also somewhat hypoxic on room air.  He was put on supplemental oxygen.  Chest x-ray shows a left pleural effusion.  Zosyn was given empirically for fever.  Patient was given a small fluid bolus shortly after arrival.  Patient has dry mucous membranes.  Will not diurese at this time.    Risk  Decision regarding hospitalization.            Scribe Attestation:   Scribe #1: I performed the above scribed service and the documentation accurately describes the services I performed. I attest to the accuracy of the note.    Attending Attestation:           Physician Attestation for Scribe:  Physician Attestation Statement for Scribe #1: I, Justice Enciso MD, reviewed documentation, as scribed by Erick Paez in my presence, and it is both accurate and complete.             ED Course as of 08/04/24 1321   Sun Aug 04, 2024   1120 Repeat temperature is 100.2 after Tylenol was given [KG]   1315 I discussed patient with Dr. Sahu to notify of admit.  He was not consulted [KG]   1316 Soledad NP, OK to admit [KG]   1316 Patient's O2 saturation on room air is 92-93%.  On 2 L nasal cannula it is 97%. [KG]   1321 Vital signs are stable, patient is in no apparent distress. [KG]      ED Course User Index  [KG] Justice Enciso MD                             Clinical Impression:  Final diagnoses:  [R53.1] Generalized weakness  [R50.9] Fever  [R09.02] Hypoxia (Primary)  [D72.829] Leukocytosis, unspecified type  [J90] Pleural effusion on left          ED Disposition Condition    Admit Stable                Justice Enciso MD  08/04/24 1320       Justice Enciso MD  08/04/24 1321

## 2024-08-04 NOTE — FIRST PROVIDER EVALUATION
"Medical screening examination initiated.  I have conducted a focused provider triage encounter, findings are as follows:    Brief history of present illness:  70 yo male s/p from AVR and ligation of left atrial appendage on 7/26/24 presents to ED for evaluation of generalized fatigue, nausea and fever since this morning.     Vitals:    08/04/24 0942   BP: 137/84   Pulse: 77   Resp: 16   Temp: (!) 101 °F (38.3 °C)   TempSrc: Oral   SpO2: 95%   Weight: 115.7 kg (255 lb)   Height: 5' 10" (1.778 m)       Pertinent physical exam:  Patient is awake and alert and oriented.  Ambulatory to triage.  In no acute distress.      Brief workup plan:  labs, EKG, CXR, COVID/FLU, IVF, meds    Preliminary workup initiated; this workup will be continued and followed by the physician or advanced practice provider that is assigned to the patient when roomed.  "

## 2024-08-05 LAB
ALBUMIN SERPL-MCNC: 2.8 G/DL (ref 3.4–4.8)
ALBUMIN/GLOB SERPL: 0.9 RATIO (ref 1.1–2)
ALP SERPL-CCNC: 40 UNIT/L (ref 40–150)
ALT SERPL-CCNC: 30 UNIT/L (ref 0–55)
ANION GAP SERPL CALC-SCNC: 8 MEQ/L
APICAL FOUR CHAMBER EJECTION FRACTION: 41 %
APICAL TWO CHAMBER EJECTION FRACTION: 54 %
AST SERPL-CCNC: 21 UNIT/L (ref 5–34)
AV INDEX (PROSTH): 0.43
AV MEAN GRADIENT: 15 MMHG
AV PEAK GRADIENT: 28 MMHG
AV VALVE AREA BY VELOCITY RATIO: 2.09 CM²
AV VALVE AREA: 1.94 CM²
AV VELOCITY RATIO: 0.46
BASOPHILS # BLD AUTO: 0.04 X10(3)/MCL
BASOPHILS NFR BLD AUTO: 0.4 %
BILIRUB SERPL-MCNC: 1.5 MG/DL
BSA FOR ECHO PROCEDURE: 2.39 M2
BUN SERPL-MCNC: 13.9 MG/DL (ref 8.4–25.7)
CALCIUM SERPL-MCNC: 7.9 MG/DL (ref 8.8–10)
CHLORIDE SERPL-SCNC: 103 MMOL/L (ref 98–107)
CO2 SERPL-SCNC: 24 MMOL/L (ref 23–31)
CREAT SERPL-MCNC: 1.04 MG/DL (ref 0.73–1.18)
CREAT/UREA NIT SERPL: 13
CV ECHO LV RWT: 0.64 CM
DOP CALC AO PEAK VEL: 2.66 M/S
DOP CALC AO VTI: 55.2 CM
DOP CALC LVOT AREA: 4.5 CM2
DOP CALC LVOT DIAMETER: 2.4 CM
DOP CALC LVOT PEAK VEL: 1.23 M/S
DOP CALC LVOT STROKE VOLUME: 107.16 CM3
DOP CALC MV VTI: 50.3 CM
DOP CALCLVOT PEAK VEL VTI: 23.7 CM
E WAVE DECELERATION TIME: 296 MSEC
E/A RATIO: 0.83
E/E' RATIO: 22.67 M/S
ECHO LV POSTERIOR WALL: 1.6 CM (ref 0.6–1.1)
EOSINOPHIL # BLD AUTO: 0.1 X10(3)/MCL (ref 0–0.9)
EOSINOPHIL NFR BLD AUTO: 1 %
ERYTHROCYTE [DISTWIDTH] IN BLOOD BY AUTOMATED COUNT: 15.7 % (ref 11.5–17)
FRACTIONAL SHORTENING: 18 % (ref 28–44)
GFR SERPLBLD CREATININE-BSD FMLA CKD-EPI: >60 ML/MIN/1.73/M2
GLOBULIN SER-MCNC: 3.2 GM/DL (ref 2.4–3.5)
GLUCOSE SERPL-MCNC: 113 MG/DL (ref 82–115)
HCT VFR BLD AUTO: 30.7 % (ref 42–52)
HGB BLD-MCNC: 10.5 G/DL (ref 14–18)
IMM GRANULOCYTES # BLD AUTO: 0.12 X10(3)/MCL (ref 0–0.04)
IMM GRANULOCYTES NFR BLD AUTO: 1.3 %
INTERVENTRICULAR SEPTUM: 1.6 CM (ref 0.6–1.1)
LEFT ATRIUM AREA SYSTOLIC (APICAL 2 CHAMBER): 31 CM2
LEFT ATRIUM SIZE: 5.4 CM
LEFT INTERNAL DIMENSION IN SYSTOLE: 4.1 CM (ref 2.1–4)
LEFT VENTRICLE DIASTOLIC VOLUME INDEX: 51.08 ML/M2
LEFT VENTRICLE DIASTOLIC VOLUME: 118 ML
LEFT VENTRICLE END DIASTOLIC VOLUME APICAL 2 CHAMBER: 149 ML
LEFT VENTRICLE END DIASTOLIC VOLUME APICAL 4 CHAMBER: 129 ML
LEFT VENTRICLE END SYSTOLIC VOLUME APICAL 2 CHAMBER: 121 ML
LEFT VENTRICLE MASS INDEX: 154 G/M2
LEFT VENTRICLE SYSTOLIC VOLUME INDEX: 32.1 ML/M2
LEFT VENTRICLE SYSTOLIC VOLUME: 74.2 ML
LEFT VENTRICULAR INTERNAL DIMENSION IN DIASTOLE: 5 CM (ref 3.5–6)
LEFT VENTRICULAR MASS: 355.34 G
LV LATERAL E/E' RATIO: 25.5 M/S
LV SEPTAL E/E' RATIO: 20.4 M/S
LVED V (TEICH): 118 ML
LVES V (TEICH): 74.2 ML
LVOT MG: 3 MMHG
LVOT MV: 0.76 CM/S
LYMPHOCYTES # BLD AUTO: 1.03 X10(3)/MCL (ref 0.6–4.6)
LYMPHOCYTES NFR BLD AUTO: 10.8 %
MCH RBC QN AUTO: 33.1 PG (ref 27–31)
MCHC RBC AUTO-ENTMCNC: 34.2 G/DL (ref 33–36)
MCV RBC AUTO: 96.8 FL (ref 80–94)
MONOCYTES # BLD AUTO: 0.81 X10(3)/MCL (ref 0.1–1.3)
MONOCYTES NFR BLD AUTO: 8.5 %
MV MEAN GRADIENT: 4 MMHG
MV PEAK A VEL: 1.23 M/S
MV PEAK E VEL: 1.02 M/S
MV PEAK GRADIENT: 8 MMHG
MV STENOSIS PRESSURE HALF TIME: 59 MS
MV VALVE AREA BY CONTINUITY EQUATION: 2.13 CM2
MV VALVE AREA P 1/2 METHOD: 3.73 CM2
NEUTROPHILS # BLD AUTO: 7.47 X10(3)/MCL (ref 2.1–9.2)
NEUTROPHILS NFR BLD AUTO: 78 %
NRBC BLD AUTO-RTO: 0 %
OHS CV RV/LV RATIO: 0.56 CM
OHS LV EJECTION FRACTION SIMPSONS BIPLANE MOD: 45 %
OHS QRS DURATION: 100 MS
OHS QTC CALCULATION: 463 MS
PISA TR MAX VEL: 1.68 M/S
PLATELET # BLD AUTO: 340 X10(3)/MCL (ref 130–400)
PLATELETS.RETICULATED NFR BLD AUTO: 1 % (ref 0.9–11.2)
PMV BLD AUTO: 8.7 FL (ref 7.4–10.4)
POTASSIUM SERPL-SCNC: 3.9 MMOL/L (ref 3.5–5.1)
PROT SERPL-MCNC: 6 GM/DL (ref 5.8–7.6)
RBC # BLD AUTO: 3.17 X10(6)/MCL (ref 4.7–6.1)
RIGHT VENTRICULAR END-DIASTOLIC DIMENSION: 2.8 CM
SODIUM SERPL-SCNC: 135 MMOL/L (ref 136–145)
TDI LATERAL: 0.04 M/S
TDI SEPTAL: 0.05 M/S
TDI: 0.05 M/S
TR MAX PG: 11 MMHG
TRICUSPID ANNULAR PLANE SYSTOLIC EXCURSION: 1.46 CM
TROPONIN I SERPL-MCNC: 0.4 NG/ML (ref 0–0.04)
WBC # BLD AUTO: 9.57 X10(3)/MCL (ref 4.5–11.5)
Z-SCORE OF LEFT VENTRICULAR DIMENSION IN END DIASTOLE: -5.97
Z-SCORE OF LEFT VENTRICULAR DIMENSION IN END SYSTOLE: -2.29

## 2024-08-05 PROCEDURE — 25000003 PHARM REV CODE 250: Performed by: INTERNAL MEDICINE

## 2024-08-05 PROCEDURE — 63600175 PHARM REV CODE 636 W HCPCS: Performed by: INTERNAL MEDICINE

## 2024-08-05 PROCEDURE — 25000003 PHARM REV CODE 250

## 2024-08-05 PROCEDURE — 21400001 HC TELEMETRY ROOM

## 2024-08-05 PROCEDURE — 85025 COMPLETE CBC W/AUTO DIFF WBC: CPT | Performed by: PHYSICIAN ASSISTANT

## 2024-08-05 PROCEDURE — 36415 COLL VENOUS BLD VENIPUNCTURE: CPT | Performed by: PHYSICIAN ASSISTANT

## 2024-08-05 PROCEDURE — 84484 ASSAY OF TROPONIN QUANT: CPT | Performed by: PHYSICIAN ASSISTANT

## 2024-08-05 PROCEDURE — 80053 COMPREHEN METABOLIC PANEL: CPT | Performed by: PHYSICIAN ASSISTANT

## 2024-08-05 PROCEDURE — 63600175 PHARM REV CODE 636 W HCPCS

## 2024-08-05 RX ORDER — FUROSEMIDE 10 MG/ML
40 INJECTION INTRAMUSCULAR; INTRAVENOUS ONCE
Status: COMPLETED | OUTPATIENT
Start: 2024-08-05 | End: 2024-08-05

## 2024-08-05 RX ORDER — DAPAGLIFLOZIN 10 MG/1
10 TABLET, FILM COATED ORAL DAILY
Status: DISCONTINUED | OUTPATIENT
Start: 2024-08-05 | End: 2024-08-07 | Stop reason: HOSPADM

## 2024-08-05 RX ADMIN — ATORVASTATIN CALCIUM 40 MG: 40 TABLET, FILM COATED ORAL at 08:08

## 2024-08-05 RX ADMIN — FAMOTIDINE 20 MG: 20 TABLET, FILM COATED ORAL at 08:08

## 2024-08-05 RX ADMIN — APIXABAN 5 MG: 5 TABLET, FILM COATED ORAL at 08:08

## 2024-08-05 RX ADMIN — VANCOMYCIN HYDROCHLORIDE 1750 MG: 500 INJECTION, POWDER, LYOPHILIZED, FOR SOLUTION INTRAVENOUS at 04:08

## 2024-08-05 RX ADMIN — DAPAGLIFLOZIN 10 MG: 10 TABLET, FILM COATED ORAL at 08:08

## 2024-08-05 RX ADMIN — FOLIC ACID 1 MG: 1 TABLET ORAL at 08:08

## 2024-08-05 RX ADMIN — SUCRALFATE 1 G: 1 TABLET ORAL at 06:08

## 2024-08-05 RX ADMIN — LEVOTHYROXINE SODIUM 50 MCG: 50 TABLET ORAL at 06:08

## 2024-08-05 RX ADMIN — CEFEPIME 2 G: 2 INJECTION, POWDER, FOR SOLUTION INTRAVENOUS at 02:08

## 2024-08-05 RX ADMIN — FUROSEMIDE 40 MG: 10 INJECTION, SOLUTION INTRAMUSCULAR; INTRAVENOUS at 08:08

## 2024-08-05 RX ADMIN — CEFEPIME 2 G: 2 INJECTION, POWDER, FOR SOLUTION INTRAVENOUS at 12:08

## 2024-08-05 RX ADMIN — ASPIRIN 81 MG: 81 TABLET, COATED ORAL at 08:08

## 2024-08-05 RX ADMIN — VANCOMYCIN HYDROCHLORIDE 1750 MG: 500 INJECTION, POWDER, LYOPHILIZED, FOR SOLUTION INTRAVENOUS at 02:08

## 2024-08-05 RX ADMIN — CEFEPIME 2 G: 2 INJECTION, POWDER, FOR SOLUTION INTRAVENOUS at 11:08

## 2024-08-05 RX ADMIN — AMIODARONE HYDROCHLORIDE 200 MG: 200 TABLET ORAL at 08:08

## 2024-08-05 RX ADMIN — LOSARTAN POTASSIUM 25 MG: 25 TABLET, FILM COATED ORAL at 08:08

## 2024-08-05 RX ADMIN — CARVEDILOL 3.12 MG: 3.12 TABLET, FILM COATED ORAL at 08:08

## 2024-08-05 RX ADMIN — CEFEPIME 2 G: 2 INJECTION, POWDER, FOR SOLUTION INTRAVENOUS at 06:08

## 2024-08-05 NOTE — PROGRESS NOTES
[facility] 6th Floor [facility] MEDICINE ~ PROGRESS NOTE        CHIEF COMPLAINT   Hospital follow up    HOSPITAL COURSE   Arturo Ortiz is a 69 y.o. White male with a past medical history of hypertension, hyperlipidemia, CHF with LVEF 45% and grade 1 diastolic dysfunction, diabetes mellitus type 2, aortic valve replacement on 07/26/2024 with Dr. Sierra and on Eliquis, diabetes mellitus type 2, hypothyroidism. The patient presented to [facility] on 8/4/2024 with a primary complaint of generalized weakness and fever.  Patient was states he began to experience generalized weakness yesterday (08/03/2024) worsening today.  Associated symptoms include fever, occasional nonproductive cough, nausea.  He denies complaints of chest pain, abdominal pain, vomiting, diarrhea, burning with urination urinary frequency.     Upon presentation to the ED, temperature 101° F, blood pressure 137/84, heart rate 77, respiratory rate sixteen and SpO2 95% on room air.  Labs with WBC 15.03, H&H 11.9/35.6, MCV 97.5, platelet count for 410, glucose 119, .4, troponin 0.389.  Influenza A/B and SARS-COV-2 PCR negative.  UA negative for infection.  EKG normal sinus rhythm, ST and T-wave abnormalities considering inferior ischemia and prolonged QT interval.  Chest x-ray with left small pleural effusion and left lower lung zone mild atelectasis and/or infiltrates.  In ED patient received Tylenol and Zosyn.  Patient is admitted to hospital medicine services for further medical management.    Today  Seen and examined this morning.  Afebrile since admission fever.  Leukocytosis resolved.  He feels well.        OBJECTIVE/PHYSICAL EXAM     VITAL SIGNS (MOST RECENT):  Temp: 99.8 °F (37.7 °C) (08/05/24 0704)  Pulse: 61 (08/05/24 0704)  Resp: 20 (08/04/24 1801)  BP: 121/66 (08/05/24 0704)  SpO2: (!) 92 % (08/05/24 0704) VITAL SIGNS (24 HOUR RANGE):  Temp:  [98 °F (36.7 °C)-101 °F (38.3 °C)] 99.8 °F (37.7 °C)  Pulse:   [61-77] 61  Resp:  [16-20] 20  SpO2:  [90 %-100 %] 92 %  BP: (121-148)/(62-84) 121/66   GENERAL: In no acute distress, afebrile  HEENT:  CHEST: Clear to auscultation bilaterally  HEART: S1, S2, no appreciable murmur  ABDOMEN: Soft, nontender, BS +  MSK: Warm, no lower extremity edema, no clubbing or cyanosis  NEUROLOGIC: Alert and oriented x4, moving all extremities with good strength   INTEGUMENTARY:  PSYCHIATRY:        ASSESSMENT/PLAN   Left lower lobe hospital-acquired pneumonia   Sepsis 2/2 above   Elevated troponins 2/to recent surgery    History of: As listed above      Cardiology consulted for elevated troponins.    Check echocardiogram   Follow-up blood cultures.  Continue cefepime and vancomycin for now.  If echo without concern, plan to change to oral antibiotics today.  Hope to discharge tomorrow if remains otherwise afebrile and doing well.    DVT prophylaxis:  Eliquis    Anticipated discharge and disposition:   __________________________________________________________________________    NUTRITIONAL STATUS     Patient meets ASPEN criteria for   malnutrition of   per RD assessment as evidenced by:                       A minimum of two characteristics is recommended for diagnosis of either severe or non-severe malnutrition.     LABS/MICRO/MEDS/DIAGNOSTICS       LABS  Recent Labs     08/05/24  0328   *   K 3.9   CO2 24   BUN 13.9   CREATININE 1.04   GLUCOSE 113   CALCIUM 7.9*   ALKPHOS 40   AST 21   ALT 30   ALBUMIN 2.8*     Recent Labs     08/05/24  0328   WBC 9.57   RBC 3.17*   HCT 30.7*   MCV 96.8*          MICROBIOLOGY  Microbiology Results (last 7 days)       Procedure Component Value Units Date/Time    Respiratory Culture [[id number]]     Order Status: Sent Specimen: Sputum     Blood culture #1 **CANNOT BE ORDERED STAT** [[id number]] Collected: 08/04/24 1000    Order Status: Resulted Specimen: Blood Updated: 08/04/24 1023    Blood culture #2 **CANNOT BE ORDERED STAT** [[id number]]  Collected: 08/04/24 1000    Order Status: Resulted Specimen: Blood Updated: 08/04/24 1021               MEDICATIONS   amiodarone  200 mg Oral Daily    apixaban  5 mg Oral BID    aspirin  81 mg Oral Daily    atorvastatin  40 mg Oral Daily    carvediloL  3.125 mg Oral BID    ceFEPime IV (PEDS and ADULTS)  2 g Intravenous Q8H    dapagliflozin propanediol  10 mg Oral Daily    famotidine  20 mg Oral Daily    folic acid  1 mg Oral Daily    furosemide (LASIX) injection  40 mg Intravenous Once    levothyroxine  50 mcg Oral Before breakfast    losartan  25 mg Oral Daily    sucralfate  1 g Oral QID (AC & HS)    vancomycin (VANCOCIN) IV (PEDS and ADULTS)  1,750 mg Intravenous Q12H         INFUSIONS         DIAGNOSTIC TESTS  X-Ray Chest 1 View   Final Result      Left small pleural effusion.  Left lower lung zone mild atelectasis and/or infiltrates.         Electronically signed by: Efe Bahena   Date:    08/04/2024   Time:    09:56           Transesophageal echo (QUENTIN)    Result Date: 7/26/2024  Please see the anesthesia P Note for QUENTIN results.      Transesophageal echo (QUENTIN)    Result Date: 7/25/2024    Left Ventricle: There is mildly reduced systolic function with a   visually estimated ejection fraction of 45 - 50%.    Right Ventricle: Systolic function is normal.    Aortic Valve: The aortic valve is a trileaflet valve. There is normal   leaflet mobility. There is severe aortic regurgitation.    Mitral Valve: There is no stenosis. There is mild regurgitation.        Echo    Result Date: 7/24/2024    Left Ventricle: The left ventricle is severely dilated. Mildly   increased wall thickness. There is low normal systolic function with a   visually estimated ejection fraction of 50 - 55%. Grade II diastolic   dysfunction.    Right Ventricle: Normal right ventricular cavity size. Systolic   function is normal. TAPSE is 2.39 cm.    Left Atrium: Left atrium is moderately dilated.    Aortic Valve: Mildly calcified cusps. Mildly  restricted motion. There   is mild stenosis. Aortic valve area by VTI is 1.90 cm². Aortic valve peak   velocity is 2.97 m/s. Mean gradient is 19 mmHg. The dimensionless index is   0.55. There is severe aortic regurgitation - LVDd 6.7cm    Mitral Valve: Moderately calcified leaflets. There is no stenosis. The   mean pressure gradient across the mitral valve is 3 mmHg at a heart rate   of 66bpm. There is mild regurgitation.    Tricuspid Valve: There is mild regurgitation.    IVC/SVC: Intermediate venous pressure at 8 mmHg.              Case related differential diagnoses have been reviewed; assessment and plan has been documented. I have personally reviewed the labs and test results that are currently available; I have reviewed the patients medication list. I have reviewed the consulting providers recommendations. I have reviewed or attempted to review medical records based upon their availability.  All of the patient's and/or family's questions have been addressed and answered to the best of my ability.  I will continue to monitor closely and make adjustments to medical management as needed.  This document was created using M*Modal Fluency Direct.  Transcription errors may have been made.  Please contact me if any questions may rise regarding documentation to clarify transcription.        Chema Parsons MD   Internal Medicine  Department of Hospital Medicine  [facility] - 6th Floor Medical Telemetry

## 2024-08-05 NOTE — CONSULTS
Inpatient consult to Cardiology  Consult performed by: Barbie Lucio FNP  Consult ordered by: Darya Mendoza PA-C  Reason for consult: NSTEMI        [facility] 6th Floor Medical Telemetry    Cardiology  Consult Note    Patient Name: Arturo Ortiz  MRN: [id number]  Admission Date: 8/4/2024  Hospital Length of Stay: 1 days  Code Status: Prior   Attending Provider: Chema Parsons MD   Consulting Provider: LATIA Stearns  Primary Care Physician: CHARISSE Wallace Jr., MD  Principal Problem:<principal problem not specified>    Patient information was obtained from patient, past medical records, ER records, and primary team.     Subjective:     Chief Complaint/Reason for Consult: NSTEMI    HPI: Mr. Ortiz is a 68 y/o male with a history of VHD s/p AVR, HTN, Carotid Stenosis, DM II, HLD, AFIB, who is known to [facility], Dr. Gil. He presented to the ER on 8.4.24 with complaints of fever. Wife reports poor appetite and him being lethargic starting that morning. He also reports nausea and body aches. Wife reports he had a wet cough that resolved. He also reports he has not been able to take his medications.  Patient was recently discharged from [facility] on 7.26.24 after having a MVR. Upon arrival to the ER, patient had a fever on arrival to the ER. Significant labs include H&H 10.5/30.7, Na 135, Calcium 7.9, Albumin 2.8, .4, Troponin 0.425 (peak). Flu and COVID negative. Blood cultures pending. CXR shows left small pleural effusion. Left lower lung zone mild atelectasis and/or infiltrates.      PMH: Severe Aortic Stenosis, HTN, Carotid Stenosis, Obesity, Type II DM, HLD, Pre-Syncope  PSH: Bilateral Cataract Sx  Family History: None  Social History:  Denies use of tobacco, nicotine, ETOH or illicit drugs     Previous Cardiac Diagnostics:   QUENTIN (7.25.24):  Left Ventricle: There is mildly reduced systolic function with a visually estimated ejection fraction of 45 - 50%. Right Ventricle: Systolic  function is normal. Aortic Valve: The aortic valve is a trileaflet valve. There is normal leaflet mobility. There is severe aortic regurgitation. Mitral Valve: There is no stenosis. There is mild regurgitation.     LHC (7.24.24):   Dominance: right. Left main:  Patent vessel that trifurcates into a LAD ramus and circumflex system.  Left anterior descending artery:  Widely patent type 2 vessel that gives rise to a large patent D1 and a small to medium-sized patent D2. Ramus:  Large patent vessel. Circumflex artery:  Patent nondominant  Right coronary artery: Large dominant patent vessel     ECHO (7.24.24):   Left Ventricle: The left ventricle is severely dilated. Mildly increased wall thickness. There is low normal systolic function with a visually estimated ejection fraction of 50 - 55%. Grade II diastolic dysfunction. Right Ventricle: Normal right ventricular cavity size. Systolic function is normal. TAPSE is 2.39 cm. Left Atrium: Left atrium is moderately dilated. Aortic Valve: Mildly calcified cusps. Mildly restricted motion. There is mild stenosis. Aortic valve area by VTI is 1.90 cm². Aortic valve peak velocity is 2.97 m/s. Mean gradient is 19 mmHg. The dimensionless index is 0.55. There is severe aortic regurgitation - LVDd 6.7cm. Mitral Valve: Moderately calcified leaflets. There is no stenosis. The mean pressure gradient across the mitral valve is 3 mmHg at a heart rate of 66bpm. There is mild regurgitation. Tricuspid Valve: There is mild regurgitation. IVC/SVC: Intermediate venous pressure at 8 mmHg.     TTE (6.17.24):  The study quality is average. The left ventricle is mildly enlarged. Global left ventricular systolic function is normal. The left ventricular ejection fraction is 55%. Noted left ventricular hypertrophy. Concentric left ventricular hypertrophy is present. It is mild to moderate. The left atrial diameter is moderately increased. Moderate mitral annular calcification is noted. Moderate aortic  valve stenosis is present. The trans-aortic peak velocity is 3.2 m/s. The trans-aortic mean gradient is 19.9 mmHg.   6.Moderate to severe (3+) aortic regurgitation. Mild to moderate (1-2+) mitral regurgitation. Mild (1+) tricuspid regurgitation.  7.The pulmonary artery systolic pressure is 40.5 mmHg. Evidence of pulmonary hypertension is noted.      QUENTIN (3.14.2024)  No evidence of infectious endocarditis of the aortic valve. The aortic valve is a thickened and moderately restricted tricuspid with severe aortic insufficiency.  There is a eccentric regurgitant jets and can not exclude leaflet perforation.  The pressure half-time was 253 milliseconds. The LV function is estimated at 50%. The mitral valve appears structurally normal with no evidence of significant stenosis or insufficiency. The tricuspid valve appears structurally normal with no evidence of significant stenosis or insufficiency. The pulmonic valve is not well visualized however demonstrates no significant abnormalities. The aorta is unremarkable and there was no evidence of AAA     Carotid US (4.3.2024):  The study quality is average. No stenosis significant seen in the right internal carotid artery. No stenosis significant seen in the left internal carotid artery. Antegrade right vertebral artery flow.   5.Antegrade left vertebral artery flow.       Review of patient's allergies indicates:  No Known Allergies  No current facility-administered medications on file prior to encounter.     Current Outpatient Medications on File Prior to Encounter   Medication Sig    albuterol (PROVENTIL) 2.5 mg /3 mL (0.083 %) nebulizer solution Take 3 mLs (2.5 mg total) by nebulization every 4 (four) hours as needed for Wheezing. Rescue    amiodarone (PACERONE) 200 MG Tab Take 1 tablet (200 mg total) by mouth 2 (two) times daily. for 3 days    [START ON 8/6/2024] amiodarone (PACERONE) 200 MG Tab Take 1 tablet (200 mg total) by mouth once daily.    apixaban (ELIQUIS) 5 mg  Tab Take 1 tablet (5 mg total) by mouth 2 (two) times daily.    aspirin (ECOTRIN) 81 MG EC tablet Take 1 tablet (81 mg total) by mouth once daily.    atorvastatin (LIPITOR) 40 MG tablet Take 1 tablet (40 mg total) by mouth once daily. (Patient taking differently: Take by mouth once daily. Start date tomorrow per MD)    carvediloL (COREG) 3.125 MG tablet Take 1 tablet (3.125 mg total) by mouth 2 (two) times daily.    furosemide (LASIX) 20 MG tablet Take 2 tablets (40 mg total) by mouth as needed (prn for edema and HASTINGS).    HYDROcodone-acetaminophen (NORCO) 5-325 mg per tablet Take 1 tablet by mouth every 6 (six) hours as needed for Pain. (Patient not taking: Reported on 8/2/2024)    levothyroxine (SYNTHROID) 50 MCG tablet TAKE 1 TABLET BY MOUTH BEFORE BREAKFAST.    losartan (COZAAR) 25 MG tablet Take 1 tablet (25 mg total) by mouth once daily.     Review of Systems   Constitutional:  Positive for fever.   Respiratory:  Positive for shortness of breath. Negative for chest tightness.    Cardiovascular:  Negative for chest pain, palpitations and leg swelling.   All other systems reviewed and are negative.      Objective:     Vital Signs (Most Recent):  Temp: 99.8 °F (37.7 °C) (08/05/24 0704)  Pulse: 61 (08/05/24 0704)  Resp: 20 (08/04/24 1801)  BP: 121/66 (08/05/24 0704)  SpO2: (!) 92 % (08/05/24 0704) Vital Signs (24h Range):  Temp:  [98 °F (36.7 °C)-101 °F (38.3 °C)] 99.8 °F (37.7 °C)  Pulse:  [61-77] 61  Resp:  [16-20] 20  SpO2:  [90 %-100 %] 92 %  BP: (121-148)/(62-84) 121/66   Weight: 115.7 kg (255 lb)  Body mass index is 36.59 kg/m².  SpO2: (!) 92 %       Intake/Output Summary (Last 24 hours) at 8/5/2024 0750  Last data filed at 8/5/2024 0506  Gross per 24 hour   Intake 932.09 ml   Output --   Net 932.09 ml     Lines/Drains/Airways       Peripheral Intravenous Line  Duration                  Peripheral IV - Single Lumen 08/04/24 1015 20 G Posterior;Right Hand <1 day                  Significant Labs:  "  Chemistries:   Recent Labs   Lab 07/30/24  0351 07/31/24  0457 08/04/24  1000 08/04/24  1613 08/04/24  2209 08/05/24  0328   * 138 138  --   --  135*   K 4.0 3.6 4.5  --   --  3.9    103 103  --   --  103   CO2 25 28 23  --   --  24   BUN 20.2 16.6 17.2  --   --  13.9   CREATININE 1.11 0.96 1.14  --   --  1.04   CALCIUM 8.2* 8.3* 8.7*  --   --  7.9*   BILITOT  --   --  1.7*  --   --  1.5   ALKPHOS  --   --  52  --   --  40   ALT  --   --  43  --   --  30   AST  --   --  29  --   --  21   GLUCOSE 118* 124* 119*  --   --  113   MG 2.30 2.30  --   --   --   --    TROPONINI  --   --  0.389* 0.401* 0.425* 0.397*        CBC/Anemia Labs: Coags:    Recent Labs   Lab 07/31/24  0457 08/04/24  1000 08/05/24  0328   WBC 9.63 15.03* 9.57   HGB 9.8* 11.9* 10.5*   HCT 28.0* 35.6* 30.7*    410* 340   MCV 92.7 97.5* 96.8*   RDW 15.2 15.6 15.7    No results for input(s): "PT", "INR", "APTT" in the last 168 hours.     Significant Imaging:  Imaging Results              X-Ray Chest 1 View (Final result)  Result time 08/04/24 09:56:23      Final result by Efe Bahena MD (08/04/24 09:56:23)                   Impression:      Left small pleural effusion.  Left lower lung zone mild atelectasis and/or infiltrates.      Electronically signed by: Efe Bahena  Date:    08/04/2024  Time:    09:56               Narrative:    EXAMINATION:  XR CHEST 1 VIEW    CLINICAL HISTORY:  Fever, unspecified    TECHNIQUE:  One view    COMPARISON:  July 29, 2024.    FINDINGS:  Cardiopericardial silhouette enlarged appearance is similar.  Sternotomy changes.  There is small left pleural effusion and left lower lung zone subtle opacities which may be secondary to atelectasis with the possibility of associated mild infiltrates.  Lungs otherwise are clear.  There is no pulmonary edema.  No pneumothorax.                                    EKG:       Telemetry:  SR    Physical Exam  Vitals and nursing note reviewed.   HENT:      Head: " Normocephalic.      Nose: Nose normal.      Mouth/Throat:      Mouth: Mucous membranes are moist.   Eyes:      Pupils: Pupils are equal, round, and reactive to light.   Cardiovascular:      Rate and Rhythm: Normal rate and regular rhythm.      Pulses: Normal pulses.   Pulmonary:      Effort: Pulmonary effort is normal.   Abdominal:      General: Bowel sounds are normal.      Palpations: Abdomen is soft.   Musculoskeletal:         General: Normal range of motion.      Cervical back: Normal range of motion.   Skin:     General: Skin is warm.      Comments: Midline Incision C/D/I   Neurological:      Mental Status: He is alert.   Psychiatric:         Mood and Affect: Mood normal.         Behavior: Behavior normal.       Home Medications:   No current facility-administered medications on file prior to encounter.     Current Outpatient Medications on File Prior to Encounter   Medication Sig Dispense Refill    albuterol (PROVENTIL) 2.5 mg /3 mL (0.083 %) nebulizer solution Take 3 mLs (2.5 mg total) by nebulization every 4 (four) hours as needed for Wheezing. Rescue 60 mL 5    amiodarone (PACERONE) 200 MG Tab Take 1 tablet (200 mg total) by mouth 2 (two) times daily. for 3 days 6 tablet 0    [START ON 8/6/2024] amiodarone (PACERONE) 200 MG Tab Take 1 tablet (200 mg total) by mouth once daily. 30 tablet 1    apixaban (ELIQUIS) 5 mg Tab Take 1 tablet (5 mg total) by mouth 2 (two) times daily. 60 tablet 1    aspirin (ECOTRIN) 81 MG EC tablet Take 1 tablet (81 mg total) by mouth once daily. 30 tablet 11    atorvastatin (LIPITOR) 40 MG tablet Take 1 tablet (40 mg total) by mouth once daily. (Patient taking differently: Take by mouth once daily. Start date tomorrow per MD) 90 tablet 3    carvediloL (COREG) 3.125 MG tablet Take 1 tablet (3.125 mg total) by mouth 2 (two) times daily. 60 tablet 11    furosemide (LASIX) 20 MG tablet Take 2 tablets (40 mg total) by mouth as needed (prn for edema and HASTINGS). 30 tablet 0     HYDROcodone-acetaminophen (NORCO) 5-325 mg per tablet Take 1 tablet by mouth every 6 (six) hours as needed for Pain. (Patient not taking: Reported on 8/2/2024) 30 tablet 0    levothyroxine (SYNTHROID) 50 MCG tablet TAKE 1 TABLET BY MOUTH BEFORE BREAKFAST. 90 tablet 0    losartan (COZAAR) 25 MG tablet Take 1 tablet (25 mg total) by mouth once daily. 90 tablet 3     Current Schedule Inpatient Medications:   amiodarone  200 mg Oral Daily    apixaban  5 mg Oral BID    aspirin  81 mg Oral Daily    atorvastatin  40 mg Oral Daily    carvediloL  3.125 mg Oral BID    ceFEPime IV (PEDS and ADULTS)  2 g Intravenous Q8H    famotidine  20 mg Oral Daily    folic acid  1 mg Oral Daily    levothyroxine  50 mcg Oral Before breakfast    losartan  25 mg Oral Daily    sucralfate  1 g Oral QID (AC & HS)    vancomycin (VANCOCIN) IV (PEDS and ADULTS)  1,750 mg Intravenous Q12H     Continuous Infusions:    Assessment:   NSTEMI Type II in the setting of Heart Failure    - Troponin peaked at 0.425   Acute Systolic/Diastolic Heart Failure/EF 45-50%     - EF 45-50%   CAD    - C (7.25.24): Non-Obstrutive CAD  VHD    - Severe AR/AI s/p AVR (7.26.24):  AVR #27 Mosaic/Bioprosthetic Medtronic Valve & Ligation of EVA, 40 mm AtriClip.    - Mild AS    - Mild MR   Recently Diagnosed Aflutter/AFIB - Currently A-Flutter CVR     - [id number] Score 4.8 % Stroke Risk Per Year     - AtriaClip (7.26.24)  Hx Recent Infective Endocarditis early 2024  Anemia - Stable   Leukocytosis - Resolved   HTN  HLD  DM II   Hypothyroidism  Leukocytosis/Afebrile   Carotid Stenosis (Bilateral)  Osteoarthritis Right Hip   Obesity  No known history of GI Bleed     Plan:   Lasix IV 40 mg once   Start Farxiga 10 mg oral daily   Continue Eliquis for Stroke Risk Reduction  Continue Amiodarone, ASA, Statin, Coreg, and Losartan   IV antibiotics per Primary Team   Accurate I&O/Daily Weight   Keep Mag > 2 and Potassium > 4  Labs in AM: CBC, BMP, and Mag     Thank you for your  consult.     LATIA Stearns  Cardiology  [facility] - 6th Floor Medical Telemetry  08/05/2024

## 2024-08-05 NOTE — PLAN OF CARE
08/05/24 1522   Medicare Message   Important Message from Medicare regarding Discharge Appeal Rights Given to patient/caregiver;Explained to patient/caregiver

## 2024-08-05 NOTE — PLAN OF CARE
08/05/24 1523   Discharge Assessment   Assessment Type Discharge Planning Assessment   Confirmed/corrected address, phone number and insurance Yes   Source of Information patient;family   When was your last doctors appointment?   (PCP is Dr. Dean Wallace.)   Reason For Admission Fever   People in Home spouse   Do you expect to return to your current living situation? Yes   Do you have help at home or someone to help you manage your care at home? Yes   Who are your caregiver(s) and their phone number(s)? Deepika/Spouse/[phone]   Current cognitive status: Alert/Oriented   Equipment Currently Used at Home walker, rolling   Readmission within 30 days? Yes   Do you currently have service(s) that help you manage your care at home? Yes   Name and Contact number of agency Current with [facility]   Is the pt/caregiver preference to resume services with current agency Yes   Do you take prescription medications? Yes   Do you have prescription coverage? Yes   Who is going to help you get home at discharge? spouse   How do you get to doctors appointments? family or friend will provide   Are you on dialysis? No   Do you take coumadin? No   Discharge Plan A Home Health   Discharge Plan B Home Health   DME Needed Upon Discharge  none   Discharge Plan discussed with: Patient;Spouse/sig other

## 2024-08-06 LAB
ANION GAP SERPL CALC-SCNC: 9 MEQ/L
BASOPHILS # BLD AUTO: 0.04 X10(3)/MCL
BASOPHILS NFR BLD AUTO: 0.4 %
BUN SERPL-MCNC: 14.3 MG/DL (ref 8.4–25.7)
CALCIUM SERPL-MCNC: 7.8 MG/DL (ref 8.8–10)
CHLORIDE SERPL-SCNC: 105 MMOL/L (ref 98–107)
CO2 SERPL-SCNC: 25 MMOL/L (ref 23–31)
CREAT SERPL-MCNC: 1.1 MG/DL (ref 0.73–1.18)
CREAT/UREA NIT SERPL: 13
EOSINOPHIL # BLD AUTO: 0.53 X10(3)/MCL (ref 0–0.9)
EOSINOPHIL NFR BLD AUTO: 5.4 %
ERYTHROCYTE [DISTWIDTH] IN BLOOD BY AUTOMATED COUNT: 15.8 % (ref 11.5–17)
GFR SERPLBLD CREATININE-BSD FMLA CKD-EPI: >60 ML/MIN/1.73/M2
GLUCOSE SERPL-MCNC: 106 MG/DL (ref 82–115)
HCT VFR BLD AUTO: 33.5 % (ref 42–52)
HGB BLD-MCNC: 11.1 G/DL (ref 14–18)
IMM GRANULOCYTES # BLD AUTO: 0.1 X10(3)/MCL (ref 0–0.04)
IMM GRANULOCYTES NFR BLD AUTO: 1 %
LYMPHOCYTES # BLD AUTO: 1.71 X10(3)/MCL (ref 0.6–4.6)
LYMPHOCYTES NFR BLD AUTO: 17.5 %
MAGNESIUM SERPL-MCNC: 2.2 MG/DL (ref 1.6–2.6)
MCH RBC QN AUTO: 33.1 PG (ref 27–31)
MCHC RBC AUTO-ENTMCNC: 33.1 G/DL (ref 33–36)
MCV RBC AUTO: 100 FL (ref 80–94)
MONOCYTES # BLD AUTO: 1.41 X10(3)/MCL (ref 0.1–1.3)
MONOCYTES NFR BLD AUTO: 14.4 %
MRSA PCR SCRN (OHS): NOT DETECTED
NEUTROPHILS # BLD AUTO: 5.97 X10(3)/MCL (ref 2.1–9.2)
NEUTROPHILS NFR BLD AUTO: 61.3 %
NRBC BLD AUTO-RTO: 0 %
PLATELET # BLD AUTO: 326 X10(3)/MCL (ref 130–400)
PLATELETS.RETICULATED NFR BLD AUTO: 1.8 % (ref 0.9–11.2)
PMV BLD AUTO: 8.7 FL (ref 7.4–10.4)
POCT GLUCOSE: 110 MG/DL (ref 70–110)
POCT GLUCOSE: 117 MG/DL (ref 70–110)
POTASSIUM SERPL-SCNC: 3.9 MMOL/L (ref 3.5–5.1)
RBC # BLD AUTO: 3.35 X10(6)/MCL (ref 4.7–6.1)
SODIUM SERPL-SCNC: 139 MMOL/L (ref 136–145)
VANCOMYCIN TROUGH SERPL-MCNC: 23.3 UG/ML (ref 15–20)
WBC # BLD AUTO: 9.76 X10(3)/MCL (ref 4.5–11.5)

## 2024-08-06 PROCEDURE — 80048 BASIC METABOLIC PNL TOTAL CA: CPT | Performed by: INTERNAL MEDICINE

## 2024-08-06 PROCEDURE — 21400001 HC TELEMETRY ROOM

## 2024-08-06 PROCEDURE — 80202 ASSAY OF VANCOMYCIN: CPT | Performed by: INTERNAL MEDICINE

## 2024-08-06 PROCEDURE — 85025 COMPLETE CBC W/AUTO DIFF WBC: CPT | Performed by: INTERNAL MEDICINE

## 2024-08-06 PROCEDURE — 25000003 PHARM REV CODE 250: Performed by: INTERNAL MEDICINE

## 2024-08-06 PROCEDURE — 36415 COLL VENOUS BLD VENIPUNCTURE: CPT | Performed by: INTERNAL MEDICINE

## 2024-08-06 PROCEDURE — 25000003 PHARM REV CODE 250

## 2024-08-06 PROCEDURE — 99024 POSTOP FOLLOW-UP VISIT: CPT | Mod: ,,, | Performed by: PHYSICIAN ASSISTANT

## 2024-08-06 PROCEDURE — 63600175 PHARM REV CODE 636 W HCPCS

## 2024-08-06 PROCEDURE — 83735 ASSAY OF MAGNESIUM: CPT

## 2024-08-06 PROCEDURE — 87641 MR-STAPH DNA AMP PROBE: CPT | Performed by: INTERNAL MEDICINE

## 2024-08-06 PROCEDURE — 63600175 PHARM REV CODE 636 W HCPCS: Performed by: INTERNAL MEDICINE

## 2024-08-06 RX ORDER — ENOXAPARIN SODIUM 150 MG/ML
1 INJECTION SUBCUTANEOUS EVERY 12 HOURS
Status: DISCONTINUED | OUTPATIENT
Start: 2024-08-06 | End: 2024-08-07 | Stop reason: HOSPADM

## 2024-08-06 RX ORDER — FUROSEMIDE 10 MG/ML
40 INJECTION INTRAMUSCULAR; INTRAVENOUS ONCE
Status: COMPLETED | OUTPATIENT
Start: 2024-08-06 | End: 2024-08-06

## 2024-08-06 RX ADMIN — CARVEDILOL 3.12 MG: 3.12 TABLET, FILM COATED ORAL at 08:08

## 2024-08-06 RX ADMIN — DAPAGLIFLOZIN 10 MG: 10 TABLET, FILM COATED ORAL at 07:08

## 2024-08-06 RX ADMIN — CARVEDILOL 3.12 MG: 3.12 TABLET, FILM COATED ORAL at 07:08

## 2024-08-06 RX ADMIN — APIXABAN 5 MG: 5 TABLET, FILM COATED ORAL at 07:08

## 2024-08-06 RX ADMIN — LEVOFLOXACIN 750 MG: 500 TABLET, FILM COATED ORAL at 07:08

## 2024-08-06 RX ADMIN — FUROSEMIDE 40 MG: 10 INJECTION, SOLUTION INTRAMUSCULAR; INTRAVENOUS at 11:08

## 2024-08-06 RX ADMIN — VANCOMYCIN HYDROCHLORIDE 1750 MG: 500 INJECTION, POWDER, LYOPHILIZED, FOR SOLUTION INTRAVENOUS at 04:08

## 2024-08-06 RX ADMIN — ENOXAPARIN SODIUM 120 MG: 120 INJECTION SUBCUTANEOUS at 08:08

## 2024-08-06 RX ADMIN — AMIODARONE HYDROCHLORIDE 200 MG: 200 TABLET ORAL at 07:08

## 2024-08-06 RX ADMIN — FAMOTIDINE 20 MG: 20 TABLET, FILM COATED ORAL at 07:08

## 2024-08-06 RX ADMIN — LEVOTHYROXINE SODIUM 50 MCG: 50 TABLET ORAL at 04:08

## 2024-08-06 RX ADMIN — FOLIC ACID 1 MG: 1 TABLET ORAL at 07:08

## 2024-08-06 RX ADMIN — ASPIRIN 81 MG: 81 TABLET, COATED ORAL at 07:08

## 2024-08-06 RX ADMIN — ATORVASTATIN CALCIUM 40 MG: 40 TABLET, FILM COATED ORAL at 07:08

## 2024-08-06 RX ADMIN — LOSARTAN POTASSIUM 25 MG: 25 TABLET, FILM COATED ORAL at 07:08

## 2024-08-06 NOTE — PLAN OF CARE
Problem: Adult Inpatient Plan of Care  Goal: Plan of Care Review  Outcome: Progressing  Goal: Patient-Specific Goal (Individualized)  Outcome: Progressing  Goal: Absence of Hospital-Acquired Illness or Injury  Outcome: Progressing  Goal: Optimal Comfort and Wellbeing  Outcome: Progressing  Goal: Readiness for Transition of Care  Outcome: Progressing     Problem: Fall Injury Risk  Goal: Absence of Fall and Fall-Related Injury  Outcome: Progressing     Problem: Diabetes Comorbidity  Goal: Blood Glucose Level Within Targeted Range  Outcome: Progressing

## 2024-08-06 NOTE — PROGRESS NOTES
[facility] 6th Floor [facility] MEDICINE ~ PROGRESS NOTE        CHIEF COMPLAINT   Hospital follow up    HOSPITAL COURSE   Arturo Ortiz is a 69 y.o. White male with a past medical history of hypertension, hyperlipidemia, CHF with LVEF 45% and grade 1 diastolic dysfunction, diabetes mellitus type 2, aortic valve replacement on 07/26/2024 with Dr. Sierra and on Eliquis, diabetes mellitus type 2, hypothyroidism. The patient presented to [facility] on 8/4/2024 with a primary complaint of generalized weakness and fever.  Patient was states he began to experience generalized weakness yesterday (08/03/2024) worsening today.  Associated symptoms include fever, occasional nonproductive cough, nausea.  He denies complaints of chest pain, abdominal pain, vomiting, diarrhea, burning with urination urinary frequency.     Upon presentation to the ED, temperature 101° F, blood pressure 137/84, heart rate 77, respiratory rate sixteen and SpO2 95% on room air.  Labs with WBC 15.03, H&H 11.9/35.6, MCV 97.5, platelet count for 410, glucose 119, .4, troponin 0.389.  Influenza A/B and SARS-COV-2 PCR negative.  UA negative for infection.  EKG normal sinus rhythm, ST and T-wave abnormalities considering inferior ischemia and prolonged QT interval.  Chest x-ray with left small pleural effusion and left lower lung zone mild atelectasis and/or infiltrates.  In ED patient received Tylenol and Zosyn.  Patient is admitted to hospital medicine services for further medical management.    Today  Echo showed mod pericardial effusion, cis has consulted ct surgery.  Patient is doing well, no complaints.  Hds. Change to oral levaquin, stop vanc and cefepime.         OBJECTIVE/PHYSICAL EXAM     VITAL SIGNS (MOST RECENT):  Temp: 97.4 °F (36.3 °C) (08/06/24 1132)  Pulse: 61 (08/06/24 1221)  Resp: 18 (08/06/24 1132)  BP: 108/73 (08/06/24 1132)  SpO2: 98 % (08/06/24 1221) VITAL SIGNS (24 HOUR RANGE):  Temp:  [97.4  °F (36.3 °C)-98.8 °F (37.1 °C)] 97.4 °F (36.3 °C)  Pulse:  [53-64] 61  Resp:  [18] 18  SpO2:  [93 %-98 %] 98 %  BP: (108-148)/(67-82) 108/73   GENERAL: In no acute distress, afebrile  HEENT:  CHEST: Clear to auscultation bilaterally  HEART: S1, S2, no appreciable murmur  ABDOMEN: Soft, nontender, BS +  MSK: Warm, no lower extremity edema, no clubbing or cyanosis  NEUROLOGIC: Alert and oriented x4, moving all extremities with good strength   INTEGUMENTARY:  PSYCHIATRY:        ASSESSMENT/PLAN   Left lower lobe hospital-acquired pneumonia   Sepsis 2/2 above   Elevated troponins 2/to recent surgery  Moderate pericardial effusion    History of: As listed above      CIS following.    Change to levaquin.  Stop cefepime and vanc.  Mrsa pcr negative.   Discharge pending clearance by cis and ct surgery.     DVT prophylaxis:  fd lovenox    Anticipated discharge and disposition:   __________________________________________________________________________    NUTRITIONAL STATUS     Patient meets ASPEN criteria for   malnutrition of   per RD assessment as evidenced by:                       A minimum of two characteristics is recommended for diagnosis of either severe or non-severe malnutrition.     LABS/MICRO/MEDS/DIAGNOSTICS       LABS  Recent Labs     08/05/24  0328 08/06/24  0438   * 139   K 3.9 3.9   CO2 24 25   BUN 13.9 14.3   CREATININE 1.04 1.10   GLUCOSE 113 106   CALCIUM 7.9* 7.8*   ALKPHOS 40  --    AST 21  --    ALT 30  --    ALBUMIN 2.8*  --      Recent Labs     08/06/24  0438   WBC 9.76   RBC 3.35*   HCT 33.5*   .0*          MICROBIOLOGY  Microbiology Results (last 7 days)       Procedure Component Value Units Date/Time    Blood culture #1 **CANNOT BE ORDERED STAT** [[id number]]  (Normal) Collected: 08/04/24 1000    Order Status: Completed Specimen: Blood Updated: 08/06/24 1202     Blood Culture No Growth At 48 Hours    Blood culture #2 **CANNOT BE ORDERED STAT** [[id number]]  (Normal) Collected:  08/04/24 1000    Order Status: Completed Specimen: Blood Updated: 08/06/24 1202     Blood Culture No Growth At 48 Hours    Respiratory Culture [[id number]]     Order Status: Sent Specimen: Sputum                MEDICATIONS   amiodarone  200 mg Oral Daily    aspirin  81 mg Oral Daily    atorvastatin  40 mg Oral Daily    carvediloL  3.125 mg Oral BID    dapagliflozin propanediol  10 mg Oral Daily    enoxparin  1 mg/kg Subcutaneous Q12H (prophylaxis, 0900/2100)    famotidine  20 mg Oral Daily    folic acid  1 mg Oral Daily    levoFLOXacin  750 mg Oral Daily    levothyroxine  50 mcg Oral Before breakfast    losartan  25 mg Oral Daily    vancomycin (VANCOCIN) IV (PEDS and ADULTS)  1,750 mg Intravenous Q12H         INFUSIONS         DIAGNOSTIC TESTS  X-Ray Chest 1 View   Final Result      Left small pleural effusion.  Left lower lung zone mild atelectasis and/or infiltrates.         Electronically signed by: fEe Bahena   Date:    08/04/2024   Time:    09:56           Echo    Result Date: 8/5/2024    Left Ventricle: The left ventricle is normal in size. Mildly increased   wall thickness. Moderate global hypokinesis present. There is severely   reduced systolic function with a visually estimated ejection fraction of   less than 30%. There is normal diastolic function.    Right Ventricle: Normal right ventricular cavity size. Systolic   function is mildly reduced.    Left Atrium: Left atrium is moderately dilated.    Aortic Valve: There is a bioprosthetic valve in the aortic position.    Mitral Valve: There is no annular calcification. Moderately calcified   subvalvular apparatus. There is mild stenosis. The mean pressure gradient   across the mitral valve is 4 mmHg at a heart rate of  bpm.    Tricuspid Valve: There is physiologically normal regurgitation.    Pericardium: There is a MODERATE SIZE, CIRCUMFERENTIAL PERICARDIAL   effusion. No indication of cardiac tamponade at this time but may need   monitoring.  Correlate  clinically. Left large pleural effusion.        Transesophageal echo (QUENTIN)    Result Date: 7/26/2024  Please see the anesthesia P Note for QUENTIN results.      Transesophageal echo (QUENTIN)    Result Date: 7/25/2024    Left Ventricle: There is mildly reduced systolic function with a   visually estimated ejection fraction of 45 - 50%.    Right Ventricle: Systolic function is normal.    Aortic Valve: The aortic valve is a trileaflet valve. There is normal   leaflet mobility. There is severe aortic regurgitation.    Mitral Valve: There is no stenosis. There is mild regurgitation.        Echo    Result Date: 7/24/2024    Left Ventricle: The left ventricle is severely dilated. Mildly   increased wall thickness. There is low normal systolic function with a   visually estimated ejection fraction of 50 - 55%. Grade II diastolic   dysfunction.    Right Ventricle: Normal right ventricular cavity size. Systolic   function is normal. TAPSE is 2.39 cm.    Left Atrium: Left atrium is moderately dilated.    Aortic Valve: Mildly calcified cusps. Mildly restricted motion. There   is mild stenosis. Aortic valve area by VTI is 1.90 cm². Aortic valve peak   velocity is 2.97 m/s. Mean gradient is 19 mmHg. The dimensionless index is   0.55. There is severe aortic regurgitation - LVDd 6.7cm    Mitral Valve: Moderately calcified leaflets. There is no stenosis. The   mean pressure gradient across the mitral valve is 3 mmHg at a heart rate   of 66bpm. There is mild regurgitation.    Tricuspid Valve: There is mild regurgitation.    IVC/SVC: Intermediate venous pressure at 8 mmHg.              Case related differential diagnoses have been reviewed; assessment and plan has been documented. I have personally reviewed the labs and test results that are currently available; I have reviewed the patients medication list. I have reviewed the consulting providers recommendations. I have reviewed or attempted to review medical records based upon their  availability.  All of the patient's and/or family's questions have been addressed and answered to the best of my ability.  I will continue to monitor closely and make adjustments to medical management as needed.  This document was created using M*Modal Fluency Direct.  Transcription errors may have been made.  Please contact me if any questions may rise regarding documentation to clarify transcription.        Chema Parsons MD   Internal Medicine  Department of Hospital Medicine  [facility] - 6th Floor Medical Telemetry

## 2024-08-06 NOTE — PROGRESS NOTES
CT SURGERY PROGRESS NOTE  Arturo Ortiz  69 y.o.  1955    Patients Procedure: * No surgery found *    Subjective  Interval History:      70 y/o male with a history of endocarditis, VHD s/p AVR, HTN, Carotid Stenosis, DM II, HLD, AFIB, now present to ED [facility] poor appetite, lethargic, nausea and body aches.    Pt discharged from [facility] on 7.26.24 after having a AVR per Dr Sierra      Pt admitted with NSTEMI per Hospitalist and CIS consulted.    PMH: Severe Aortic Stenosis, HTN, Carotid Stenosis, Obesity, Type II DM, HLD, Pre-Syncope  PSH: Bilateral Cataract Sx  Family History: None  Social History:  Denies use of tobacco, nicotine, ETOH or illicit drugs     Previous Cardiac Diagnostics:   ECHO (8.5.24):  Left Ventricle: The left ventricle is normal in size. Mildly increased wall thickness. Moderate global hypokinesis present. There is severely reduced systolic function with a visually estimated ejection fraction of less than 30%. There is normal diastolic function. Right Ventricle: Normal right ventricular cavity size. Systolic function is mildly reduced. Left Atrium: Left atrium is moderately dilated. Aortic Valve: There is a bioprosthetic valve in the aortic position. Mitral Valve: There is no annular calcification. Moderately calcified subvalvular apparatus. There is mild stenosis. The mean pressure gradient across the mitral valve is 4 mmHg at a heart rate of  bpm. Tricuspid Valve: There is physiologically normal regurgitation. Pericardium: There is a MODERATE SIZE, CIRCUMFERENTIAL PERICARDIAL effusion. No indication of cardiac tamponade at this time but may need monitoring.  Correlate clinically. Left large pleural effusion.        DATE OF SURGERY:    07/26/2024 00:00:00     SURGEON:  Sita Sierra MD     PREOPERATIVE DIAGNOSES:  Severe aortic insufficiency, moderate aortic stenosis,   aortic endocarditis, dilated left ventricle, dilated left atrium, depressed   ejection fraction.     PROCEDURES  PERFORMED:    1. Aortic valve replacement #27 mosaic Medtronic valve.  2. Ligation of left atrial appendage, 40 mm AtriClip.     POSTOPERATIVE DIAGNOSES:  Severe aortic insufficiency, moderate aortic stenosis,   aortic endocarditis, dilated left ventricle, dilated left atrium, depressed   ejection fraction.     ASSISTANT:  Claudia Hicks.    Review of Systems   Constitutional:  Positive for malaise/fatigue. Negative for fever.   HENT: Negative.     Respiratory:          Wet cough- improved per pt/ wife   Cardiovascular:         HPI   Genitourinary: Negative.         Urology consult last month - see for details    Musculoskeletal:  Positive for myalgias.   Neurological: Negative.    Endo/Heme/Allergies: Negative.    Psychiatric/Behavioral: Negative.         Medication List  Infusions    Scheduled   amiodarone  200 mg Oral Daily    aspirin  81 mg Oral Daily    atorvastatin  40 mg Oral Daily    carvediloL  3.125 mg Oral BID    dapagliflozin propanediol  10 mg Oral Daily    enoxparin  1 mg/kg Subcutaneous Q12H (prophylaxis, 0900/2100)    famotidine  20 mg Oral Daily    folic acid  1 mg Oral Daily    levoFLOXacin  750 mg Oral Daily    levothyroxine  50 mcg Oral Before breakfast    losartan  25 mg Oral Daily    vancomycin (VANCOCIN) IV (PEDS and ADULTS)  1,750 mg Intravenous Q12H       Objective:  Recent Vitals:  Temp:  [97.4 °F (36.3 °C)-98.8 °F (37.1 °C)] 97.4 °F (36.3 °C)  Pulse:  [53-64] 59  Resp:  [18] 18  SpO2:  [93 %-98 %] 98 %  BP: (108-148)/(67-82) 108/73    Physical Exam  Constitutional:       Appearance: Normal appearance.   HENT:      Head: Normocephalic.   Eyes:      Extraocular Movements: Extraocular movements intact.      Pupils: Pupils are equal, round, and reactive to light.   Cardiovascular:      Rate and Rhythm: Normal rate and regular rhythm.   Pulmonary:      Effort: Pulmonary effort is normal.      Breath sounds: Normal breath sounds.   Abdominal:      General: Bowel sounds are normal.       Palpations: Abdomen is soft.   Musculoskeletal:         General: Normal range of motion.   Skin:     General: Skin is warm.      Comments: INC CDI, no erythema    Neurological:      Mental Status: He is alert and oriented to person, place, and time.   Psychiatric:         Mood and Affect: Mood normal.         Behavior: Behavior normal.          I/O last 24 hrs:  Intake/Output - Last 3 Shifts         08/04 0700  08/05 0659 08/05 0700  08/06 0659 08/06 0700  08/07 0659    P.O. 240 1072     IV Piggyback 692.1      Total Intake(mL/kg) 932.1 (8.1) 1072 (9.5)     Urine (mL/kg/hr)  1 (0)     Total Output  1     Net +932.1 +1071            Urine Occurrence 3 x      Stool Occurrence 0 x              Labs  BMP:   Recent Labs   Lab 08/06/24  0438      K 3.9      CO2 25   BUN 14.3   CREATININE 1.10   CALCIUM 7.8*   MG 2.20     CBC:   Recent Labs   Lab 08/06/24  0438   WBC 9.76   RBC 3.35*   HGB 11.1*   HCT 33.5*      .0*   MCH 33.1*   MCHC 33.1     CMP:   Recent Labs   Lab 08/05/24  0328 08/06/24  0438   CALCIUM 7.9* 7.8*   ALBUMIN 2.8*  --    * 139   K 3.9 3.9   CO2 24 25    105   BUN 13.9 14.3   CREATININE 1.04 1.10   ALKPHOS 40  --    ALT 30  --    AST 21  --    BILITOT 1.5  --          Imaging:   CT: No results found in the last 24 hours.  CXR: No results found in the last 24 hours.  ECHO: I have reviewed all results within the past 24 hours and my personal findings are:  see above        ASSESSMENT/PLAN:  Assessment:   NSTEMI Type II in the setting of Heart Failure    - Troponin peaked at 0.425   Moderate Circumferential Pericardial Effusion   Acute Systolic/Diastolic Heart Failure    - EF 30% on ECHO (8.5.24)    - EF 45-50% on QUENTIN (7.25.24)    - EF 50-55% on ECHO (7.24.24)  CAD    - LHC (7.24.24): Non-Obstrutive CAD  VHD    - Severe AR/AI s/p AVR (7.26.24):  AVR #27 Mosaic/Bioprosthetic Medtronic Valve & Ligation of EVA, 40 mm AtriClip.    - Mild AS    - Mild MR   Recently Diagnosed  Aflutter/AFIB - Currently SB    - [id number] Score 4.8 % Stroke Risk Per Year     - AtriaClip (7.26.24)  Hx Recent Infective Endocarditis early 2024  Anemia - Stable   Leukocytosis - Resolved   HTN  HLD  DM II   Hypothyroidism  Leukocytosis/Afebrile   Carotid Stenosis (Bilateral)  Osteoarthritis Right Hip   Obesity  No known history of GI Bleed     Plan:  Eval for pericardial efusion in progress  Currently on Lovenox   AVR EVA ligation 7/26 Dr Sierra     Case and plan of care discussed with Zarina SMITH. Dr Sierra returns tomorrow     Erick Troy PA-C

## 2024-08-06 NOTE — NURSING
Nurses Note -- 4 Eyes      8/6/2024   2:03 AM      Skin assessed during: Daily Assessment      [x] No Altered Skin Integrity Present    []Prevention Measures Documented      [] Yes- Altered Skin Integrity Present or Discovered   [] LDA Added if Not in Epic (Describe Wound)   [] New Altered Skin Integrity was Present on Admit and Documented in LDA   [] Wound Image Taken    Wound Care Consulted? No    Attending Nurse:  Loli Ledesma RN/Staff Member:   PERLITA Joseph

## 2024-08-06 NOTE — PROGRESS NOTES
"[facility] - 6th Floor Medical Telemetry    Cardiology  Progress Note    Patient Name: Arturo Ortiz  MRN: [id number]  Admission Date: 8/4/2024  Hospital Length of Stay: 2 days  Code Status: Prior   Attending Physician: Chema Parsons MD   Primary Care Physician: CHARISSE Wallace Jr., MD  Expected Discharge Date:   Principal Problem:<principal problem not specified>    Subjective:     Brief HPI/Hospital Course: Mr. Ortiz is a 68 y/o male with a history of VHD s/p AVR, HTN, Carotid Stenosis, DM II, HLD, AFIB, who is known to [facility], Dr. Gil. He presented to the ER on 8.4.24 with complaints of fever. Wife reports poor appetite and him being lethargic starting that morning. He also reports nausea and body aches. Wife reports he had a wet cough that resolved. He also reports he has not been able to take his medications.  Patient was recently discharged from [facility] on 7.26.24 after having a MVR. Upon arrival to the ER, patient had a fever on arrival to the ER. Significant labs include H&H 10.5/30.7, Na 135, Calcium 7.9, Albumin 2.8, .4, Troponin 0.425 (peak). Flu and COVID negative. Blood cultures pending. CXR shows left small pleural effusion. Left lower lung zone mild atelectasis and/or infiltrates.      8.6.24: Nad. VSS. No complaints of CP/Palps. Reports sob SOB with exertion. Reports incisional chest pain. Resting in bed comfortable. "I feel okay today"      PMH: Severe Aortic Stenosis, HTN, Carotid Stenosis, Obesity, Type II DM, HLD, Pre-Syncope  PSH: Bilateral Cataract Sx  Family History: None  Social History:  Denies use of tobacco, nicotine, ETOH or illicit drugs     Previous Cardiac Diagnostics:   ECHO (8.5.24):  Left Ventricle: The left ventricle is normal in size. Mildly increased wall thickness. Moderate global hypokinesis present. There is severely reduced systolic function with a visually estimated ejection fraction of less than 30%. There is normal diastolic function. Right " Ventricle: Normal right ventricular cavity size. Systolic function is mildly reduced. Left Atrium: Left atrium is moderately dilated. Aortic Valve: There is a bioprosthetic valve in the aortic position. Mitral Valve: There is no annular calcification. Moderately calcified subvalvular apparatus. There is mild stenosis. The mean pressure gradient across the mitral valve is 4 mmHg at a heart rate of  bpm. Tricuspid Valve: There is physiologically normal regurgitation. Pericardium: There is a MODERATE SIZE, CIRCUMFERENTIAL PERICARDIAL effusion. No indication of cardiac tamponade at this time but may need monitoring.  Correlate clinically. Left large pleural effusion.    QUENTIN (7.25.24):  Left Ventricle: There is mildly reduced systolic function with a visually estimated ejection fraction of 45 - 50%. Right Ventricle: Systolic function is normal. Aortic Valve: The aortic valve is a trileaflet valve. There is normal leaflet mobility. There is severe aortic regurgitation. Mitral Valve: There is no stenosis. There is mild regurgitation.     LHC (7.24.24):   Dominance: right. Left main:  Patent vessel that trifurcates into a LAD ramus and circumflex system.  Left anterior descending artery:  Widely patent type 2 vessel that gives rise to a large patent D1 and a small to medium-sized patent D2. Ramus:  Large patent vessel. Circumflex artery:  Patent nondominant  Right coronary artery: Large dominant patent vessel     ECHO (7.24.24):   Left Ventricle: The left ventricle is severely dilated. Mildly increased wall thickness. There is low normal systolic function with a visually estimated ejection fraction of 50 - 55%. Grade II diastolic dysfunction. Right Ventricle: Normal right ventricular cavity size. Systolic function is normal. TAPSE is 2.39 cm. Left Atrium: Left atrium is moderately dilated. Aortic Valve: Mildly calcified cusps. Mildly restricted motion. There is mild stenosis. Aortic valve area by VTI is 1.90 cm². Aortic  valve peak velocity is 2.97 m/s. Mean gradient is 19 mmHg. The dimensionless index is 0.55. There is severe aortic regurgitation - LVDd 6.7cm. Mitral Valve: Moderately calcified leaflets. There is no stenosis. The mean pressure gradient across the mitral valve is 3 mmHg at a heart rate of 66bpm. There is mild regurgitation. Tricuspid Valve: There is mild regurgitation. IVC/SVC: Intermediate venous pressure at 8 mmHg.     TTE (6.17.24):  The study quality is average. The left ventricle is mildly enlarged. Global left ventricular systolic function is normal. The left ventricular ejection fraction is 55%. Noted left ventricular hypertrophy. Concentric left ventricular hypertrophy is present. It is mild to moderate. The left atrial diameter is moderately increased. Moderate mitral annular calcification is noted. Moderate aortic valve stenosis is present. The trans-aortic peak velocity is 3.2 m/s. The trans-aortic mean gradient is 19.9 mmHg.   6.Moderate to severe (3+) aortic regurgitation. Mild to moderate (1-2+) mitral regurgitation. Mild (1+) tricuspid regurgitation.  7.The pulmonary artery systolic pressure is 40.5 mmHg. Evidence of pulmonary hypertension is noted.      QUENTIN (3.14.2024)  No evidence of infectious endocarditis of the aortic valve. The aortic valve is a thickened and moderately restricted tricuspid with severe aortic insufficiency.  There is a eccentric regurgitant jets and can not exclude leaflet perforation.  The pressure half-time was 253 milliseconds. The LV function is estimated at 50%. The mitral valve appears structurally normal with no evidence of significant stenosis or insufficiency. The tricuspid valve appears structurally normal with no evidence of significant stenosis or insufficiency. The pulmonic valve is not well visualized however demonstrates no significant abnormalities. The aorta is unremarkable and there was no evidence of AAA     Carotid US (4.3.2024):  The study quality is  average. No stenosis significant seen in the right internal carotid artery. No stenosis significant seen in the left internal carotid artery. Antegrade right vertebral artery flow.   5.Antegrade left vertebral artery flow.     Review of Systems   Cardiovascular:  Positive for dyspnea on exertion. Negative for chest pain and palpitations.   Respiratory:  Negative for shortness of breath.    All other systems reviewed and are negative.      Objective:     Vital Signs (Most Recent):  Temp: 98.1 °F (36.7 °C) (08/06/24 0310)  Pulse: (!) 53 (08/06/24 0400)  Resp: 18 (08/05/24 1952)  BP: (!) 148/75 (08/06/24 0310)  SpO2: (!) 94 % (08/06/24 0310) Vital Signs (24h Range):  Temp:  [98.1 °F (36.7 °C)-98.8 °F (37.1 °C)] 98.1 °F (36.7 °C)  Pulse:  [53-71] 53  Resp:  [18] 18  SpO2:  [92 %-97 %] 94 %  BP: (112-148)/(63-76) 148/75   Weight: 113 kg (249 lb 1.9 oz)  Body mass index is 35.74 kg/m².  SpO2: (!) 94 %       Intake/Output Summary (Last 24 hours) at 8/6/2024 0725  Last data filed at 8/5/2024 2130  Gross per 24 hour   Intake 1072 ml   Output 1 ml   Net 1071 ml     Lines/Drains/Airways       Peripheral Intravenous Line  Duration                  Peripheral IV - Single Lumen 08/04/24 1015 20 G Posterior;Right Hand 1 day                    Significant Labs:   Chemistries:   Recent Labs   Lab 07/31/24  0457 08/04/24  1000 08/04/24  1613 08/04/24  2209 08/05/24  0328 08/06/24  0438    138  --   --  135* 139   K 3.6 4.5  --   --  3.9 3.9    103  --   --  103 105   CO2 28 23  --   --  24 25   BUN 16.6 17.2  --   --  13.9 14.3   CREATININE 0.96 1.14  --   --  1.04 1.10   CALCIUM 8.3* 8.7*  --   --  7.9* 7.8*   BILITOT  --  1.7*  --   --  1.5  --    ALKPHOS  --  52  --   --  40  --    ALT  --  43  --   --  30  --    AST  --  29  --   --  21  --    GLUCOSE 124* 119*  --   --  113 106   MG 2.30  --   --   --   --  2.20   TROPONINI  --  0.389* 0.401* 0.425* 0.397*  --         CBC/Anemia Labs: Coags:    Recent Labs   Lab  "08/04/24  1000 08/05/24  0328 08/06/24  0438   WBC 15.03* 9.57 9.76   HGB 11.9* 10.5* 11.1*   HCT 35.6* 30.7* 33.5*   * 340 326   MCV 97.5* 96.8* 100.0*   RDW 15.6 15.7 15.8    No results for input(s): "PT", "INR", "APTT" in the last 168 hours.     Telemetry:  SR    Physical Exam  Vitals and nursing note reviewed.   HENT:      Head: Normocephalic.      Nose: Nose normal.      Mouth/Throat:      Mouth: Mucous membranes are moist.   Eyes:      Pupils: Pupils are equal, round, and reactive to light.   Cardiovascular:      Rate and Rhythm: Normal rate and regular rhythm.      Pulses: Normal pulses.   Pulmonary:      Effort: Pulmonary effort is normal.   Abdominal:      General: Bowel sounds are normal.      Palpations: Abdomen is soft.   Musculoskeletal:         General: Normal range of motion.      Cervical back: Normal range of motion.   Skin:     General: Skin is warm.      Comments: Midline Incision C/D/I   Neurological:      Mental Status: He is alert and oriented to person, place, and time.   Psychiatric:         Mood and Affect: Mood normal.         Behavior: Behavior normal.         Current Schedule Inpatient Medications:   amiodarone  200 mg Oral Daily    apixaban  5 mg Oral BID    aspirin  81 mg Oral Daily    atorvastatin  40 mg Oral Daily    carvediloL  3.125 mg Oral BID    dapagliflozin propanediol  10 mg Oral Daily    famotidine  20 mg Oral Daily    folic acid  1 mg Oral Daily    levoFLOXacin  750 mg Oral Daily    levothyroxine  50 mcg Oral Before breakfast    losartan  25 mg Oral Daily    vancomycin (VANCOCIN) IV (PEDS and ADULTS)  1,750 mg Intravenous Q12H     Continuous Infusions:      Assessment:   NSTEMI Type II in the setting of Heart Failure    - Troponin peaked at 0.425   Moderate Circumferential Pericardial Effusion   Acute Systolic/Diastolic Heart Failure    - EF 30% on ECHO (8.5.24)    - EF 45-50% on QUENTIN (7.25.24)    - EF 50-55% on ECHO (7.24.24)  CAD    - LHC (7.24.24): Non-Obstrutive " CAD  VHD    - Severe AR/AI s/p AVR (7.26.24):  AVR #27 Mosaic/Bioprosthetic Medtronic Valve & Ligation of EVA, 40 mm AtriClip.    - Mild AS    - Mild MR   Recently Diagnosed Aflutter/AFIB - Currently SB    - [id number] Score 4.8 % Stroke Risk Per Year     - AtriaClip (7.26.24)  Hx Recent Infective Endocarditis early 2024  Anemia - Stable   Leukocytosis - Resolved   HTN  HLD  DM II   Hypothyroidism  Leukocytosis/Afebrile   Carotid Stenosis (Bilateral)  Osteoarthritis Right Hip   Obesity  No known history of GI Bleed       Plan:   ECHO Reviewed  Lasix 40 IV once   Consult to CV Surgery   Consult to Cardiac Rehab  Aggressive IS Usage  Limited ECHO in AM to assess Pericardial Effusion  Start FD Lovenox for Stroke Risk Reduction; Resume Eliquis at Discharge   Continue Amiodarone, ASA, Statin, Coreg, Farxiga, and Losartan   Keep Mag > 2 and Potassium > 4  Ensure Accurate I&O/Daily Weights  Labs in AM: CBC, BMP, and Mag       LATIA Stearns  Cardiology  [facility] - 6th Floor Medical Telemetry

## 2024-08-06 NOTE — DISCHARGE INSTRUCTIONS
HOME CARE INSTRUCTIONS AFTER  OPEN HEART SURGERY    WHAT TO DO:  · Eat whatever appeals to you for the first 2 weeks at home. Do not limit your diet when you have no appetite.  · Continue to use the Incentive Spirometer (breathing exercises) 10 times every hour while youre awake for at least 2 weeks.  · Elevate legs above chest, while lying flat, 4 times a day for at least 30 minutes.  o Do so more often if necessary to decrease swelling.  o Recliners do not allow for proper elevation, beds or couches are recommended.  · Wear compression stockings during the day until you are up and walking frequently and you have no real swelling ( usually about 2 weeks)  · Shower daily, use mild bath soap (dial or safeguard) on incisions and rinse well. Pat dry and leave open to air.  o Inspect incisions daily. Notify surgeon of any redness, swelling, drainage or opening of the incision.  · Walk frequently throughout the day as tolerated.  o Try not to sit for longer than 2 hour periods at a time.    WHAT TO AVOID:  · NO STRAINING, LIFTING, PULLING OR PUSHING >5-10 POUNDS FOR 8 WEEKS.  · No hydrogen peroxide, ointment, creams, or powders on incisions unless specifically instructed by surgeon. SOAP AND WATER ONLY.  · NO DRIVING UNTIL CLEARED BY SURGEON.    REPORT TO SURGEON  · Any increased redness, swelling, thick/cloudy yellow/greenish drainage, or opening of incision.  · Any fever above 100.2  · If you hear or feel any tingling, clicking, or popping in chest.  · Weigh yourself EVERY morning AFTER urinating, BEFORE eating and wearing the same amount of clothes.  · Experiencing new/continuous shortness of breath, fast/irregular heartbeat, vomiting, vision or speech changes/disturbances, confusion, or unusual bleeding.

## 2024-08-07 VITALS
HEART RATE: 58 BPM | OXYGEN SATURATION: 98 % | RESPIRATION RATE: 18 BRPM | SYSTOLIC BLOOD PRESSURE: 102 MMHG | DIASTOLIC BLOOD PRESSURE: 65 MMHG | HEIGHT: 70 IN | TEMPERATURE: 97 F | BODY MASS INDEX: 34.97 KG/M2 | WEIGHT: 244.25 LBS

## 2024-08-07 PROBLEM — R50.9 FEVER: Status: ACTIVE | Noted: 2024-08-07

## 2024-08-07 LAB
ANION GAP SERPL CALC-SCNC: 10 MEQ/L
BASOPHILS # BLD AUTO: 0.06 X10(3)/MCL
BASOPHILS NFR BLD AUTO: 0.5 %
BSA FOR ECHO PROCEDURE: 2.39 M2
BUN SERPL-MCNC: 15.5 MG/DL (ref 8.4–25.7)
CALCIUM SERPL-MCNC: 7.8 MG/DL (ref 8.8–10)
CHLORIDE SERPL-SCNC: 103 MMOL/L (ref 98–107)
CO2 SERPL-SCNC: 24 MMOL/L (ref 23–31)
CREAT SERPL-MCNC: 1.08 MG/DL (ref 0.73–1.18)
CREAT/UREA NIT SERPL: 14
EOSINOPHIL # BLD AUTO: 0.51 X10(3)/MCL (ref 0–0.9)
EOSINOPHIL NFR BLD AUTO: 4.5 %
ERYTHROCYTE [DISTWIDTH] IN BLOOD BY AUTOMATED COUNT: 15.8 % (ref 11.5–17)
GFR SERPLBLD CREATININE-BSD FMLA CKD-EPI: >60 ML/MIN/1.73/M2
GLUCOSE SERPL-MCNC: 95 MG/DL (ref 82–115)
HCT VFR BLD AUTO: 32.8 % (ref 42–52)
HGB BLD-MCNC: 11.1 G/DL (ref 14–18)
IMM GRANULOCYTES # BLD AUTO: 0.1 X10(3)/MCL (ref 0–0.04)
IMM GRANULOCYTES NFR BLD AUTO: 0.9 %
LYMPHOCYTES # BLD AUTO: 2.46 X10(3)/MCL (ref 0.6–4.6)
LYMPHOCYTES NFR BLD AUTO: 21.8 %
MAGNESIUM SERPL-MCNC: 2.2 MG/DL (ref 1.6–2.6)
MCH RBC QN AUTO: 33.3 PG (ref 27–31)
MCHC RBC AUTO-ENTMCNC: 33.8 G/DL (ref 33–36)
MCV RBC AUTO: 98.5 FL (ref 80–94)
MONOCYTES # BLD AUTO: 1.4 X10(3)/MCL (ref 0.1–1.3)
MONOCYTES NFR BLD AUTO: 12.4 %
NEUTROPHILS # BLD AUTO: 6.75 X10(3)/MCL (ref 2.1–9.2)
NEUTROPHILS NFR BLD AUTO: 59.9 %
NRBC BLD AUTO-RTO: 0 %
PLATELET # BLD AUTO: 360 X10(3)/MCL (ref 130–400)
PLATELETS.RETICULATED NFR BLD AUTO: 1.8 % (ref 0.9–11.2)
PMV BLD AUTO: 9 FL (ref 7.4–10.4)
POTASSIUM SERPL-SCNC: 3.8 MMOL/L (ref 3.5–5.1)
RBC # BLD AUTO: 3.33 X10(6)/MCL (ref 4.7–6.1)
SODIUM SERPL-SCNC: 137 MMOL/L (ref 136–145)
WBC # BLD AUTO: 11.28 X10(3)/MCL (ref 4.5–11.5)

## 2024-08-07 PROCEDURE — 36415 COLL VENOUS BLD VENIPUNCTURE: CPT

## 2024-08-07 PROCEDURE — 80048 BASIC METABOLIC PNL TOTAL CA: CPT

## 2024-08-07 PROCEDURE — 83735 ASSAY OF MAGNESIUM: CPT

## 2024-08-07 PROCEDURE — 25000003 PHARM REV CODE 250

## 2024-08-07 PROCEDURE — 25000003 PHARM REV CODE 250: Performed by: INTERNAL MEDICINE

## 2024-08-07 PROCEDURE — 85025 COMPLETE CBC W/AUTO DIFF WBC: CPT

## 2024-08-07 PROCEDURE — 63600175 PHARM REV CODE 636 W HCPCS

## 2024-08-07 RX ORDER — LEVOFLOXACIN 750 MG/1
750 TABLET ORAL DAILY
Qty: 4 TABLET | Refills: 0 | Status: SHIPPED | OUTPATIENT
Start: 2024-08-08 | End: 2024-08-12

## 2024-08-07 RX ORDER — DAPAGLIFLOZIN 10 MG/1
10 TABLET, FILM COATED ORAL DAILY
Qty: 30 TABLET | Refills: 0 | Status: SHIPPED | OUTPATIENT
Start: 2024-08-08 | End: 2024-09-07

## 2024-08-07 RX ADMIN — LEVOTHYROXINE SODIUM 50 MCG: 50 TABLET ORAL at 05:08

## 2024-08-07 RX ADMIN — FAMOTIDINE 20 MG: 20 TABLET, FILM COATED ORAL at 08:08

## 2024-08-07 RX ADMIN — AMIODARONE HYDROCHLORIDE 200 MG: 200 TABLET ORAL at 08:08

## 2024-08-07 RX ADMIN — DAPAGLIFLOZIN 10 MG: 10 TABLET, FILM COATED ORAL at 08:08

## 2024-08-07 RX ADMIN — ENOXAPARIN SODIUM 120 MG: 120 INJECTION SUBCUTANEOUS at 08:08

## 2024-08-07 RX ADMIN — LOSARTAN POTASSIUM 25 MG: 25 TABLET, FILM COATED ORAL at 08:08

## 2024-08-07 RX ADMIN — LEVOFLOXACIN 750 MG: 500 TABLET, FILM COATED ORAL at 08:08

## 2024-08-07 RX ADMIN — CARVEDILOL 3.12 MG: 3.12 TABLET, FILM COATED ORAL at 08:08

## 2024-08-07 RX ADMIN — ASPIRIN 81 MG: 81 TABLET, COATED ORAL at 08:08

## 2024-08-07 RX ADMIN — FOLIC ACID 1 MG: 1 TABLET ORAL at 08:08

## 2024-08-07 RX ADMIN — ATORVASTATIN CALCIUM 40 MG: 40 TABLET, FILM COATED ORAL at 08:08

## 2024-08-07 NOTE — PROGRESS NOTES
"[facility] - 6th Floor Medical Telemetry    Cardiology  Progress Note    Patient Name: Arturo Ortiz  MRN: [id number]  Admission Date: 8/4/2024  Hospital Length of Stay: 3 days  Code Status: Prior   Attending Physician: Chema Parsons MD   Primary Care Physician: CHARISSE Wallace Jr., MD  Expected Discharge Date:   Principal Problem:<principal problem not specified>    Subjective:     Brief HPI/Hospital Course: Mr. Ortiz is a 68 y/o male with a history of VHD s/p AVR, HTN, Carotid Stenosis, DM II, HLD, AFIB, who is known to [facility], Dr. Gil. He presented to the ER on 8.4.24 with complaints of fever. Wife reports poor appetite and him being lethargic starting that morning. He also reports nausea and body aches. Wife reports he had a wet cough that resolved. He also reports he has not been able to take his medications.  Patient was recently discharged from [facility] on 7.26.24 after having a MVR. Upon arrival to the ER, patient had a fever on arrival to the ER. Significant labs include H&H 10.5/30.7, Na 135, Calcium 7.9, Albumin 2.8, .4, Troponin 0.425 (peak). Flu and COVID negative. Blood cultures pending. CXR shows left small pleural effusion. Left lower lung zone mild atelectasis and/or infiltrates.      8.6.24: Nad. VSS. No complaints of CP/Palps. Reports sob SOB with exertion. Reports incisional chest pain. Resting in bed comfortable. "I feel okay today"  8.7.24: NAD. VSS. No complaints of CP/SOB/Palps.  2675 urine output over 24 hours/ Fluid Net Negative 1300.      PMH: Severe Aortic Stenosis, HTN, Carotid Stenosis, Obesity, Type II DM, HLD, Pre-Syncope  PSH: Bilateral Cataract Sx  Family History: None  Social History:  Denies use of tobacco, nicotine, ETOH or illicit drugs     Previous Cardiac Diagnostics:   Limited ECHO (8.7.24):  Pericardium: Technically difficult study. There is a small to medium sized circumferential effusion. No indication of cardiac tamponade. Effusion appears " improved from previous study on 8/05/24     ECHO (8.5.24):  Left Ventricle: The left ventricle is normal in size. Mildly increased wall thickness. Moderate global hypokinesis present. There is severely reduced systolic function with a visually estimated ejection fraction of less than 30%. There is normal diastolic function. Right Ventricle: Normal right ventricular cavity size. Systolic function is mildly reduced. Left Atrium: Left atrium is moderately dilated. Aortic Valve: There is a bioprosthetic valve in the aortic position. Mitral Valve: There is no annular calcification. Moderately calcified subvalvular apparatus. There is mild stenosis. The mean pressure gradient across the mitral valve is 4 mmHg at a heart rate of  bpm. Tricuspid Valve: There is physiologically normal regurgitation. Pericardium: There is a MODERATE SIZE, CIRCUMFERENTIAL PERICARDIAL effusion. No indication of cardiac tamponade at this time but may need monitoring.  Correlate clinically. Left large pleural effusion.    QUENTIN (7.25.24):  Left Ventricle: There is mildly reduced systolic function with a visually estimated ejection fraction of 45 - 50%. Right Ventricle: Systolic function is normal. Aortic Valve: The aortic valve is a trileaflet valve. There is normal leaflet mobility. There is severe aortic regurgitation. Mitral Valve: There is no stenosis. There is mild regurgitation.     LHC (7.24.24):   Dominance: right. Left main:  Patent vessel that trifurcates into a LAD ramus and circumflex system.  Left anterior descending artery:  Widely patent type 2 vessel that gives rise to a large patent D1 and a small to medium-sized patent D2. Ramus:  Large patent vessel. Circumflex artery:  Patent nondominant  Right coronary artery: Large dominant patent vessel     ECHO (7.24.24):   Left Ventricle: The left ventricle is severely dilated. Mildly increased wall thickness. There is low normal systolic function with a visually estimated ejection fraction  of 50 - 55%. Grade II diastolic dysfunction. Right Ventricle: Normal right ventricular cavity size. Systolic function is normal. TAPSE is 2.39 cm. Left Atrium: Left atrium is moderately dilated. Aortic Valve: Mildly calcified cusps. Mildly restricted motion. There is mild stenosis. Aortic valve area by VTI is 1.90 cm². Aortic valve peak velocity is 2.97 m/s. Mean gradient is 19 mmHg. The dimensionless index is 0.55. There is severe aortic regurgitation - LVDd 6.7cm. Mitral Valve: Moderately calcified leaflets. There is no stenosis. The mean pressure gradient across the mitral valve is 3 mmHg at a heart rate of 66bpm. There is mild regurgitation. Tricuspid Valve: There is mild regurgitation. IVC/SVC: Intermediate venous pressure at 8 mmHg.     TTE (6.17.24):  The study quality is average. The left ventricle is mildly enlarged. Global left ventricular systolic function is normal. The left ventricular ejection fraction is 55%. Noted left ventricular hypertrophy. Concentric left ventricular hypertrophy is present. It is mild to moderate. The left atrial diameter is moderately increased. Moderate mitral annular calcification is noted. Moderate aortic valve stenosis is present. The trans-aortic peak velocity is 3.2 m/s. The trans-aortic mean gradient is 19.9 mmHg.   6.Moderate to severe (3+) aortic regurgitation. Mild to moderate (1-2+) mitral regurgitation. Mild (1+) tricuspid regurgitation.  7.The pulmonary artery systolic pressure is 40.5 mmHg. Evidence of pulmonary hypertension is noted.      QUENTIN (3.14.2024)  No evidence of infectious endocarditis of the aortic valve. The aortic valve is a thickened and moderately restricted tricuspid with severe aortic insufficiency.  There is a eccentric regurgitant jets and can not exclude leaflet perforation.  The pressure half-time was 253 milliseconds. The LV function is estimated at 50%. The mitral valve appears structurally normal with no evidence of significant stenosis or  insufficiency. The tricuspid valve appears structurally normal with no evidence of significant stenosis or insufficiency. The pulmonic valve is not well visualized however demonstrates no significant abnormalities. The aorta is unremarkable and there was no evidence of AAA     Carotid US (4.3.2024):  The study quality is average. No stenosis significant seen in the right internal carotid artery. No stenosis significant seen in the left internal carotid artery. Antegrade right vertebral artery flow.   5.Antegrade left vertebral artery flow.     Review of Systems   Cardiovascular:  Positive for dyspnea on exertion. Negative for chest pain and palpitations.   Respiratory:  Negative for shortness of breath.    All other systems reviewed and are negative.      Objective:     Vital Signs (Most Recent):  Temp: 98.2 °F (36.8 °C) (08/07/24 0727)  Pulse: 62 (08/07/24 0809)  Resp: 18 (08/06/24 1946)  BP: (!) 109/57 (08/07/24 0809)  SpO2: (!) 93 % (08/07/24 0800) Vital Signs (24h Range):  Temp:  [97.4 °F (36.3 °C)-98.9 °F (37.2 °C)] 98.2 °F (36.8 °C)  Pulse:  [52-62] 62  Resp:  [18-20] 18  SpO2:  [92 %-98 %] 93 %  BP: (108-133)/(57-73) 109/57   Weight: 110.8 kg (244 lb 4.3 oz)  Body mass index is 35.05 kg/m².  SpO2: (!) 93 %       Intake/Output Summary (Last 24 hours) at 8/7/2024 1013  Last data filed at 8/7/2024 0500  Gross per 24 hour   Intake 1284 ml   Output 2675 ml   Net -1391 ml     Lines/Drains/Airways       Peripheral Intravenous Line  Duration                  Peripheral IV - Single Lumen 08/04/24 1015 20 G Posterior;Right Hand 2 days                    Significant Labs:   Chemistries:   Recent Labs   Lab 08/04/24  1000 08/04/24  1613 08/04/24  2209 08/05/24  0328 08/06/24  0438 08/07/24  0348     --   --  135* 139 137   K 4.5  --   --  3.9 3.9 3.8     --   --  103 105 103   CO2 23  --   --  24 25 24   BUN 17.2  --   --  13.9 14.3 15.5   CREATININE 1.14  --   --  1.04 1.10 1.08   CALCIUM 8.7*  --   --  7.9*  "7.8* 7.8*   BILITOT 1.7*  --   --  1.5  --   --    ALKPHOS 52  --   --  40  --   --    ALT 43  --   --  30  --   --    AST 29  --   --  21  --   --    GLUCOSE 119*  --   --  113 106 95   MG  --   --   --   --  2.20 2.20   TROPONINI 0.389* 0.401* 0.425* 0.397*  --   --         CBC/Anemia Labs: Coags:    Recent Labs   Lab 08/05/24  0328 08/06/24  0438 08/07/24  0348   WBC 9.57 9.76 11.28   HGB 10.5* 11.1* 11.1*   HCT 30.7* 33.5* 32.8*    326 360   MCV 96.8* 100.0* 98.5*   RDW 15.7 15.8 15.8    No results for input(s): "PT", "INR", "APTT" in the last 168 hours.     Telemetry:  SR    Physical Exam  Vitals and nursing note reviewed.   HENT:      Head: Normocephalic.      Nose: Nose normal.      Mouth/Throat:      Mouth: Mucous membranes are moist.   Eyes:      Pupils: Pupils are equal, round, and reactive to light.   Cardiovascular:      Rate and Rhythm: Normal rate and regular rhythm.      Pulses: Normal pulses.   Pulmonary:      Effort: Pulmonary effort is normal.   Abdominal:      General: Bowel sounds are normal.      Palpations: Abdomen is soft.   Musculoskeletal:         General: Normal range of motion.      Cervical back: Normal range of motion.   Skin:     General: Skin is warm.      Comments: Midline Incision C/D/I   Neurological:      Mental Status: He is alert and oriented to person, place, and time.   Psychiatric:         Mood and Affect: Mood normal.         Behavior: Behavior normal.         Current Schedule Inpatient Medications:   amiodarone  200 mg Oral Daily    aspirin  81 mg Oral Daily    atorvastatin  40 mg Oral Daily    carvediloL  3.125 mg Oral BID    dapagliflozin propanediol  10 mg Oral Daily    enoxparin  1 mg/kg Subcutaneous Q12H (prophylaxis, 0900/2100)    famotidine  20 mg Oral Daily    folic acid  1 mg Oral Daily    levoFLOXacin  750 mg Oral Daily    levothyroxine  50 mcg Oral Before breakfast    losartan  25 mg Oral Daily     Continuous Infusions:      Assessment:   NSTEMI Type II in " the setting of Heart Failure    - Troponin peaked at 0.425   Small to Medium Circumferential Pericardial Effusion   Acute Systolic/Diastolic Heart Failure    - EF 30% on ECHO (8.5.24)    - EF 45-50% on QUENTIN (7.25.24)    - EF 50-55% on ECHO (7.24.24)  CAD    - LHC (7.24.24): Non-Obstrutive CAD  VHD    - Severe AR/AI s/p AVR (7.26.24):  AVR #27 Mosaic/Bioprosthetic Medtronic Valve & Ligation of EVA, 40 mm AtriClip.    - Mild AS    - Mild MR   Recently Diagnosed Aflutter/AFIB - Currently SB    - [id number] Score 4.8 % Stroke Risk Per Year     - AtriaClip (7.26.24)  Hx Recent Infective Endocarditis early 2024  Anemia - Stable   Leukocytosis - Resolved   HTN  HLD  DM II   Hypothyroidism  Leukocytosis/Afebrile   Carotid Stenosis (Bilateral)  Osteoarthritis Right Hip   Obesity  No known history of GI Bleed       Plan:   Limited ECHO Reviewed   Aggressive IS Usage  Continue FD Lovenox for Stroke Risk Reduction; Resume Eliquis at Discharge   Continue Amiodarone, ASA, Statin, Coreg, Farxiga, and Losartan   Keep Mag > 2 and Potassium > 4  Ensure Accurate I&O/Daily Weights  Follow-up with Dr. Gil in 1 week with Limited ECHO prior to appointment     We will sign off at this time. Please call with any questions or concerns.     LATIA Stearns  Cardiology  [facility] - 6th Floor Medical Telemetry

## 2024-08-07 NOTE — PLAN OF CARE
Problem: Adult Inpatient Plan of Care  Goal: Plan of Care Review  8/7/2024 0419 by Loli Magallanes RN  Outcome: Progressing  8/6/2024 1921 by Loli Magallanes RN  Outcome: Progressing  Goal: Patient-Specific Goal (Individualized)  8/7/2024 0419 by Loli Magallanes RN  Outcome: Progressing  8/6/2024 1921 by Loli Magallanes RN  Outcome: Progressing  Goal: Absence of Hospital-Acquired Illness or Injury  8/7/2024 0419 by Loli Magallanes RN  Outcome: Progressing  8/6/2024 1921 by Loli Magallanes RN  Outcome: Progressing  Goal: Optimal Comfort and Wellbeing  8/7/2024 0419 by Loli Magallanes RN  Outcome: Progressing  8/6/2024 1921 by Loli Magallanes RN  Outcome: Progressing  Goal: Readiness for Transition of Care  8/7/2024 0419 by Loli Magallanes RN  Outcome: Progressing  8/6/2024 1921 by Loli Magallanes RN  Outcome: Progressing     Problem: Diabetes Comorbidity  Goal: Blood Glucose Level Within Targeted Range  8/7/2024 0419 by Loli Magallanes RN  Outcome: Progressing  8/6/2024 1921 by Loli Magallanes RN  Outcome: Progressing

## 2024-08-07 NOTE — PLAN OF CARE
08/07/24 1541   Final Note   Assessment Type Final Discharge Note   Anticipated Discharge Disposition Home-Health   Post-Acute Status   Post-Acute Authorization Home Health     Discharge documentation sent to [facility] via [employer].

## 2024-08-07 NOTE — DISCHARGE SUMMARY
[facility] - 6th Floor [facility] MEDICINE - DISCHARGE SUMMARY    Patient Name: Arturo Ortiz  MRN: [id number]  Admission Date: 8/4/2024  Discharge Date: 08/07/2024  Hospital Length of Stay: 3 days  Discharge Provider: Chema Parsons MD  Primary Care Provider: CHARISSE Wallace Jr., MD      HOSPITAL COURSE   Arturo Ortiz is a 69 y.o. White male with a past medical history of hypertension, hyperlipidemia, CHF with LVEF 45% and grade 1 diastolic dysfunction, diabetes mellitus type 2, aortic valve replacement on 07/26/2024 with Dr. Sierra and on Eliquis, diabetes mellitus type 2, hypothyroidism. The patient presented to [facility] on 8/4/2024 with a primary complaint of generalized weakness and fever.  Patient was states he began to experience generalized weakness yesterday (08/03/2024) worsening today.  Associated symptoms include fever, occasional nonproductive cough, nausea.  He denies complaints of chest pain, abdominal pain, vomiting, diarrhea, burning with urination urinary frequency.     Upon presentation to the ED, temperature 101° F, blood pressure 137/84, heart rate 77, respiratory rate sixteen and SpO2 95% on room air.  Labs with WBC 15.03, H&H 11.9/35.6, MCV 97.5, platelet count for 410, glucose 119, .4, troponin 0.389.  Influenza A/B and SARS-COV-2 PCR negative.  UA negative for infection.  EKG normal sinus rhythm, ST and T-wave abnormalities considering inferior ischemia and prolonged QT interval.  Chest x-ray with left small pleural effusion and left lower lung zone mild atelectasis and/or infiltrates.  In ED patient received Tylenol and Zosyn.  Patient is admitted to hospital medicine services for further medical management.      Essentially admitted for fever and generalized weakness.  Source of the fever was felt to be pneumonia which was found on imaging.  Initiated on broad-spectrum with cefepime and vancomycin initially and then later tapered down to  Levaquin.  Remains doing well and no further fevers or complaints.  He was noted to have a moderate pericardial effusion and thus Cardiology and CT surgery evaluated, repeat echocardiogram today shows improvement.  No tamponade.  He is feeling better.  Plan for discharge home today on Levaquin to complete course.        PHYSICAL EXAM     Most Recent Vital Signs:  Temp: 96.6 °F (35.9 °C) (08/07/24 1147)  Pulse: (!) 58 (08/07/24 1227)  Resp: 18 (08/06/24 1946)  BP: 102/65 (08/07/24 1147)  SpO2: 98 % (08/07/24 1227)   GENERAL: In no acute distress, afebrile  HEENT:  CHEST: Clear to auscultation bilaterally  HEART: S1, S2, no appreciable murmur  ABDOMEN: Soft, nontender, BS +  MSK: Warm, no lower extremity edema, no clubbing or cyanosis  NEUROLOGIC: Alert and oriented x4, moving all extremities with good strength   INTEGUMENTARY:  PSYCHIATRY:          DISCHARGE DIAGNOSIS   Left lower lobe hospital-acquired pneumonia   Sepsis 2/2 above   Elevated troponins 2/to recent surgery  Moderate pericardial effusion     History of: As listed above        _____________________________________________________________________________      DISCHARGE MED REC     Current Discharge Medication List        START taking these medications    Details   dapagliflozin propanediol (FARXIGA) 10 mg tablet Take 1 tablet (10 mg total) by mouth once daily.  Qty: 30 tablet, Refills: 0      levoFLOXacin (LEVAQUIN) 750 MG tablet Take 1 tablet (750 mg total) by mouth once daily. for 4 days  Qty: 4 tablet, Refills: 0           CONTINUE these medications which have NOT CHANGED    Details   albuterol (PROVENTIL) 2.5 mg /3 mL (0.083 %) nebulizer solution Take 3 mLs (2.5 mg total) by nebulization every 4 (four) hours as needed for Wheezing. Rescue  Qty: 60 mL, Refills: 5      amiodarone (PACERONE) 200 MG Tab Take 1 tablet (200 mg total) by mouth once daily.  Qty: 30 tablet, Refills: 1      apixaban (ELIQUIS) 5 mg Tab Take 1 tablet (5 mg total) by mouth 2  (two) times daily.  Qty: 60 tablet, Refills: 1      aspirin (ECOTRIN) 81 MG EC tablet Take 1 tablet (81 mg total) by mouth once daily.  Qty: 30 tablet, Refills: 11      atorvastatin (LIPITOR) 40 MG tablet Take 1 tablet (40 mg total) by mouth once daily.  Qty: 90 tablet, Refills: 3      carvediloL (COREG) 3.125 MG tablet Take 1 tablet (3.125 mg total) by mouth 2 (two) times daily.  Qty: 60 tablet, Refills: 11    Comments: .      furosemide (LASIX) 20 MG tablet Take 2 tablets (40 mg total) by mouth as needed (prn for edema and HASTINGS).  Qty: 30 tablet, Refills: 0    Associated Diagnoses: Fluid retention      HYDROcodone-acetaminophen (NORCO) 5-325 mg per tablet Take 1 tablet by mouth every 6 (six) hours as needed for Pain.  Qty: 30 tablet, Refills: 0    Comments: Quantity prescribed more than 7 day supply? Yes, quantity medically necessary      levothyroxine (SYNTHROID) 50 MCG tablet TAKE 1 TABLET BY MOUTH BEFORE BREAKFAST.  Qty: 90 tablet, Refills: 0    Associated Diagnoses: Medication refill      losartan (COZAAR) 25 MG tablet Take 1 tablet (25 mg total) by mouth once daily.  Qty: 90 tablet, Refills: 3    Comments: .                CONSULTS     Consults (From admission, onward)          Status Ordering Provider     Inpatient consult to Cardiac Rehab  Once        Provider:  (Not yet assigned)    Acknowledged NICKIE SIM     Inpatient consult to Cardiothoracic Surgery  Once        Provider:  Sita Sierra MD    Acknowledged NICKIE SIM     Inpatient consult to Cardiology  Once        Provider:  Carter Hsu MD    Completed CHAYA WINSLOW              FOLLOW UP      Follow-up Information       [facility] Follow up.    Specialties: Home Health Services, Home Therapy Services, Home Living Aide Services  Why: This is your home health agency.  Contact information:  [address] [place] [address] [phone]             Sita Sierra MD Follow up on 8/21/2024.    Specialties:  Cardiothoracic Surgery, Cardiology  Why: @ 9:20  Contact information:  [address] [place] [address]  [address]  [facility] [address]  [phone]                                 DISCHARGE INSTRUCTIONS     Explained in detail to the patient about the discharge plan, medications, and follow-up visits. Pt understands and agrees with the treatment plan.  Discharged Condition: stable  Diet as tolerated  Activities as tolerated  Discharge to: Home or Self Care    TIME SPENT ON DISCHARGE   35 minutes        Chema Parsons MD  Internal Medicine  Department of Hospital Medicine [facility] - 6th Floor Medical Telemetry      This document was created using electronic dictation services.  Please excuse any errors that may have been made.  Contact me if any questions regarding documentation to clarify verbiage.

## 2024-08-09 LAB
BACTERIA BLD CULT: NORMAL
BACTERIA BLD CULT: NORMAL

## 2024-08-10 ENCOUNTER — PATIENT MESSAGE (OUTPATIENT)
Dept: ADMINISTRATIVE | Facility: OTHER | Age: 69
End: 2024-08-10
Payer: MEDICARE

## 2024-08-21 ENCOUNTER — OFFICE VISIT (OUTPATIENT)
Dept: CARDIAC SURGERY | Facility: CLINIC | Age: 69
End: 2024-08-21
Payer: MEDICARE

## 2024-08-21 VITALS
BODY MASS INDEX: 34.79 KG/M2 | WEIGHT: 243 LBS | OXYGEN SATURATION: 95 % | SYSTOLIC BLOOD PRESSURE: 125 MMHG | HEIGHT: 70 IN | HEART RATE: 72 BPM | DIASTOLIC BLOOD PRESSURE: 73 MMHG

## 2024-08-21 DIAGNOSIS — I35.1 MODERATE TO SEVERE AORTIC VALVE REGURGITATION: Primary | ICD-10-CM

## 2024-08-21 NOTE — PROGRESS NOTES
"Arturo Ortiz is a 69 y.o. male patient.   No diagnosis found.  Past Medical History:   Diagnosis Date    Avascular necrosis of right femoral head     DM (diabetes mellitus)     Essential (primary) hypertension     Hypercholesteremia     Macrocytosis     Osteoarthritis of right hip     Osteoarthritis of right knee     Peripheral neuropathy     Stenosis of aortic and mitral valves      Past Surgical History Pertinent Negatives:   Procedure Date Noted    ADENOIDECTOMY 2024    APPENDECTOMY 2024    BRAIN SURGERY 2024    BREAST SURGERY 2024    CARDIAC VALVE REPLACEMENT 2024     SECTION 2024    CHOLECYSTECTOMY 2024    COLON SURGERY 2024    CORONARY ARTERY BYPASS GRAFT 2024    COSMETIC SURGERY 2024    FRACTURE SURGERY 2024    HERNIA REPAIR 2024    KIDNEY TRANSPLANT 2024    LIVER TRANSPLANT 2024    PROSTATE SURGERY 2024    SMALL INTESTINE SURGERY 2024    SPINE SURGERY 2024    TONSILLECTOMY 2024    TUBAL LIGATION 2024    VASECTOMY 2024     Scheduled Meds:  Continuous Infusions:  PRN Meds:    Review of patient's allergies indicates:  No Known Allergies  There are no hospital problems to display for this patient.    Blood pressure 125/73, pulse 72, height 5' 10" (1.778 m), weight 110.2 kg (243 lb), SpO2 95%.    Subjective:  The patient has thinning status post aortic valve replacement.  He has been doing well with no complaints.      Objective:  His wounds are clean his sternum is stable.      Assessment & Plan:  Overall doing very well RTC p.r.n. definitely recommend cardiac rehab      Sita Sierra MD  2024    "

## 2024-08-27 ENCOUNTER — EXTERNAL HOME HEALTH (OUTPATIENT)
Dept: HOME HEALTH SERVICES | Facility: HOSPITAL | Age: 69
End: 2024-08-27
Payer: MEDICARE

## 2024-08-30 ENCOUNTER — DOCUMENT SCAN (OUTPATIENT)
Dept: HOME HEALTH SERVICES | Facility: HOSPITAL | Age: 69
End: 2024-08-30
Payer: MEDICARE

## 2024-09-12 ENCOUNTER — ANESTHESIA EVENT (OUTPATIENT)
Dept: CARDIOLOGY | Facility: HOSPITAL | Age: 69
End: 2024-09-12
Payer: MEDICARE

## 2024-09-12 ENCOUNTER — ANESTHESIA (OUTPATIENT)
Dept: CARDIOLOGY | Facility: HOSPITAL | Age: 69
End: 2024-09-12
Payer: MEDICARE

## 2024-09-12 ENCOUNTER — HOSPITAL ENCOUNTER (OUTPATIENT)
Dept: CARDIOLOGY | Facility: HOSPITAL | Age: 69
Discharge: HOME | End: 2024-09-12
Attending: INTERNAL MEDICINE
Payer: MEDICARE

## 2024-09-12 VITALS
BODY MASS INDEX: 34.36 KG/M2 | OXYGEN SATURATION: 97 % | HEIGHT: 70 IN | SYSTOLIC BLOOD PRESSURE: 147 MMHG | TEMPERATURE: 96 F | WEIGHT: 240 LBS | DIASTOLIC BLOOD PRESSURE: 74 MMHG | RESPIRATION RATE: 16 BRPM | HEART RATE: 58 BPM

## 2024-09-12 VITALS
SYSTOLIC BLOOD PRESSURE: 174 MMHG | RESPIRATION RATE: 20 BRPM | OXYGEN SATURATION: 99 % | HEART RATE: 58 BPM | DIASTOLIC BLOOD PRESSURE: 107 MMHG

## 2024-09-12 DIAGNOSIS — I10 HTN (HYPERTENSION): ICD-10-CM

## 2024-09-12 DIAGNOSIS — I48.0 PAROXYSMAL ATRIAL FIBRILLATION: Primary | ICD-10-CM

## 2024-09-12 DIAGNOSIS — I48.0 PAROXYSMAL ATRIAL FIBRILLATION: ICD-10-CM

## 2024-09-12 LAB
BSA FOR ECHO PROCEDURE: 2.32 M2
OHS QRS DURATION: 108 MS
OHS QTC CALCULATION: 441 MS
POCT GLUCOSE: 101 MG/DL (ref 70–110)

## 2024-09-12 PROCEDURE — 93312 ECHO TRANSESOPHAGEAL: CPT

## 2024-09-12 PROCEDURE — 93010 ELECTROCARDIOGRAM REPORT: CPT | Mod: ,,, | Performed by: INTERNAL MEDICINE

## 2024-09-12 PROCEDURE — 93325 DOPPLER ECHO COLOR FLOW MAPG: CPT

## 2024-09-12 PROCEDURE — 63600175 PHARM REV CODE 636 W HCPCS

## 2024-09-12 PROCEDURE — 25000003 PHARM REV CODE 250

## 2024-09-12 PROCEDURE — 37000008 HC ANESTHESIA 1ST 15 MINUTES

## 2024-09-12 PROCEDURE — 93005 ELECTROCARDIOGRAM TRACING: CPT

## 2024-09-12 RX ORDER — PROPOFOL 10 MG/ML
VIAL (ML) INTRAVENOUS
Status: DISCONTINUED | OUTPATIENT
Start: 2024-09-12 | End: 2024-09-12

## 2024-09-12 RX ORDER — ETOMIDATE 2 MG/ML
INJECTION INTRAVENOUS
Status: DISCONTINUED | OUTPATIENT
Start: 2024-09-12 | End: 2024-09-12

## 2024-09-12 RX ORDER — GLYCOPYRROLATE 0.2 MG/ML
INJECTION INTRAMUSCULAR; INTRAVENOUS
Status: DISCONTINUED | OUTPATIENT
Start: 2024-09-12 | End: 2024-09-12

## 2024-09-12 RX ORDER — LIDOCAINE HYDROCHLORIDE 20 MG/ML
INJECTION, SOLUTION EPIDURAL; INFILTRATION; INTRACAUDAL; PERINEURAL
Status: DISCONTINUED | OUTPATIENT
Start: 2024-09-12 | End: 2024-09-12

## 2024-09-12 RX ADMIN — PROPOFOL 30 MG: 10 INJECTION, EMULSION INTRAVENOUS at 11:09

## 2024-09-12 RX ADMIN — GLYCOPYRROLATE 0.2 MG: 0.2 INJECTION INTRAMUSCULAR; INTRAVENOUS at 11:09

## 2024-09-12 RX ADMIN — SODIUM CHLORIDE, SODIUM GLUCONATE, SODIUM ACETATE, POTASSIUM CHLORIDE AND MAGNESIUM CHLORIDE: 526; 502; 368; 37; 30 INJECTION, SOLUTION INTRAVENOUS at 11:09

## 2024-09-12 RX ADMIN — LIDOCAINE HYDROCHLORIDE 80 MG: 20 INJECTION, SOLUTION EPIDURAL; INFILTRATION; INTRACAUDAL; PERINEURAL at 11:09

## 2024-09-12 RX ADMIN — ETOMIDATE 10 MG: 2 INJECTION INTRAVENOUS at 11:09

## 2024-09-12 NOTE — TRANSFER OF CARE
"Anesthesia Transfer of Care Note    Patient: Arturo Ortiz    Procedure(s) Performed: * No procedures listed *    Patient location: Cath Lab    Anesthesia Type: general    Transport from OR: Transported from OR on room air with adequate spontaneous ventilation    Post pain: adequate analgesia    Post assessment: no apparent anesthetic complications    Post vital signs: stable    Level of consciousness: awake and alert    Nausea/Vomiting: no nausea/vomiting    Complications: none    Transfer of care protocol was followed      Last vitals: Visit Vitals  BP (!) 166/80   Pulse (!) 58   Temp 35.6 °C (96 °F) (Oral)   Resp 18   Ht 5' 10" (1.778 m)   Wt 108.9 kg (240 lb)   SpO2 100%   BMI 34.44 kg/m²     "

## 2024-09-12 NOTE — ANESTHESIA PREPROCEDURE EVALUATION
"                                                                                                             09/12/2024  Arturo Ortiz is a 69 y.o., male.s/p Valve replacement(Aortic), 06/24.  Diagnosis: Paroxysmal atrial fibrillation [I48.0]     The pt. Comes to [facility] Cath.Lab for the noted procedure under GA/TIVA w/ IV propofol.  Date/Time: 09/12/24 0900   Scheduled providers: Stanislaw Gil MD; Frank Joy MD   Procedure: TRANSESOPHAGEAL ECHO (QUENTIN) (CUPID ONLY)             PMHx:Problem List  Current as of 09/12/24 0857  Acute diastolic congestive heart failure Aerococcus urinae bacteremia   COVID Calculus of gallbladder without cholecystitis without obstruction   Class 2 severe obesity due to excess calories with serious comorbidity and body mass index (BMI) of 37.0 to 37.9 in adult Congestive heart failure   Diabetes mellitus Endocarditis   Fever High thyroid stimulating hormone (TSH) level   History of right hip replacement Hypercholesterolemia   Hypothyroid Macrocytic anemia   Moderate to severe aortic valve regurgitation Primary hypertension   Primary osteoarthritis involving multiple joints      No specialty history recorded    Other Medical History   Avascular necrosis of right femoral head DM (diabetes mellitus)   Essential (primary) hypertension Hypercholesteremia   Macrocytosis Osteoarthritis of right hip   Osteoarthritis of right knee Peripheral neuropathy   Stenosis of aortic and mitral valves          PSHx:  Surgical History:  TOTAL HIP ARTHROPLASTY EYE SURGERY   JOINT REPLACEMENT CORONARY ANGIOGRAPHY   AORTIC VALVE REPLACEMENT            Vital signs:  Pre Vitals  Current as of 09/12/24 0857  BP: 131/73 Pulse: 63   Resp: SpO2: 99   Temp: 35.6 °C (96 °F)   Height: 5' 10" (1.778 m) (09/12/24) Weight: 108.9 kg (240 lb) (09/12/24)   BMI: 34.4 IBW: 73 kg (160 lb 15 oz)   Last edited 09/12/24 0837 by NC      Lab Data:      Echo:          EKG:          Pre-op Assessment    I have reviewed " the Patient Summary Reports.     I have reviewed the Nursing Notes. I have reviewed the NPO Status.   I have reviewed the Medications.     Review of Systems  Anesthesia Hx:  No problems with previous Anesthesia                Social:  Non-Smoker       Hematology/Oncology:  Hematology Normal   Oncology Normal                                   EENT/Dental:  EENT/Dental Normal           Cardiovascular:  Exercise tolerance: good   Hypertension       CHF         Functional Capacity good / => 4 METS      Congestive Heart Failure (CHF)                Hypertension         Pulmonary:  Pulmonary Normal                       Renal/:  Renal/ Normal                 Hepatic/GI:  Hepatic/GI Normal                 Musculoskeletal:  Arthritis        Arthritis          Neurological:    Neuromuscular Disease,           Arthritis                         Neuromuscular Disease   Endocrine:  Diabetes Hypothyroidism   Diabetes                   Hypothyroidism          Dermatological:  Skin Normal    Psych:  Psychiatric Normal                    Physical Exam  General: Alert, Oriented, Well nourished and Cooperative    Airway:  Mallampati: II   Mouth Opening: Normal  TM Distance: Normal  Tongue: Normal  Neck ROM: Normal ROM    Dental:  Intact    Chest/Lungs:  Clear to auscultation, Normal Respiratory Rate    Heart:  Rate: Normal  Rhythm: Regular Rhythm        Anesthesia Plan  Type of Anesthesia, risks & benefits discussed:    Anesthesia Type: Gen Natural Airway  Intra-op Monitoring Plan: Standard ASA Monitors  Post Op Pain Control Plan: IV/PO Opioids PRN  Induction:  IV  Informed Consent: Informed consent signed with the Patient and all parties understand the risks and agree with anesthesia plan.  All questions answered.   ASA Score: 3  Day of Surgery Review of History & Physical: H&P Update referred to the surgeon/provider.    Ready For Surgery From Anesthesia Perspective.     .

## 2024-09-12 NOTE — DISCHARGE SUMMARY
[facility] - Cath Lab Pre/Post  Discharge Note  Short Stay    Transesophageal echo (QUENTIN)      OUTCOME: Patient tolerated treatment/procedure well without complication and is now ready for discharge.    DISPOSITION: Home or Self Care    FINAL DIAGNOSIS:  AF    FOLLOWUP: In clinic    DISCHARGE INSTRUCTIONS:  No discharge procedures on file.     TIME SPENT ON DISCHARGE: 31 minutes

## 2024-09-12 NOTE — ANESTHESIA POSTPROCEDURE EVALUATION
Anesthesia Post Evaluation    Patient: Arturo Ortiz    Procedure(s) Performed: * No procedures listed *    Final Anesthesia Type: general      Patient location during evaluation: PACU  Patient participation: Yes- Able to Participate  Level of consciousness: awake and alert and oriented  Post-procedure vital signs: reviewed and stable  Pain management: adequate  Airway patency: patent  JEROME mitigation strategies: Verification of full reversal of neuromuscular block  PONV status at discharge: No PONV  Anesthetic complications: no      Cardiovascular status: blood pressure returned to baseline and stable  Respiratory status: spontaneous ventilation and unassisted  Hydration status: euvolemic  Follow-up not needed.  Comments: [facility]              Vitals Value Taken Time   /76 09/12/24 1146   Temp  09/12/24 1158   Pulse 58 09/12/24 1158   Resp 7 09/12/24 1158   SpO2 97 % 09/12/24 1158   Vitals shown include unfiled device data.      No case tracking events are documented in the log.      Pain/Mary Jane Score: No data recorded

## 2024-09-12 NOTE — DISCHARGE INSTRUCTIONS
Your throat may feel sore.  Do not eat or drink anything until the numbness in your throat wears off so that you don't choke. Then you may drink fluids and eat soft foods until your throat feels normal.  For the next day or so, get lots of rest. Sleep when you are feeling tired. Avoid doing tiring activities.  Do not drive. Avoid using tools or machines, such as a lawnmower, for at least 1 day after the procedure.

## 2025-01-16 PROCEDURE — 83880 ASSAY OF NATRIURETIC PEPTIDE: CPT | Performed by: FAMILY MEDICINE

## 2025-01-24 PROBLEM — L82.1 SEBORRHEIC KERATOSIS: Status: ACTIVE | Noted: 2025-01-24

## 2025-01-24 PROBLEM — I83.91 ASYMPTOMATIC VARICOSE VEINS OF RIGHT LOWER EXTREMITY: Status: ACTIVE | Noted: 2025-01-24

## 2025-02-21 RX ORDER — CARVEDILOL 3.12 MG/1
3.12 TABLET ORAL 2 TIMES DAILY
Qty: 180 TABLET | Refills: 3 | Status: SHIPPED | OUTPATIENT
Start: 2025-02-21

## 2025-03-24 RX ORDER — ATORVASTATIN CALCIUM 40 MG/1
40 TABLET, FILM COATED ORAL
Qty: 90 TABLET | Refills: 3 | Status: SHIPPED | OUTPATIENT
Start: 2025-03-24

## (undated) DEVICE — GLOVE PROTEXIS PI SYN SURG 7.5

## (undated) DEVICE — GOWN SMARTSLEEVE AAMI LVL4 XXL

## (undated) DEVICE — SOL ELECTROLYTE PH 7.4 500ML

## (undated) DEVICE — SOL .9NACL PF 100 ML

## (undated) DEVICE — CATH THORACIC 28FR ST

## (undated) DEVICE — BOWL STERILE LG GRAD 32OZ

## (undated) DEVICE — SOL IRR NACL .9% 3000ML

## (undated) DEVICE — PAD DEFIB CADENCE ADULT R2

## (undated) DEVICE — CATH JACKY RADIAL 5FR 100CM

## (undated) DEVICE — Device

## (undated) DEVICE — CANNULA MC2 OVAL NVENT 32/40FR

## (undated) DEVICE — GUIDEWIRE INQWIRE SE 3MM JTIP

## (undated) DEVICE — PACK SURG PERF CARDPULM BYPS

## (undated) DEVICE — SUT 2/0 36IN ETHIBOND EXCE

## (undated) DEVICE — BAND TR WITH INFLATOR

## (undated) DEVICE — GLOVE PROTEXIS HYDROGEL SZ6.5

## (undated) DEVICE — KIT GLIDESHEATH SLEND 6FR 10CM

## (undated) DEVICE — NDL ASPIRATOR AIR 16G

## (undated) DEVICE — GLOVE BIOGEL 7.5

## (undated) DEVICE — CARTRIDGE SILV 4CHAN 2.0-3.5MG

## (undated) DEVICE — KIT C.A.T.S. FAST START

## (undated) DEVICE — DRESSING TELFA + RECT 6X10IN

## (undated) DEVICE — SOL LAC RINGERS 1000ML INJ

## (undated) DEVICE — SUT PROLENE 4-0 RB-1 BL MO

## (undated) DEVICE — SUT SILK 2-0 BLK BR KS 30 I

## (undated) DEVICE — SENSOR LOW LEVEL OXYGEN

## (undated) DEVICE — KIT VAVD

## (undated) DEVICE — SUT PROLENE 5-0 36IN C-1

## (undated) DEVICE — CARTRIDGE HEPARIN 2 CHNNL ACT

## (undated) DEVICE — KIT SURGICAL TURNOVER

## (undated) DEVICE — SOL PLASMALYTE PH 7.4 1000ML

## (undated) DEVICE — SOL NORMAL USPCA 0.9%

## (undated) DEVICE — TUBE SUCTION 6.5 TIP 6FR

## (undated) DEVICE — GLOVE PROTEXIS BLUE LATEX 7

## (undated) DEVICE — DEVICE COR KNOT MINI COMBO KIT

## (undated) DEVICE — SPONGE LAP 18X18 PREWASHED

## (undated) DEVICE — PAD HEARTSTART DEFIB ADULT

## (undated) DEVICE — HOLDER STRIP-T SELF ADH 2X10IN

## (undated) DEVICE — SUT ETHBND XTRA 1 OS-8 30IN

## (undated) DEVICE — KIT CATHGARD DBL LUMN 9FRX11.5

## (undated) DEVICE — CANNULA NON-VENT 24FR 14.5IN

## (undated) DEVICE — CANNULA NASAL ADULT

## (undated) DEVICE — CARTRIDGE HEPARIN DOSE

## (undated) DEVICE — CATH THOR STND RGHT ANG 28F

## (undated) DEVICE — SUT 2 30IN SILK BLK BRAIDE

## (undated) DEVICE — INSERT INTRACK CLAMP ULTRA 88M

## (undated) DEVICE — SUT MONOCRYL PLUS UD 3-0 27

## (undated) DEVICE — TRAY CATH FOL SIL TEMP 10 16FR

## (undated) DEVICE — INSERT STEALTH SURGICAL CLAMP

## (undated) DEVICE — CATH FOLEY 16F COUNCIL STA LOC

## (undated) DEVICE — COVER PROBE US 5.5X58L NON LTX

## (undated) DEVICE — HEMOCONCENTRATOR W TBNG ADPT

## (undated) DEVICE — DRAPE SLUSH WARMER WITH DISC

## (undated) DEVICE — INSERT INTRACK CLAMP ULT 66MM

## (undated) DEVICE — GLOVE PROTEXIS NEOPRN SZ8

## (undated) DEVICE — DEVICE CLSR ATRICLIP FLEX 40MM

## (undated) DEVICE — DRESSING TEGADERM CHG 3.5X4.5

## (undated) DEVICE — SYR IRRIGATION BULB STER 60ML

## (undated) DEVICE — SUT MAXON GRN 0 CLSR 27IN

## (undated) DEVICE — HEMOSTAT SURGICEL FIBRLR 2X4IN

## (undated) DEVICE — PENCIL ELECSURG ROCKER 15FT

## (undated) DEVICE — BLANKET HYPER ADULT 24X60IN

## (undated) DEVICE — SPONGE GAUZE 16PLY 4X4

## (undated) DEVICE — STOPCOCK 4-WAY

## (undated) DEVICE — SYR LUER LOCK STERILE 10ML